# Patient Record
Sex: MALE | Race: BLACK OR AFRICAN AMERICAN | NOT HISPANIC OR LATINO | URBAN - METROPOLITAN AREA
[De-identification: names, ages, dates, MRNs, and addresses within clinical notes are randomized per-mention and may not be internally consistent; named-entity substitution may affect disease eponyms.]

---

## 2022-07-18 ENCOUNTER — EMERGENCY (EMERGENCY)
Facility: HOSPITAL | Age: 47
LOS: 1 days | Discharge: HOME | End: 2022-07-18
Attending: EMERGENCY MEDICINE | Admitting: EMERGENCY MEDICINE

## 2022-07-18 VITALS
TEMPERATURE: 98 F | DIASTOLIC BLOOD PRESSURE: 101 MMHG | SYSTOLIC BLOOD PRESSURE: 141 MMHG | HEART RATE: 89 BPM | RESPIRATION RATE: 18 BRPM | OXYGEN SATURATION: 97 %

## 2022-07-18 PROCEDURE — 99284 EMERGENCY DEPT VISIT MOD MDM: CPT

## 2022-07-18 RX ORDER — IBUPROFEN 200 MG
600 TABLET ORAL ONCE
Refills: 0 | Status: COMPLETED | OUTPATIENT
Start: 2022-07-18 | End: 2022-07-18

## 2022-07-18 RX ADMIN — Medication 600 MILLIGRAM(S): at 11:52

## 2022-07-18 NOTE — ED PROVIDER NOTE - OBJECTIVE STATEMENT
Offered pt professional , pt declined, wife at bedside fluent in english.   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 Pt occasionally takes tylenol for pain.   Paperwork provided states malignant melanoma with metastasis to multiple right lymph node. Pt was started on chemo, does not know what regimen. Pt has no insurance yet. Offered pt professional , pt declined, wife at bedside fluent in english.   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 Pt occasionally takes tylenol for pain.   Paperwork provided states malignant melanoma with metastasis to multiple right lymph node. Pt was started on chemo, does not know what regimen. Pt has no insurance yet.

## 2022-07-18 NOTE — ED PROVIDER NOTE - NSFOLLOWUPINSTRUCTIONS_ED_ALL_ED_FT
** You have metastatic ungual melanoma.   ** Take over the counter Tylenol or Ibuprofen for pain -- follow dosing directions on original bottle.    ** A rapid follow up appointment has been requested. The coordinator will call.   ** Go to the nearest Emergency Department if you experience any new or concerning symptoms, such as:   - worsening pain  - chest pain  - difficulty breathing  - passing out  - unable to eat or drink  - unable to move or feel part of your body  - fever, chills

## 2022-07-18 NOTE — ED PROVIDER NOTE - ATTENDING CONTRIBUTION TO CARE
GEN - NAD; well appearing; A+O x3   HEAD - NC/AT   EYES- PERRL, EOMI  ENT: Airway patent, mmm, Oral cavity and pharynx normal. No inflammation, swelling, exudate, or lesions.  NECK: Neck supple, non-tender without lymphadenopathy, no masses.  PULMONARY - CTA b/l, symmetric breath sounds.   CARDIAC -s1s2, RRR, no M,G,R  ABDOMEN - +BS, ND, NT, soft  BACK - no CVA tenderness, Normal  spine   EXTREMITIES - FROM, no edema, r. thumb with discoloration, hyperpigmentation and distortion of nail, no erythema/drainage, warmth, mild tender, brisk cap refill, full function to affected finger and hand, no swelling, +r. axillary lymph nodes palpable, minimally tender, no overlying axillary skin changes   SKIN - no rash or bruising   NEUROLOGIC - alert, speech clear, no focal deficits  PSYCH -nl mood/affect, nl insight.  a/p-irma winn-wife translating at patients request. Patient and wife report he was diagnosed with metastatic melanoma in [facility]-started chemo treatment while there but was determined they did not have the appropriate treatment regimen in [facility] so has come to us to resume treatment here. He has not yet sought care since he arrived. He describes chronic thumb pain for which he takes tylenol when severe, and does provide relief, has not taken any today. He notes previously diagnosed lymph nodes to his r. axilla. He is able to tolerate po, no fevers, chills, cough, cp, sob, abd pain, vomiting, diarrhea. On exam patient is nontoxic appearing, ao3, unlabored breathing, clear lungs, abd soft/nt, exts warm and well perfused-chronic appearing deformity to r. thumb c/w reported melanoma-axillary lymph node present as reported in patients paperwork indicating metastasis, is nvi. Patient has no acute complaints-is seeking establishment of care. He does not yet have insurance. BELLE consulted to assist with emergency insurance info for patient-[facility] soheila contacted [facility] to establish f/u with them for further management. Return precautions discussed.

## 2022-07-18 NOTE — ED PROVIDER NOTE - NS ED ROS FT
GENERAL: No fever, chills  EYES: no vision changes, no discharge.   ENT: no difficulty swallowing or speaking   CARDIAC: no chest pain/pressure, SOB, lower extremity swelling  PULMONARY: no cough, SOB  GI: no abdominal pain, n/v/d  : no dysuria, no hematuria  SKIN: + right first digit nail pain.   NEURO: no headache, lightheadedness  MSK: No joint pain, myalgia, weakness.

## 2022-07-18 NOTE — ED PROVIDER NOTE - PATIENT PORTAL LINK FT
10/18/18 2000   [facility] Group Therapy   Group Name Community Reintegration   Specific Interventions Other (see comments)  (leisure )   Participation Level Active   Participation Quality Cooperative   Insight/Motivation Good   Affect/Mood Display Appropriate   Cognition Alert      You can access the FollowMyHealth Patient Portal offered by [facility] by registering at the following website: [email or web address]. By joining [employer]’s FollowMyHealth portal, you will also be able to view your health information using other applications (apps) compatible with our system.

## 2022-07-18 NOTE — ED PROVIDER NOTE - PHYSICAL EXAMINATION
Gen: Well appearing in NAD   Head: NC/AT  Neck: trachea midline  Resp:  No distress  Ext: + right thumb nail deformity with ungual melanoma, no surrounding erythema or draining. + ri  Neuro:  A&O appears non focal  Skin:  + right thumb nail deformity with ungual melanoma, no surrounding erythema or draining.   Psych:  Normal affect and mood Gen: Well appearing in NAD   Head: NC/AT  Neck: trachea midline  Resp:  No distress  Ext: + right thumb nail deformity with ungual melanoma, no surrounding erythema or draining. + right axillary lymph node tenderness, no cervical or supraclavicular lymph nodes.   Neuro:  A&O appears non focal  Skin:  + right thumb nail deformity with ungual melanoma, no surrounding erythema or draining.   Psych:  Normal affect and mood

## 2022-07-18 NOTE — ED PROVIDER NOTE - CLINICAL SUMMARY MEDICAL DECISION MAKING FREE TEXT BOX
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 Pt occasionally takes tylenol for pain.   Paperwork provided states malignant melanoma with metastasis to multiple right lymph node. Ext: + right thumb nail deformity with ungual melanoma, no surrounding erythema or draining. + right axillary lymph node tenderness, no cervical or supraclavicular lymph nodes. No acute distress, well appearing. analgesia, consult SW and referral coordinator to facilitate follow up care with oncology.

## 2022-08-01 PROBLEM — Z00.00 ENCOUNTER FOR PREVENTIVE HEALTH EXAMINATION: Status: ACTIVE | Noted: 2022-08-01

## 2022-08-02 ENCOUNTER — OUTPATIENT (OUTPATIENT)
Dept: OUTPATIENT SERVICES | Facility: HOSPITAL | Age: 47
LOS: 1 days | Discharge: HOME | End: 2022-08-02

## 2022-08-02 ENCOUNTER — TRANSCRIPTION ENCOUNTER (OUTPATIENT)
Age: 47
End: 2022-08-02

## 2022-08-02 DIAGNOSIS — C43.9 MALIGNANT MELANOMA OF SKIN, UNSPECIFIED: ICD-10-CM

## 2022-08-03 ENCOUNTER — APPOINTMENT (OUTPATIENT)
Dept: HEMATOLOGY ONCOLOGY | Facility: CLINIC | Age: 47
End: 2022-08-03

## 2022-08-03 ENCOUNTER — RESULT REVIEW (OUTPATIENT)
Age: 47
End: 2022-08-03

## 2022-08-03 ENCOUNTER — NON-APPOINTMENT (OUTPATIENT)
Age: 47
End: 2022-08-03

## 2022-08-03 VITALS
BODY MASS INDEX: 31.28 KG/M2 | RESPIRATION RATE: 16 BRPM | HEART RATE: 78 BPM | OXYGEN SATURATION: 98 % | WEIGHT: 216.05 LBS | SYSTOLIC BLOOD PRESSURE: 134 MMHG | HEIGHT: 69.49 IN | TEMPERATURE: 97.1 F | DIASTOLIC BLOOD PRESSURE: 95 MMHG

## 2022-08-03 LAB
BASOPHILS # BLD AUTO: 0.03 K/UL — SIGNIFICANT CHANGE UP (ref 0–0.2)
BASOPHILS NFR BLD AUTO: 0.8 % — SIGNIFICANT CHANGE UP (ref 0–2)
EOSINOPHIL # BLD AUTO: 0.23 K/UL — SIGNIFICANT CHANGE UP (ref 0–0.5)
EOSINOPHIL NFR BLD AUTO: 6 % — SIGNIFICANT CHANGE UP (ref 0–6)
HCT VFR BLD CALC: 43.6 % — SIGNIFICANT CHANGE UP (ref 39–50)
HGB BLD-MCNC: 14.5 G/DL — SIGNIFICANT CHANGE UP (ref 13–17)
IMM GRANULOCYTES NFR BLD AUTO: 0.3 % — SIGNIFICANT CHANGE UP (ref 0–1.5)
LYMPHOCYTES # BLD AUTO: 1.72 K/UL — SIGNIFICANT CHANGE UP (ref 1–3.3)
LYMPHOCYTES # BLD AUTO: 44.7 % — HIGH (ref 13–44)
MCHC RBC-ENTMCNC: 28.3 PG — SIGNIFICANT CHANGE UP (ref 27–34)
MCHC RBC-ENTMCNC: 33.3 G/DL — SIGNIFICANT CHANGE UP (ref 32–36)
MCV RBC AUTO: 85 FL — SIGNIFICANT CHANGE UP (ref 80–100)
MONOCYTES # BLD AUTO: 0.4 K/UL — SIGNIFICANT CHANGE UP (ref 0–0.9)
MONOCYTES NFR BLD AUTO: 10.4 % — SIGNIFICANT CHANGE UP (ref 2–14)
NEUTROPHILS # BLD AUTO: 1.46 K/UL — LOW (ref 1.8–7.4)
NEUTROPHILS NFR BLD AUTO: 37.8 % — LOW (ref 43–77)
NRBC # BLD: 0 /100 WBCS — SIGNIFICANT CHANGE UP (ref 0–0)
PLATELET # BLD AUTO: 223 K/UL — SIGNIFICANT CHANGE UP (ref 150–400)
RBC # BLD: 5.13 M/UL — SIGNIFICANT CHANGE UP (ref 4.2–5.8)
RBC # FLD: 12.9 % — SIGNIFICANT CHANGE UP (ref 10.3–14.5)
WBC # BLD: 3.85 K/UL — SIGNIFICANT CHANGE UP (ref 3.8–10.5)
WBC # FLD AUTO: 3.85 K/UL — SIGNIFICANT CHANGE UP (ref 3.8–10.5)

## 2022-08-03 PROCEDURE — 99205 OFFICE O/P NEW HI 60 MIN: CPT | Mod: 25

## 2022-08-03 PROCEDURE — 93010 ELECTROCARDIOGRAM REPORT: CPT

## 2022-08-03 PROCEDURE — 99417 PROLNG OP E/M EACH 15 MIN: CPT

## 2022-08-03 NOTE — HISTORY OF PRESENT ILLNESS
[de-identified] : Mr. Burk is a 47 year old Citizen of [place] speaking gentlemen presenting to the office for an initial consultation of melanoma.\par \par Patient has history of ungual melanoma of right thumb with metastases to multiple right axillary lymph nodes.\par He first noticed the change in color of the nail > 1 year.\par He underwent node resection 12/2021 and 3 nodes between 6-9 cm were removed at the time.\par \par He received treatment from January 10 to March 28 2022 at [facility] in [facility].\par Patient received 4 cycles of dacarbazine 250 mg/m2 x 5 days every 28 days.\par He states that the nail tumor improved with chemotherapy.\par The bleeding stopped.\par Since off of chemotherapy the pain is getting worse.\par \par He notes pain in the epigastrum for the past 3 weeks that is worse at night.\par It is ~ 3/10 in severity and is persistent.\par \par Patient referred to US oncology for treatment with immunotherapy.\par \par COVID Vaccine Screen Fix [employer] J&J 12/5/22 (no booster) [de-identified] : Medical Oncology ([facility]): Guy Shetty  [phone]\par \par \par PTs Contact    Inocencia Tovar (spouse) / [phone]\par

## 2022-08-03 NOTE — PHYSICAL EXAM
[Restricted in physically strenuous activity but ambulatory and able to carry out work of a light or sedentary nature] : Status 1- Restricted in physically strenuous activity but ambulatory and able to carry out work of a light or sedentary nature, e.g., light house work, office work [Normal] : affect appropriate [de-identified] : epigastric tenderness [de-identified] : destructive pigmented lesion on the right thumb that completely replaces the nail; right axilla node ~ 2 cm.

## 2022-08-04 ENCOUNTER — OUTPATIENT (OUTPATIENT)
Dept: OUTPATIENT SERVICES | Facility: HOSPITAL | Age: 47
LOS: 1 days | End: 2022-08-04
Payer: MEDICAID

## 2022-08-04 ENCOUNTER — APPOINTMENT (OUTPATIENT)
Dept: MRI IMAGING | Facility: IMAGING CENTER | Age: 47
End: 2022-08-04

## 2022-08-04 DIAGNOSIS — C43.61 MALIGNANT MELANOMA OF RIGHT UPPER LIMB, INCLUDING SHOULDER: ICD-10-CM

## 2022-08-04 PROCEDURE — 70553 MRI BRAIN STEM W/O & W/DYE: CPT

## 2022-08-04 PROCEDURE — A9585: CPT

## 2022-08-04 PROCEDURE — 70553 MRI BRAIN STEM W/O & W/DYE: CPT | Mod: 26

## 2022-08-05 LAB
25(OH)D3 SERPL-MCNC: 27.3 NG/ML
ALBUMIN SERPL ELPH-MCNC: 4.8 G/DL
ALP BLD-CCNC: 57 U/L
ALT SERPL-CCNC: 14 U/L
AMYLASE/CREAT SERPL: 95 U/L
ANION GAP SERPL CALC-SCNC: 10 MMOL/L
APTT BLD: 30.7 SEC
AST SERPL-CCNC: 18 U/L
BILIRUB SERPL-MCNC: 0.4 MG/DL
BUN SERPL-MCNC: 8 MG/DL
CALCIUM SERPL-MCNC: 9.6 MG/DL
CHLORIDE SERPL-SCNC: 103 MMOL/L
CHOLEST SERPL-MCNC: 210 MG/DL
CO2 SERPL-SCNC: 29 MMOL/L
COVID-19 NUCLEOCAPSID  GAM ANTIBODY INTERPRETATION: POSITIVE
COVID-19 SPIKE DOMAIN ANTIBODY INTERPRETATION: POSITIVE
CREAT SERPL-MCNC: 1.25 MG/DL
CRP SERPL-MCNC: 4 MG/L
EGFR: 71 ML/MIN/1.73M2
ESTIMATED AVERAGE GLUCOSE: 123 MG/DL
FERRITIN SERPL-MCNC: 210 NG/ML
GLUCOSE SERPL-MCNC: 101 MG/DL
HBA1C MFR BLD HPLC: 5.9 %
HBV SURFACE AB SER QL: NONREACTIVE
HBV SURFACE AG SER QL: NONREACTIVE
HCV AB SER QL: NONREACTIVE
HCV S/CO RATIO: 0.48 S/CO
HDLC SERPL-MCNC: 41 MG/DL
INR PPP: 1.08 RATIO
LDH SERPL-CCNC: 170 U/L
LDLC SERPL CALC-MCNC: 136 MG/DL
LPL SERPL-CCNC: 24 U/L
NONHDLC SERPL-MCNC: 169 MG/DL
POTASSIUM SERPL-SCNC: 3.7 MMOL/L
PROT SERPL-MCNC: 7.8 G/DL
PSA SERPL-MCNC: 0.74 NG/ML
PT BLD: 12.6 SEC
SARS-COV-2 AB SERPL IA-ACNC: >250 U/ML
SARS-COV-2 AB SERPL QL IA: 117 INDEX
SODIUM SERPL-SCNC: 142 MMOL/L
TRIGL SERPL-MCNC: 168 MG/DL
TSH SERPL-ACNC: 0.46 UIU/ML

## 2022-08-06 ENCOUNTER — APPOINTMENT (OUTPATIENT)
Dept: NUCLEAR MEDICINE | Facility: IMAGING CENTER | Age: 47
End: 2022-08-06

## 2022-08-06 ENCOUNTER — OUTPATIENT (OUTPATIENT)
Dept: OUTPATIENT SERVICES | Facility: HOSPITAL | Age: 47
LOS: 1 days | End: 2022-08-06
Payer: MEDICAID

## 2022-08-06 DIAGNOSIS — C43.61 MALIGNANT MELANOMA OF RIGHT UPPER LIMB, INCLUDING SHOULDER: ICD-10-CM

## 2022-08-06 DIAGNOSIS — Z00.8 ENCOUNTER FOR OTHER GENERAL EXAMINATION: ICD-10-CM

## 2022-08-06 PROCEDURE — 78816 PET IMAGE W/CT FULL BODY: CPT

## 2022-08-06 PROCEDURE — 78816 PET IMAGE W/CT FULL BODY: CPT | Mod: 26,PI

## 2022-08-06 PROCEDURE — A9552: CPT

## 2022-08-08 ENCOUNTER — NON-APPOINTMENT (OUTPATIENT)
Age: 47
End: 2022-08-08

## 2022-08-09 ENCOUNTER — RESULT REVIEW (OUTPATIENT)
Age: 47
End: 2022-08-09

## 2022-08-17 ENCOUNTER — RESULT REVIEW (OUTPATIENT)
Age: 47
End: 2022-08-17

## 2022-08-17 ENCOUNTER — OUTPATIENT (OUTPATIENT)
Dept: OUTPATIENT SERVICES | Facility: HOSPITAL | Age: 47
LOS: 1 days | End: 2022-08-17
Payer: MEDICAID

## 2022-08-17 ENCOUNTER — APPOINTMENT (OUTPATIENT)
Dept: ULTRASOUND IMAGING | Facility: IMAGING CENTER | Age: 47
End: 2022-08-17

## 2022-08-17 DIAGNOSIS — Z00.8 ENCOUNTER FOR OTHER GENERAL EXAMINATION: ICD-10-CM

## 2022-08-17 PROCEDURE — 88305 TISSUE EXAM BY PATHOLOGIST: CPT | Mod: 26

## 2022-08-17 PROCEDURE — 88173 CYTOPATH EVAL FNA REPORT: CPT | Mod: 26

## 2022-08-17 PROCEDURE — 88172 CYTP DX EVAL FNA 1ST EA SITE: CPT

## 2022-08-17 PROCEDURE — 10005 FNA BX W/US GDN 1ST LES: CPT

## 2022-08-17 PROCEDURE — 88342 IMHCHEM/IMCYTCHM 1ST ANTB: CPT

## 2022-08-17 PROCEDURE — 88173 CYTOPATH EVAL FNA REPORT: CPT

## 2022-08-17 PROCEDURE — 88342 IMHCHEM/IMCYTCHM 1ST ANTB: CPT | Mod: 26

## 2022-08-17 PROCEDURE — 88341 IMHCHEM/IMCYTCHM EA ADD ANTB: CPT | Mod: 26

## 2022-08-17 PROCEDURE — 88305 TISSUE EXAM BY PATHOLOGIST: CPT

## 2022-08-17 PROCEDURE — 88341 IMHCHEM/IMCYTCHM EA ADD ANTB: CPT

## 2022-08-23 LAB — NON-GYNECOLOGICAL CYTOLOGY STUDY: SIGNIFICANT CHANGE UP

## 2022-08-30 ENCOUNTER — APPOINTMENT (OUTPATIENT)
Dept: INTERVENTIONAL RADIOLOGY/VASCULAR | Facility: CLINIC | Age: 47
End: 2022-08-30

## 2022-08-30 ENCOUNTER — APPOINTMENT (OUTPATIENT)
Dept: HEMATOLOGY ONCOLOGY | Facility: CLINIC | Age: 47
End: 2022-08-30

## 2022-08-30 ENCOUNTER — APPOINTMENT (OUTPATIENT)
Dept: INFUSION THERAPY | Facility: HOSPITAL | Age: 47
End: 2022-08-30

## 2022-08-30 DIAGNOSIS — Z78.9 OTHER SPECIFIED HEALTH STATUS: ICD-10-CM

## 2022-08-30 PROCEDURE — 99203 OFFICE O/P NEW LOW 30 MIN: CPT | Mod: 95

## 2022-08-30 NOTE — DATA REVIEWED
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 , initial treatment strategy\par \par HISTORY: 47-year-old patient with ungual melanoma right thumb and nodes in right axilla\par ungual melanoma of right thumb with metastases to multiple right axillary lymph nodes.\par He first noticed the change in color of the nail > 1 year.\par He underwent node resection 12/2021 and 3 nodes between 6-9 cm were removed at the time.\par \par He received treatment from January 10 to March 28 2022 at [facility] in [facility].\par Patient received 4 cycles of dacarbazine 250 mg/m2 x 5 days every 28 days.\par He states that the nail tumor improved with chemotherapy.\par The bleeding stopped.\par Since off of chemotherapy the pain is getting worse.\par Blood glucose pre injection 97 mg/dL\par TECHNIQUE: Approximately 45 minutes after the intravenous administration of 11.5 mCi 18-Fluorine FDG, whole body PET images were acquired from top of head to bottom of feet . In addition, non-contrast, low dose, non - diagnostic CT was acquired for attenuation correction and anatomic correlation purposes only.\par These images reveal pathologic FDG uptake coregistering with the distal phalanx of the right finger (SUV 6.2; image 264-268) suspicious for biologic tumor activity in light of the history (note technical misregistration between the CT and emission images of the right hand) and FDG avid 1.4 x 0.9 cm solid right middle lobe pulmonary nodule with SUV 3.6, avid on nonattenuation corrected images as well, (image 134) suspicious for biologic tumor activity\par \par There is focal uptake in the left forearm (image 227 SUV 3.5) which is possibly related to injection in the left hand IV and physical examination of the left forearm suggested\par Subcentimeter right axillary adenopathy SUV 2.6 (example image 100) which is nonspecific despite history of metastases to right axilla.\par \par IMPRESSION:\par Pathologic FDG uptake in the right first finger and 1.4 x 0.9 cm solid right middle lobe pulmonary nodule (SUV 3.6, and avid on NAC images, image 134) suspicious for biologic tumor activity in light of the history\par \par -distal phalanx of the right first finger (SUV 6.2; image 264-268) (technical misregistration between CT and emission images of the right hand)\par - focal uptake in the left forearm (image 227 SUV 3.5) - may be related to injection site in "left hand IV". Physical examination of the left forearm suggested\par -Subcentimeter right axillary adenopathy SUV 2.6 (example image 100) -nonspecific despite history of metastases to right axilla\par \par No other sites of abnormal FDG uptake\par \par --- End of Report ---\par \par \par \par \par \par \par ABDIAS BENAVIDES DO; Attending Nuclear Medicine\par This document has been electronically signed. Aug  6 2022 11:55PM\par

## 2022-08-30 NOTE — HISTORY OF PRESENT ILLNESS
[FreeTextEntry1] : Mr. Burk is a 47 year old [employer] [facility] speaking gentlemen being evaluated for melanoma referred to IR for biopsy. Patient accompanied with spouse Diane Tovar. \par \par Patient has history of ungual melanoma of right thumb with metastases to multiple right axillary lymph nodes.\par He first noticed the change in color of the nail > 1 year. He underwent node resection 12/2021 and 3 nodes between 6-9 cm were removed at the time. He received treatment from January 10 to March 28 2022 at [facility] in [facility]. Patient received 4 cycles of dacarbazine 250 mg/m2 x 5 days every 28 days. He states that the nail tumor improved with chemotherapy & the bleeding stopped. Since off of chemotherapy the pain is getting worse.\par \par Pt s/p now axiliary LN biopsy on 8/17 resulting nonmalignant; now referred to IR for lung biopsy to assess for pathology and molecular genetics. \par \par \par Heme/Onc: Dr. Flores\par \par \par \par Medical Oncology ([facility]): Guy Shetty [phone]\par \par

## 2022-08-30 NOTE — ASSESSMENT
[Other: _____] : [unfilled] [FreeTextEntry1] : Mr. Burk is a 47 year old [place] speaking gentlemen being evaluated for melanoma referred to IR for right lung lesion biopsy. \par \par #1  Right middle lobe pulmonary nodule\par - PET from 8/6/22 demonstrating Pathologic FDG uptake in the right first finger and 1.4 x 0.9 cm solid right middle lobe pulmonary nodule (SUV 3.6, and avid on NAC images, image 134) suspicious for biologic tumor activity in light of the history; distal phalanx of the right first finger (SUV 6.2; image 264-268) (technical misregistration between CT and emission images of the right hand)\par - image findings discussed at length with patient\par - I reviewed image guided lung biopsy procedure, including the risks (pneumothorax, chest tube placement/hospitalization, bleeding, etc), benefits, alternatives, and expected post procedure course.\par \par I confirmed with Dr. Marcos the indication and rationale for performing the TNB of the right lung mass to obtain tissue to guide therapy at this point in time.\par \par \par Mr. Burk and his wife's comprehension was confirmed & all questions were answered to his satisfaction.\par \par IR contact information was provided to the pt & I discussed that our booking office will be in touch to schedule Covid PCR test, PST & procedure once she contacts our office. \par

## 2022-09-12 ENCOUNTER — TRANSCRIPTION ENCOUNTER (OUTPATIENT)
Age: 47
End: 2022-09-12

## 2022-09-12 ENCOUNTER — RESULT REVIEW (OUTPATIENT)
Age: 47
End: 2022-09-12

## 2022-09-12 ENCOUNTER — OUTPATIENT (OUTPATIENT)
Dept: OUTPATIENT SERVICES | Facility: HOSPITAL | Age: 47
LOS: 1 days | End: 2022-09-12
Payer: MEDICAID

## 2022-09-12 VITALS
TEMPERATURE: 98 F | RESPIRATION RATE: 20 BRPM | SYSTOLIC BLOOD PRESSURE: 144 MMHG | DIASTOLIC BLOOD PRESSURE: 110 MMHG | WEIGHT: 216.05 LBS | OXYGEN SATURATION: 97 % | HEIGHT: 69 IN | HEART RATE: 91 BPM

## 2022-09-12 VITALS
SYSTOLIC BLOOD PRESSURE: 128 MMHG | RESPIRATION RATE: 16 BRPM | HEART RATE: 60 BPM | OXYGEN SATURATION: 100 % | DIASTOLIC BLOOD PRESSURE: 72 MMHG

## 2022-09-12 DIAGNOSIS — C43.61 MALIGNANT MELANOMA OF RIGHT UPPER LIMB, INCLUDING SHOULDER: ICD-10-CM

## 2022-09-12 LAB — SARS-COV-2 RNA SPEC QL NAA+PROBE: SIGNIFICANT CHANGE UP

## 2022-09-12 PROCEDURE — 88305 TISSUE EXAM BY PATHOLOGIST: CPT

## 2022-09-12 PROCEDURE — 88173 CYTOPATH EVAL FNA REPORT: CPT

## 2022-09-12 PROCEDURE — C1894: CPT

## 2022-09-12 PROCEDURE — 88173 CYTOPATH EVAL FNA REPORT: CPT | Mod: 26

## 2022-09-12 PROCEDURE — 10009 FNA BX W/CT GDN 1ST LES: CPT

## 2022-09-12 PROCEDURE — 88341 IMHCHEM/IMCYTCHM EA ADD ANTB: CPT

## 2022-09-12 PROCEDURE — 88305 TISSUE EXAM BY PATHOLOGIST: CPT | Mod: 26

## 2022-09-12 PROCEDURE — U0003: CPT

## 2022-09-12 PROCEDURE — 71045 X-RAY EXAM CHEST 1 VIEW: CPT

## 2022-09-12 PROCEDURE — 71045 X-RAY EXAM CHEST 1 VIEW: CPT | Mod: 26

## 2022-09-12 PROCEDURE — 88342 IMHCHEM/IMCYTCHM 1ST ANTB: CPT | Mod: 26

## 2022-09-12 RX ORDER — FENTANYL CITRATE 50 UG/ML
50 INJECTION INTRAVENOUS
Refills: 0 | Status: DISCONTINUED | OUTPATIENT
Start: 2022-09-12 | End: 2022-09-12

## 2022-09-12 NOTE — ASU PATIENT PROFILE, ADULT - FALL HARM RISK - UNIVERSAL INTERVENTIONS
Bed in lowest position, wheels locked, appropriate side rails in place/Call bell, personal items and telephone in reach/Instruct patient to call for assistance before getting out of bed or chair/Non-slip footwear when patient is out of bed/[place] to call system/Physically safe environment - no spills, clutter or unnecessary equipment/Purposeful Proactive Rounding/Room/bathroom lighting operational, light cord in reach

## 2022-09-12 NOTE — PROCEDURE NOTE - PROCEDURE FINDINGS AND DETAILS
Patient placed supine on CT table. Right lung nodule identified on CT imaging. Right lung nodule accessed under CT imaging. FNA of the right lung nodule was performed x1. Repeat imaging demonstrated a right sided pneumothorax. Pleural air was then aspirated with a syringe. Repeat imaging demonstrated resolution of pneumothorax with trace residual pneumothorax. Patient placed supine on CT table. Right lung nodule identified on CT imaging. Right lung nodule accessed under CT imaging. FNA of the right lung nodule was performed x1 using 20 g needle via 19 g guiding needle. Repeat imaging demonstrated a right sided pneumothorax. Pleural air was then aspirated with a syringe via 5 [place] Islander modified sheath placed using Seldinger technique. Repeat imaging demonstrated resolution of pneumothorax with trace residual pneumothorax.  Efforts to continue biopsy for more tissue were unsuccessful due to PTX.  Specimen was reviewed by cytopathologist present.  Full report to follow.

## 2022-09-12 NOTE — PROCEDURE NOTE - PLAN
Recovery for atleast 2 hours.  Chest Xray at 1PM and 3PM. Discharge pending results of 3PM chest Xray  Follow up results of FNA.

## 2022-09-12 NOTE — ASU PREOP CHECKLIST - ALLERGIES REVIEWED
Dr. Alberto Faust regarding antibiotic duration for patient via perfect serve. Awaiting call back. done

## 2022-09-12 NOTE — ASU DISCHARGE PLAN (ADULT/PEDIATRIC) - NS MD DC FALL RISK RISK
For information on Fall & Injury Prevention, visit: [email or web address] OR  [email or web address] OR  https://www.cdc.gov/steadi/patient.html

## 2022-09-12 NOTE — ASU DISCHARGE PLAN (ADULT/PEDIATRIC) - NURSING INSTRUCTIONS
Please feel free to contact us at [phone] if any problems arise. After 6PM, Monday through Friday, on weekends and on holidays, please call [phone] and ask for the radiology resident on call to be paged.

## 2022-09-12 NOTE — PRE PROCEDURE NOTE - PRE PROCEDURE EVALUATION
Interventional Radiology    HPI: 47y Male with melanoma referred to IR for right lung lesion biopsy to obtain tissue to guide therapy. PET from 8/6/22 demonstrating pathologic FDC uptake in the right first finger and 1.4x 0.9 cm solid right middle lobe pulmonary nodule. IR to obtain right lung lesion biopsy.      Allergies:   Medications (Abx/Cardiac/Anticoagulation/Blood Products)      Data:  175.3  98  T(C): 36.8  HR: 91  BP: 144/110  RR: 20  SpO2: 97%    Exam  General: No acute distress  Chest: Non labored breathing  Abdomen: Non-distended  Extremities: No swelling, warm          Imaging:   Imaging reviewed     Plan: Right lung lesion biopsy     -- Relevant imaging and labs were reviewed.   -- No additional antibiotics are indicated for this procedure.   -- Risks, benefits, and alternatives were explained to the patient and informed consent was obtained.

## 2022-09-12 NOTE — ASU DISCHARGE PLAN (ADULT/PEDIATRIC) - ASU DC SPECIAL INSTRUCTIONSFT
Biopsy Discharge    Discharge Instructions  - You have had a biopsy of right lung nodule.   - You may shower in 24 hours. No soaking or swimming until the site is completely healed.  - Keep the area covered and dry for the next 24 hours.  - Do not perform any heavy lifting for the next few days or until the site is healed.  - You may resume your normal diet.  - You may resume your normal medications however you should wait 48 hours before restarting aspirin, plavix, or blood thinners.  - It is normal to experience some pain over the site for the next few days. You may take apply ice to the area (20 minutes on, 20 minutes off) and take Tylenol for that pain. Do not take more frequently than every 6 hours and do not exceed more than 3000mg of Tylenol in a 24 hour period.    - You were given conscious sedation which may make you drowsy, therefore you need someone to stay with you until the morning following the procedure.  - Do not drive, engage in heavy lifting or strenuous activity, or drink any alcoholic beverages for the next 24 hours.   - You may resume normal activity in 24 hours.    Notify your primary physician and/or Interventional Radiology IMMEDIATELY if you experience any of the following       - Fever of 100.4F or 38C       - Chills or Rigors/ Shakes       - Swelling and/or Redness in the area around the biopsy site       - Worsening Pain       - Blood soaked bandages or worsening bleeding       - Lightheadedness and/or dizziness upon standing       - Chest Pain/ Tightness       - Shortness of Breath       - Difficulty walking    If you have a problem that you believe requires IMMEDIATE attention, please go to your NEAREST Emergency Room. If you believe your problem can safely wait until you speak to a physician, please call Interventional Radiology for any concerns.    During Normal Weekday Business Hours- You can contact the Interventional Radiology department during normal business hours via telephone.  During Evenings and Weekends- If you need to contact Interventional Radiology during off hours, do so by calling the hospital and requesting to be connected to the Interventional Radiologist on call.

## 2022-09-15 ENCOUNTER — NON-APPOINTMENT (OUTPATIENT)
Age: 47
End: 2022-09-15

## 2022-09-18 LAB — NON-GYNECOLOGICAL CYTOLOGY STUDY: SIGNIFICANT CHANGE UP

## 2022-09-19 ENCOUNTER — NON-APPOINTMENT (OUTPATIENT)
Age: 47
End: 2022-09-19

## 2022-09-19 DIAGNOSIS — R91.8 OTHER NONSPECIFIC ABNORMAL FINDING OF LUNG FIELD: ICD-10-CM

## 2022-09-20 ENCOUNTER — APPOINTMENT (OUTPATIENT)
Dept: HEMATOLOGY ONCOLOGY | Facility: CLINIC | Age: 47
End: 2022-09-20

## 2022-09-20 ENCOUNTER — APPOINTMENT (OUTPATIENT)
Dept: INFUSION THERAPY | Facility: HOSPITAL | Age: 47
End: 2022-09-20

## 2022-09-20 VITALS
DIASTOLIC BLOOD PRESSURE: 87 MMHG | BODY MASS INDEX: 30.83 KG/M2 | RESPIRATION RATE: 16 BRPM | HEART RATE: 82 BPM | HEIGHT: 69.49 IN | WEIGHT: 212.94 LBS | TEMPERATURE: 97.2 F | SYSTOLIC BLOOD PRESSURE: 128 MMHG | OXYGEN SATURATION: 99 %

## 2022-09-20 PROCEDURE — 99214 OFFICE O/P EST MOD 30 MIN: CPT

## 2022-09-20 NOTE — HISTORY OF PRESENT ILLNESS
[de-identified] : Mr. Burk is a 47 year old [place] speaking gentlemen presenting to the office for an initial consultation of melanoma.\par \par Patient has history of ungual melanoma of right thumb with metastases to multiple right axillary lymph nodes.\par He first noticed the change in color of the nail > 1 year.\par He underwent node resection 12/2021 and 3 nodes between 6-9 cm were removed at the time.\par \par He received treatment from January 10 to March 28 2022 at [facility] in [facility].\par Patient received 4 cycles of dacarbazine 250 mg/m2 x 5 days every 28 days.\par He states that the nail tumor improved with chemotherapy.\par The bleeding stopped.\par Since off of chemotherapy the pain is getting worse.\par \par He notes pain in the epigastrum for the past 3 weeks that is worse at night.\par It is ~ 3/10 in severity and is persistent.\par \par Patient referred to US oncology for treatment with immunotherapy.\par \par COVID Vaccine [employer] J&J 12/5/22 (no booster)\par \par 9/20/2022\par Both the right axilla biopsy and lung biopsy are negative.\par However could be false negative.\par His thumb pain is significant and might need amputation.\par We still need pathologic diagnosis and molecular genetics.\par 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 Herrera on 9/27.\par  [de-identified] : Medical Oncology ([facility]): Guy Shetty  [phone]\par \par \par PTs Contact    Inocencia Tovar (spouse) / [phone]\par

## 2022-09-21 LAB
ANION GAP SERPL CALC-SCNC: 14 MMOL/L
APTT BLD: 29.7 SEC
BASOPHILS # BLD AUTO: 0.05 K/UL
BASOPHILS NFR BLD AUTO: 1 %
BUN SERPL-MCNC: 11 MG/DL
CALCIUM SERPL-MCNC: 9.7 MG/DL
CHLORIDE SERPL-SCNC: 104 MMOL/L
CO2 SERPL-SCNC: 25 MMOL/L
CREAT SERPL-MCNC: 1.24 MG/DL
EGFR: 72 ML/MIN/1.73M2
EOSINOPHIL # BLD AUTO: 0.38 K/UL
EOSINOPHIL NFR BLD AUTO: 7.3 %
GLUCOSE SERPL-MCNC: 127 MG/DL
HCT VFR BLD CALC: 43.2 %
HGB BLD-MCNC: 14.8 G/DL
IMM GRANULOCYTES NFR BLD AUTO: 0 %
INR PPP: 0.99 RATIO
LYMPHOCYTES # BLD AUTO: 2.29 K/UL
LYMPHOCYTES NFR BLD AUTO: 44 %
MAN DIFF?: NORMAL
MCHC RBC-ENTMCNC: 28.6 PG
MCHC RBC-ENTMCNC: 34.3 GM/DL
MCV RBC AUTO: 83.4 FL
MONOCYTES # BLD AUTO: 0.54 K/UL
MONOCYTES NFR BLD AUTO: 10.4 %
NEUTROPHILS # BLD AUTO: 1.95 K/UL
NEUTROPHILS NFR BLD AUTO: 37.3 %
PLATELET # BLD AUTO: 209 K/UL
POTASSIUM SERPL-SCNC: 4.1 MMOL/L
PT BLD: 11.5 SEC
RBC # BLD: 5.18 M/UL
RBC # FLD: 13.1 %
SODIUM SERPL-SCNC: 143 MMOL/L
WBC # FLD AUTO: 5.21 K/UL

## 2022-09-27 ENCOUNTER — APPOINTMENT (OUTPATIENT)
Dept: SURGICAL ONCOLOGY | Facility: CLINIC | Age: 47
End: 2022-09-27

## 2022-09-27 VITALS
WEIGHT: 216 LBS | TEMPERATURE: 98 F | OXYGEN SATURATION: 100 % | RESPIRATION RATE: 17 BRPM | BODY MASS INDEX: 31.99 KG/M2 | DIASTOLIC BLOOD PRESSURE: 87 MMHG | HEIGHT: 69 IN | SYSTOLIC BLOOD PRESSURE: 131 MMHG | HEART RATE: 74 BPM

## 2022-09-27 PROCEDURE — 99205 OFFICE O/P NEW HI 60 MIN: CPT

## 2022-09-28 ENCOUNTER — RESULT REVIEW (OUTPATIENT)
Age: 47
End: 2022-09-28

## 2022-10-04 ENCOUNTER — OUTPATIENT (OUTPATIENT)
Dept: OUTPATIENT SERVICES | Facility: HOSPITAL | Age: 47
LOS: 1 days | End: 2022-10-04
Payer: MEDICAID

## 2022-10-04 ENCOUNTER — APPOINTMENT (OUTPATIENT)
Dept: CT IMAGING | Facility: IMAGING CENTER | Age: 47
End: 2022-10-04

## 2022-10-04 DIAGNOSIS — Z00.8 ENCOUNTER FOR OTHER GENERAL EXAMINATION: ICD-10-CM

## 2022-10-04 DIAGNOSIS — C43.61 MALIGNANT MELANOMA OF RIGHT UPPER LIMB, INCLUDING SHOULDER: ICD-10-CM

## 2022-10-04 PROCEDURE — 71260 CT THORAX DX C+: CPT | Mod: 26

## 2022-10-04 PROCEDURE — 71260 CT THORAX DX C+: CPT

## 2022-10-05 ENCOUNTER — OUTPATIENT (OUTPATIENT)
Dept: OUTPATIENT SERVICES | Facility: HOSPITAL | Age: 47
LOS: 1 days | Discharge: HOME | End: 2022-10-05

## 2022-10-05 DIAGNOSIS — C34.90 MALIGNANT NEOPLASM OF UNSPECIFIED PART OF UNSPECIFIED BRONCHUS OR LUNG: ICD-10-CM

## 2022-10-10 NOTE — ED PROVIDER NOTE - NSCAREINITIATED _GEN_ER
[facility]    Patient currently receiving [facility] of:     • Nursing: was unable to see patient at home due to rehospitalization.     Home Health plan of care is available in [facility].      Home care goals have not been met. Please place orders to resume services upon discharge if appropriate. [facility] ([phone]) will continue to follow until discharge.     Bridget Walters RN, BSN   [facility] Liaison  Phone: [phone]   Marlena Jones(Attending)

## 2022-10-11 ENCOUNTER — APPOINTMENT (OUTPATIENT)
Dept: HEMATOLOGY ONCOLOGY | Facility: CLINIC | Age: 47
End: 2022-10-11

## 2022-10-11 ENCOUNTER — APPOINTMENT (OUTPATIENT)
Dept: INFUSION THERAPY | Facility: HOSPITAL | Age: 47
End: 2022-10-11

## 2022-10-11 VITALS
SYSTOLIC BLOOD PRESSURE: 126 MMHG | OXYGEN SATURATION: 99 % | RESPIRATION RATE: 17 BRPM | DIASTOLIC BLOOD PRESSURE: 87 MMHG | BODY MASS INDEX: 31.16 KG/M2 | WEIGHT: 210.98 LBS | HEART RATE: 79 BPM | TEMPERATURE: 97.8 F

## 2022-10-11 PROCEDURE — 99214 OFFICE O/P EST MOD 30 MIN: CPT

## 2022-10-11 NOTE — HISTORY OF PRESENT ILLNESS
[de-identified] : Mr. Burk is a 47 year old [place] speaking gentlemen presenting to the office for an initial consultation of melanoma.\par \par Patient has history of ungual melanoma of right thumb with metastases to multiple right axillary lymph nodes.\par He first noticed the change in color of the nail > 1 year.\par He underwent node resection 12/2021 and 3 nodes between 6-9 cm were removed at the time.\par \par He received treatment from January 10 to March 28 2022 at [facility] in [facility].\par Patient received 4 cycles of dacarbazine 250 mg/m2 x 5 days every 28 days.\par He states that the nail tumor improved with chemotherapy.\par The bleeding stopped.\par Since off of chemotherapy the pain is getting worse.\par \par He notes pain in the epigastrum for the past 3 weeks that is worse at night.\par It is ~ 3/10 in severity and is persistent.\par \par Patient referred to US oncology for treatment with immunotherapy.\par \par COVID Vaccine [employer] J&[employer]ub[employer] 12/5/22 (no booster)\par \par 9/20/2022\par Both the right axilla biopsy and lung biopsy are negative.\par However could be false negative.\par His thumb pain is significant and might need amputation.\par We still need pathologic diagnosis and molecular genetics.\par 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 Herrera on 9/27.\par \par 10/22/2022\par I discussed case with surgeon.\par Given lack of tissue positive on lung or axilla biopsy, will proceed with distal right thumb amputation.\par Patient has been in pain and also notes intermittent discharge.\par Repeat CT scan of Rr Lung lesion shows mile increase in size of lesion.\par Patient will then also need to have this lesion resected.\par He will return to see me 2-3 weeks post-op and we will begin 1 year of adjuvant immunotherapy.  [de-identified] : Medical Oncology ([facility]): Guy Shetty  [phone]\par \par \par PTs Contact    Inocencia Tovar (spouse) / [phone]\par

## 2022-10-11 NOTE — PHYSICAL EXAM
[Restricted in physically strenuous activity but ambulatory and able to carry out work of a light or sedentary nature] : Status 1- Restricted in physically strenuous activity but ambulatory and able to carry out work of a light or sedentary nature, e.g., light house work, office work [Normal] : normoactive bowel sounds, soft and nontender, no hepatosplenomegaly or masses appreciated [de-identified] : destructive pigmented lesion on the right thumb that completely replaces the nail; right axilla node <2 cm.

## 2022-10-25 ENCOUNTER — APPOINTMENT (OUTPATIENT)
Dept: THORACIC SURGERY | Facility: CLINIC | Age: 47
End: 2022-10-25

## 2022-10-25 ENCOUNTER — NON-APPOINTMENT (OUTPATIENT)
Age: 47
End: 2022-10-25

## 2022-10-25 ENCOUNTER — OUTPATIENT (OUTPATIENT)
Dept: OUTPATIENT SERVICES | Facility: HOSPITAL | Age: 47
LOS: 1 days | End: 2022-10-25

## 2022-10-25 ENCOUNTER — APPOINTMENT (OUTPATIENT)
Dept: PULMONOLOGY | Facility: CLINIC | Age: 47
End: 2022-10-25

## 2022-10-25 VITALS
HEART RATE: 84 BPM | SYSTOLIC BLOOD PRESSURE: 124 MMHG | OXYGEN SATURATION: 100 % | TEMPERATURE: 97 F | HEIGHT: 69 IN | WEIGHT: 207.9 LBS | RESPIRATION RATE: 16 BRPM | DIASTOLIC BLOOD PRESSURE: 87 MMHG

## 2022-10-25 VITALS
HEART RATE: 82 BPM | BODY MASS INDEX: 31.25 KG/M2 | WEIGHT: 211 LBS | RESPIRATION RATE: 18 BRPM | OXYGEN SATURATION: 98 % | SYSTOLIC BLOOD PRESSURE: 141 MMHG | DIASTOLIC BLOOD PRESSURE: 97 MMHG | HEIGHT: 69 IN

## 2022-10-25 DIAGNOSIS — C43.9 MALIGNANT MELANOMA OF SKIN, UNSPECIFIED: ICD-10-CM

## 2022-10-25 DIAGNOSIS — Z98.890 OTHER SPECIFIED POSTPROCEDURAL STATES: Chronic | ICD-10-CM

## 2022-10-25 LAB
ALBUMIN SERPL ELPH-MCNC: 4.8 G/DL — SIGNIFICANT CHANGE UP (ref 3.3–5)
ALP SERPL-CCNC: 55 U/L — SIGNIFICANT CHANGE UP (ref 40–120)
ALT FLD-CCNC: 12 U/L — SIGNIFICANT CHANGE UP (ref 4–41)
ANION GAP SERPL CALC-SCNC: 12 MMOL/L — SIGNIFICANT CHANGE UP (ref 7–14)
AST SERPL-CCNC: 25 U/L — SIGNIFICANT CHANGE UP (ref 4–40)
BILIRUB SERPL-MCNC: 0.6 MG/DL — SIGNIFICANT CHANGE UP (ref 0.2–1.2)
BLD GP AB SCN SERPL QL: NEGATIVE — SIGNIFICANT CHANGE UP
BUN SERPL-MCNC: 18 MG/DL — SIGNIFICANT CHANGE UP (ref 7–23)
CALCIUM SERPL-MCNC: 9.3 MG/DL — SIGNIFICANT CHANGE UP (ref 8.4–10.5)
CHLORIDE SERPL-SCNC: 102 MMOL/L — SIGNIFICANT CHANGE UP (ref 98–107)
CO2 SERPL-SCNC: 27 MMOL/L — SIGNIFICANT CHANGE UP (ref 22–31)
CREAT SERPL-MCNC: 1.36 MG/DL — HIGH (ref 0.5–1.3)
EGFR: 65 ML/MIN/1.73M2 — SIGNIFICANT CHANGE UP
GLUCOSE SERPL-MCNC: 93 MG/DL — SIGNIFICANT CHANGE UP (ref 70–99)
HCT VFR BLD CALC: 42.5 % — SIGNIFICANT CHANGE UP (ref 39–50)
HGB BLD-MCNC: 14.2 G/DL — SIGNIFICANT CHANGE UP (ref 13–17)
MCHC RBC-ENTMCNC: 28 PG — SIGNIFICANT CHANGE UP (ref 27–34)
MCHC RBC-ENTMCNC: 33.4 GM/DL — SIGNIFICANT CHANGE UP (ref 32–36)
MCV RBC AUTO: 83.8 FL — SIGNIFICANT CHANGE UP (ref 80–100)
NRBC # BLD: 0 /100 WBCS — SIGNIFICANT CHANGE UP (ref 0–0)
NRBC # FLD: 0 K/UL — SIGNIFICANT CHANGE UP (ref 0–0)
PLATELET # BLD AUTO: 199 K/UL — SIGNIFICANT CHANGE UP (ref 150–400)
POTASSIUM SERPL-MCNC: 3.5 MMOL/L — SIGNIFICANT CHANGE UP (ref 3.5–5.3)
POTASSIUM SERPL-SCNC: 3.5 MMOL/L — SIGNIFICANT CHANGE UP (ref 3.5–5.3)
PROT SERPL-MCNC: 7.7 G/DL — SIGNIFICANT CHANGE UP (ref 6–8.3)
RBC # BLD: 5.07 M/UL — SIGNIFICANT CHANGE UP (ref 4.2–5.8)
RBC # FLD: 13.1 % — SIGNIFICANT CHANGE UP (ref 10.3–14.5)
RH IG SCN BLD-IMP: POSITIVE — SIGNIFICANT CHANGE UP
SODIUM SERPL-SCNC: 141 MMOL/L — SIGNIFICANT CHANGE UP (ref 135–145)
WBC # BLD: 5.36 K/UL — SIGNIFICANT CHANGE UP (ref 3.8–10.5)
WBC # FLD AUTO: 5.36 K/UL — SIGNIFICANT CHANGE UP (ref 3.8–10.5)

## 2022-10-25 PROCEDURE — 99205 OFFICE O/P NEW HI 60 MIN: CPT

## 2022-10-25 RX ORDER — SODIUM CHLORIDE 9 MG/ML
1000 INJECTION, SOLUTION INTRAVENOUS
Refills: 0 | Status: DISCONTINUED | OUTPATIENT
Start: 2022-11-09 | End: 2022-11-23

## 2022-10-25 RX ORDER — GABAPENTIN 300 MG/1
300 CAPSULE ORAL 3 TIMES DAILY
Qty: 30 | Refills: 0 | Status: COMPLETED | COMMUNITY
Start: 2022-09-27 | End: 2022-10-25

## 2022-10-25 NOTE — H&P PST ADULT - NSICDXPASTMEDICALHX_GEN_ALL_CORE_FT
PAST MEDICAL HISTORY:  GERD (gastroesophageal reflux disease)     Malignant melanoma right thumb    Metastatic malignant melanoma Right axillary lymph nodes

## 2022-10-25 NOTE — HISTORY OF PRESENT ILLNESS
[FreeTextEntry1] : Mr. 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 Path was non-diagnostic.\par \par CT Chest w/ IV contrast on 10/4/22:\par - 1.3 x 1.4cm Rt lung nodule (previously 1.2 x 1.1cm) \par - stable 3mm LLL nodule (3:290)\par - no new nodules, consolidations, edema, effusion or PTX\par \par Patient is here today for CT Sx consultation, referred by Dr. Herrera. Denies SOB, CP, cough.\par

## 2022-10-25 NOTE — H&P PST ADULT - HISTORY OF PRESENT ILLNESS
48 y/o Cayman Islander speaking male presents to presurgical testing with diagnosis of malignant melanoma of skin scheduled for a right thumb melanoma amputation distal, right axillary lymph node dissection. Pt with a right thumb melanoma with mets to right axillary lymph nodes s/p right axillary dissection in 12/2021, and chemotherapy from 1-3, 2022, in [facility].

## 2022-10-25 NOTE — CONSULT LETTER
[Consult Letter:] : I had the pleasure of evaluating your patient, [unfilled]. [( Thank you for referring [unfilled] for consultation for _____ )] : Thank you for referring [unfilled] for consultation for [unfilled] [Please see my note below.] : Please see my note below. [Consult Closing:] : Thank you very much for allowing me to participate in the care of this patient.  If you have any questions, please do not hesitate to contact me. [Sincerely,] : Sincerely, [FreeTextEntry2] : Dr. Sammy Herrera (Surg/Onc/referring)\par Dr. Neo Marcos (Hem/Onc)\par  [FreeTextEntry3] : Zachariah Dunn MD\par Director of Thoracic, [facility]\par Cardiovascular & Thoracic Surgery\par \par [facility]\par [address]\par Oncology Building 3rd Fl\[place] [address]\par Tel: [phone]\par Fax: [phone]\par

## 2022-10-25 NOTE — DATA REVIEWED
[FreeTextEntry1] : I have independently reviewed the following:\par PET/CT on 8/6/22\par CT-guided core biopsy of RML on 9/19/22. Path was non-diagnostic.\par CT Chest w/ IV contrast on 10/4/22

## 2022-10-25 NOTE — H&P PST ADULT - ATTENDING COMMENTS
Risks, benefits, and alternatives discussed with the patient - he expressed understanding and agrees to proceed with right thumb amputation and right axillary lymph node dissection.

## 2022-10-25 NOTE — H&P PST ADULT - NSICDXPASTSURGICALHX_GEN_ALL_CORE_FT
PAST SURGICAL HISTORY:  History of lymph node dissection of right axilla and Right thumb melanoma excision 12/2021

## 2022-10-25 NOTE — ASSESSMENT
[FreeTextEntry1] : Mr. 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 Path was non-diagnostic.\par \par CT Chest w/ IV contrast on 10/4/22:\par - 1.3 x 1.4cm Rt lung nodule (previously 1.2 x 1.1cm) \par - stable 3mm LLL nodule (3:290)\par - no new nodules, consolidations, edema, effusion or PTX\par \par I have reviewed the patient's medical records and diagnostic images at time of this office consultation and have made the following recommendation:\par 1. CT and PET scans reviewed, RML nodule is suspicious for mets melanoma, I recommended a Flex Bronch Rt VATS R.A. lung rxn on 11/15/22. Risks and benefits and alternatives explained to patient, all questions answered, patient agreed to proceed with surgery.\par 2. PFTs with Dr. Love\par 3. PST and COVID testing prior\par \par \par I personally performed the services described in the documentation, reviewed the documentation recorded by the scribe in my presence and it accurately and completely records my words and actions.\par \par I, Nacho Raymond NP, am scribing for and the presence of DESIRAE Lewis, the following sections HISTORY OF PRESENT ILLNESS, PAST MEDICAL/FAMILY/SOCIAL HISTORY; REVIEW OF SYSTEMS; VITAL SIGNS; PHYSICAL EXAM; DISPOSITION.\par \par

## 2022-10-26 PROBLEM — C43.9 MALIGNANT MELANOMA OF SKIN, UNSPECIFIED: Chronic | Status: ACTIVE | Noted: 2022-10-25

## 2022-10-26 PROBLEM — K21.9 GASTRO-ESOPHAGEAL REFLUX DISEASE WITHOUT ESOPHAGITIS: Chronic | Status: ACTIVE | Noted: 2022-10-25

## 2022-10-28 ENCOUNTER — APPOINTMENT (OUTPATIENT)
Dept: PULMONOLOGY | Facility: CLINIC | Age: 47
End: 2022-10-28

## 2022-10-28 VITALS — HEART RATE: 87 BPM | SYSTOLIC BLOOD PRESSURE: 130 MMHG | OXYGEN SATURATION: 96 % | DIASTOLIC BLOOD PRESSURE: 93 MMHG

## 2022-10-28 DIAGNOSIS — R91.1 SOLITARY PULMONARY NODULE: ICD-10-CM

## 2022-10-28 PROCEDURE — 94729 DIFFUSING CAPACITY: CPT

## 2022-10-28 PROCEDURE — 99204 OFFICE O/P NEW MOD 45 MIN: CPT | Mod: 25

## 2022-10-28 PROCEDURE — 94726 PLETHYSMOGRAPHY LUNG VOLUMES: CPT

## 2022-10-28 PROCEDURE — 94010 BREATHING CAPACITY TEST: CPT

## 2022-10-28 PROCEDURE — ZZZZZ: CPT

## 2022-10-31 PROBLEM — R91.1 LUNG NODULE: Status: ACTIVE | Noted: 2022-10-20

## 2022-10-31 NOTE — PROCEDURE
[FreeTextEntry1] : pfts 10/2022- normal spirometry, normal volume & DLCO\par \par PET/CT on 8/6/22:\par - 1.4 x 0.9 cm SUV 3.6 solid RML nodule (image 134) suspicious for biologic tumor activity in light of the history\par \par PET/CT on 8/6/22:\par - 1.4 x 0.9 cm SUV 3.6 solid RML nodule (image 134) suspicious for biologic tumor activity in light of the history\par

## 2022-10-31 NOTE — PHYSICAL EXAM
[No Acute Distress] : no acute distress [Well Nourished] : well nourished [Well Developed] : well developed [No Resp Distress] : no resp distress [No Acc Muscle Use] : no acc muscle use [Clear to Auscultation Bilaterally] : clear to auscultation bilaterally [TextBox_105] : right thumb melanoma- traces of old blood

## 2022-10-31 NOTE — REASON FOR VISIT
[Initial] : an initial visit [Abnormal CXR/ Chest CT] : an abnormal CXR/ chest CT [Pre-op Risk Stratification] : pre-op risk stratification [TextBox_13] : Dr Zachariah Dunn

## 2022-10-31 NOTE — HISTORY OF PRESENT ILLNESS
[Never] : never [TextBox_4] : KELLY NEGRON is a 47 year old male who presents with family for pre-op with Dr. 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 Path was non-diagnostic.\par now pending surgical resectin\par \par per wife/ patient- never smoker. \par no resp complaints. no cough no wheezing no dyspnea\par no issues with anesthesia in the past\par \par surgery planned for 11/15/2022\par

## 2022-11-01 ENCOUNTER — OUTPATIENT (OUTPATIENT)
Dept: OUTPATIENT SERVICES | Facility: HOSPITAL | Age: 47
LOS: 1 days | End: 2022-11-01

## 2022-11-01 ENCOUNTER — APPOINTMENT (OUTPATIENT)
Dept: PLASTIC SURGERY | Facility: CLINIC | Age: 47
End: 2022-11-01

## 2022-11-01 ENCOUNTER — APPOINTMENT (OUTPATIENT)
Dept: INFUSION THERAPY | Facility: HOSPITAL | Age: 47
End: 2022-11-01

## 2022-11-01 ENCOUNTER — APPOINTMENT (OUTPATIENT)
Dept: HEMATOLOGY ONCOLOGY | Facility: CLINIC | Age: 47
End: 2022-11-01

## 2022-11-01 VITALS
BODY MASS INDEX: 31.25 KG/M2 | DIASTOLIC BLOOD PRESSURE: 83 MMHG | TEMPERATURE: 97.8 F | HEART RATE: 92 BPM | OXYGEN SATURATION: 97 % | SYSTOLIC BLOOD PRESSURE: 124 MMHG | HEIGHT: 69 IN | WEIGHT: 211 LBS

## 2022-11-01 DIAGNOSIS — Z01.818 ENCOUNTER FOR OTHER PREPROCEDURAL EXAMINATION: ICD-10-CM

## 2022-11-01 DIAGNOSIS — Z98.890 OTHER SPECIFIED POSTPROCEDURAL STATES: Chronic | ICD-10-CM

## 2022-11-01 PROCEDURE — 99203 OFFICE O/P NEW LOW 30 MIN: CPT

## 2022-11-06 ENCOUNTER — RX RENEWAL (OUTPATIENT)
Age: 47
End: 2022-11-06

## 2022-11-07 DIAGNOSIS — Z01.818 ENCOUNTER FOR OTHER PREPROCEDURAL EXAMINATION: ICD-10-CM

## 2022-11-09 ENCOUNTER — TRANSCRIPTION ENCOUNTER (OUTPATIENT)
Age: 47
End: 2022-11-09

## 2022-11-09 ENCOUNTER — APPOINTMENT (OUTPATIENT)
Dept: PLASTIC SURGERY | Facility: HOSPITAL | Age: 47
End: 2022-11-09

## 2022-11-09 ENCOUNTER — RESULT REVIEW (OUTPATIENT)
Age: 47
End: 2022-11-09

## 2022-11-09 ENCOUNTER — OUTPATIENT (OUTPATIENT)
Dept: OUTPATIENT SERVICES | Facility: HOSPITAL | Age: 47
LOS: 1 days | Discharge: HOME | End: 2022-11-09
Payer: MEDICAID

## 2022-11-09 ENCOUNTER — APPOINTMENT (OUTPATIENT)
Dept: SURGICAL ONCOLOGY | Facility: HOSPITAL | Age: 47
End: 2022-11-09

## 2022-11-09 VITALS
OXYGEN SATURATION: 100 % | RESPIRATION RATE: 18 BRPM | WEIGHT: 207.9 LBS | SYSTOLIC BLOOD PRESSURE: 137 MMHG | TEMPERATURE: 98 F | DIASTOLIC BLOOD PRESSURE: 102 MMHG | HEART RATE: 84 BPM | HEIGHT: 69 IN

## 2022-11-09 VITALS
DIASTOLIC BLOOD PRESSURE: 85 MMHG | SYSTOLIC BLOOD PRESSURE: 130 MMHG | HEART RATE: 79 BPM | OXYGEN SATURATION: 100 % | RESPIRATION RATE: 18 BRPM

## 2022-11-09 DIAGNOSIS — C43.9 MALIGNANT MELANOMA OF SKIN, UNSPECIFIED: ICD-10-CM

## 2022-11-09 DIAGNOSIS — Z98.890 OTHER SPECIFIED POSTPROCEDURAL STATES: Chronic | ICD-10-CM

## 2022-11-09 LAB — SARS-COV-2 RNA SPEC QL NAA+PROBE: SIGNIFICANT CHANGE UP

## 2022-11-09 PROCEDURE — 14040 TIS TRNFR F/C/C/M/N/A/G/H/F: CPT

## 2022-11-09 PROCEDURE — 26951 AMPUTATION OF FINGER/THUMB: CPT | Mod: F5

## 2022-11-09 PROCEDURE — 38745 REMOVE ARMPIT LYMPH NODES: CPT

## 2022-11-09 DEVICE — ARISTA 3GR: Type: IMPLANTABLE DEVICE | Site: RIGHT | Status: FUNCTIONAL

## 2022-11-09 DEVICE — LIGATING CLIPS WECK HORIZON MEDIUM (BLUE) 24: Type: IMPLANTABLE DEVICE | Site: RIGHT | Status: FUNCTIONAL

## 2022-11-09 RX ORDER — MEPERIDINE HYDROCHLORIDE 50 MG/ML
12.5 INJECTION INTRAMUSCULAR; INTRAVENOUS; SUBCUTANEOUS
Refills: 0 | Status: DISCONTINUED | OUTPATIENT
Start: 2022-11-09 | End: 2022-11-09

## 2022-11-09 RX ORDER — OXYCODONE HYDROCHLORIDE 5 MG/1
1 TABLET ORAL
Qty: 0 | Refills: 0 | DISCHARGE
Start: 2022-11-09 | End: 2022-11-11

## 2022-11-09 RX ORDER — OXYCODONE HYDROCHLORIDE 5 MG/1
10 TABLET ORAL ONCE
Refills: 0 | Status: DISCONTINUED | OUTPATIENT
Start: 2022-11-09 | End: 2022-11-09

## 2022-11-09 RX ORDER — OXYCODONE HYDROCHLORIDE 5 MG/1
1 TABLET ORAL
Qty: 0 | Refills: 0 | DISCHARGE

## 2022-11-09 RX ORDER — OXYCODONE HYDROCHLORIDE 5 MG/1
5 TABLET ORAL ONCE
Refills: 0 | Status: DISCONTINUED | OUTPATIENT
Start: 2022-11-09 | End: 2022-11-09

## 2022-11-09 RX ORDER — OXYCODONE HYDROCHLORIDE 5 MG/1
1 TABLET ORAL
Qty: 12 | Refills: 0
Start: 2022-11-09 | End: 2022-11-11

## 2022-11-09 RX ORDER — HYDROMORPHONE HYDROCHLORIDE 2 MG/ML
0.5 INJECTION INTRAMUSCULAR; INTRAVENOUS; SUBCUTANEOUS
Refills: 0 | Status: DISCONTINUED | OUTPATIENT
Start: 2022-11-09 | End: 2022-11-09

## 2022-11-09 RX ORDER — HYDROMORPHONE HYDROCHLORIDE 2 MG/ML
1 INJECTION INTRAMUSCULAR; INTRAVENOUS; SUBCUTANEOUS
Refills: 0 | Status: DISCONTINUED | OUTPATIENT
Start: 2022-11-09 | End: 2022-11-09

## 2022-11-09 RX ORDER — ONDANSETRON 8 MG/1
4 TABLET, FILM COATED ORAL ONCE
Refills: 0 | Status: DISCONTINUED | OUTPATIENT
Start: 2022-11-09 | End: 2022-11-23

## 2022-11-09 RX ADMIN — HYDROMORPHONE HYDROCHLORIDE 1 MILLIGRAM(S): 2 INJECTION INTRAMUSCULAR; INTRAVENOUS; SUBCUTANEOUS at 12:00

## 2022-11-09 RX ADMIN — HYDROMORPHONE HYDROCHLORIDE 0.5 MILLIGRAM(S): 2 INJECTION INTRAMUSCULAR; INTRAVENOUS; SUBCUTANEOUS at 12:44

## 2022-11-09 RX ADMIN — HYDROMORPHONE HYDROCHLORIDE 1 MILLIGRAM(S): 2 INJECTION INTRAMUSCULAR; INTRAVENOUS; SUBCUTANEOUS at 11:36

## 2022-11-09 RX ADMIN — HYDROMORPHONE HYDROCHLORIDE 0.5 MILLIGRAM(S): 2 INJECTION INTRAMUSCULAR; INTRAVENOUS; SUBCUTANEOUS at 12:27

## 2022-11-09 RX ADMIN — OXYCODONE HYDROCHLORIDE 5 MILLIGRAM(S): 5 TABLET ORAL at 12:42

## 2022-11-09 NOTE — ASU DISCHARGE PLAN (ADULT/PEDIATRIC) - FOLLOW UP APPOINTMENTS
may also call Recovery Room (PACU) 24/7 @ [phone]/[facility]

## 2022-11-09 NOTE — ASU DISCHARGE PLAN (ADULT/PEDIATRIC) - ASU DC SPECIAL INSTRUCTIONSFT
elevate right arm  wear compressive wrap or garment as tolerated, rewrap if too tight  leave dressing over hand and thumb in place, keep dry  empty and record drain output  follow up with Dr. Barry in 1 week elevate right arm  wear compressive wrap or garment as tolerated, rewrap if too tight  leave dressing over hand and thumb in place, keep dry  empty and record drain output  follow up with Dr. Barry in 1 week  follow up with Dr. Herrera in 2 weeks

## 2022-11-09 NOTE — ASU DISCHARGE PLAN (ADULT/PEDIATRIC) - NS MD DC FALL RISK RISK
For information on Fall & Injury Prevention, visit: [email or web address] OR  [email or web address] OR  https://www.cdc.gov/steadi/patient.html

## 2022-11-09 NOTE — ASU DISCHARGE PLAN (ADULT/PEDIATRIC) - PROVIDER TOKENS
PROVIDER:[TOKEN:[[id number]],FOLLOWUP:[1 week]] PROVIDER:[TOKEN:[[id number]],FOLLOWUP:[1 week]],PROVIDER:[TOKEN:[[id number]],FOLLOWUP:[2 weeks]]

## 2022-11-09 NOTE — BRIEF OPERATIVE NOTE - OPERATION/FINDINGS
complex closure of thumb wound with proximal phalanx shortening, reconstruction of axillary wound with complex closure
Right thumb amputation at PIP joint with PRS closure and Right axillary dissection of zones 1,2,3 with PRS closure

## 2022-11-09 NOTE — BRIEF OPERATIVE NOTE - VENOUS THROMBOEMBOLISM PROPHYLAXIS THERAPY
TELEMEDICINE VISIT on 10/1/2021 .    Verbal consent was given by the patient to conduct this visit.    History: The patient was scheduled for an appointment.  Due to hospital outpatient closure with the COVID-19 situation, a telemedicine visit was conducted as a follow-up instead.      Review of systems:  Gen: NAD. No fever.  Head: No headaches, vertigo.  Eyes: Normal vision, no diplopia, no pain.  Ears: No change in hearing, tinnitus, bleeding.  Nose: No epistaxis, coryza, obstruction.  Mouth: No dental issues, no gingival bleeding.  Chest: No cough, no heavyness, no hemoptysis.  Heart: No chest pain, palpitations, syncope, or orthopnea.  Abdomen: No dysphagia, or melena.  : No hematuria.  Musculoskeletal: No cyanosis x4 extr.  Neur: No changes in mentation or ataxia.  Psych: No changes in thought content.    Impression:   1.  Bilateral low lumbar facet arthropathy with axial low back pain.  2.  Lumbar degenerative disc disease with L5/S1 extrusion and axial discogenic pain.  3.  Lumbar spinal stenosis at L4/5 with neurogenic claudication.     Plan:   1.  This visit was conducted due to the current COVID-19 situation.  I answered all of the patient's questions and they have our contact information.  2.  Had bilat lumbar MBB#2 with no pain the first day w/o side effects. Rec: bilat lumbar MB-RF, starting with the left. RBSA d/w pt and agreed.  3.  Will treat disks conservatively given his diagnostic response with the facets.  Please see my initial note for those details.    Over half of today's telemedicine visit was spent educating them about their disease.      
Verbal consent was given by patient to conduct this telemed visit with Dr Argueta.  The visit was performed via telephone.    Clinic/Physician Location : [facility]  Patient Location: Home    This phone call occurred on   Date and Time: 10/01/21 10:39 AM        In addition to the telemedicine visit:    [] Patients physician was contacted for coordination of care.  [] Consultation(s) ordered (MD,  etc.)  [] Precertification/referral for approval of treatment plan initiated.  [x] Medication Compliance Audit was completed, and prescription monitoring report reviewed.  [x] Routine discharge instructions given.  [] Complex discharge instructions given.  
SCDs
SCDs

## 2022-11-09 NOTE — ASU DISCHARGE PLAN (ADULT/PEDIATRIC) - CARE PROVIDER_API CALL
Michael Barry)  Plastic Surgery  [address] [address]  Phone: [phone]  Fax: [phone]  Follow Up Time: 1 week   Michael Barry)  Plastic Surgery  [address] [address]  Phone: [phone]  Fax: [phone]  Follow Up Time: 1 week    Sammy Herrera)  Complex General Surgical Oncology; Surgery; Surgical Oncology  [address] [place] [address]  Phone: [phone]  Fax: [phone]  Follow Up Time: 2 weeks

## 2022-11-09 NOTE — ASU DISCHARGE PLAN (ADULT/PEDIATRIC) - NURSING INSTRUCTIONS
DO NOT take any Tylenol (Acetaminophen) or narcotics containing Tylenol until after  4:15pm______ . You received Tylenol during your operation and it can cause damage to your liver if too much is taken within a 24 hour time period.

## 2022-11-09 NOTE — BRIEF OPERATIVE NOTE - NSICDXBRIEFPROCEDURE_GEN_ALL_CORE_FT
PROCEDURES:  Amputation of right thumb 09-Nov-2022 11:18:13  Abbey Romero  Right axillary lymph node dissection 09-Nov-2022 11:18:25  Abbey Romero

## 2022-11-11 ENCOUNTER — OUTPATIENT (OUTPATIENT)
Dept: OUTPATIENT SERVICES | Facility: HOSPITAL | Age: 47
LOS: 1 days | End: 2022-11-11

## 2022-11-11 VITALS
RESPIRATION RATE: 18 BRPM | SYSTOLIC BLOOD PRESSURE: 112 MMHG | TEMPERATURE: 98 F | HEIGHT: 69 IN | OXYGEN SATURATION: 97 % | DIASTOLIC BLOOD PRESSURE: 77 MMHG | HEART RATE: 87 BPM | WEIGHT: 210.1 LBS

## 2022-11-11 DIAGNOSIS — Z98.890 OTHER SPECIFIED POSTPROCEDURAL STATES: Chronic | ICD-10-CM

## 2022-11-11 DIAGNOSIS — R91.1 SOLITARY PULMONARY NODULE: ICD-10-CM

## 2022-11-11 DIAGNOSIS — C43.60 MALIGNANT MELANOMA OF UNSPECIFIED UPPER LIMB, INCLUDING SHOULDER: Chronic | ICD-10-CM

## 2022-11-11 LAB
ALBUMIN SERPL ELPH-MCNC: 4.4 G/DL — SIGNIFICANT CHANGE UP (ref 3.3–5)
ALP SERPL-CCNC: 54 U/L — SIGNIFICANT CHANGE UP (ref 40–120)
ALT FLD-CCNC: 13 U/L — SIGNIFICANT CHANGE UP (ref 4–41)
ANION GAP SERPL CALC-SCNC: 12 MMOL/L — SIGNIFICANT CHANGE UP (ref 7–14)
AST SERPL-CCNC: 15 U/L — SIGNIFICANT CHANGE UP (ref 4–40)
BILIRUB SERPL-MCNC: 0.2 MG/DL — SIGNIFICANT CHANGE UP (ref 0.2–1.2)
BLD GP AB SCN SERPL QL: NEGATIVE — SIGNIFICANT CHANGE UP
BUN SERPL-MCNC: 14 MG/DL — SIGNIFICANT CHANGE UP (ref 7–23)
CALCIUM SERPL-MCNC: 9.2 MG/DL — SIGNIFICANT CHANGE UP (ref 8.4–10.5)
CHLORIDE SERPL-SCNC: 102 MMOL/L — SIGNIFICANT CHANGE UP (ref 98–107)
CO2 SERPL-SCNC: 27 MMOL/L — SIGNIFICANT CHANGE UP (ref 22–31)
CREAT SERPL-MCNC: 1.34 MG/DL — HIGH (ref 0.5–1.3)
EGFR: 66 ML/MIN/1.73M2 — SIGNIFICANT CHANGE UP
GLUCOSE SERPL-MCNC: 115 MG/DL — HIGH (ref 70–99)
HCT VFR BLD CALC: 41 % — SIGNIFICANT CHANGE UP (ref 39–50)
HGB BLD-MCNC: 13.6 G/DL — SIGNIFICANT CHANGE UP (ref 13–17)
MCHC RBC-ENTMCNC: 28 PG — SIGNIFICANT CHANGE UP (ref 27–34)
MCHC RBC-ENTMCNC: 33.2 GM/DL — SIGNIFICANT CHANGE UP (ref 32–36)
MCV RBC AUTO: 84.5 FL — SIGNIFICANT CHANGE UP (ref 80–100)
NRBC # BLD: 0 /100 WBCS — SIGNIFICANT CHANGE UP (ref 0–0)
NRBC # FLD: 0 K/UL — SIGNIFICANT CHANGE UP (ref 0–0)
PLATELET # BLD AUTO: 195 K/UL — SIGNIFICANT CHANGE UP (ref 150–400)
POTASSIUM SERPL-MCNC: 3.8 MMOL/L — SIGNIFICANT CHANGE UP (ref 3.5–5.3)
POTASSIUM SERPL-SCNC: 3.8 MMOL/L — SIGNIFICANT CHANGE UP (ref 3.5–5.3)
PROT SERPL-MCNC: 7.7 G/DL — SIGNIFICANT CHANGE UP (ref 6–8.3)
RBC # BLD: 4.85 M/UL — SIGNIFICANT CHANGE UP (ref 4.2–5.8)
RBC # FLD: 12.8 % — SIGNIFICANT CHANGE UP (ref 10.3–14.5)
RH IG SCN BLD-IMP: POSITIVE — SIGNIFICANT CHANGE UP
SODIUM SERPL-SCNC: 141 MMOL/L — SIGNIFICANT CHANGE UP (ref 135–145)
WBC # BLD: 5.46 K/UL — SIGNIFICANT CHANGE UP (ref 3.8–10.5)
WBC # FLD AUTO: 5.46 K/UL — SIGNIFICANT CHANGE UP (ref 3.8–10.5)

## 2022-11-11 RX ORDER — SODIUM CHLORIDE 9 MG/ML
1000 INJECTION, SOLUTION INTRAVENOUS
Refills: 0 | Status: DISCONTINUED | OUTPATIENT
Start: 2022-11-15 | End: 2022-11-16

## 2022-11-11 NOTE — H&P PST ADULT - REASON FOR ADMISSION
"I'm having a right thumb melanoma amputation and right axillary lymph node dissection" 'I am having a nodule removed from my right lung"

## 2022-11-11 NOTE — H&P PST ADULT - HISTORY OF PRESENT ILLNESS
46 y/o Gibraltarian speaking male presents to presurgical testing with diagnosis of malignant melanoma of skin scheduled for a right thumb melanoma amputation distal, right axillary lymph node dissection. Pt s/p  right thumb melanoma with mets to right axillary lymph nodes s/p right axillary dissection in 12/2021, and chemotherapy from 1-3, 2022, in [facility]. Pt had followup PET scan done & Right Lung nodules seen. Now scheduled for Flexible Bronchoscopy, Right Robotic Assisted Wedge Resection tentatively for 11/15/22.

## 2022-11-11 NOTE — H&P PST ADULT - NSICDXPASTSURGICALHX_GEN_ALL_CORE_FT
PAST SURGICAL HISTORY:  History of lymph node dissection of right axilla and Right thumb melanoma excision 12/2021    Malignant melanoma of thumb Right Thumb Amputation with aillary Lymph Node Dissection -10/2022

## 2022-11-11 NOTE — H&P PST ADULT - PROBLEM SELECTOR PLAN 1
Now scheduled for Flexible Bronchoscopy, Right Robotic Assisted Wedge Resection tentatively on 11/15/22  Pre-op instructions provided. Pt given verbal and written instructions with teach back on chlorhexidine shampoo and Pepcid. Pt verbalized understanding with return demonstration.   Pending labs   Covid testing scheduled prior to surgery as per patient.

## 2022-11-11 NOTE — H&P PST ADULT - SKIN COMMENTS
s/p amputation right thumb - right arm splint with ace maryanne & SONAL Drain Inpalce, other finger mobile s/p amputation right thumb - right arm splint with ace wrap & SONAL Drain Inlace, other finger mobile

## 2022-11-14 NOTE — ASU PATIENT PROFILE, ADULT - NS TRANSFER PATIENT BELONGINGS
Spoke to patient's mother. Informed her a copy of [employer]'s sports form has been printed. Asked mother if school was requesting a school form for [employer] as well. Mother was unsure. Thinks the sports form should be sufficient. Mother states she will call the school to double-check. Informed mother if they are requesting the [place] school form as well to call our office back and we can have it ready for . Mother verbalized understanding. States she does have a copy of [employer]'s sports form on hand. Will call our office tomorrow if school form is needed. Otherwise, has no additional needs at this time.    Clothing

## 2022-11-14 NOTE — ASU PATIENT PROFILE, ADULT - FALL HARM RISK - UNIVERSAL INTERVENTIONS
Bed in lowest position, wheels locked, appropriate side rails in place/Call bell, personal items and telephone in reach/Instruct patient to call for assistance before getting out of bed or chair/Non-slip footwear when patient is out of bed/[place] to call system/Physically safe environment - no spills, clutter or unnecessary equipment/Purposeful Proactive Rounding/Room/bathroom lighting operational, light cord in reach

## 2022-11-14 NOTE — ASU PATIENT PROFILE, ADULT - URINARY CATHETER
<--- Click to Launch ICDx for PreOp, PostOp and Procedure <--- Click to Launch ICDx for PreOp, PostOp and Procedure no

## 2022-11-15 ENCOUNTER — INPATIENT (INPATIENT)
Facility: HOSPITAL | Age: 47
LOS: 1 days | Discharge: HOME | End: 2022-11-17
Attending: THORACIC SURGERY (CARDIOTHORACIC VASCULAR SURGERY) | Admitting: THORACIC SURGERY (CARDIOTHORACIC VASCULAR SURGERY)
Payer: MEDICAID

## 2022-11-15 ENCOUNTER — APPOINTMENT (OUTPATIENT)
Dept: THORACIC SURGERY | Facility: HOSPITAL | Age: 47
End: 2022-11-15

## 2022-11-15 ENCOUNTER — RESULT REVIEW (OUTPATIENT)
Age: 47
End: 2022-11-15

## 2022-11-15 VITALS
SYSTOLIC BLOOD PRESSURE: 126 MMHG | OXYGEN SATURATION: 98 % | WEIGHT: 210.1 LBS | DIASTOLIC BLOOD PRESSURE: 81 MMHG | HEART RATE: 98 BPM | RESPIRATION RATE: 16 BRPM | TEMPERATURE: 98 F | HEIGHT: 69 IN

## 2022-11-15 DIAGNOSIS — Z98.890 OTHER SPECIFIED POSTPROCEDURAL STATES: Chronic | ICD-10-CM

## 2022-11-15 DIAGNOSIS — R91.1 SOLITARY PULMONARY NODULE: ICD-10-CM

## 2022-11-15 DIAGNOSIS — C43.60 MALIGNANT MELANOMA OF UNSPECIFIED UPPER LIMB, INCLUDING SHOULDER: Chronic | ICD-10-CM

## 2022-11-15 PROCEDURE — S2900 ROBOTIC SURGICAL SYSTEM: CPT | Mod: NC

## 2022-11-15 PROCEDURE — 88331 PATH CONSLTJ SURG 1 BLK 1SPC: CPT | Mod: 26

## 2022-11-15 PROCEDURE — 71045 X-RAY EXAM CHEST 1 VIEW: CPT | Mod: 26

## 2022-11-15 PROCEDURE — 88307 TISSUE EXAM BY PATHOLOGIST: CPT | Mod: 26

## 2022-11-15 PROCEDURE — 88305 TISSUE EXAM BY PATHOLOGIST: CPT | Mod: 26

## 2022-11-15 PROCEDURE — 32674 THORACOSCOPY LYMPH NODE EXC: CPT | Mod: AS

## 2022-11-15 PROCEDURE — 32666 THORACOSCOPY W/WEDGE RESECT: CPT | Mod: AS

## 2022-11-15 PROCEDURE — 31622 DX BRONCHOSCOPE/WASH: CPT

## 2022-11-15 PROCEDURE — 32666 THORACOSCOPY W/WEDGE RESECT: CPT

## 2022-11-15 PROCEDURE — 32674 THORACOSCOPY LYMPH NODE EXC: CPT

## 2022-11-15 PROCEDURE — 88360 TUMOR IMMUNOHISTOCHEM/MANUAL: CPT | Mod: 26

## 2022-11-15 PROCEDURE — 99233 SBSQ HOSP IP/OBS HIGH 50: CPT

## 2022-11-15 DEVICE — PROGEL PLEURAL AIR LEAK 4ML: Type: IMPLANTABLE DEVICE | Status: FUNCTIONAL

## 2022-11-15 DEVICE — SURGICEL 2 X 14": Type: IMPLANTABLE DEVICE | Status: FUNCTIONAL

## 2022-11-15 DEVICE — XI STAPLER SUREFORM RELOAD 60 BLACK: Type: IMPLANTABLE DEVICE | Status: FUNCTIONAL

## 2022-11-15 DEVICE — SURGICEL FIBRILLAR 2 X 4": Type: IMPLANTABLE DEVICE | Status: FUNCTIONAL

## 2022-11-15 DEVICE — CHEST DRAIN THORACIC ARGYLE PVC 24FR STRAIGHT: Type: IMPLANTABLE DEVICE | Status: FUNCTIONAL

## 2022-11-15 RX ORDER — SENNA PLUS 8.6 MG/1
2 TABLET ORAL AT BEDTIME
Refills: 0 | Status: DISCONTINUED | OUTPATIENT
Start: 2022-11-15 | End: 2022-11-17

## 2022-11-15 RX ORDER — ACETAMINOPHEN 500 MG
1000 TABLET ORAL ONCE
Refills: 0 | Status: COMPLETED | OUTPATIENT
Start: 2022-11-15 | End: 2022-11-15

## 2022-11-15 RX ORDER — HEPARIN SODIUM 5000 [USP'U]/ML
5000 INJECTION INTRAVENOUS; SUBCUTANEOUS EVERY 8 HOURS
Refills: 0 | Status: DISCONTINUED | OUTPATIENT
Start: 2022-11-15 | End: 2022-11-17

## 2022-11-15 RX ORDER — KETOROLAC TROMETHAMINE 30 MG/ML
15 SYRINGE (ML) INJECTION EVERY 8 HOURS
Refills: 0 | Status: DISCONTINUED | OUTPATIENT
Start: 2022-11-15 | End: 2022-11-16

## 2022-11-15 RX ORDER — INFLUENZA VIRUS VACCINE 15; 15; 15; 15 UG/.5ML; UG/.5ML; UG/.5ML; UG/.5ML
0.5 SUSPENSION INTRAMUSCULAR ONCE
Refills: 0 | Status: DISCONTINUED | OUTPATIENT
Start: 2022-11-15 | End: 2022-11-16

## 2022-11-15 RX ORDER — METOCLOPRAMIDE HCL 10 MG
10 TABLET ORAL ONCE
Refills: 0 | Status: COMPLETED | OUTPATIENT
Start: 2022-11-15 | End: 2022-11-15

## 2022-11-15 RX ORDER — GABAPENTIN 400 MG/1
300 CAPSULE ORAL ONCE
Refills: 0 | Status: COMPLETED | OUTPATIENT
Start: 2022-11-15 | End: 2022-11-15

## 2022-11-15 RX ORDER — ACETAMINOPHEN 500 MG
975 TABLET ORAL ONCE
Refills: 0 | Status: COMPLETED | OUTPATIENT
Start: 2022-11-15 | End: 2022-11-15

## 2022-11-15 RX ORDER — HYDROMORPHONE HYDROCHLORIDE 2 MG/ML
0.5 INJECTION INTRAMUSCULAR; INTRAVENOUS; SUBCUTANEOUS
Refills: 0 | Status: DISCONTINUED | OUTPATIENT
Start: 2022-11-15 | End: 2022-11-16

## 2022-11-15 RX ORDER — HYDROMORPHONE HYDROCHLORIDE 2 MG/ML
30 INJECTION INTRAMUSCULAR; INTRAVENOUS; SUBCUTANEOUS
Refills: 0 | Status: DISCONTINUED | OUTPATIENT
Start: 2022-11-15 | End: 2022-11-16

## 2022-11-15 RX ORDER — HEPARIN SODIUM 5000 [USP'U]/ML
5000 INJECTION INTRAVENOUS; SUBCUTANEOUS ONCE
Refills: 0 | Status: COMPLETED | OUTPATIENT
Start: 2022-11-15 | End: 2022-11-15

## 2022-11-15 RX ORDER — NALOXONE HYDROCHLORIDE 4 MG/.1ML
0.1 SPRAY NASAL
Refills: 0 | Status: DISCONTINUED | OUTPATIENT
Start: 2022-11-15 | End: 2022-11-17

## 2022-11-15 RX ORDER — ONDANSETRON 8 MG/1
4 TABLET, FILM COATED ORAL EVERY 6 HOURS
Refills: 0 | Status: DISCONTINUED | OUTPATIENT
Start: 2022-11-15 | End: 2022-11-17

## 2022-11-15 RX ORDER — PANTOPRAZOLE SODIUM 20 MG/1
40 TABLET, DELAYED RELEASE ORAL
Refills: 0 | Status: DISCONTINUED | OUTPATIENT
Start: 2022-11-15 | End: 2022-11-17

## 2022-11-15 RX ORDER — POLYETHYLENE GLYCOL 3350 17 G/17G
17 POWDER, FOR SOLUTION ORAL DAILY
Refills: 0 | Status: DISCONTINUED | OUTPATIENT
Start: 2022-11-15 | End: 2022-11-17

## 2022-11-15 RX ADMIN — HEPARIN SODIUM 5000 UNIT(S): 5000 INJECTION INTRAVENOUS; SUBCUTANEOUS at 07:28

## 2022-11-15 RX ADMIN — GABAPENTIN 300 MILLIGRAM(S): 400 CAPSULE ORAL at 07:28

## 2022-11-15 RX ADMIN — HEPARIN SODIUM 5000 UNIT(S): 5000 INJECTION INTRAVENOUS; SUBCUTANEOUS at 14:32

## 2022-11-15 RX ADMIN — SENNA PLUS 2 TABLET(S): 8.6 TABLET ORAL at 21:47

## 2022-11-15 RX ADMIN — Medication 15 MILLIGRAM(S): at 18:49

## 2022-11-15 RX ADMIN — HEPARIN SODIUM 5000 UNIT(S): 5000 INJECTION INTRAVENOUS; SUBCUTANEOUS at 21:48

## 2022-11-15 RX ADMIN — HYDROMORPHONE HYDROCHLORIDE 30 MILLILITER(S): 2 INJECTION INTRAMUSCULAR; INTRAVENOUS; SUBCUTANEOUS at 11:26

## 2022-11-15 RX ADMIN — HYDROMORPHONE HYDROCHLORIDE 30 MILLILITER(S): 2 INJECTION INTRAMUSCULAR; INTRAVENOUS; SUBCUTANEOUS at 19:12

## 2022-11-15 RX ADMIN — SODIUM CHLORIDE 30 MILLILITER(S): 9 INJECTION, SOLUTION INTRAVENOUS at 11:25

## 2022-11-15 RX ADMIN — Medication 400 MILLIGRAM(S): at 21:47

## 2022-11-15 RX ADMIN — Medication 975 MILLIGRAM(S): at 07:29

## 2022-11-15 RX ADMIN — Medication 15 MILLIGRAM(S): at 19:05

## 2022-11-15 RX ADMIN — SODIUM CHLORIDE 30 MILLILITER(S): 9 INJECTION, SOLUTION INTRAVENOUS at 19:13

## 2022-11-15 RX ADMIN — SODIUM CHLORIDE 30 MILLILITER(S): 9 INJECTION, SOLUTION INTRAVENOUS at 07:27

## 2022-11-15 RX ADMIN — Medication 10 MILLIGRAM(S): at 16:55

## 2022-11-15 RX ADMIN — Medication 1000 MILLIGRAM(S): at 22:00

## 2022-11-15 NOTE — CONSULT NOTE ADULT - ASSESSMENT
ASSESSMENT/RECOMMENDATIONS: 47y Gambian speaking male PMH melanoma of right thumb with metastases axillary lymph nodes, s/p chemotherapy s/p R thumb amputation at PIP with R ALND.  /p Flexible bronchoscopy, robotic assisted right vats with RML lobe wedge resection, right mediastinal lymph node dissection of LN #9. Surgical Oncology consulted for R axillary drain management.         Patient to be discussed with attending, Dr. Herrera.     Umm Paige MD  PGY3 Consult Resident  D Team Surgery (Surgical Oncology)  [id number]   ASSESSMENT/RECOMMENDATIONS: 47y Burundian speaking male PMH melanoma of right thumb with metastases axillary lymph nodes, s/p chemotherapy s/p R thumb amputation at PIP with R ALND.  /p Flexible bronchoscopy, robotic assisted right vats with RML lobe wedge resection, right mediastinal lymph node dissection of LN #9. Surgical Oncology consulted for R axillary drain management.     - Continue axillary drain, will consider d/c when output < 20-25cc per day  - Management of thumb wound per PRS  - Continue excellent care per CTICU    Patient discussed with attending, Dr. Herrera.     Umm Paige MD  PGY3 Consult Resident  D Team Surgery (Surgical Oncology)  [phone]

## 2022-11-15 NOTE — CONSULT NOTE ADULT - SUBJECTIVE AND OBJECTIVE BOX
C A R D I O L O G Y  *********************    DATE OF SERVICE: 11-15-22    HISTORY OF PRESENT ILLNESS: HPI: Pt is a 47 year old amle with a pmh of GERD who presented for flexible bronchoscopy with robotic assisted right vats. Right middle lobe wedge resection. Lymph node dissection of LN #9.    HE is now post op and denies any chest pain or SOB.      PAST MEDICAL & SURGICAL HISTORY:  Malignant melanoma right thumb  Metastatic malignant melanomaRight axillary lymph nodes  GERD (gastroesophageal reflux disease)  History of lymph node dissection of right axilla and Right thumb melanoma excision 12/2021  Malignant melanoma of thumb Right Thumb Amputation with aillary Lymph Node Dissection -10/2022      MEDICATIONS:  MEDICATIONS  (STANDING):  heparin   Injectable 5000 Unit(s) SubCutaneous every 8 hours  HYDROmorphone PCA (1 mG/mL) 30 milliLiter(s) PCA Continuous PCA Continuous  influenza   Vaccine 0.5 milliLiter(s) IntraMuscular once  ketorolac   Injectable 15 milliGRAM(s) IV Push every 8 hours  lactated ringers. 1000 milliLiter(s) (30 mL/Hr) IV Continuous <Continuous>  pantoprazole    Tablet 40 milliGRAM(s) Oral before breakfast  polyethylene glycol 3350 17 Gram(s) Oral daily  senna 2 Tablet(s) Oral at bedtime    AllergiesNo Known Allergies    FAMILY HISTORY:  No pertinent family history in first degree relatives    Non-contributary for premature coronary disease or sudden cardiac death    SOCIAL HISTORY:    [X ] Non-smoker  [ ] Smoker  [ ] Alcohol    FLU VACCINE THIS YEAR STARTS IN AUGUST:  [ ] Yes    [ ] No    IF OVER 65 HAVE YOU EVER HAD A PNA VACCINE:  [ ] Yes    [ ] No       [ ] N/A      REVIEW OF SYSTEMS:  [ ]chest pain  [  ]shortness of breath  [  ]palpitations  [  ]syncope  [ ]near syncope [ ]upper extremity weakness   [ ] lower extremity weakness  [  ]diplopia  [  ]altered mental status   [  ]fevers  [ ]chills [ ]nausea  [ ]vomiting  [  ]dysphagia    [ ]abdominal pain  [ ]melena  [ ]BRBPR    [  ]epistaxis  [  ]rash    [ ]lower extremity edema    [X] All others negative	  [ ] Unable to obtain      LABS:	 	  CARDIAC MARKERS:  Hb Trend:   Creatinine Trend: 1.34<--, 1.36<--      PHYSICAL EXAM:  T(C): 36.2 (11-15-22 @ 11:15), Max: 36.7 (11-15-22 @ 06:55)  HR: 83 (11-15-22 @ 14:00) (79 - 98)  BP: 115/83 (11-15-22 @ 14:00) (115/83 - 151/76)  RR: 15 (11-15-22 @ 14:00) (13 - 26)  SpO2: 98% (11-15-22 @ 14:00) (92% - 99%)  Wt(kg): --   BMI (kg/m2): 31 (11-15-22 @ 06:55)  I&O's Summary    15 Nov 2022 07:01  -  15 Nov 2022 14:11  --------------------------------------------------------  IN: 90 mL / OUT: 0 mL / NET: 90 mL        Gen: Appears well in NAD  HEENT:  (-)icterus (-)pallor  CV: N S1 S2 1/6 LENO (+)2 Pulses B/l  Resp:  Clear to auscultation B/L, normal effort  GI: (+) BS Soft, NT, ND  Lymph:  (-)Edema, (-)obvious lymphadenopathy  Skin: Warm to touch, Normal turgor  Psych: Appropriate mood and affect      TELEMETRY: 	  NSR    ECG:  	NSR  ASSESSMENT/PLAN: Pt is a 47 year old amle with a pmh of GERD who presented for flexible bronchoscopy with robotic assisted right vats. Right middle lobe wedge resection. Lymph node dissection of LN #9.    1. Post OP  - BP well controlled post op. ON no meds at home  - Sinus without any events on telemetry  - PAin control per ICU team    Lian Grimm MD  Pager: [phone]

## 2022-11-15 NOTE — ASU PREOP CHECKLIST - 2.
patient right arm ace wrap and jody in place, left axillary steri/ patient had thumb surgery last wed

## 2022-11-15 NOTE — BRIEF OPERATIVE NOTE - COMMENTS
I, BRIANNE Orozco served as first assistant on this operation. My involvement was including but not limited to positioning, prepping and draping, robotic port placement, robot docking, robotic instrument insertion and removal, exposure for the surgeon by using instruments, retrieving specimens from the operative field, closing surgical incisions, undocking the robot and dressing wounds. As well as other tasks as directed by the surgeon.

## 2022-11-15 NOTE — BRIEF OPERATIVE NOTE - NSICDXBRIEFPROCEDURE_GEN_ALL_CORE_FT
PROCEDURES:  Bronchoscopy, flexible, adult 15-Nov-2022 10:44:13  Naty Mcclure  Thoracoscopic robotic assisted procedure 15-Nov-2022 10:44:22  Naty Mcclure

## 2022-11-15 NOTE — PATIENT PROFILE ADULT - FALL HARM RISK - RISK INTERVENTIONS
Assistance OOB with selected safe patient handling equipment/Assistance with ambulation/Communicate Fall Risk and Risk Factors to all staff, patient, and family/Monitor gait and stability/Reinforce activity limits and safety measures with patient and family/Sit up slowly, dangle for a short time, stand at bedside before walking/Use of alarms - bed, chair and/or voice tab/Visual Cue: Yellow wristband/Bed in lowest position, wheels locked, appropriate side rails in place/Call bell, personal items and telephone in reach/Instruct patient to call for assistance before getting out of bed or chair/Non-slip footwear when patient is out of bed/[place] to call system/Physically safe environment - no spills, clutter or unnecessary equipment/Purposeful Proactive Rounding/Room/bathroom lighting operational, light cord in reach

## 2022-11-15 NOTE — CONSULT NOTE ADULT - SUBJECTIVE AND OBJECTIVE BOX
SURGICAL ONCOLOGY CONSULT NOTE  HPI: 47y Macanese speaking male PMH melanoma of right thumb with metastases axillary lymph nodes, s/p chemotherapy s/p R thumb amputation at [facility] with R ALND.  Pt had followup PET scan done & Right Lung nodules seen. Now s/p Flexible bronchoscopy, robotic assisted right vats with RML lobe wedge resection, right mediastinal lymph node dissection of LN #9.    Surgical Oncology consulted for R axillary drain management.       PAST MEDICAL & SURGICAL HISTORY:  Malignant melanoma  right thumb      Metastatic malignant melanoma  Right axillary lymph nodes      GERD (gastroesophageal reflux disease)      History of lymph node dissection of right axilla  and Right thumb melanoma excision 12/2021      Malignant melanoma of thumb  Right Thumb Amputation with aillary Lymph Node Dissection -10/2022          MEDICATIONS  (STANDING):  heparin   Injectable 5000 Unit(s) SubCutaneous every 8 hours  HYDROmorphone PCA (1 mG/mL) 30 milliLiter(s) PCA Continuous PCA Continuous  influenza   Vaccine 0.5 milliLiter(s) IntraMuscular once  ketorolac   Injectable 15 milliGRAM(s) IV Push every 8 hours  lactated ringers. 1000 milliLiter(s) (30 mL/Hr) IV Continuous <Continuous>  metoclopramide Injectable 10 milliGRAM(s) IV Push once  pantoprazole    Tablet 40 milliGRAM(s) Oral before breakfast  polyethylene glycol 3350 17 Gram(s) Oral daily  senna 2 Tablet(s) Oral at bedtime    MEDICATIONS  (PRN):  HYDROmorphone PCA (1 mG/mL) Rescue Clinician Bolus 0.5 milliGRAM(s) IV Push every 15 minutes PRN for Pain Scale GREATER THAN 6  naloxone Injectable 0.1 milliGRAM(s) IV Push every 3 minutes PRN For ANY of the following changes in patient status:  A. RR LESS THAN 10 breaths per minute, B. Oxygen saturation LESS THAN 90%, C. Sedation score of 6  ondansetron Injectable 4 milliGRAM(s) IV Push every 6 hours PRN Nausea      Allergies    No Known Allergies    Intolerances      SOCIAL HISTORY: former teacher, nonsmoker      FAMILY HISTORY:  No pertinent family history in first degree relatives      Physical Exam:  General: NAD, resting comfortably  HEENT: NC/AT, EOMI, normal hearing, no oral lesions, no LAD, neck supple  Pulmonary: normal resp effort, CTA-B  Cardiovascular: NSR, no murmurs  Abdominal: soft, ND/NT, no organomegaly  Extremities: WWP, normal strength, no clubbing/cyanosis/edema  Neuro: A/O x 3, CNs II-XII grossly intact, normal sensation, no focal deficits  Pulses: palpable distal pulses      Vital Signs Last 24 Hrs  T(C): 36.2 (15 Nov 2022 11:15), Max: 36.7 (15 Nov 2022 06:55)  T(F): 97.1 (15 Nov 2022 11:15), Max: 98.1 (15 Nov 2022 06:55)  HR: 79 (15 Nov 2022 15:00) (79 - 98)  BP: 117/82 (15 Nov 2022 15:00) (115/83 - 151/76)  BP(mean): 94 (15 Nov 2022 15:00) (92 - 107)  RR: 14 (15 Nov 2022 15:00) (13 - 26)  SpO2: 96% (15 Nov 2022 15:00) (92% - 99%)    Parameters below as of 15 Nov 2022 16:00  Patient On (Oxygen Delivery Method): nasal cannula w/ humidification        I&O's Summary    15 Nov 2022 07:01  -  15 Nov 2022 15:59  --------------------------------------------------------  IN: 150 mL / OUT: 20 mL / NET: 130 mL        LABS:   Comprehensive Metabolic Panel (11.11.22 @ 13:30)   Sodium, Serum: 141 mmol/L   Potassium, Serum: 3.8 mmol/L   Chloride, Serum: 102 mmol/L   Carbon Dioxide, Serum: 27 mmol/L   Anion Gap, Serum: 12 mmol/L   Blood Urea Nitrogen, Serum: 14 mg/dL   Creatinine, Serum: 1.34 mg/dL   Glucose, Serum: 115 mg/dL   Calcium, Total Serum: 9.2 mg/dL   Protein Total, Serum: 7.7 g/dL   Albumin, Serum: 4.4 g/dL   Bilirubin Total, Serum: 0.2 mg/dL   Alkaline Phosphatase, Serum: 54 U/L   Aspartate Aminotransferase (AST/SGOT): 15 U/L   Alanine Aminotransferase (ALT/SGPT): 13 U/L   eGFR: 66: The estimated glomerular filtration rate (eGFR) is calculated using the   2021 CKD-EPI creatinine equation, which does not have a coefficient for   race and is validated in individuals 18 years of age and older (N Engl J   Med 2021; 385:3036-7702). Creatinine-based eGFR may be inaccurate in   various situations including but not limited to extremes of muscle mass,   altered dietary protein intake, or medications that affect renal tubular   creatinine secretion. mL/min/1.73m2 Complete Blood Count (11.11.22 @ 13:30)   Nucleated RBC: 0 /100 WBCs   WBC Count: 5.46 K/uL   RBC Count: 4.85 M/uL   Hemoglobin: 13.6 g/dL   Hematocrit: 41.0 %   Mean Cell Volume: 84.5 fL   Mean Cell Hemoglobin: 28.0 pg   Mean Cell Hemoglobin Conc: 33.2 gm/dL   Red Cell Distrib Width: 12.8 %   Platelet Count - Automated: 195 K/uL   Nucleated RBC #: 0.00 K/uL       RADIOLOGY & ADDITIONAL STUDIES: No recent imaging SURGICAL ONCOLOGY CONSULT NOTE  HPI: 47y Spanish speaking male PMH melanoma of right thumb with metastases axillary lymph nodes, s/p chemotherapy s/p R thumb amputation at [facility] with R ALND.  Pt had followup PET scan done & Right Lung nodules seen. Now s/p Flexible bronchoscopy, robotic assisted right vats with RML lobe wedge resection, right mediastinal lymph node dissection of LN #9.    Surgical Oncology consulted for R axillary drain management. Per wife at bedside, no issues managing drain at home. Drain with approx. 40cc serosanguineous output daily. Pt has not yet seen Dr. Herrera in the office for follow-up.       PAST MEDICAL & SURGICAL HISTORY:  Malignant melanoma  right thumb      Metastatic malignant melanoma  Right axillary lymph nodes      GERD (gastroesophageal reflux disease)      History of lymph node dissection of right axilla  and Right thumb melanoma excision 12/2021      Malignant melanoma of thumb  Right Thumb Amputation with aillary Lymph Node Dissection -10/2022          MEDICATIONS  (STANDING):  heparin   Injectable 5000 Unit(s) SubCutaneous every 8 hours  HYDROmorphone PCA (1 mG/mL) 30 milliLiter(s) PCA Continuous PCA Continuous  influenza   Vaccine 0.5 milliLiter(s) IntraMuscular once  ketorolac   Injectable 15 milliGRAM(s) IV Push every 8 hours  lactated ringers. 1000 milliLiter(s) (30 mL/Hr) IV Continuous <Continuous>  metoclopramide Injectable 10 milliGRAM(s) IV Push once  pantoprazole    Tablet 40 milliGRAM(s) Oral before breakfast  polyethylene glycol 3350 17 Gram(s) Oral daily  senna 2 Tablet(s) Oral at bedtime    MEDICATIONS  (PRN):  HYDROmorphone PCA (1 mG/mL) Rescue Clinician Bolus 0.5 milliGRAM(s) IV Push every 15 minutes PRN for Pain Scale GREATER THAN 6  naloxone Injectable 0.1 milliGRAM(s) IV Push every 3 minutes PRN For ANY of the following changes in patient status:  A. RR LESS THAN 10 breaths per minute, B. Oxygen saturation LESS THAN 90%, C. Sedation score of 6  ondansetron Injectable 4 milliGRAM(s) IV Push every 6 hours PRN Nausea      Allergies    No Known Allergies    Intolerances      SOCIAL HISTORY: former teacher, nonsmoker      FAMILY HISTORY:  No pertinent family history in first degree relatives      Physical Exam:  General: NAD, resting comfortably  HEENT: NC/AT, EOMI, normal hearing, no oral lesions, no LAD, neck supple  Pulmonary: normal resp effort on RA, R chest tube in place to suction with serosanguineous output  Cardiovascular: NSR, no murmurs  Abdominal: soft, ND/NT, no organomegaly  Extremities: R arm in ace from fingers to shoulder, R axillary SONAL in place with serosanguineous output, R axillary dermabond prineo c/d/i  Neuro: A/O x 3  Psych: Affect appropriate      Vital Signs Last 24 Hrs  T(C): 36.2 (15 Nov 2022 11:15), Max: 36.7 (15 Nov 2022 06:55)  T(F): 97.1 (15 Nov 2022 11:15), Max: 98.1 (15 Nov 2022 06:55)  HR: 79 (15 Nov 2022 15:00) (79 - 98)  BP: 117/82 (15 Nov 2022 15:00) (115/83 - 151/76)  BP(mean): 94 (15 Nov 2022 15:00) (92 - 107)  RR: 14 (15 Nov 2022 15:00) (13 - 26)  SpO2: 96% (15 Nov 2022 15:00) (92% - 99%)    Parameters below as of 15 Nov 2022 16:00  Patient On (Oxygen Delivery Method): nasal cannula w/ humidification        I&O's Summary    15 Nov 2022 07:01  -  15 Nov 2022 15:59  --------------------------------------------------------  IN: 150 mL / OUT: 20 mL / NET: 130 mL        LABS:   Comprehensive Metabolic Panel (11.11.22 @ 13:30)   Sodium, Serum: 141 mmol/L   Potassium, Serum: 3.8 mmol/L   Chloride, Serum: 102 mmol/L   Carbon Dioxide, Serum: 27 mmol/L   Anion Gap, Serum: 12 mmol/L   Blood Urea Nitrogen, Serum: 14 mg/dL   Creatinine, Serum: 1.34 mg/dL   Glucose, Serum: 115 mg/dL   Calcium, Total Serum: 9.2 mg/dL   Protein Total, Serum: 7.7 g/dL   Albumin, Serum: 4.4 g/dL   Bilirubin Total, Serum: 0.2 mg/dL   Alkaline Phosphatase, Serum: 54 U/L   Aspartate Aminotransferase (AST/SGOT): 15 U/L   Alanine Aminotransferase (ALT/SGPT): 13 U/L   eGFR: 66: The estimated glomerular filtration rate (eGFR) is calculated using the   2021 CKD-EPI creatinine equation, which does not have a coefficient for   race and is validated in individuals 18 years of age and older (N Engl J   Med 2021; 385:9107-9970). Creatinine-based eGFR may be inaccurate in   various situations including but not limited to extremes of muscle mass,   altered dietary protein intake, or medications that affect renal tubular   creatinine secretion. mL/min/1.73m2 Complete Blood Count (11.11.22 @ 13:30)   Nucleated RBC: 0 /100 WBCs   WBC Count: 5.46 K/uL   RBC Count: 4.85 M/uL   Hemoglobin: 13.6 g/dL   Hematocrit: 41.0 %   Mean Cell Volume: 84.5 fL   Mean Cell Hemoglobin: 28.0 pg   Mean Cell Hemoglobin Conc: 33.2 gm/dL   Red Cell Distrib Width: 12.8 %   Platelet Count - Automated: 195 K/uL   Nucleated RBC #: 0.00 K/uL       RADIOLOGY & ADDITIONAL STUDIES: No recent imaging

## 2022-11-15 NOTE — CONSULT NOTE ADULT - ASSESSMENT
ASSESSMENT/RECOMMENDATIONS: 47y Indian speaking male PMH melanoma of right thumb with metastases axillary lymph nodes, s/p chemotherapy s/p R thumb amputation at [facility] with R ALND now admitted for lung nodules s /p Flexible bronchoscopy, robotic assisted right vats with RML lobe wedge resection, right mediastinal lymph node dissection of LN #9. PRS consulted for R thumb dressing management.    - OK to take down R thumb dressing  - keep open to air  - Discussed with Dr. Barry  - continue excellent care per [facility]

## 2022-11-15 NOTE — CONSULT NOTE ADULT - SUBJECTIVE AND OBJECTIVE BOX
Plastic Surgery Consult Note  (pg [facility]: [id number], [facility]: [phone])    HPI:  47y Anguillan speaking male PMH melanoma of right thumb with metastases axillary lymph nodes, s/p chemotherapy s/p R thumb amputation at [facility] with R ALND.  Pt had followup PET scan done & Right Lung nodules seen. Here for s/p Flexible bronchoscopy, robotic assisted right vats with RML lobe wedge resection, right mediastinal lymph node dissection of LN #9. PRS consulted for management of R thumb dressing      PAST MEDICAL & SURGICAL HISTORY:  Malignant melanoma  right thumb      Metastatic malignant melanoma  Right axillary lymph nodes      GERD (gastroesophageal reflux disease)      History of lymph node dissection of right axilla  and Right thumb melanoma excision 12/2021      Malignant melanoma of thumb  Right Thumb Amputation with aillary Lymph Node Dissection -10/2022        Allergies    No Known Allergies    Intolerances      Home Medications:  oxyCODONE 5 mg oral tablet: 1 tab(s) orally once a day, As Needed -for severe pain MDD:6 tabs  AM (11 Nov 2022 13:25)  pantoprazole 40 mg oral delayed release tablet: 1 tab(s) orally once a day AM (11 Nov 2022 13:25)    MEDICATIONS  (STANDING):  heparin   Injectable 5000 Unit(s) SubCutaneous every 8 hours  HYDROmorphone PCA (1 mG/mL) 30 milliLiter(s) PCA Continuous PCA Continuous  influenza   Vaccine 0.5 milliLiter(s) IntraMuscular once  ketorolac   Injectable 15 milliGRAM(s) IV Push every 8 hours  lactated ringers. 1000 milliLiter(s) (30 mL/Hr) IV Continuous <Continuous>  pantoprazole    Tablet 40 milliGRAM(s) Oral before breakfast  polyethylene glycol 3350 17 Gram(s) Oral daily  senna 2 Tablet(s) Oral at bedtime      SOCIAL HISTORY:  FAMILY HISTORY:  No pertinent family history in first degree relatives        ___________________________________________  OBJECTIVE:  Vital Signs Last 24 Hrs  T(C): 36.9 (15 Nov 2022 16:00), Max: 36.9 (15 Nov 2022 16:00)  T(F): 98.5 (15 Nov 2022 16:00), Max: 98.5 (15 Nov 2022 16:00)  HR: 80 (15 Nov 2022 18:00) (70 - 98)  BP: 128/75 (15 Nov 2022 18:00) (115/83 - 151/76)  BP(mean): 93 (15 Nov 2022 18:00) (92 - 107)  RR: 16 (15 Nov 2022 18:00) (13 - 26)  SpO2: 94% (15 Nov 2022 18:00) (92% - 99%)    Parameters below as of 15 Nov 2022 19:00  Patient On (Oxygen Delivery Method): room air    CAPILLARY BLOOD GLUCOSE        I&O's Detail    15 Nov 2022 07:01  -  15 Nov 2022 18:23  --------------------------------------------------------  IN:    Lactated Ringers: 240 mL    Oral Fluid: 220 mL  Total IN: 460 mL    OUT:    Chest Tube (mL): 30 mL    Drain (mL): 20 mL    Voided (mL): 175 mL  Total OUT: 225 mL    Total NET: 235 mL        General: Well developed, well nourished, NAD  Respiratory: Airway patent, respirations unlabored  CVS: Regular rate and rhythm  Extremities: No edema, sensation and movement grossly intact  RUE: in ACE wrap with thumb dressign c/d/i. FROM of R thumb with sensation intact.   Skin: Warm, dry, appropriate color  ____________________________________________  LABS:

## 2022-11-15 NOTE — BRIEF OPERATIVE NOTE - OPERATION/FINDINGS
Pt had one small amount of bright red blood in her stool.  Pt denies history of hemorrhoids.  MD updated.  No new orders.      Flexible bronchoscopy with robotic assisted right vats. Right middle lobe wedge resection. Lymph node dissection of LN #9.
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
## 2022-11-16 ENCOUNTER — TRANSCRIPTION ENCOUNTER (OUTPATIENT)
Age: 47
End: 2022-11-16

## 2022-11-16 LAB
ANION GAP SERPL CALC-SCNC: 12 MMOL/L — SIGNIFICANT CHANGE UP (ref 7–14)
BASOPHILS # BLD AUTO: 0.01 K/UL — SIGNIFICANT CHANGE UP (ref 0–0.2)
BASOPHILS NFR BLD AUTO: 0.1 % — SIGNIFICANT CHANGE UP (ref 0–2)
BUN SERPL-MCNC: 15 MG/DL — SIGNIFICANT CHANGE UP (ref 7–23)
CALCIUM SERPL-MCNC: 9.6 MG/DL — SIGNIFICANT CHANGE UP (ref 8.4–10.5)
CHLORIDE SERPL-SCNC: 100 MMOL/L — SIGNIFICANT CHANGE UP (ref 98–107)
CO2 SERPL-SCNC: 25 MMOL/L — SIGNIFICANT CHANGE UP (ref 22–31)
CREAT SERPL-MCNC: 1.19 MG/DL — SIGNIFICANT CHANGE UP (ref 0.5–1.3)
EGFR: 76 ML/MIN/1.73M2 — SIGNIFICANT CHANGE UP
EOSINOPHIL # BLD AUTO: 0.03 K/UL — SIGNIFICANT CHANGE UP (ref 0–0.5)
EOSINOPHIL NFR BLD AUTO: 0.4 % — SIGNIFICANT CHANGE UP (ref 0–6)
GLUCOSE SERPL-MCNC: 93 MG/DL — SIGNIFICANT CHANGE UP (ref 70–99)
HCT VFR BLD CALC: 38.4 % — LOW (ref 39–50)
HGB BLD-MCNC: 12.9 G/DL — LOW (ref 13–17)
IANC: 5.66 K/UL — SIGNIFICANT CHANGE UP (ref 1.8–7.4)
IMM GRANULOCYTES NFR BLD AUTO: 0.2 % — SIGNIFICANT CHANGE UP (ref 0–0.9)
LYMPHOCYTES # BLD AUTO: 1.57 K/UL — SIGNIFICANT CHANGE UP (ref 1–3.3)
LYMPHOCYTES # BLD AUTO: 19.1 % — SIGNIFICANT CHANGE UP (ref 13–44)
MAGNESIUM SERPL-MCNC: 2.2 MG/DL — SIGNIFICANT CHANGE UP (ref 1.6–2.6)
MCHC RBC-ENTMCNC: 28.2 PG — SIGNIFICANT CHANGE UP (ref 27–34)
MCHC RBC-ENTMCNC: 33.6 GM/DL — SIGNIFICANT CHANGE UP (ref 32–36)
MCV RBC AUTO: 84 FL — SIGNIFICANT CHANGE UP (ref 80–100)
MONOCYTES # BLD AUTO: 0.91 K/UL — HIGH (ref 0–0.9)
MONOCYTES NFR BLD AUTO: 11.1 % — SIGNIFICANT CHANGE UP (ref 2–14)
NEUTROPHILS # BLD AUTO: 5.66 K/UL — SIGNIFICANT CHANGE UP (ref 1.8–7.4)
NEUTROPHILS NFR BLD AUTO: 69.1 % — SIGNIFICANT CHANGE UP (ref 43–77)
NRBC # BLD: 0 /100 WBCS — SIGNIFICANT CHANGE UP (ref 0–0)
NRBC # FLD: 0 K/UL — SIGNIFICANT CHANGE UP (ref 0–0)
PHOSPHATE SERPL-MCNC: 4.1 MG/DL — SIGNIFICANT CHANGE UP (ref 2.5–4.5)
PLATELET # BLD AUTO: 196 K/UL — SIGNIFICANT CHANGE UP (ref 150–400)
POTASSIUM SERPL-MCNC: 3.8 MMOL/L — SIGNIFICANT CHANGE UP (ref 3.5–5.3)
POTASSIUM SERPL-SCNC: 3.8 MMOL/L — SIGNIFICANT CHANGE UP (ref 3.5–5.3)
RBC # BLD: 4.57 M/UL — SIGNIFICANT CHANGE UP (ref 4.2–5.8)
RBC # FLD: 12.8 % — SIGNIFICANT CHANGE UP (ref 10.3–14.5)
SODIUM SERPL-SCNC: 137 MMOL/L — SIGNIFICANT CHANGE UP (ref 135–145)
WBC # BLD: 8.2 K/UL — SIGNIFICANT CHANGE UP (ref 3.8–10.5)
WBC # FLD AUTO: 8.2 K/UL — SIGNIFICANT CHANGE UP (ref 3.8–10.5)

## 2022-11-16 PROCEDURE — 71045 X-RAY EXAM CHEST 1 VIEW: CPT | Mod: 26,76

## 2022-11-16 RX ORDER — OXYCODONE HYDROCHLORIDE 5 MG/1
5 TABLET ORAL
Refills: 0 | Status: DISCONTINUED | OUTPATIENT
Start: 2022-11-16 | End: 2022-11-16

## 2022-11-16 RX ORDER — SENNA PLUS 8.6 MG/1
2 TABLET ORAL
Qty: 0 | Refills: 0 | DISCHARGE
Start: 2022-11-16

## 2022-11-16 RX ORDER — ACETAMINOPHEN 500 MG
3 TABLET ORAL
Qty: 0 | Refills: 0 | DISCHARGE
Start: 2022-11-16

## 2022-11-16 RX ORDER — POLYETHYLENE GLYCOL 3350 17 G/17G
17 POWDER, FOR SOLUTION ORAL
Qty: 0 | Refills: 0 | DISCHARGE
Start: 2022-11-16

## 2022-11-16 RX ORDER — OXYCODONE HYDROCHLORIDE 5 MG/1
10 TABLET ORAL
Refills: 0 | Status: DISCONTINUED | OUTPATIENT
Start: 2022-11-16 | End: 2022-11-16

## 2022-11-16 RX ORDER — HYDROMORPHONE HYDROCHLORIDE 2 MG/ML
4 INJECTION INTRAMUSCULAR; INTRAVENOUS; SUBCUTANEOUS EVERY 4 HOURS
Refills: 0 | Status: DISCONTINUED | OUTPATIENT
Start: 2022-11-16 | End: 2022-11-17

## 2022-11-16 RX ORDER — ACETAMINOPHEN 500 MG
975 TABLET ORAL EVERY 6 HOURS
Refills: 0 | Status: DISCONTINUED | OUTPATIENT
Start: 2022-11-16 | End: 2022-11-17

## 2022-11-16 RX ORDER — OXYCODONE HYDROCHLORIDE 5 MG/1
2 TABLET ORAL
Qty: 60 | Refills: 0
Start: 2022-11-16 | End: 2022-11-20

## 2022-11-16 RX ORDER — IBUPROFEN 200 MG
400 TABLET ORAL EVERY 6 HOURS
Refills: 0 | Status: DISCONTINUED | OUTPATIENT
Start: 2022-11-16 | End: 2022-11-17

## 2022-11-16 RX ADMIN — Medication 975 MILLIGRAM(S): at 01:11

## 2022-11-16 RX ADMIN — HEPARIN SODIUM 5000 UNIT(S): 5000 INJECTION INTRAVENOUS; SUBCUTANEOUS at 05:48

## 2022-11-16 RX ADMIN — Medication 15 MILLIGRAM(S): at 03:37

## 2022-11-16 RX ADMIN — OXYCODONE HYDROCHLORIDE 10 MILLIGRAM(S): 5 TABLET ORAL at 12:18

## 2022-11-16 RX ADMIN — HYDROMORPHONE HYDROCHLORIDE 4 MILLIGRAM(S): 2 INJECTION INTRAMUSCULAR; INTRAVENOUS; SUBCUTANEOUS at 17:29

## 2022-11-16 RX ADMIN — Medication 400 MILLIGRAM(S): at 15:50

## 2022-11-16 RX ADMIN — HEPARIN SODIUM 5000 UNIT(S): 5000 INJECTION INTRAVENOUS; SUBCUTANEOUS at 14:11

## 2022-11-16 RX ADMIN — HYDROMORPHONE HYDROCHLORIDE 4 MILLIGRAM(S): 2 INJECTION INTRAMUSCULAR; INTRAVENOUS; SUBCUTANEOUS at 16:29

## 2022-11-16 RX ADMIN — PANTOPRAZOLE SODIUM 40 MILLIGRAM(S): 20 TABLET, DELAYED RELEASE ORAL at 05:47

## 2022-11-16 RX ADMIN — POLYETHYLENE GLYCOL 3350 17 GRAM(S): 17 POWDER, FOR SOLUTION ORAL at 12:12

## 2022-11-16 RX ADMIN — Medication 15 MILLIGRAM(S): at 02:37

## 2022-11-16 RX ADMIN — HYDROMORPHONE HYDROCHLORIDE 30 MILLILITER(S): 2 INJECTION INTRAMUSCULAR; INTRAVENOUS; SUBCUTANEOUS at 07:35

## 2022-11-16 RX ADMIN — Medication 975 MILLIGRAM(S): at 12:11

## 2022-11-16 RX ADMIN — OXYCODONE HYDROCHLORIDE 10 MILLIGRAM(S): 5 TABLET ORAL at 01:11

## 2022-11-16 RX ADMIN — SENNA PLUS 2 TABLET(S): 8.6 TABLET ORAL at 22:30

## 2022-11-16 RX ADMIN — Medication 15 MILLIGRAM(S): at 01:11

## 2022-11-16 RX ADMIN — Medication 400 MILLIGRAM(S): at 16:50

## 2022-11-16 RX ADMIN — Medication 400 MILLIGRAM(S): at 22:29

## 2022-11-16 RX ADMIN — HEPARIN SODIUM 5000 UNIT(S): 5000 INJECTION INTRAVENOUS; SUBCUTANEOUS at 22:29

## 2022-11-16 RX ADMIN — HYDROMORPHONE HYDROCHLORIDE 30 MILLILITER(S): 2 INJECTION INTRAMUSCULAR; INTRAVENOUS; SUBCUTANEOUS at 00:57

## 2022-11-16 RX ADMIN — Medication 15 MILLIGRAM(S): at 12:11

## 2022-11-16 NOTE — DISCHARGE NOTE PROVIDER - NSDCFUADDINST_GEN_ALL_CORE_FT
Please keep your chest tube dressing in place for 2 days. After 2 days you may remove the dressing to shower. Wash the area with soap and water, do no scrub area, pat to dry, leave open to air.  Please monitor the area for any redness, swelling, discharge, pus, or pain. If you start to develop these symptoms or new/worsening chest pain, shortness of breath, fevers or chills, please call Dr. Dunn.  Please follow all follow up instructions given to you by the Oncology and Plastic surgery teams. Make appointments with them as you were directed. Please keep your chest tube dressing in place until tomorrow. Starting tomorrow you may remove all dressings to shower. Wash the area with soap and water, do no scrub area, pat to dry, leave open to air and uncovered. Keep ACE wrap to arm in place for compression.   Please monitor the area for any redness, swelling, discharge, pus, or pain. If you start to develop these symptoms or new/worsening chest pain, shortness of breath, fevers or chills, please call Dr. Dunn.  Walk 4-5 x per day, increase as tolerated. You may climb stairs. Continue to use incentive spirometer.   Please follow all follow up instructions given to you by the Oncology and Plastic surgery teams. Make appointments with them as you were directed.

## 2022-11-16 NOTE — DISCHARGE NOTE PROVIDER - CARE PROVIDER_API CALL
Zachariah Dunn)  Thoracic Surgery  [address] [place] [address]  Phone: [phone]  Fax: [phone]  Follow Up Time:

## 2022-11-16 NOTE — PHYSICAL THERAPY INITIAL EVALUATION ADULT - PATIENT PROFILE REVIEW, REHAB EVAL
PT evaluate and treat orders received: Ambulate as tolerated, Out of bed to chair. Consult with RN Rabia MONTELONGO, pt may participate in PT evaluation./yes

## 2022-11-16 NOTE — DISCHARGE NOTE PROVIDER - NSDCMRMEDTOKEN_GEN_ALL_CORE_FT
acetaminophen 325 mg oral tablet: 3 tab(s) orally every 6 hours, As needed, Mild Pain (1 - 3)  pantoprazole 40 mg oral delayed release tablet: 1 tab(s) orally once a day AM  polyethylene glycol 3350 oral powder for reconstitution: 17 gram(s) orally once a day  senna leaf extract oral tablet: 2 tab(s) orally once a day (at bedtime)   acetaminophen 325 mg oral tablet: 3 tab(s) orally every 6 hours, As needed, Mild Pain (1 - 3)  oxyCODONE 5 mg oral tablet: 2 tab(s) orally every 4 hours, As Needed   -1 tab for moderate pain   -2 tabs for severe pain MDD:12   pantoprazole 40 mg oral delayed release tablet: 1 tab(s) orally once a day AM  polyethylene glycol 3350 oral powder for reconstitution: 17 gram(s) orally once a day  senna leaf extract oral tablet: 2 tab(s) orally once a day (at bedtime)   acetaminophen 325 mg oral tablet: 3 tab(s) orally every 6 hours, As needed, Mild Pain (1 - 3)  HYDROmorphone 4 mg oral tablet: 1 tab(s) orally every 4 hours, As needed, moderate to severe pain MDD:6  ibuprofen 200 mg oral tablet: 2 tab(s) orally every 6 hours, As Needed.  Can alternate with Tylenol and Dilaudid  pantoprazole 40 mg oral delayed release tablet: 1 tab(s) orally once a day AM  polyethylene glycol 3350 oral powder for reconstitution: 17 gram(s) orally once a day  senna leaf extract oral tablet: 2 tab(s) orally once a day (at bedtime)

## 2022-11-16 NOTE — PHYSICAL THERAPY INITIAL EVALUATION ADULT - ADDITIONAL COMMENTS
Pt lives in an apartment with 8 exterior steps to enter. Prior to admission, pt ambulated independently.

## 2022-11-16 NOTE — DISCHARGE NOTE PROVIDER - NSDCFUADDAPPT_GEN_ALL_CORE_FT
Please make a follow up appointment with Dr. Dunn in 2 weeks. Call for an appointment [phone]. Please obtain a chest xray 1-2 days before your appointment.    Please follow up with your primary care provider.    Please follow up with plastic surgery as directed previously    Please follow up with your oncologist as directed previously. Please make a follow up appointment with Dr. Dunn in 2 weeks. Call for an appointment [phone]. Please obtain a chest xray 1-2 days before your appointment.    Please follow up with your primary care provider.    Please follow up with plastic surgery as directed previously  Follow up with Dr. Herrera about drain removal. Continue to empty SONAL drain and record output.   Please follow up with your oncologist as directed previously.

## 2022-11-16 NOTE — PHYSICAL THERAPY INITIAL EVALUATION ADULT - ASSISTIVE DEVICE FOR TRANSFER: GAIT, REHAB EVAL
Yulia Rivero  [id number]  [place] [address]      2020      : 1969    Dear Ms. Rivero:    We have been trying to reach you without success.  Please call my office at [phone].    Thank you for your timely response.    Sincerely,         Hang Osullivan DO        
no assistive device

## 2022-11-16 NOTE — DISCHARGE NOTE PROVIDER - NSDCACTIVITY_GEN_ALL_CORE
Return to Work/School allowed/Sex allowed/Do not drive or operate machinery/Do not make important decisions/Stairs allowed/Walking - Indoors allowed/No heavy lifting/straining/Walking - Outdoors allowed/Follow Instructions Provided by your Surgical Team

## 2022-11-16 NOTE — PHYSICAL THERAPY INITIAL EVALUATION ADULT - MD/RN NOTIFIED
Health Maintenance Due   Topic Date Due   • COVID-19 Vaccine (1) Never done   • DTaP/Tdap/Td Vaccine (2 - Tdap) 02/06/2006   • Influenza Vaccine (1) 09/01/2021       Patient is due for topics as listed above but is not proceeding with Immunization(s) COVID-19, Dtap/Tdap/Td and Influenza at this time.      yes

## 2022-11-16 NOTE — DISCHARGE NOTE PROVIDER - NSDCCPTREATMENT_GEN_ALL_CORE_FT
PRINCIPAL PROCEDURE  Procedure: Thoracoscopic robotic assisted procedure  Findings and Treatment:       SECONDARY PROCEDURE  Procedure: Bronchoscopy, flexible, adult  Findings and Treatment:     Procedure: Thoracoscopic robotic assisted procedure  Findings and Treatment:

## 2022-11-16 NOTE — DISCHARGE NOTE PROVIDER - HOSPITAL COURSE
48 y/o Syrian speaking male presents to presurgical testing with diagnosis of malignant melanoma of skin scheduled for a right thumb melanoma amputation distal, right axillary lymph node dissection. Pt s/p  right thumb melanoma with mets to right axillary lymph nodes s/p right axillary dissection in 12/2021, and chemotherapy from 1-3, 2022, in [facility]. Pt had followup PET scan done & Right Lung nodules seen. Patient had his chest tube removed on POD 1, his follow up chest Xray was stable and he was cleared for discharge home per Dr. Dunn.    Vital Signs Last 24 Hrs  T(C): 37 (16 Nov 2022 08:45), Max: 37.1 (16 Nov 2022 01:16)  T(F): 98.6 (16 Nov 2022 08:45), Max: 98.8 (16 Nov 2022 01:16)  HR: 78 (16 Nov 2022 08:45) (70 - 99)  BP: 117/74 (16 Nov 2022 08:45) (115/81 - 151/76)  BP(mean): 93 (16 Nov 2022 00:00) (90 - 107)  RR: 18 (16 Nov 2022 08:45) (13 - 26)  SpO2: 98% (16 Nov 2022 08:45) (92% - 100%)    Parameters below as of 16 Nov 2022 08:45  Patient On (Oxygen Delivery Method): room air      General: A&Ox3, NAD  HEENT: Normocephalic. Vision and hearing grossly intact, EOMI. Airway grossly patent, no stridor.  CV: S1S2, RRR, well perfused  Resp: Breathing comfortably on RA, no resp distress, no accessory muscle use  GI: Soft, NT, ND, +BS  MSK: WALTERS x4  Pysch: Appropriate affect  Incisions: c/d/i  Tubes: R axilla SONAL (Plastics)                          12.9   8.20  )-----------( 196      ( 16 Nov 2022 06:39 )             38.4       11-16    137  |  100  |  15  ----------------------------<  93  3.8   |  25  |  1.19    Ca    9.6      16 Nov 2022 06:39  Phos  4.1     11-16  Mg     2.20     11-16     48 y/o Rwandan speaking male presents to presurgical testing with diagnosis of malignant melanoma of skin scheduled for a right thumb melanoma amputation distal, right axillary lymph node dissection. Pt s/p  right thumb melanoma with mets to right axillary lymph nodes s/p right axillary dissection in 12/2021, and chemotherapy from 1-3, 2022, in [facility]. Pt had followup PET scan done & Right Lung nodules seen. Patient had his chest tube removed on POD 1, his follow up chest Xray was stable and he was cleared for discharge home per Dr. Dunn.    Vital Signs Last 24 Hrs  T(C): 37 (16 Nov 2022 08:45), Max: 37.1 (16 Nov 2022 01:16)  T(F): 98.6 (16 Nov 2022 08:45), Max: 98.8 (16 Nov 2022 01:16)  HR: 78 (16 Nov 2022 08:45) (70 - 99)  BP: 117/74 (16 Nov 2022 08:45) (115/81 - 151/76)  BP(mean): 93 (16 Nov 2022 00:00) (90 - 107)  RR: 18 (16 Nov 2022 08:45) (13 - 26)  SpO2: 98% (16 Nov 2022 08:45) (92% - 100%)    Parameters below as of 16 Nov 2022 08:45  Patient On (Oxygen Delivery Method): room air      General: A&Ox3, NAD  HEENT: Normocephalic. Vision and hearing grossly intact, EOMI. Airway grossly patent, no stridor.  CV: S1S2, RRR, well perfused  Resp: Breathing comfortably on RA, no resp distress, no accessory muscle use  GI: Soft, NT, ND, +BS  MSK: WALTERS x4, s/p thumb amp, wound c/d/i.  Pysch: Appropriate affect  Incisions: c/d/i  Tubes: R axilla SONAL (Plastics)                          12.9   8.20  )-----------( 196      ( 16 Nov 2022 06:39 )             38.4       11-16    137  |  100  |  15  ----------------------------<  93  3.8   |  25  |  1.19    Ca    9.6      16 Nov 2022 06:39  Phos  4.1     11-16  Mg     2.20     11-16     48 y/o Puerto Rican speaking male presents to presurgical testing with diagnosis of malignant melanoma of skin scheduled for a right thumb melanoma amputation distal, right axillary lymph node dissection. Pt s/p  right thumb melanoma with mets to right axillary lymph nodes s/p right axillary dissection in 12/2021, and chemotherapy from 1-3, 2022, in [facility]. Pt had followup PET scan done & Right Lung nodules seen. Patient had his chest tube removed on POD 1, his follow up chest Xray was stable. Discharged was delayed yesterday for uncontrolled pain. Pt was changed to Dilaudid from Oxy and added Motrin with increased  relief. Thumb wound uncovered. Wound c/d/i. Rt axilla SONAL drain and ACE wrap to remain in placed. Pt CXR again this morning. Pain improved. Cleared for d/c to home    Vital Signs Last 24 Hrs  T(C): 36.8 (17 Nov 2022 08:20), Max: 37 (17 Nov 2022 05:11)  T(F): 98.2 (17 Nov 2022 08:20), Max: 98.6 (17 Nov 2022 05:11)  HR: 74 (17 Nov 2022 08:20) (73 - 90)  BP: 142/72 (17 Nov 2022 08:20) (116/75 - 142/72)  BP(mean): --  RR: 18 (17 Nov 2022 08:20) (18 - 19)  SpO2: 98% (17 Nov 2022 08:20) (98% - 100%)    Parameters below as of 17 Nov 2022 08:20  Patient On (Oxygen Delivery Method): room air          General: A&Ox3, NAD  HEENT: Normocephalic. Vision and hearing grossly intact, EOMI. Airway grossly patent, no stridor.  CV: S1S2, RRR, well perfused  Resp: Breathing comfortably on RA, no resp distress, no accessory muscle use  GI: Soft, NT, ND, +BS  MSK: WALTERS x4, s/p thumb amp, wound c/d/i.  Pysch: Appropriate affect  Incisions: c/d/i  Tubes: R axilla SONAL (Plastics)                          12.9   8.20  )-----------( 196      ( 16 Nov 2022 06:39 )             38.4       11-16    137  |  100  |  15  ----------------------------<  93  3.8   |  25  |  1.19    Ca    9.6      16 Nov 2022 06:39  Phos  4.1     11-16  Mg     2.20     11-16

## 2022-11-16 NOTE — PHYSICAL THERAPY INITIAL EVALUATION ADULT - GENERAL OBSERVATIONS, REHAB EVAL
Pt received seated in bedside chair, +IV/PCA pump, +SONAL drain, +right thumb dressing/ace wrap, in NAD. Pt agreeable to participate in PT evaluation. Pt left seated in bedside chair, all lines intact and RN aware.

## 2022-11-16 NOTE — PROGRESS NOTE ADULT - ASSESSMENT
ASSESSMENT/RECOMMENDATIONS: 47y [place]aguayan speaking male PMH melanoma of right thumb with metastases axillary lymph nodes, s/p chemotherapy s/p R thumb amputation at [facility] with R ALND now admitted for lung nodules s /p Flexible bronchoscopy, robotic assisted right vats with RML lobe wedge resection, right mediastinal lymph node dissection of LN #9. PRS consulted for R thumb dressing management.    - OK to take down R thumb dressing  - keep open to air  - Discussed with Dr. Barry  - continue excellent care per [facility]

## 2022-11-16 NOTE — DISCHARGE NOTE PROVIDER - NSDCCPCAREPLAN_GEN_ALL_CORE_FT
PRINCIPAL DISCHARGE DIAGNOSIS  Diagnosis: Status post lung surgery  Assessment and Plan of Treatment:

## 2022-11-16 NOTE — PHYSICAL THERAPY INITIAL EVALUATION ADULT - PERTINENT HX OF CURRENT PROBLEM, REHAB EVAL
Pt is a 47 year old female presenting with a pre-operative diagnosis of lung nodules. Pt POD#1 s/p flexible bronchoscopy, robotic assisted right vats with RML lobe wedge resection, right mediastinal lymph node dissection of LN #9. PMH listed below.

## 2022-11-17 ENCOUNTER — TRANSCRIPTION ENCOUNTER (OUTPATIENT)
Age: 47
End: 2022-11-17

## 2022-11-17 VITALS
OXYGEN SATURATION: 98 % | RESPIRATION RATE: 18 BRPM | SYSTOLIC BLOOD PRESSURE: 118 MMHG | HEART RATE: 70 BPM | DIASTOLIC BLOOD PRESSURE: 77 MMHG | TEMPERATURE: 97 F

## 2022-11-17 PROCEDURE — 71045 X-RAY EXAM CHEST 1 VIEW: CPT | Mod: 26

## 2022-11-17 RX ORDER — HYDROMORPHONE HYDROCHLORIDE 2 MG/ML
1 INJECTION INTRAMUSCULAR; INTRAVENOUS; SUBCUTANEOUS
Qty: 30 | Refills: 0
Start: 2022-11-17 | End: 2022-11-21

## 2022-11-17 RX ORDER — IBUPROFEN 200 MG
2 TABLET ORAL
Qty: 0 | Refills: 0 | DISCHARGE
Start: 2022-11-17

## 2022-11-17 RX ADMIN — HYDROMORPHONE HYDROCHLORIDE 4 MILLIGRAM(S): 2 INJECTION INTRAMUSCULAR; INTRAVENOUS; SUBCUTANEOUS at 05:37

## 2022-11-17 RX ADMIN — HYDROMORPHONE HYDROCHLORIDE 4 MILLIGRAM(S): 2 INJECTION INTRAMUSCULAR; INTRAVENOUS; SUBCUTANEOUS at 06:22

## 2022-11-17 RX ADMIN — HEPARIN SODIUM 5000 UNIT(S): 5000 INJECTION INTRAVENOUS; SUBCUTANEOUS at 05:37

## 2022-11-17 RX ADMIN — PANTOPRAZOLE SODIUM 40 MILLIGRAM(S): 20 TABLET, DELAYED RELEASE ORAL at 05:37

## 2022-11-17 RX ADMIN — Medication 400 MILLIGRAM(S): at 11:30

## 2022-11-17 NOTE — DISCHARGE NOTE NURSING/CASE MANAGEMENT/SOCIAL WORK - NSDCFUADDAPPT_GEN_ALL_CORE_FT
Please make a follow up appointment with Dr. Dunn in 2 weeks. Call for an appointment [phone]. Please obtain a chest xray 1-2 days before your appointment.    Please follow up with your primary care provider.    Please follow up with plastic surgery as directed previously  Follow up with Dr. Herrera about drain removal. Continue to empty SONAL drain and record output.   Please follow up with your oncologist as directed previously.

## 2022-11-17 NOTE — DISCHARGE NOTE NURSING/CASE MANAGEMENT/SOCIAL WORK - PATIENT PORTAL LINK FT
You can access the FollowMyHealth Patient Portal offered by [facility] by registering at the following website: [email or web address]. By joining [facility]’s FollowMyHealth portal, you will also be able to view your health information using other applications (apps) compatible with our system.

## 2022-11-17 NOTE — PROGRESS NOTE ADULT - SUBJECTIVE AND OBJECTIVE BOX
CHIEF COMPLAINT: FOLLOW UP IN ICU FOR POSTOPERATIVE CARE OF PATIENT WHO IS S/P  RML wedge resection            PROCEDURES:       Flexible bronchoscopy with robotic assisted right vats. Right middle lobe wedge [id number]          ISSUES:       Lung nodule   Postop pain   Chest tube in place   Metastatic melanoma   Recent right thumb amputation, right axillary lymph node dissection         INTERVAL EVENTS:      OR today. Extubated in OR. Transferred to CTICU.               HISTORY:      Patient reports moderate pain at chest wall incision sites which is worse with coughing and deep breathing without associated fever or dyspnea. Pain is improved with use of pain meds.           PHYSICAL EXAM:      Gen: Comfortable, No acute distress     Eyes: Sclera white, Conjunctiva normal, Eyelids normal, Pupils symmetrical      ENT: Mucous membranes moist,  ,  ,       Neck: Trachea midline,  ,  ,  ,  ,  ,       CV: Rate regular, Rhythm regular,  ,  ,       Resp: Breath sounds clear, No accessory muscles use, R chest tube in place,  ,   Right chest SONAL drain place    Abd: Soft, Non-distended, Non-tender, Bowel sounds normal,  ,  ,       Skin: Warm, RUE with ace wrap, right thumb with dressing in place after recent surgery     : No paz     Neuro: Moving all 4 extremities,       Psych: A&Ox3               ASSESSMENT AND PLAN:           NEURO:     Post-operative Pain - Stable. Pain control with PCA and Tylenol IV PRN.              RESPIRATORY:   Hypoxia - Wean nasal cannula for goal O2sat above 92. Obtain CXR. Incentive spirometry. Chest PT and frequent suctioning. Continue bronchodilators. OOB to chair & ambulate w/ assistance. Continuous pulse oximetry for support & to prevent decompensation.     Chest tube – Pleurevac regulated suctioning. Monitor chest tube output.                  CARDIOVASCULAR:     Hemodynamically stable - Not on pressors. Continue hemodynamic monitoring.     Telemetry (medical test) - Reviewed by me today independently. Normal sinus rhythm.                RENAL:     Stable - Monitor IOs and electrolytes. Keep K above 4.0 and Mg above 2.0.           GASTROINTESTINAL:     GI prophylaxis not indicated     Zofran and Reglan IV PRN for nausea     Regular consistency diet              HEMATOLOGIC:     No signs of active bleeding. Monitor Hgb in CBC in AM     DVT prophylaxis with heparin subQ and SCDs.              INFECTIOUS DISEASE:     All surgical sites appear clean. No signs of active infection. Will monitor for fever and leukocytosis.                ENDOCRINE:     Stable – Monitor glucose fingersticks for goal 120-180.            ONCOLOGY:     Lung nodule - Improved. S/P resection. Follow up final pathology.        RUE precautions, f/u plastic surgery recs       Pertinent clinical, laboratory, radiographic, hemodynamic, echocardiographic, respiratory data, microbiologic data and chart were reviewed by myself and analyzed frequently throughout the course of the day and night by myself.     Plan discussed at length with the CTICU staff and Attending CT Surgeon -   Dr Zachariah Dunn    Patient's status was discussed with patient at bedside.        ________________________________________________        _________________________  VITAL SIGNS:  Vital Signs Last 24 Hrs  T(C): 36.2 (15 Nov 2022 11:15), Max: 36.7 (15 Nov 2022 06:55)  T(F): 97.1 (15 Nov 2022 11:15), Max: 98.1 (15 Nov 2022 06:55)  HR: 79 (15 Nov 2022 15:00) (79 - 98)  BP: 117/82 (15 Nov 2022 15:00) (115/83 - 151/76)  BP(mean): 94 (15 Nov 2022 15:00) (92 - 107)  RR: 14 (15 Nov 2022 15:00) (13 - 26)  SpO2: 96% (15 Nov 2022 15:00) (92% - 99%)    Parameters below as of 15 Nov 2022 16:00  Patient On (Oxygen Delivery Method): nasal cannula w/ humidification      I/Os:   I&O's Detail    15 Nov 2022 07:01  -  15 Nov 2022 15:51  --------------------------------------------------------  IN:    Lactated Ringers: 150 mL  Total IN: 150 mL    OUT:    Chest Tube (mL): 20 mL  Total OUT: 20 mL    Total NET: 130 mL              MEDICATIONS:  MEDICATIONS  (STANDING):  heparin   Injectable 5000 Unit(s) SubCutaneous every 8 hours  HYDROmorphone PCA (1 mG/mL) 30 milliLiter(s) PCA Continuous PCA Continuous  influenza   Vaccine 0.5 milliLiter(s) IntraMuscular once  ketorolac   Injectable 15 milliGRAM(s) IV Push every 8 hours  lactated ringers. 1000 milliLiter(s) (30 mL/Hr) IV Continuous <Continuous>  pantoprazole    Tablet 40 milliGRAM(s) Oral before breakfast  polyethylene glycol 3350 17 Gram(s) Oral daily  senna 2 Tablet(s) Oral at bedtime    MEDICATIONS  (PRN):  HYDROmorphone PCA (1 mG/mL) Rescue Clinician Bolus 0.5 milliGRAM(s) IV Push every 15 minutes PRN for Pain Scale GREATER THAN 6  naloxone Injectable 0.1 milliGRAM(s) IV Push every 3 minutes PRN For ANY of the following changes in patient status:  A. RR LESS THAN 10 breaths per minute, B. Oxygen saturation LESS THAN 90%, C. Sedation score of 6  ondansetron Injectable 4 milliGRAM(s) IV Push every 6 hours PRN Nausea      LABS:  Laboratory data was independently reviewed by me today.                       RADIOLOGY:   Radiology images were independently reviewed by me today. Reports were reviewed by me today.       
Anesthesia Pain Management Service    SUBJECTIVE: Pt now off IV PCA without problems reported.    Therapy:	  [ X] IV PCA	   [ ] Epidural           [ ] s/p Spinal Opoid              [ ] Postpartum infusion	  [ ] Patient controlled regional anesthesia (PCRA)    [ ] prn Analgesics    Allergies    No Known Allergies    Intolerances      MEDICATIONS  (STANDING):  heparin   Injectable 5000 Unit(s) SubCutaneous every 8 hours  ibuprofen  Tablet. 400 milliGRAM(s) Oral every 6 hours  pantoprazole    Tablet 40 milliGRAM(s) Oral before breakfast  polyethylene glycol 3350 17 Gram(s) Oral daily  senna 2 Tablet(s) Oral at bedtime    MEDICATIONS  (PRN):  acetaminophen     Tablet .. 975 milliGRAM(s) Oral every 6 hours PRN Mild Pain (1 - 3)  HYDROmorphone   Tablet 4 milliGRAM(s) Oral every 4 hours PRN moderate to severe pain  naloxone Injectable 0.1 milliGRAM(s) IV Push every 3 minutes PRN For ANY of the following changes in patient status:  A. RR LESS THAN 10 breaths per minute, B. Oxygen saturation LESS THAN 90%, C. Sedation score of 6  ondansetron Injectable 4 milliGRAM(s) IV Push every 6 hours PRN Nausea      OBJECTIVE:   [X] No new signs     [ ] Other:    Side Effects:  [X ] None			[ ] Other:    Assessment of Catheter Site:		[ ] Intact		[ ] Other:    ASSESSMENT/PLAN  [ ] Continue current therapy    [X ] Therapy changed to:    [ ] IV PCA       [ ] Epidural     [ X] prn Analgesics     Comments: PRN Oral/IV opioids and/or non-opioid adjuvant analgesics to be used at this point.    Progress Note written now but Patient was seen earlier.
Date of [id number]/17/2022    chief complaint: Right middle lobe wedge resection    extended hpi: : Pt is a 47 year old amle with a pmh of GERD who presented for flexible bronchoscopy with robotic assisted right vats. Right middle lobe wedge resection. Lymph node dissection of LN.    Patient denies chest pain or shortness of breath.   Review of symptoms otherwise negative.    Review of Systems:   Constitutional: [ ] fevers, [ ] chills.   Skin: [ ] dry skin. [ ] rashes.  Psychiatric: [ ] depression, [ ] anxiety.   Gastrointestinal: [ ] BRBPR, [ ] melena.   Neurological: [ ] confusion. [ ] seizures. [ ] shuffling gait.   Ears,Nose,Mouth and Throat: [ ] ear pain [ ] sore throat.   Eyes: [ ] diplopia.   Respiratory: [ ] hemoptysis. [ ] shortness of breath  Cardiovascular: See HPI above  Hematologic/Lymphatic: [ ] anemia. [ ] painful nodes. [ ] prolonged bleeding.   Genitourinary: [ ] hematuria. [ ] flank pain.   Endocrine: [ ] significant change in weight. [ ] intolerance to heat and cold.     Review of systems [x ] otherwise negative, [ ] otherwise unable to obtain    FH: no family history of sudden cardiac death in first degree relatives    SH: [ ] tobacco, [ ] alcohol, [ ] drugs    acetaminophen     Tablet .. 975 milliGRAM(s) Oral every 6 hours PRN  heparin   Injectable 5000 Unit(s) SubCutaneous every 8 hours  HYDROmorphone   Tablet 4 milliGRAM(s) Oral every 4 hours PRN  ibuprofen  Tablet. 400 milliGRAM(s) Oral every 6 hours  naloxone Injectable 0.1 milliGRAM(s) IV Push every 3 minutes PRN  ondansetron Injectable 4 milliGRAM(s) IV Push every 6 hours PRN  pantoprazole    Tablet 40 milliGRAM(s) Oral before breakfast  polyethylene glycol 3350 17 Gram(s) Oral daily  senna 2 Tablet(s) Oral at bedtime                            12.9   8.20  )-----------( 196      ( 16 Nov 2022 06:39 )             38.4       11-16    137  |  100  |  15  ----------------------------<  93  3.8   |  25  |  1.19    Ca    9.6      16 Nov 2022 06:39  Phos  4.1     11-16  Mg     2.20     11-16      T(C): 36.3 (11-17-22 @ 12:13), Max: 37 (11-17-22 @ 05:11)  HR: 70 (11-17-22 @ 12:13) (70 - 85)  BP: 118/77 (11-17-22 @ 12:13) (116/75 - 142/72)  RR: 18 (11-17-22 @ 12:13) (18 - 19)  SpO2: 98% (11-17-22 @ 12:13) (98% - 100%)  Wt(kg): --    I&O's Summary    16 Nov 2022 07:01  -  17 Nov 2022 07:00  --------------------------------------------------------  IN: 1760 mL / OUT: 1423 mL / NET: 337 mL    17 Nov 2022 07:01  -  17 Nov 2022 12:35  --------------------------------------------------------  IN: 300 mL / OUT: 303 mL / NET: -3 mL      General: Well nourished in no acute distress. Alert and Oriented * 3.   Head: Normocephalic and atraumatic.   Neck: No JVD. No bruits. Supple. Does not appear to be enlarged.   Cardiovascular: + S1,S2 ; RRR Soft systolic murmur at the left lower sternal border. No rubs noted.    Lungs: CTA b/l. No rhonchi, rales or wheezes.   Abdomen: + BS, soft. Non tender. Non distended. No rebound. No guarding.   Extremities: No clubbing/cyanosis/edema.   Neurologic: Moves all four extremities. Full range of motion.   Skin: Warm and moist. The patient's skin has normal elasticity and good skin turgor.   Psychiatric: Appropriate mood and affect.  Musculoskeletal: Normal range of motion, normal strength      TELEMETRY: 	  NSR    ECG:  	NSR    ASSESSMENT/PLAN: Pt is a 47 year old female with a pmh of GERD who presented for flexible bronchoscopy with robotic assisted right vats. Right middle lobe wedge resection. Lymph node dissection of LN #9.    1. Post OP  - BP well controlled post op. On no BP meds meds at home  - Sinus without any events on telemetry  - Pain control per ICU team    
Anesthesia Pain Management Service    SUBJECTIVE: Pt is now off IV PCA without problems reported. Patient is primarily Creole speaking,  ID# [id number] used.  Patient states that the IV PCA helped with his pain.  Pain Score:  6/10    THERAPY:    [ ] IV PCA Morphine		[ ] 5 mg/mL	[ ] 1 mg/mL  [X ] IV PCA Hydromorphone	[ ] 5 mg/mL	[X ] 1 mg/mL  [ ] IV PCA Fentanyl		[ ] 50 micrograms/mL    Demand dose __0.2_ lockout __6_ (minutes) Continuous Rate _0__ Total: _1.2__  mg used (in past 24 hours)    Therapy:	  [ X] IV PCA	   [ ] Epidural           [ ] s/p Spinal Opoid              [ ] Postpartum infusion	  [ ] Patient controlled regional anesthesia (PCRA)    [ ] prn Analgesics    Allergies  No Known Allergies    MEDICATIONS  (STANDING):  heparin   Injectable 5000 Unit(s) SubCutaneous every 8 hours  influenza   Vaccine 0.5 milliLiter(s) IntraMuscular once  ketorolac   Injectable 15 milliGRAM(s) IV Push every 8 hours  lactated ringers. 1000 milliLiter(s) (30 mL/Hr) IV Continuous <Continuous>  pantoprazole    Tablet 40 milliGRAM(s) Oral before breakfast  polyethylene glycol 3350 17 Gram(s) Oral daily  senna 2 Tablet(s) Oral at bedtime    MEDICATIONS  (PRN):  acetaminophen     Tablet .. 975 milliGRAM(s) Oral every 6 hours PRN Mild Pain (1 - 3)  naloxone Injectable 0.1 milliGRAM(s) IV Push every 3 minutes PRN For ANY of the following changes in patient status:  A. RR LESS THAN 10 breaths per minute, B. Oxygen saturation LESS THAN 90%, C. Sedation score of 6  ondansetron Injectable 4 milliGRAM(s) IV Push every 6 hours PRN Nausea  oxyCODONE    IR 5 milliGRAM(s) Oral every 3 hours PRN Moderate Pain (4 - 6)  oxyCODONE    IR 10 milliGRAM(s) Oral every 3 hours PRN Severe Pain (7 - 10)      OBJECTIVE:   [X] No new signs     [ ] Other:    Side Effects:  [X ] None			[ ] Other:    Assessment of Catheter Site:		[ ] Intact		[ ] Other:    ASSESSMENT/PLAN  [ ] Continue current therapy    [X ] Therapy changed to:    [ ] IV PCA       [ ] Epidural     [ X] prn Analgesics     Comments: IV PCA discontinued by team and they ordered PRN Oral/IV opioids and/or non-opioid adjuvant analgesics to be used at this point.    Progress Note written now but Patient was seen earlier.
Date of [id number]/16/2022    chief complaint: Right middle lobe wedge resection    extended hpi: : Pt is a 47 year old amle with a pmh of GERD who presented for flexible bronchoscopy with robotic assisted right vats. Right middle lobe wedge resection. Lymph node dissection of LN.    Patient denies chest pain or shortness of breath.   Review of symptoms otherwise negative.    MEDICATIONS:  heparin   Injectable 5000 Unit(s) SubCutaneous every 8 hours  HYDROmorphone PCA (1 mG/mL) 30 milliLiter(s) PCA Continuous PCA Continuous  HYDROmorphone PCA (1 mG/mL) Rescue Clinician Bolus 0.5 milliGRAM(s) IV Push every 15 minutes PRN  influenza   Vaccine 0.5 milliLiter(s) IntraMuscular once  ketorolac   Injectable 15 milliGRAM(s) IV Push every 8 hours  lactated ringers. 1000 milliLiter(s) IV Continuous <Continuous>  naloxone Injectable 0.1 milliGRAM(s) IV Push every 3 minutes PRN  ondansetron Injectable 4 milliGRAM(s) IV Push every 6 hours PRN  pantoprazole    Tablet 40 milliGRAM(s) Oral before breakfast  polyethylene glycol 3350 17 Gram(s) Oral daily  senna 2 Tablet(s) Oral at bedtime      LABS:                        12.9   8.20  )-----------( 196      ( 16 Nov 2022 06:39 )             38.4       Hemoglobin: 12.9 g/dL (11-16 @ 06:39)  Hemoglobin: 13.6 g/dL (11-11 @ 13:30)      11-16    137  |  100  |  15  ----------------------------<  93  3.8   |  25  |  1.19    Ca    9.6      16 Nov 2022 06:39  Phos  4.1     11-16  Mg     2.20     11-16    Creatinine Trend: 1.19<--, 1.34<--, 1.36<--    PHYSICAL EXAM:  T(C): 37 (11-16-22 @ 05:40), Max: 37.1 (11-16-22 @ 01:16)  HR: 74 (11-16-22 @ 05:40) (70 - 99)  BP: 118/79 (11-16-22 @ 05:40) (115/81 - 151/76)  RR: 18 (11-16-22 @ 05:40) (13 - 26)  SpO2: 100% (11-16-22 @ 05:40) (92% - 100%)  Wt(kg): --    I&O's Summary    15 Nov 2022 07:01  -  16 Nov 2022 07:00  --------------------------------------------------------  IN: 1060 mL / OUT: 308 mL / NET: 752 mL    General: Well nourished in no acute distress. Alert and Oriented * 3.   Head: Normocephalic and atraumatic.   Neck: No JVD. No bruits. Supple. Does not appear to be enlarged.   Cardiovascular: + S1,S2 ; RRR Soft systolic murmur at the left lower sternal border. No rubs noted.    Lungs: CTA b/l. No rhonchi, rales or wheezes.   Abdomen: + BS, soft. Non tender. Non distended. No rebound. No guarding.   Extremities: No clubbing/cyanosis/edema.   Neurologic: Moves all four extremities. Full range of motion.   Skin: Warm and moist. The patient's skin has normal elasticity and good skin turgor.   Psychiatric: Appropriate mood and affect.  Musculoskeletal: Normal range of motion, normal strength          TELEMETRY: 	  NSR    ECG:  	NSR    ASSESSMENT/PLAN: Pt is a 47 year old amle with a pmh of GERD who presented for flexible bronchoscopy with robotic assisted right vats. Right middle lobe wedge resection. Lymph node dissection of LN #9.    1. Post OP  - BP well controlled post op. On no BP meds meds at home  - Sinus without any events on telemetry  - Pain control per ICU team    Lian Grimm MD  Pager: [phone] 
Plastic Surgery Progress Note (pg [facility]: [id number], [facility]: [phone])    SUBJECTIVE  The patient was seen and examined.     OBJECTIVE  ___________________________________________________  VITAL SIGNS / I&O's   Vital Signs Last 24 Hrs  T(C): 37 (16 Nov 2022 05:40), Max: 37.1 (16 Nov 2022 01:16)  T(F): 98.6 (16 Nov 2022 05:40), Max: 98.8 (16 Nov 2022 01:16)  HR: 74 (16 Nov 2022 05:40) (70 - 99)  BP: 118/79 (16 Nov 2022 05:40) (115/81 - 151/76)  BP(mean): 93 (16 Nov 2022 00:00) (90 - 107)  RR: 18 (16 Nov 2022 05:40) (13 - 26)  SpO2: 100% (16 Nov 2022 05:40) (92% - 100%)    Parameters below as of 16 Nov 2022 05:40  Patient On (Oxygen Delivery Method): room air          15 Nov 2022 07:01  -  16 Nov 2022 07:00  --------------------------------------------------------  IN:    Lactated Ringers: 570 mL    Oral Fluid: 460 mL  Total IN: 1030 mL    OUT:    Chest Tube (mL): 98 mL    Drain (mL): 35 mL    Voided (mL): 175 mL  Total OUT: 308 mL    Total NET: 722 mL        ___________________________________________________  PHYSICAL EXAM    General: Well developed, well nourished, NAD  Respiratory: Airway patent, respirations unlabored  CVS: Regular rate and rhythm  Extremities: No edema, sensation and movement grossly intact  RUE: in ACE wrap with thumb dressing c/d/i. FROM of R thumb with sensation intact. Incision c/d/i no erythema/collections/discharge. Sutures in place  Skin: Warm, dry, appropriate color          ___________________________________________________  MEDICATIONS  (STANDING):  heparin   Injectable 5000 Unit(s) SubCutaneous every 8 hours  HYDROmorphone PCA (1 mG/mL) 30 milliLiter(s) PCA Continuous PCA Continuous  influenza   Vaccine 0.5 milliLiter(s) IntraMuscular once  ketorolac   Injectable 15 milliGRAM(s) IV Push every 8 hours  lactated ringers. 1000 milliLiter(s) (30 mL/Hr) IV Continuous <Continuous>  pantoprazole    Tablet 40 milliGRAM(s) Oral before breakfast  polyethylene glycol 3350 17 Gram(s) Oral daily  senna 2 Tablet(s) Oral at bedtime    MEDICATIONS  (PRN):  HYDROmorphone PCA (1 mG/mL) Rescue Clinician Bolus 0.5 milliGRAM(s) IV Push every 15 minutes PRN for Pain Scale GREATER THAN 6  naloxone Injectable 0.1 milliGRAM(s) IV Push every 3 minutes PRN For ANY of the following changes in patient status:  A. RR LESS THAN 10 breaths per minute, B. Oxygen saturation LESS THAN 90%, C. Sedation score of 6  ondansetron Injectable 4 milliGRAM(s) IV Push every 6 hours PRN Nausea      
   Subjective: Translation done using  phone in Creole. Pt states severe pain at CT site, axilla. Taking pain meds with minimal relief. OOB and amb in room and hallway. Using IS.     Vital Signs:  Vital Signs Last 24 Hrs  T(C): 36.8 (11-16-22 @ 16:33), Max: 37.1 (11-16-22 @ 01:16)  T(F): 98.3 (11-16-22 @ 16:33), Max: 98.8 (11-16-22 @ 01:16)  HR: 85 (11-16-22 @ 16:33) (74 - 99)  BP: 116/75 (11-16-22 @ 16:33) (115/81 - 126/86)  RR: 18 (11-16-22 @ 16:33) (13 - 21)  SpO2: 99% (11-16-22 @ 16:33) (92% - 100%) on (O2)    Telemetry/Alarms: tele SR  General: WN/WD NAD  Neurology: Awake, nonfocal, WALTERS x 4  Eyes: Scleras clear, PERRLA/ EOMI, Gross vision intact  ENT:Gross hearing intact, grossly patent pharynx, no stridor  Neck: Neck supple, trachea midline, No JVD,   Respiratory: Dec BS to Rt LL  CV: RRR, S1S2, no murmurs, rubs or gallops  Abdominal: Soft, NT, ND +BS, no BM, +voiding.   Extremities: No edema, + peripheral pulses  Skin: No Rashes, Hematoma, Ecchymosis  Lymphatic: No Neck, axilla, groin LAD  Psych: Oriented x 3, normal affect  Incisions: rt VATS c//di  Tubes: Rt CT minimal output. Removed at bedside this am. FU CXR stable, no obvious ptx.   Relevant labs, radiology and Medications reviewed                        12.9   8.20  )-----------( 196      ( 16 Nov 2022 06:39 )             38.4     11-16    137  |  100  |  15  ----------------------------<  93  3.8   |  25  |  1.19    Ca    9.6      16 Nov 2022 06:39  Phos  4.1     11-16  Mg     2.20     11-16        MEDICATIONS  (STANDING):  heparin   Injectable 5000 Unit(s) SubCutaneous every 8 hours  ibuprofen  Tablet. 400 milliGRAM(s) Oral every 6 hours  pantoprazole    Tablet 40 milliGRAM(s) Oral before breakfast  polyethylene glycol 3350 17 Gram(s) Oral daily  senna 2 Tablet(s) Oral at bedtime    MEDICATIONS  (PRN):  acetaminophen     Tablet .. 975 milliGRAM(s) Oral every 6 hours PRN Mild Pain (1 - 3)  HYDROmorphone   Tablet 4 milliGRAM(s) Oral every 4 hours PRN moderate to severe pain  naloxone Injectable 0.1 milliGRAM(s) IV Push every 3 minutes PRN For ANY of the following changes in patient status:  A. RR LESS THAN 10 breaths per minute, B. Oxygen saturation LESS THAN 90%, C. Sedation score of 6  ondansetron Injectable 4 milliGRAM(s) IV Push every 6 hours PRN Nausea    Pertinent Physical Exam  I&O's Summary    15 Nov 2022 07:01  -  16 Nov 2022 07:00  --------------------------------------------------------  IN: 1060 mL / OUT: 308 mL / NET: 752 mL    16 Nov 2022 07:01  -  16 Nov 2022 18:05  --------------------------------------------------------  IN: 810 mL / OUT: 993 mL / NET: -183 mL        Assessment  47y Male  w/ PAST MEDICAL & SURGICAL HISTORY:  Malignant melanoma  right thumb      Metastatic malignant melanoma  Right axillary lymph nodes      GERD (gastroesophageal reflux disease)      History of lymph node dissection of right axilla  and Right thumb melanoma excision 12/2021      Malignant melanoma of thumb  Right Thumb Amputation with aillary Lymph Node Dissection -10/2022      admitted with complaints of Patient is a 47y old  Male who presents with a chief complaint of RVATS, RML Wedge (16 Nov 2022 01:42)  46yo m s/p Rt VATS RML wedge on 11/15/22. Post op non complicated. Thumb dressing removed per plastics. Per Surg Onc, will need to keep ACE wrap in place. Pt kept for improved pain control.     PLAN  Neuro: Pain management. Minimal relief on Oxy. Will switch to dilaudid and add Motrin ATC.   Pulm: Encourage coughing, deep breathing and use of incentive spirometry. . Daily CXR.   Cardio: Monitor telemetry/alarms  GI: Tolerating diet. Continue stool softeners.  Renal: monitor urine output, supplement electrolytes as needed  Vasc: Heparin SC/SCDs for DVT prophylaxis  Heme: Stable H/H. .   ID: Off antibiotics. Stable.  Therapy: OOB/ambulate  Disposition: Aim to D/C to home tomorrow if pain improved.   Discussed with Cardiothoracic Team at AM rounds.

## 2022-11-17 NOTE — PROGRESS NOTE ADULT - NS ATTEND AMEND GEN_ALL_CORE FT
Patient seen and examined. Agree with plan as detailed in PA/NP Note.     BP acceptable, no events on tele.    Lian Grimm MD  Pager: [phone]

## 2022-11-18 LAB — SURGICAL PATHOLOGY STUDY: SIGNIFICANT CHANGE UP

## 2022-11-21 LAB — SURGICAL PATHOLOGY STUDY: SIGNIFICANT CHANGE UP

## 2022-11-22 DIAGNOSIS — R91.1 SOLITARY PULMONARY NODULE: ICD-10-CM

## 2022-11-29 ENCOUNTER — APPOINTMENT (OUTPATIENT)
Dept: PLASTIC SURGERY | Facility: CLINIC | Age: 47
End: 2022-11-29

## 2022-11-29 PROCEDURE — 99024 POSTOP FOLLOW-UP VISIT: CPT

## 2022-11-29 NOTE — REASON FOR VISIT
[Post Op: _________] : a [unfilled] post op visit [Family Member] : family member [FreeTextEntry1] : s/p right thumb distal amputation and right axillary lymph node dissection. Patient has 1 drain remaining , he complaints  of thumb pain.

## 2022-12-06 ENCOUNTER — APPOINTMENT (OUTPATIENT)
Dept: PLASTIC SURGERY | Facility: CLINIC | Age: 47
End: 2022-12-06

## 2022-12-06 VITALS
DIASTOLIC BLOOD PRESSURE: 81 MMHG | WEIGHT: 211 LBS | TEMPERATURE: 97.6 F | SYSTOLIC BLOOD PRESSURE: 121 MMHG | BODY MASS INDEX: 31.25 KG/M2 | HEART RATE: 91 BPM | OXYGEN SATURATION: 97 % | HEIGHT: 69 IN | RESPIRATION RATE: 17 BRPM

## 2022-12-06 PROCEDURE — 99024 POSTOP FOLLOW-UP VISIT: CPT

## 2022-12-07 NOTE — HISTORY OF PRESENT ILLNESS
[de-identified] : A 47 year old [place] citizen speaking gentlemen without significant medical hx  presenting to the office for an initial consultation of melanoma.\par \par Patient has history of ungual melanoma of right thumb with metastases to multiple right axillary lymph nodes. He first noticed the change in color of the nail > 1 year. He also noticed multiple masses in R axilla. He underwent node resection 12/2021 and 3 nodes between 6-9 cm were removed at the time and result came back as melanoma. \par \par He came [place] for continue treatment of melanoma and possible immunotherapy.He received treatment from January 10 to March 28 2022 at [facility] in [facility]. Patient received 4 cycles of dacarbazine 250 mg/m2 x 5 days every 28 days. He states that the nail tumor improved with chemotherapy. The bleeding stopped. Since off of chemotherapy the pain is getting worse. pain prevents him from sleep. He take oxycodone for pain but no significant improvement. Pain radiates to his hand . He underwent PET scan which had a lesion in lung and R axillary.  Both the right axilla biopsy and lung biopsy are negative.\par

## 2022-12-07 NOTE — PHYSICAL EXAM
[FreeTextEntry1] : \par ECOG Performance Status: Status 1- Restricted in physically strenuous activity but ambulatory and able to carry out work of a light or sedentary nature, e.g., light house work, office work. \par \par \par Constitutional: well developed, well nourished, in no acute distress. \par Eyes: PERRL, EOMI, no conjunctival infection, anicteric. \par ENT: pharynx is unremarkable, moist mucus membrane, no oral lesions. \par Neck: supple without JVD, no thyromegaly or masses appreciated. \par Pulmonary: clear to auscultation bilaterally, no dullness, no wheezing. \par Cardiac: RRR, normal S1S2, no murmurs, rubs, gallops. \par Vascular: no JVD, no calf tenderness, venous stasis changes, varices. \par Abdomen:. epigastric tenderness. \par Lymphatic:there was questionable lymphnode enlargement in R axilla ( large LN versus scar tissue from surgery)\par Musculoskeletal: full range of motion and no deformities appreciated. \par Skin:. destructive pigmented lesion on the right thumb that completely replaces the nail; right axilla node ~ 2 cm. \par Neurology: grossly intact. \par Psychiatric: affect appropriate.

## 2022-12-07 NOTE — ASSESSMENT
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 He first noticed the change in color of the nail > 1 year. He also noticed multiple masses in R axilla. He underwent node resection 12/2021 and 3 nodes between 6-9 cm were removed at the time and result came back as melanoma. \par \par He came [place] for continue treatment of melanoma and possible immunotherapy.He received treatment from January 10 to March 28 2022 at [facility] in [facility]. Patient received 4 cycles of dacarbazine 250 mg/m2 x 5 days every 28 days. He states that the nail tumor improved with chemotherapy. The bleeding stopped. Since off of chemotherapy the pain is getting worse. pain prevents him from sleep. He take oxycodone for pain but no significant improvement. Pain radiates to his hand . He underwent PET scan which had a lesion in lung and R axillary.  Both the right axilla biopsy and lung biopsy are negative.\par \par -ungual melanoma: pt requires amputation of tip of R thumb in order to get a negative margin. We will schedule the surgery with combination with plastic surgeon for reconstruction. we also prescribed gabapentin before surgery for pain management. the possibility of phantom pain after surgery was discussed with patient and he understood he may have some residual pain after surgery. \par \par -With consideration of not complete axillary dissection in initial operation ( only sample 3 lymph nodes at the time of operation) , will consider axillary dissection with consideration of 3 positive LNs in this area\par \par -Lung nodule: previous biopsy was negative, will obtain dedicated CT of chest for better evaluation of mass. \par \par

## 2022-12-07 NOTE — CONSULT LETTER
[Dear  ___] : Dear  [unfilled], [Consult Letter:] : I had the pleasure of evaluating your patient, [unfilled]. [Please see my note below.] : Please see my note below. [Consult Closing:] : Thank you very much for allowing me to participate in the care of this patient.  If you have any questions, please do not hesitate to contact me. [Sincerely,] : Sincerely, [FreeTextEntry3] : Sammy Herrera MD, MPH, FACS, FSSO\par , [facility] General Surgical Oncology [facility]\par [facility]\par Associate Professor of Surgery\par [employer] at [facility]

## 2022-12-08 NOTE — REASON FOR VISIT
[Post Op: _________] : a [unfilled] post op visit [Family Member] : family member [FreeTextEntry1] : s/p right thumb distal amputation and right axillary lymph node dissection DOS: 11/09/22. Patient complaints of discomfort in the left back, otherwise denies any other complaints.

## 2022-12-12 NOTE — REASON FOR VISIT
[Consultation] : a consultation visit [FreeTextEntry1] : Patient present regarding diagnosed melanoma of right hand(thumb), at the request of Dr Herrera.Patient first noticed lesion a year ago, he has underwent chemo therapy. Last chemo treatment 03/28/22 in [facility]. Patient complaints of growth, pain and bleeding. He denies Hx and Fhx of skin cancer.

## 2022-12-13 ENCOUNTER — APPOINTMENT (OUTPATIENT)
Dept: RADIOLOGY | Facility: HOSPITAL | Age: 47
End: 2022-12-13

## 2022-12-13 ENCOUNTER — OUTPATIENT (OUTPATIENT)
Dept: OUTPATIENT SERVICES | Facility: HOSPITAL | Age: 47
LOS: 1 days | End: 2022-12-13

## 2022-12-13 ENCOUNTER — APPOINTMENT (OUTPATIENT)
Dept: THORACIC SURGERY | Facility: CLINIC | Age: 47
End: 2022-12-13

## 2022-12-13 ENCOUNTER — RESULT REVIEW (OUTPATIENT)
Age: 47
End: 2022-12-13

## 2022-12-13 VITALS
DIASTOLIC BLOOD PRESSURE: 79 MMHG | RESPIRATION RATE: 18 BRPM | BODY MASS INDEX: 29.62 KG/M2 | SYSTOLIC BLOOD PRESSURE: 118 MMHG | WEIGHT: 200 LBS | OXYGEN SATURATION: 97 % | HEART RATE: 83 BPM | HEIGHT: 69 IN

## 2022-12-13 VITALS
WEIGHT: 200 LBS | HEART RATE: 83 BPM | DIASTOLIC BLOOD PRESSURE: 79 MMHG | RESPIRATION RATE: 18 BRPM | OXYGEN SATURATION: 97 % | HEIGHT: 69 IN | SYSTOLIC BLOOD PRESSURE: 118 MMHG | BODY MASS INDEX: 29.62 KG/M2

## 2022-12-13 DIAGNOSIS — R91.1 SOLITARY PULMONARY NODULE: ICD-10-CM

## 2022-12-13 PROCEDURE — 99024 POSTOP FOLLOW-UP VISIT: CPT

## 2022-12-13 PROCEDURE — 71046 X-RAY EXAM CHEST 2 VIEWS: CPT | Mod: 26

## 2022-12-13 NOTE — CONSULT LETTER
[Consult Letter:] : I had the pleasure of evaluating your patient, [unfilled]. [( Thank you for referring [unfilled] for consultation for _____ )] : Thank you for referring [unfilled] for consultation for [unfilled] [Please see my note below.] : Please see my note below. [Consult Closing:] : Thank you very much for allowing me to participate in the care of this patient.  If you have any questions, please do not hesitate to contact me. [Sincerely,] : Sincerely, [FreeTextEntry2] : Dr. Sammy Herrera (Surg/Onc/referring)\par Dr. Neo Marcos (Hem/Onc)\par  [FreeTextEntry3] : Zachariah Dunn MD\par Director of Thoracic, [facility]\par Cardiovascular & Thoracic Surgery\par \par [facility]\par [address]\par Oncology Building 3rd Fl\[place] [address]\par Tel: [phone]\par Fax: [phone]\par

## 2022-12-13 NOTE — PHYSICAL EXAM
[] : no respiratory distress [Auscultation Breath Sounds / Voice Sounds] : lungs were clear to auscultation bilaterally [Heart Rate And Rhythm] : heart rate was normal and rhythm regular [Heart Sounds] : normal S1 and S2 [Heart Sounds Gallop] : no gallops [Murmurs] : no murmurs [Heart Sounds Pericardial Friction Rub] : no pericardial rub [Clean] : clean [Dry] : dry [Healing Well] : healing well [No Edema] : no edema

## 2022-12-13 NOTE — REASON FOR VISIT
[Spouse] : spouse [de-identified] : Flex Bronch Rt VATS BRANDIE RML rxn, MLND [de-identified] : 11/15/22

## 2022-12-13 NOTE — ASSESSMENT
[FreeTextEntry1] : Mr. 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 Path was non-diagnostic.\par \par CT Chest w/ IV contrast on 10/4/22:\par - 1.3 x 1.4cm Rt lung nodule (previously 1.2 x 1.1cm) \par - stable 3mm LLL nodule (3:290)\par - no new nodules, consolidations, edema, effusion or PTX\par \par PFTs with Dr. Love on 10/28/22: %, FEV1 114%, DLCO 106%.\par \par Now s/p Flex Bronch Rt VATS R.A. RML rxn, MLND on 11/15/22. Path of RML wedge revealed +Mets melanoma in lung, margin negative. +PDL1 30%. LN level 9 showed fibrous tissue, negative for melanoma.\par \par CXR today -- reviewed.\par \par I have reviewed the patient's medical records and diagnostic images at time of this office consultation and have made the following recommendation:\par 1. Path reviewed and explained to patient, mets melanoma to lung, recommended patient to f/u with Hem/Onc for chemo.\par 2. RTC as needed.\par 3. Trial of Gabapentin 300mg BID\par \par \par I, DESIRAE Lewis, personally performed the evaluation and management (E/M) services for this established patient who presents today with (a) new problem(s)/exacerbation of (an) existing condition(s). That E/M includes conducting the examination, assessing all new/exacerbated conditions, and establishing a new plan of care. Today, my ACP, Nacho Raymond NP was here to observe my evaluation and management services for this new problem/exacerbated condition to be followed going forward.\par \par

## 2022-12-13 NOTE — DISCUSSION/SUMMARY
[Doing Well] : is doing well [Fair Pain Control] : has fair pain control [No Sign of Infection] : is showing no signs of infection [3] : 3 [de-identified] : c/o increased sensation to Rt sided chest wall

## 2023-01-10 ENCOUNTER — OUTPATIENT (OUTPATIENT)
Dept: OUTPATIENT SERVICES | Facility: HOSPITAL | Age: 48
LOS: 1 days | Discharge: HOME | End: 2023-01-10

## 2023-01-10 DIAGNOSIS — C43.60 MALIGNANT MELANOMA OF UNSPECIFIED UPPER LIMB, INCLUDING SHOULDER: Chronic | ICD-10-CM

## 2023-01-10 DIAGNOSIS — Z98.890 OTHER SPECIFIED POSTPROCEDURAL STATES: Chronic | ICD-10-CM

## 2023-01-10 DIAGNOSIS — C43.9 MALIGNANT MELANOMA OF SKIN, UNSPECIFIED: ICD-10-CM

## 2023-01-17 ENCOUNTER — RESULT REVIEW (OUTPATIENT)
Age: 48
End: 2023-01-17

## 2023-01-17 ENCOUNTER — APPOINTMENT (OUTPATIENT)
Dept: HEMATOLOGY ONCOLOGY | Facility: CLINIC | Age: 48
End: 2023-01-17
Payer: MEDICAID

## 2023-01-17 VITALS
HEART RATE: 70 BPM | SYSTOLIC BLOOD PRESSURE: 112 MMHG | DIASTOLIC BLOOD PRESSURE: 77 MMHG | RESPIRATION RATE: 18 BRPM | TEMPERATURE: 97.3 F | WEIGHT: 198.41 LBS | BODY MASS INDEX: 29.3 KG/M2 | OXYGEN SATURATION: 93 %

## 2023-01-17 LAB
BASOPHILS # BLD AUTO: 0.03 K/UL — SIGNIFICANT CHANGE UP (ref 0–0.2)
BASOPHILS NFR BLD AUTO: 0.8 % — SIGNIFICANT CHANGE UP (ref 0–2)
EOSINOPHIL # BLD AUTO: 0.45 K/UL — SIGNIFICANT CHANGE UP (ref 0–0.5)
EOSINOPHIL NFR BLD AUTO: 12 % — HIGH (ref 0–6)
HCT VFR BLD CALC: 41.7 % — SIGNIFICANT CHANGE UP (ref 39–50)
HGB BLD-MCNC: 14 G/DL — SIGNIFICANT CHANGE UP (ref 13–17)
IMM GRANULOCYTES NFR BLD AUTO: 0 % — SIGNIFICANT CHANGE UP (ref 0–0.9)
LYMPHOCYTES # BLD AUTO: 1.71 K/UL — SIGNIFICANT CHANGE UP (ref 1–3.3)
LYMPHOCYTES # BLD AUTO: 45.7 % — HIGH (ref 13–44)
MCHC RBC-ENTMCNC: 28.3 PG — SIGNIFICANT CHANGE UP (ref 27–34)
MCHC RBC-ENTMCNC: 33.6 G/DL — SIGNIFICANT CHANGE UP (ref 32–36)
MCV RBC AUTO: 84.2 FL — SIGNIFICANT CHANGE UP (ref 80–100)
MONOCYTES # BLD AUTO: 0.26 K/UL — SIGNIFICANT CHANGE UP (ref 0–0.9)
MONOCYTES NFR BLD AUTO: 7 % — SIGNIFICANT CHANGE UP (ref 2–14)
NEUTROPHILS # BLD AUTO: 1.29 K/UL — LOW (ref 1.8–7.4)
NEUTROPHILS NFR BLD AUTO: 34.5 % — LOW (ref 43–77)
NRBC # BLD: 0 /100 WBCS — SIGNIFICANT CHANGE UP (ref 0–0)
PLATELET # BLD AUTO: 190 K/UL — SIGNIFICANT CHANGE UP (ref 150–400)
RBC # BLD: 4.95 M/UL — SIGNIFICANT CHANGE UP (ref 4.2–5.8)
RBC # FLD: 13.2 % — SIGNIFICANT CHANGE UP (ref 10.3–14.5)
WBC # BLD: 3.74 K/UL — LOW (ref 3.8–10.5)
WBC # FLD AUTO: 3.74 K/UL — LOW (ref 3.8–10.5)

## 2023-01-17 PROCEDURE — 99214 OFFICE O/P EST MOD 30 MIN: CPT

## 2023-01-17 NOTE — PHYSICAL EXAM
[Normal] : affect appropriate [Ambulatory and capable of all self care but unable to carry out any work activities] : Status 2- Ambulatory and capable of all self care but unable to carry out any work activities. Up and about more than 50% of waking hours [de-identified] : tender RUQ; no masses [de-identified] : right thumb amputation is well healed

## 2023-01-17 NOTE — HISTORY OF PRESENT ILLNESS
[de-identified] : Mr. 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.\par Patient received 4 cycles of dacarbazine 250 mg/m2 x 5 days every 28 days.\par He states that the nail tumor improved with chemotherapy.\par The bleeding stopped.\par Since off of chemotherapy the pain is getting worse.\par \par He notes pain in the epigastrum for the past 3 weeks that is worse at night.\par It is ~ 3/10 in severity and is persistent.\par \par Patient referred to [facility] for treatment with immunotherapy.\par \par COVID Vaccine Mozenda J&J 12/5/22 (no booster)\par \par 9/20/2022\par Both the right axilla biopsy and lung biopsy are negative.\par However could be false negative.\par His thumb pain is significant and might need amputation.\par We still need pathologic diagnosis and molecular genetics.\par 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 Herrera on 9/27.\par \par 10/22/2022\par I discussed case with surgeon.\par Given lack of tissue positive on lung or axilla biopsy, will proceed with distal right thumb amputation.\par Patient has been in pain and also notes intermittent discharge.\par Repeat CT scan of Rr Lung lesion shows mile increase in size of lesion.\par Patient will then also need to have this lesion resected.\par He will return to see me 2-3 weeks post-op and we will begin 1 year of adjuvant immunotherapy. \par \par 1/17/2023\par Patient had both surgeries\par 11/9/22 - Right thumb resection:\par 1. Right thumb, disarticulated between proximal and distal phalanges\par -  Melanoma, ulcerated, acral type, approximately 8.5 mm in thickness, mitotic rate 5/mm2, margins negative\par - See synoptic summary\par 2. Right thumb distal phalanx proximal bone margin, excision\par - Negative for tumor\par 3. Right axillary node dissection levels 1, 2, and 3\par - Two of eleven lymph nodes positive for metastatic melanoma (2/11)\par - Present as aggregates of tumor cells in subcapsular spaces (0.6 mmin greatest dimension)\par - No extracapsular extension seen\par Note: Melan-A and SOX10 immunostains on representative sections support the interpretation.\par Mitotic Rate:   5 mitoses per mm2\par pT Category:  pT4b\par pN Category:   pN2\par pM Category:   pM1b\par \par 11/15/2022: Right lung resection\par 1. Lung, right middle lobe, wedge resection\par - Metastatic melanoma in lung. Margin is free of melanoma. Moleculartesting and PDL1 testing is pending.\par 2. Lymph node, level 9:\par - Fibrous tissue, negative for melanoma.\par PDL1 IHC = 30% (TPS)\par Nodule:   1.5 x 1.3 x 0.8 cm\par \par Will need repeat labs and imaging\par Pacheco start Nivolumab q 4 weeks x 1 year.\par \par \par  [de-identified] : Medical Oncology ([facility]): Guy Shetty  [phone]\par \par PTs Contact: Inocencia Tovar (spouse) / [phone]\par Friend: Cipriano

## 2023-01-18 ENCOUNTER — OUTPATIENT (OUTPATIENT)
Dept: OUTPATIENT SERVICES | Facility: HOSPITAL | Age: 48
LOS: 1 days | End: 2023-01-18
Payer: MEDICAID

## 2023-01-18 ENCOUNTER — APPOINTMENT (OUTPATIENT)
Dept: CT IMAGING | Facility: IMAGING CENTER | Age: 48
End: 2023-01-18
Payer: MEDICAID

## 2023-01-18 DIAGNOSIS — Z98.890 OTHER SPECIFIED POSTPROCEDURAL STATES: Chronic | ICD-10-CM

## 2023-01-18 DIAGNOSIS — C43.61 MALIGNANT MELANOMA OF RIGHT UPPER LIMB, INCLUDING SHOULDER: ICD-10-CM

## 2023-01-18 DIAGNOSIS — C43.60 MALIGNANT MELANOMA OF UNSPECIFIED UPPER LIMB, INCLUDING SHOULDER: Chronic | ICD-10-CM

## 2023-01-18 LAB
ALBUMIN SERPL ELPH-MCNC: 4.5 G/DL
ALP BLD-CCNC: 65 U/L
ALT SERPL-CCNC: 8 U/L
AMYLASE/CREAT SERPL: 96 U/L
ANION GAP SERPL CALC-SCNC: 10 MMOL/L
AST SERPL-CCNC: 15 U/L
BILIRUB SERPL-MCNC: 0.5 MG/DL
BUN SERPL-MCNC: 11 MG/DL
CALCIUM SERPL-MCNC: 9.5 MG/DL
CHLORIDE SERPL-SCNC: 104 MMOL/L
CO2 SERPL-SCNC: 28 MMOL/L
CREAT SERPL-MCNC: 1.36 MG/DL
EGFR: 65 ML/MIN/1.73M2
GLUCOSE SERPL-MCNC: 91 MG/DL
LDH SERPL-CCNC: 139 U/L
LPL SERPL-CCNC: 21 U/L
POTASSIUM SERPL-SCNC: 3.6 MMOL/L
PROT SERPL-MCNC: 7.4 G/DL
SODIUM SERPL-SCNC: 142 MMOL/L

## 2023-01-18 PROCEDURE — 71260 CT THORAX DX C+: CPT | Mod: 26

## 2023-01-18 PROCEDURE — 71260 CT THORAX DX C+: CPT

## 2023-01-18 PROCEDURE — 74177 CT ABD & PELVIS W/CONTRAST: CPT

## 2023-01-18 PROCEDURE — 74177 CT ABD & PELVIS W/CONTRAST: CPT | Mod: 26

## 2023-01-23 ENCOUNTER — RESULT REVIEW (OUTPATIENT)
Age: 48
End: 2023-01-23

## 2023-01-23 ENCOUNTER — NON-APPOINTMENT (OUTPATIENT)
Age: 48
End: 2023-01-23

## 2023-01-23 ENCOUNTER — APPOINTMENT (OUTPATIENT)
Dept: INFUSION THERAPY | Facility: HOSPITAL | Age: 48
End: 2023-01-23

## 2023-01-23 LAB
BASOPHILS # BLD AUTO: 0.05 K/UL — SIGNIFICANT CHANGE UP (ref 0–0.2)
BASOPHILS NFR BLD AUTO: 1.1 % — SIGNIFICANT CHANGE UP (ref 0–2)
EOSINOPHIL # BLD AUTO: 0.36 K/UL — SIGNIFICANT CHANGE UP (ref 0–0.5)
EOSINOPHIL NFR BLD AUTO: 7.9 % — HIGH (ref 0–6)
HCT VFR BLD CALC: 42.9 % — SIGNIFICANT CHANGE UP (ref 39–50)
HGB BLD-MCNC: 14.7 G/DL — SIGNIFICANT CHANGE UP (ref 13–17)
IMM GRANULOCYTES NFR BLD AUTO: 0.2 % — SIGNIFICANT CHANGE UP (ref 0–0.9)
LYMPHOCYTES # BLD AUTO: 2.13 K/UL — SIGNIFICANT CHANGE UP (ref 1–3.3)
LYMPHOCYTES # BLD AUTO: 46.7 % — HIGH (ref 13–44)
MCHC RBC-ENTMCNC: 28 PG — SIGNIFICANT CHANGE UP (ref 27–34)
MCHC RBC-ENTMCNC: 34.3 G/DL — SIGNIFICANT CHANGE UP (ref 32–36)
MCV RBC AUTO: 81.7 FL — SIGNIFICANT CHANGE UP (ref 80–100)
MONOCYTES # BLD AUTO: 0.5 K/UL — SIGNIFICANT CHANGE UP (ref 0–0.9)
MONOCYTES NFR BLD AUTO: 11 % — SIGNIFICANT CHANGE UP (ref 2–14)
NEUTROPHILS # BLD AUTO: 1.51 K/UL — LOW (ref 1.8–7.4)
NEUTROPHILS NFR BLD AUTO: 33.1 % — LOW (ref 43–77)
NRBC # BLD: 0 /100 WBCS — SIGNIFICANT CHANGE UP (ref 0–0)
PLATELET # BLD AUTO: 180 K/UL — SIGNIFICANT CHANGE UP (ref 150–400)
RBC # BLD: 5.25 M/UL — SIGNIFICANT CHANGE UP (ref 4.2–5.8)
RBC # FLD: 13.1 % — SIGNIFICANT CHANGE UP (ref 10.3–14.5)
WBC # BLD: 4.56 K/UL — SIGNIFICANT CHANGE UP (ref 3.8–10.5)
WBC # FLD AUTO: 4.56 K/UL — SIGNIFICANT CHANGE UP (ref 3.8–10.5)

## 2023-01-23 RX ORDER — PANTOPRAZOLE SODIUM 20 MG/1
1 TABLET, DELAYED RELEASE ORAL
Qty: 0 | Refills: 0 | DISCHARGE

## 2023-01-24 ENCOUNTER — NON-APPOINTMENT (OUTPATIENT)
Age: 48
End: 2023-01-24

## 2023-01-24 DIAGNOSIS — Z51.11 ENCOUNTER FOR ANTINEOPLASTIC CHEMOTHERAPY: ICD-10-CM

## 2023-01-24 DIAGNOSIS — C49.9 MALIGNANT NEOPLASM OF CONNECTIVE AND SOFT TISSUE, UNSPECIFIED: ICD-10-CM

## 2023-01-24 LAB
ALBUMIN SERPL ELPH-MCNC: 4.2 G/DL — SIGNIFICANT CHANGE UP (ref 3.3–5)
ALP SERPL-CCNC: 50 U/L — SIGNIFICANT CHANGE UP (ref 40–120)
ALT FLD-CCNC: 11 U/L — SIGNIFICANT CHANGE UP (ref 10–45)
ANION GAP SERPL CALC-SCNC: 16 MMOL/L — SIGNIFICANT CHANGE UP (ref 5–17)
AST SERPL-CCNC: 34 U/L — SIGNIFICANT CHANGE UP (ref 10–40)
BILIRUB SERPL-MCNC: 0.3 MG/DL — SIGNIFICANT CHANGE UP (ref 0.2–1.2)
BUN SERPL-MCNC: 16 MG/DL — SIGNIFICANT CHANGE UP (ref 7–23)
CALCIUM SERPL-MCNC: 8.8 MG/DL — SIGNIFICANT CHANGE UP (ref 8.4–10.5)
CHLORIDE SERPL-SCNC: 103 MMOL/L — SIGNIFICANT CHANGE UP (ref 96–108)
CO2 SERPL-SCNC: 20 MMOL/L — LOW (ref 22–31)
CREAT SERPL-MCNC: 1.21 MG/DL — SIGNIFICANT CHANGE UP (ref 0.5–1.3)
CRP SERPL-MCNC: <3 MG/L — SIGNIFICANT CHANGE UP
EGFR: 74 ML/MIN/1.73M2 — SIGNIFICANT CHANGE UP
GLUCOSE SERPL-MCNC: 67 MG/DL — LOW (ref 70–99)
HBV CORE AB SER-ACNC: SIGNIFICANT CHANGE UP
LDH SERPL L TO P-CCNC: 535 U/L — HIGH (ref 50–242)
POTASSIUM SERPL-MCNC: 5.5 MMOL/L — HIGH (ref 3.5–5.3)
POTASSIUM SERPL-SCNC: 5.5 MMOL/L — HIGH (ref 3.5–5.3)
PROT SERPL-MCNC: 7 G/DL — SIGNIFICANT CHANGE UP (ref 6–8.3)
SODIUM SERPL-SCNC: 139 MMOL/L — SIGNIFICANT CHANGE UP (ref 135–145)
T3 SERPL-MCNC: 106 NG/DL — SIGNIFICANT CHANGE UP (ref 80–200)
T4 FREE SERPL-MCNC: 1 NG/DL — SIGNIFICANT CHANGE UP (ref 0.9–1.8)
TSH SERPL-MCNC: 0.29 UIU/ML — SIGNIFICANT CHANGE UP (ref 0.27–4.2)

## 2023-02-20 ENCOUNTER — RESULT REVIEW (OUTPATIENT)
Age: 48
End: 2023-02-20

## 2023-02-20 ENCOUNTER — APPOINTMENT (OUTPATIENT)
Dept: INFUSION THERAPY | Facility: HOSPITAL | Age: 48
End: 2023-02-20

## 2023-02-20 LAB
ALBUMIN SERPL ELPH-MCNC: 4.7 G/DL — SIGNIFICANT CHANGE UP (ref 3.3–5)
ALP SERPL-CCNC: 55 U/L — SIGNIFICANT CHANGE UP (ref 40–120)
ALT FLD-CCNC: 7 U/L — LOW (ref 10–45)
ANION GAP SERPL CALC-SCNC: 14 MMOL/L — SIGNIFICANT CHANGE UP (ref 5–17)
AST SERPL-CCNC: 13 U/L — SIGNIFICANT CHANGE UP (ref 10–40)
BASOPHILS # BLD AUTO: 0.04 K/UL — SIGNIFICANT CHANGE UP (ref 0–0.2)
BASOPHILS NFR BLD AUTO: 1 % — SIGNIFICANT CHANGE UP (ref 0–2)
BILIRUB SERPL-MCNC: 0.7 MG/DL — SIGNIFICANT CHANGE UP (ref 0.2–1.2)
BUN SERPL-MCNC: 18 MG/DL — SIGNIFICANT CHANGE UP (ref 7–23)
CALCIUM SERPL-MCNC: 9.7 MG/DL — SIGNIFICANT CHANGE UP (ref 8.4–10.5)
CHLORIDE SERPL-SCNC: 103 MMOL/L — SIGNIFICANT CHANGE UP (ref 96–108)
CO2 SERPL-SCNC: 25 MMOL/L — SIGNIFICANT CHANGE UP (ref 22–31)
CREAT SERPL-MCNC: 1.34 MG/DL — HIGH (ref 0.5–1.3)
CRP SERPL-MCNC: <3 MG/L — SIGNIFICANT CHANGE UP
EGFR: 66 ML/MIN/1.73M2 — SIGNIFICANT CHANGE UP
EOSINOPHIL # BLD AUTO: 0.33 K/UL — SIGNIFICANT CHANGE UP (ref 0–0.5)
EOSINOPHIL NFR BLD AUTO: 8 % — HIGH (ref 0–6)
ERYTHROCYTE [SEDIMENTATION RATE] IN BLOOD: 7 MM/HR — SIGNIFICANT CHANGE UP (ref 0–15)
GLUCOSE SERPL-MCNC: 119 MG/DL — HIGH (ref 70–99)
HCT VFR BLD CALC: 42.7 % — SIGNIFICANT CHANGE UP (ref 39–50)
HGB BLD-MCNC: 14.3 G/DL — SIGNIFICANT CHANGE UP (ref 13–17)
IMM GRANULOCYTES NFR BLD AUTO: 0.2 % — SIGNIFICANT CHANGE UP (ref 0–0.9)
LDH SERPL L TO P-CCNC: 156 U/L — SIGNIFICANT CHANGE UP (ref 50–242)
LYMPHOCYTES # BLD AUTO: 1.31 K/UL — SIGNIFICANT CHANGE UP (ref 1–3.3)
LYMPHOCYTES # BLD AUTO: 31.9 % — SIGNIFICANT CHANGE UP (ref 13–44)
MCHC RBC-ENTMCNC: 28 PG — SIGNIFICANT CHANGE UP (ref 27–34)
MCHC RBC-ENTMCNC: 33.5 G/DL — SIGNIFICANT CHANGE UP (ref 32–36)
MCV RBC AUTO: 83.7 FL — SIGNIFICANT CHANGE UP (ref 80–100)
MONOCYTES # BLD AUTO: 0.36 K/UL — SIGNIFICANT CHANGE UP (ref 0–0.9)
MONOCYTES NFR BLD AUTO: 8.8 % — SIGNIFICANT CHANGE UP (ref 2–14)
NEUTROPHILS # BLD AUTO: 2.06 K/UL — SIGNIFICANT CHANGE UP (ref 1.8–7.4)
NEUTROPHILS NFR BLD AUTO: 50.1 % — SIGNIFICANT CHANGE UP (ref 43–77)
NRBC # BLD: 0 /100 WBCS — SIGNIFICANT CHANGE UP (ref 0–0)
PLATELET # BLD AUTO: 181 K/UL — SIGNIFICANT CHANGE UP (ref 150–400)
POTASSIUM SERPL-MCNC: 3.7 MMOL/L — SIGNIFICANT CHANGE UP (ref 3.5–5.3)
POTASSIUM SERPL-SCNC: 3.7 MMOL/L — SIGNIFICANT CHANGE UP (ref 3.5–5.3)
PROT SERPL-MCNC: 7.1 G/DL — SIGNIFICANT CHANGE UP (ref 6–8.3)
RBC # BLD: 5.1 M/UL — SIGNIFICANT CHANGE UP (ref 4.2–5.8)
RBC # FLD: 13.2 % — SIGNIFICANT CHANGE UP (ref 10.3–14.5)
SODIUM SERPL-SCNC: 142 MMOL/L — SIGNIFICANT CHANGE UP (ref 135–145)
T3 SERPL-MCNC: 102 NG/DL — SIGNIFICANT CHANGE UP (ref 80–200)
T4 FREE SERPL-MCNC: 1.2 NG/DL — SIGNIFICANT CHANGE UP (ref 0.9–1.8)
TSH SERPL-MCNC: 0.11 UIU/ML — LOW (ref 0.27–4.2)
WBC # BLD: 4.11 K/UL — SIGNIFICANT CHANGE UP (ref 3.8–10.5)
WBC # FLD AUTO: 4.11 K/UL — SIGNIFICANT CHANGE UP (ref 3.8–10.5)

## 2023-02-21 DIAGNOSIS — C79.9 SECONDARY MALIGNANT NEOPLASM OF UNSPECIFIED SITE: ICD-10-CM

## 2023-03-04 ENCOUNTER — EMERGENCY (EMERGENCY)
Facility: HOSPITAL | Age: 48
LOS: 1 days | Discharge: ANOTHER HOSPITAL | End: 2023-03-04
Attending: EMERGENCY MEDICINE | Admitting: EMERGENCY MEDICINE
Payer: MEDICAID

## 2023-03-04 VITALS
OXYGEN SATURATION: 100 % | TEMPERATURE: 99 F | HEART RATE: 101 BPM | RESPIRATION RATE: 16 BRPM | HEIGHT: 68.9 IN | SYSTOLIC BLOOD PRESSURE: 124 MMHG | DIASTOLIC BLOOD PRESSURE: 83 MMHG

## 2023-03-04 DIAGNOSIS — C43.60 MALIGNANT MELANOMA OF UNSPECIFIED UPPER LIMB, INCLUDING SHOULDER: Chronic | ICD-10-CM

## 2023-03-04 DIAGNOSIS — C79.31 SECONDARY MALIGNANT NEOPLASM OF BRAIN: ICD-10-CM

## 2023-03-04 DIAGNOSIS — Z98.890 OTHER SPECIFIED POSTPROCEDURAL STATES: Chronic | ICD-10-CM

## 2023-03-04 LAB
ALBUMIN SERPL ELPH-MCNC: 4.5 G/DL — SIGNIFICANT CHANGE UP (ref 3.3–5)
ALP SERPL-CCNC: 56 U/L — SIGNIFICANT CHANGE UP (ref 40–120)
ALT FLD-CCNC: 13 U/L — SIGNIFICANT CHANGE UP (ref 4–41)
ANION GAP SERPL CALC-SCNC: 10 MMOL/L — SIGNIFICANT CHANGE UP (ref 7–14)
APPEARANCE UR: CLEAR — SIGNIFICANT CHANGE UP
APTT BLD: 30.8 SEC — SIGNIFICANT CHANGE UP (ref 27–36.3)
AST SERPL-CCNC: 23 U/L — SIGNIFICANT CHANGE UP (ref 4–40)
BACTERIA # UR AUTO: NEGATIVE — SIGNIFICANT CHANGE UP
BASOPHILS # BLD AUTO: 0.04 K/UL — SIGNIFICANT CHANGE UP (ref 0–0.2)
BASOPHILS NFR BLD AUTO: 0.8 % — SIGNIFICANT CHANGE UP (ref 0–2)
BILIRUB SERPL-MCNC: 0.3 MG/DL — SIGNIFICANT CHANGE UP (ref 0.2–1.2)
BILIRUB UR-MCNC: NEGATIVE — SIGNIFICANT CHANGE UP
BUN SERPL-MCNC: 12 MG/DL — SIGNIFICANT CHANGE UP (ref 7–23)
CALCIUM SERPL-MCNC: 9.4 MG/DL — SIGNIFICANT CHANGE UP (ref 8.4–10.5)
CHLORIDE SERPL-SCNC: 103 MMOL/L — SIGNIFICANT CHANGE UP (ref 98–107)
CO2 SERPL-SCNC: 28 MMOL/L — SIGNIFICANT CHANGE UP (ref 22–31)
COLOR SPEC: YELLOW — SIGNIFICANT CHANGE UP
CREAT SERPL-MCNC: 1.23 MG/DL — SIGNIFICANT CHANGE UP (ref 0.5–1.3)
DIFF PNL FLD: NEGATIVE — SIGNIFICANT CHANGE UP
EGFR: 73 ML/MIN/1.73M2 — SIGNIFICANT CHANGE UP
EOSINOPHIL # BLD AUTO: 0.44 K/UL — SIGNIFICANT CHANGE UP (ref 0–0.5)
EOSINOPHIL NFR BLD AUTO: 8.8 % — HIGH (ref 0–6)
EPI CELLS # UR: 1 /HPF — SIGNIFICANT CHANGE UP (ref 0–5)
GLUCOSE SERPL-MCNC: 116 MG/DL — HIGH (ref 70–99)
GLUCOSE UR QL: NEGATIVE — SIGNIFICANT CHANGE UP
HCT VFR BLD CALC: 45.5 % — SIGNIFICANT CHANGE UP (ref 39–50)
HGB BLD-MCNC: 14.7 G/DL — SIGNIFICANT CHANGE UP (ref 13–17)
IANC: 2.27 K/UL — SIGNIFICANT CHANGE UP (ref 1.8–7.4)
IMM GRANULOCYTES NFR BLD AUTO: 0.2 % — SIGNIFICANT CHANGE UP (ref 0–0.9)
INR BLD: 1.13 RATIO — SIGNIFICANT CHANGE UP (ref 0.88–1.16)
KETONES UR-MCNC: NEGATIVE — SIGNIFICANT CHANGE UP
LEUKOCYTE ESTERASE UR-ACNC: NEGATIVE — SIGNIFICANT CHANGE UP
LYMPHOCYTES # BLD AUTO: 1.77 K/UL — SIGNIFICANT CHANGE UP (ref 1–3.3)
LYMPHOCYTES # BLD AUTO: 35.5 % — SIGNIFICANT CHANGE UP (ref 13–44)
MCHC RBC-ENTMCNC: 27.7 PG — SIGNIFICANT CHANGE UP (ref 27–34)
MCHC RBC-ENTMCNC: 32.3 GM/DL — SIGNIFICANT CHANGE UP (ref 32–36)
MCV RBC AUTO: 85.7 FL — SIGNIFICANT CHANGE UP (ref 80–100)
MONOCYTES # BLD AUTO: 0.45 K/UL — SIGNIFICANT CHANGE UP (ref 0–0.9)
MONOCYTES NFR BLD AUTO: 9 % — SIGNIFICANT CHANGE UP (ref 2–14)
NEUTROPHILS # BLD AUTO: 2.27 K/UL — SIGNIFICANT CHANGE UP (ref 1.8–7.4)
NEUTROPHILS NFR BLD AUTO: 45.7 % — SIGNIFICANT CHANGE UP (ref 43–77)
NITRITE UR-MCNC: NEGATIVE — SIGNIFICANT CHANGE UP
NRBC # BLD: 0 /100 WBCS — SIGNIFICANT CHANGE UP (ref 0–0)
NRBC # FLD: 0 K/UL — SIGNIFICANT CHANGE UP (ref 0–0)
PH UR: 7.5 — SIGNIFICANT CHANGE UP (ref 5–8)
PLATELET # BLD AUTO: 193 K/UL — SIGNIFICANT CHANGE UP (ref 150–400)
POTASSIUM SERPL-MCNC: 4.4 MMOL/L — SIGNIFICANT CHANGE UP (ref 3.5–5.3)
POTASSIUM SERPL-SCNC: 4.4 MMOL/L — SIGNIFICANT CHANGE UP (ref 3.5–5.3)
PROT SERPL-MCNC: 7.7 G/DL — SIGNIFICANT CHANGE UP (ref 6–8.3)
PROT UR-MCNC: ABNORMAL
PROTHROM AB SERPL-ACNC: 13.1 SEC — SIGNIFICANT CHANGE UP (ref 10.5–13.4)
RBC # BLD: 5.31 M/UL — SIGNIFICANT CHANGE UP (ref 4.2–5.8)
RBC # FLD: 13.2 % — SIGNIFICANT CHANGE UP (ref 10.3–14.5)
RBC CASTS # UR COMP ASSIST: 4 /HPF — SIGNIFICANT CHANGE UP (ref 0–4)
SARS-COV-2 RNA SPEC QL NAA+PROBE: DETECTED
SODIUM SERPL-SCNC: 141 MMOL/L — SIGNIFICANT CHANGE UP (ref 135–145)
SP GR SPEC: 1.02 — SIGNIFICANT CHANGE UP (ref 1.01–1.05)
UROBILINOGEN FLD QL: SIGNIFICANT CHANGE UP
WBC # BLD: 4.98 K/UL — SIGNIFICANT CHANGE UP (ref 3.8–10.5)
WBC # FLD AUTO: 4.98 K/UL — SIGNIFICANT CHANGE UP (ref 3.8–10.5)
WBC UR QL: 0 /HPF — SIGNIFICANT CHANGE UP (ref 0–5)

## 2023-03-04 PROCEDURE — 70498 CT ANGIOGRAPHY NECK: CPT | Mod: 26,MA

## 2023-03-04 PROCEDURE — 99285 EMERGENCY DEPT VISIT HI MDM: CPT

## 2023-03-04 PROCEDURE — 99284 EMERGENCY DEPT VISIT MOD MDM: CPT

## 2023-03-04 PROCEDURE — 71045 X-RAY EXAM CHEST 1 VIEW: CPT | Mod: 26

## 2023-03-04 PROCEDURE — 70496 CT ANGIOGRAPHY HEAD: CPT | Mod: 26,MA

## 2023-03-04 RX ORDER — DEXAMETHASONE 0.5 MG/5ML
4 ELIXIR ORAL EVERY 6 HOURS
Refills: 0 | Status: DISCONTINUED | OUTPATIENT
Start: 2023-03-04 | End: 2023-03-05

## 2023-03-04 RX ORDER — DEXAMETHASONE 0.5 MG/5ML
6 ELIXIR ORAL ONCE
Refills: 0 | Status: COMPLETED | OUTPATIENT
Start: 2023-03-04 | End: 2023-03-04

## 2023-03-04 RX ORDER — LEVETIRACETAM 250 MG/1
1000 TABLET, FILM COATED ORAL ONCE
Refills: 0 | Status: COMPLETED | OUTPATIENT
Start: 2023-03-04 | End: 2023-03-04

## 2023-03-04 RX ORDER — SODIUM CHLORIDE 9 MG/ML
1000 INJECTION INTRAMUSCULAR; INTRAVENOUS; SUBCUTANEOUS ONCE
Refills: 0 | Status: COMPLETED | OUTPATIENT
Start: 2023-03-04 | End: 2023-03-04

## 2023-03-04 RX ORDER — METOCLOPRAMIDE HCL 10 MG
10 TABLET ORAL ONCE
Refills: 0 | Status: COMPLETED | OUTPATIENT
Start: 2023-03-04 | End: 2023-03-04

## 2023-03-04 RX ADMIN — LEVETIRACETAM 400 MILLIGRAM(S): 250 TABLET, FILM COATED ORAL at 22:03

## 2023-03-04 RX ADMIN — Medication 6 MILLIGRAM(S): at 22:03

## 2023-03-04 RX ADMIN — Medication 4 MILLIGRAM(S): at 21:40

## 2023-03-04 RX ADMIN — SODIUM CHLORIDE 1000 MILLILITER(S): 9 INJECTION INTRAMUSCULAR; INTRAVENOUS; SUBCUTANEOUS at 18:15

## 2023-03-04 RX ADMIN — Medication 10 MILLIGRAM(S): at 18:16

## 2023-03-04 NOTE — ED ADULT NURSE NOTE - NS ED NOTE ABUSE RESPONSE YN
Stable   Likely due to myelosuppression from chronic alcohol use and cirrhosis   · Avoid pharmocological DVT prophylaxis   · Monitor for excessive bruising or bleeding   · SCDs  · Monitor fever curve, CBC/D Yes

## 2023-03-04 NOTE — ED PROVIDER NOTE - PROGRESS NOTE DETAILS
iPad  #[id number]  Patient states he is feeling better.  Headache improved not dizzy at this time.  Patient with friend Ari at bedside would like him to stay for our discussion.  Updated patient on CT read unfortunately concerning for metastatic disease there is vasogenic edema and shift on the CAT scan.  Aware neurosurgery will come evaluate patient.  Steroids ordered.  Patient neurologically intact at this time pt evaluated by neurosurgery recommend transfer to [facility]> pt updated. also given Keppra. pt AO3 no focal deficits. pt tested +covid, Transfer center states that now will need approval from the medical director and a private room. pt not tachycardic or requiring oxgyen. Informed he is COVID-positive MD CHO:  Pt pending xfer.  [facility] preparing appropriate bed.  Nurse manager Freddy facilitating transfer.  Will continue neurosurg orders as per consult note.

## 2023-03-04 NOTE — ED PROVIDER NOTE - OBJECTIVE STATEMENT
Dr Soto  iPad  #[id number]  47-year-old male patient speaking with a history of melanoma of the right thumb status post and marked left with history of metastatic disease status post flexible bronchoscopy, robotic assisted right VATS with right middle lobe resection in November 2022 on chemo with last treatment on February 20 from home chief complaint of headache x1 week and dizziness for 2 weeks.  Describes it as dull headache, no prior history of headaches.  But denies any associated nausea, vomiting no fever no chills.  Intermittently has been felt dizzy, lightheaded.  But has not passed out no chest pain no shortness of breath.  Does endorses right upper quadrant pain since surgery back in November 22 2 no change in quality of pain no associated change in bowel no urinary complaints.  No numbness no weakness.  Has not taken anything for pain at home.  No falls.  Not on anticoagulation.

## 2023-03-04 NOTE — ED ADULT NURSE NOTE - OBJECTIVE STATEMENT
Pt received to rm  1 , awake and alert, A&OX4, ambulatory. C/o headache and dizziness x1 week. Reports having intermittent blurry vision with the headaches. Hx of melanoma of the R thumb, metastasis with R middle lobe resection in November. Receiving  IV chemo at this time q 2weeks, last treatment 2/20/23. Respirations even and unlabored.  Denies CP, SOB, N/V, palpitations, blurry vision at this time. 20G IV placed to  L AC. NSR on CM. Bed in lowest position, call bell within reach. NAD. iPad  #[id number] used with MD Soto at bedside.

## 2023-03-04 NOTE — CONSULT NOTE ADULT - SUBJECTIVE AND OBJECTIVE BOX
48 YO male with right thumb melanoma, S/P amputation, metastases to the axillary lymph nodes amd right lung S/P right VATS procedure. Patient C/O 2 weeks of dizziness, 1 week of headaches. He denies nausea, vomiting, lethargy, focal motor or sensory loss.    WDWN male in NAD  Vital Signs Last 24 Hrs  T(C): 36.9 (04 Mar 2023 22:08), Max: 37.2 (04 Mar 2023 15:58)  T(F): 98.5 (04 Mar 2023 22:08), Max: 98.9 (04 Mar 2023 15:58)  HR: 88 (04 Mar 2023 22:08) (82 - 101)  BP: 108/86 (04 Mar 2023 22:08) (108/86 - 124/90)  BP(mean): --  RR: 17 (04 Mar 2023 22:08) (16 - 17)  SpO2: 100% (04 Mar 2023 22:08) (100% - 100%)    Parameters below as of 04 Mar 2023 22:08  Patient On (Oxygen Delivery Method): room air    AAO X 3  PERRLA, EOMI  CN 2-12 grossly intact  WALTERS strength 5/5  No dysmetria or drift  SILT  Gait normal, no ataxia    < from: CT Angio Head w/ IV Cont (03.04.23 @ 19:55) >  COMPARISON: MRI brain from 8/4/2022.    FINDINGS:    CT BRAIN:    Left anterior inferior frontal 6.4 x 4.3 x 4.2 cm hyperattenuating mass   (2:19 and 607:46), with surrounding vasogenic edema. Mass effect on the   adjacent left lateral ventricle with associated rightward midline shift   of approximately 1.2 cm at the level of the septum pellucidum. No pawel   hydrocephalus. Additional hyperattenuating 1.6 x 1.3 x 0.9 cm (2:16 and   602:23) right lateral temporal lobe mass with mild surrounding edema,   most prominently inferior to the mass. Few scattered parenchymal   calcifications. No evidence of pawel parenchymal hemorrhage.    Mild polypoid maxillary sinus mucosal thickening and scattered ethmoid   sinus mucosal thickening. Mastoid air cells are clear. Intraorbital   contents are unremarkable. Calvarium is intact.    CT ANGIOGRAPHY NECK:    Thoracic aorta and branch vessels: Patent.  No atherosclerosis.  No   flow-limiting stenosis.  No evidence of dissection.    Right carotid system: Patent.  No atherosclerosis.  No hemodynamically   significant stenosis using NASCET criteria.  No evidence of dissection.    Left carotid system: Patent.  No atherosclerosis.  No hemodynamically   significant stenosis using NASCET criteria.  No evidence of dissection.    Vertebral arteries: Patent.  No atherosclerosis.  No flow-limiting   stenosis.  No evidence of dissection.    Soft tissues of the neck: 1.0 cm right thyroid lobe hypodense nodule.   Intraglandular cystic prominence within the submandibular glands without   discrete radiopaque calculus. No main submandibular ductal dilatation.    Visualized spine: Unremarkable.    Visualized upper chest: Unremarkable.    CT ANGIOGRAPHY BRAIN:    Internal carotid arteries: Patent bilaterally.  No flow limiting stenosis.    Anterior cerebral arteries: Patent bilaterally without flow limiting   stenosis.    Middle cerebral arteries: Patent bilaterally without flow limiting    stenosis.    Anterior communicating artery: Visualized.    Posterior communicating arteries: Visualized bilaterally.    Posterior cerebral arteries: Patent bilaterally without stenosis.    Vertebrobasilar: Patent without stenosis. The distal vertebral arteries   are similar in caliber.  Bilateral posterior inferior cerebellar   arteries, bilateral anterior inferior cerebellar arteries and bilateral   superior cerebellar arteries are visualized.    Vascular lesions: No evidence of intracranial aneurysm within limits of   CTA technique.  Tiny aneurysms may be beyond the resolution of this   examination.    Dural venous sinuses: Suboptimally evaluated secondary to phase of   contrast.    IMPRESSION:    Noncontrast CT Head: Dominant left anterior inferior frontal and   additional right temporal lobe hyperattenuating masses, with associated   vasogenic edema and mass effect contributing to rightward midline shift.   No pawel hydrocephalus at this time. In the setting of metastatic   melanoma, findings likely represent melanoma metastases. More sensitive   evaluation with contrast-enhanced brain MRI may be obtained, as   clinically warranted, as no contraindications exist.    CTA Neck: No significant flow-limiting stenosis or evidence of acute   dissection within the cervical carotid or vertebral arteries.    CTA Head: No proximal large vessel occlusion, significant stenosis, or   evidence of intracranial aneurysm.    < end of copied text >

## 2023-03-04 NOTE — ED ADULT NURSE NOTE - SUICIDE SCREENING QUESTION 2
Veena Buck is a 26 y.o. female referred for outpatient diabetes education by Dr. Prieto. Patient completed individual session 07/07/2017. Topics covered included:    1. Carbohydrate Counting   i. Provided patient with detailed carbohydrate counting guide   ii. Instructed patient to eat 45-60 grams of carbohydrate with each meal and 15-30 grams of carbohydrate for snacks   iii. Provided patient with list of non-starchy vegetables and foods that are low in carbohydrate for snacks and to incorporate with meals    iv. Reviewed carbohydrate-containing foods, standard serving sizes, measuring foods, and nutrition label reading.    v. Reviewed the difference between simple and complex carbohydrate. Encouraged patient to choose complex carbohydrates more often. Patient is drinking Dr. Pepper and was adamant about not quitting. Patient agreed to try Dr. Pepper Ten with with less calories and carbs.     2. Pathophysiology of Diabetes   i. Reviewed disease process of type 2 diabetes   ii. Reviewed common conditions associated with uncontrolled diabetes (diabetic neuropathy, retinopathy, nephropathy, heart disease, skin infections, and kidney disease)    3. Hypoglycemia   i. Reviewed the signs and symptoms of low blood sugar   ii. Explained a low blood sugar is a blood glucose reading below 70 mg/dl   iii. Provided patient with handout detailing proper treatment guidelines      Thank you Dr. Prieto  for this referral.     Micaela Nicolas MS, RD, LD, CDE    
No

## 2023-03-04 NOTE — CONSULT NOTE ADULT - ASSESSMENT
46 YO male with metastatic melanoma with new brain metastases. Discussed with Dr Rodriguez, will transfer to [place] for higher level of care

## 2023-03-04 NOTE — ED ADULT TRIAGE NOTE - CHIEF COMPLAINT QUOTE
c/o headaches x 1 week. C/o dizziness x 2 weeks. Denies any N/V , fever or chills. PMHX skin cancer , started on chemo treatment Jan 2023. Last chemo treatment 2/20/23. FS= 127 c/o headaches x 1 week. C/o dizziness x 2 weeks. Denies any N/V , fever or chills. PMHX metastatic malignant melanoma , started on chemo treatment Jan 2023. Last chemo treatment 2/20/23. FS= 127

## 2023-03-04 NOTE — ED PROVIDER NOTE - PHYSICAL EXAMINATION
Dr Soto  Vital signs noted heart rate 101, blood pressure 124/83, respiratory 16 pulse ox 90% room air  Patient observed walking from triage to room 1 in the red zone normal gait without any difficulty or assistance.  ANO x3.  No photophobia and supple neck no meningeal signs.  No sign of facial asymmetry.  No slurred speech.  Extraocular motor intact.  No work of breathing.  Talking in full clear sentences.  Soft nondistended nontender abdomen.  AOx3, no focal deficits. CN 2-12 grossly intact. nl gait. no meningeal signs.

## 2023-03-04 NOTE — ED PROVIDER NOTE - CLINICAL SUMMARY MEDICAL DECISION MAKING FREE TEXT BOX
Plan EKG, labs, CT angio head and neck to evaluate for metastatic disease, chest x-ray, UA and urine culture, pain meds and reassess.  EKG was sinus with no acute ST elevations

## 2023-03-04 NOTE — ED ADULT NURSE NOTE - CHIEF COMPLAINT QUOTE
c/o headaches x 1 week. C/o dizziness x 2 weeks. Denies any N/V , fever or chills. PMHX metastatic malignant melanoma , started on chemo treatment Jan 2023. Last chemo treatment 2/20/23. FS= 127

## 2023-03-04 NOTE — CONSULT NOTE ADULT - PROBLEM SELECTOR RECOMMENDATION 9
Decadron 10mg IV X 1 then 4mg Q6H  Keppra 1000mg Q12H  Transfer to [place] for higher level of care

## 2023-03-05 ENCOUNTER — INPATIENT (INPATIENT)
Facility: HOSPITAL | Age: 48
LOS: 11 days | Discharge: HOME | DRG: 25 | End: 2023-03-17
Attending: NEUROLOGICAL SURGERY | Admitting: NEUROLOGICAL SURGERY
Payer: MEDICAID

## 2023-03-05 VITALS
RESPIRATION RATE: 18 BRPM | DIASTOLIC BLOOD PRESSURE: 80 MMHG | TEMPERATURE: 98 F | OXYGEN SATURATION: 98 % | HEART RATE: 63 BPM | SYSTOLIC BLOOD PRESSURE: 119 MMHG

## 2023-03-05 VITALS
HEART RATE: 69 BPM | RESPIRATION RATE: 17 BRPM | DIASTOLIC BLOOD PRESSURE: 76 MMHG | SYSTOLIC BLOOD PRESSURE: 99 MMHG | OXYGEN SATURATION: 100 %

## 2023-03-05 DIAGNOSIS — C71.9 MALIGNANT NEOPLASM OF BRAIN, UNSPECIFIED: ICD-10-CM

## 2023-03-05 DIAGNOSIS — Z98.890 OTHER SPECIFIED POSTPROCEDURAL STATES: Chronic | ICD-10-CM

## 2023-03-05 DIAGNOSIS — C43.60 MALIGNANT MELANOMA OF UNSPECIFIED UPPER LIMB, INCLUDING SHOULDER: Chronic | ICD-10-CM

## 2023-03-05 LAB
ANION GAP SERPL CALC-SCNC: 13 MMOL/L — SIGNIFICANT CHANGE UP (ref 5–17)
BUN SERPL-MCNC: 14 MG/DL — SIGNIFICANT CHANGE UP (ref 7–23)
CALCIUM SERPL-MCNC: 10.3 MG/DL — SIGNIFICANT CHANGE UP (ref 8.4–10.5)
CHLORIDE SERPL-SCNC: 100 MMOL/L — SIGNIFICANT CHANGE UP (ref 96–108)
CO2 SERPL-SCNC: 27 MMOL/L — SIGNIFICANT CHANGE UP (ref 22–31)
CREAT SERPL-MCNC: 1.08 MG/DL — SIGNIFICANT CHANGE UP (ref 0.5–1.3)
CULTURE RESULTS: SIGNIFICANT CHANGE UP
EGFR: 85 ML/MIN/1.73M2 — SIGNIFICANT CHANGE UP
GLUCOSE SERPL-MCNC: 120 MG/DL — HIGH (ref 70–99)
HCT VFR BLD CALC: 47.5 % — SIGNIFICANT CHANGE UP (ref 39–50)
HGB BLD-MCNC: 15.5 G/DL — SIGNIFICANT CHANGE UP (ref 13–17)
MCHC RBC-ENTMCNC: 28 PG — SIGNIFICANT CHANGE UP (ref 27–34)
MCHC RBC-ENTMCNC: 32.6 GM/DL — SIGNIFICANT CHANGE UP (ref 32–36)
MCV RBC AUTO: 85.7 FL — SIGNIFICANT CHANGE UP (ref 80–100)
NRBC # BLD: 0 /100 WBCS — SIGNIFICANT CHANGE UP (ref 0–0)
PLATELET # BLD AUTO: 206 K/UL — SIGNIFICANT CHANGE UP (ref 150–400)
POTASSIUM SERPL-MCNC: 3.6 MMOL/L — SIGNIFICANT CHANGE UP (ref 3.5–5.3)
POTASSIUM SERPL-SCNC: 3.6 MMOL/L — SIGNIFICANT CHANGE UP (ref 3.5–5.3)
RBC # BLD: 5.54 M/UL — SIGNIFICANT CHANGE UP (ref 4.2–5.8)
RBC # FLD: 13.3 % — SIGNIFICANT CHANGE UP (ref 10.3–14.5)
SODIUM SERPL-SCNC: 140 MMOL/L — SIGNIFICANT CHANGE UP (ref 135–145)
SPECIMEN SOURCE: SIGNIFICANT CHANGE UP
WBC # BLD: 8.55 K/UL — SIGNIFICANT CHANGE UP (ref 3.8–10.5)
WBC # FLD AUTO: 8.55 K/UL — SIGNIFICANT CHANGE UP (ref 3.8–10.5)

## 2023-03-05 RX ORDER — DEXAMETHASONE 0.5 MG/5ML
4 ELIXIR ORAL ONCE
Refills: 0 | Status: DISCONTINUED | OUTPATIENT
Start: 2023-03-05 | End: 2023-03-05

## 2023-03-05 RX ORDER — ACETAMINOPHEN 500 MG
650 TABLET ORAL EVERY 6 HOURS
Refills: 0 | Status: DISCONTINUED | OUTPATIENT
Start: 2023-03-05 | End: 2023-03-14

## 2023-03-05 RX ORDER — PANTOPRAZOLE SODIUM 20 MG/1
40 TABLET, DELAYED RELEASE ORAL
Refills: 0 | Status: DISCONTINUED | OUTPATIENT
Start: 2023-03-05 | End: 2023-03-13

## 2023-03-05 RX ORDER — SENNA PLUS 8.6 MG/1
2 TABLET ORAL AT BEDTIME
Refills: 0 | Status: DISCONTINUED | OUTPATIENT
Start: 2023-03-05 | End: 2023-03-14

## 2023-03-05 RX ORDER — LEVETIRACETAM 250 MG/1
1000 TABLET, FILM COATED ORAL
Refills: 0 | Status: DISCONTINUED | OUTPATIENT
Start: 2023-03-05 | End: 2023-03-08

## 2023-03-05 RX ORDER — LEVETIRACETAM 250 MG/1
500 TABLET, FILM COATED ORAL
Refills: 0 | Status: DISCONTINUED | OUTPATIENT
Start: 2023-03-05 | End: 2023-03-13

## 2023-03-05 RX ORDER — LEVETIRACETAM 250 MG/1
1000 TABLET, FILM COATED ORAL EVERY 12 HOURS
Refills: 0 | Status: DISCONTINUED | OUTPATIENT
Start: 2023-03-05 | End: 2023-03-05

## 2023-03-05 RX ORDER — ONDANSETRON 8 MG/1
4 TABLET, FILM COATED ORAL EVERY 6 HOURS
Refills: 0 | Status: DISCONTINUED | OUTPATIENT
Start: 2023-03-05 | End: 2023-03-14

## 2023-03-05 RX ORDER — DEXAMETHASONE 0.5 MG/5ML
4 ELIXIR ORAL EVERY 6 HOURS
Refills: 0 | Status: DISCONTINUED | OUTPATIENT
Start: 2023-03-05 | End: 2023-03-08

## 2023-03-05 RX ORDER — DEXAMETHASONE 0.5 MG/5ML
4 ELIXIR ORAL EVERY 6 HOURS
Refills: 0 | Status: DISCONTINUED | OUTPATIENT
Start: 2023-03-05 | End: 2023-03-13

## 2023-03-05 RX ORDER — INFLUENZA VIRUS VACCINE 15; 15; 15; 15 UG/.5ML; UG/.5ML; UG/.5ML; UG/.5ML
0.5 SUSPENSION INTRAMUSCULAR ONCE
Refills: 0 | Status: COMPLETED | OUTPATIENT
Start: 2023-03-05 | End: 2023-03-05

## 2023-03-05 RX ADMIN — SENNA PLUS 2 TABLET(S): 8.6 TABLET ORAL at 23:05

## 2023-03-05 RX ADMIN — Medication 4 MILLIGRAM(S): at 23:06

## 2023-03-05 RX ADMIN — LEVETIRACETAM 500 MILLIGRAM(S): 250 TABLET, FILM COATED ORAL at 18:53

## 2023-03-05 RX ADMIN — Medication 4 MILLIGRAM(S): at 08:32

## 2023-03-05 RX ADMIN — Medication 4 MILLIGRAM(S): at 18:52

## 2023-03-05 RX ADMIN — LEVETIRACETAM 1000 MILLIGRAM(S): 250 TABLET, FILM COATED ORAL at 09:36

## 2023-03-05 NOTE — H&P ADULT - HISTORY OF PRESENT ILLNESS
47M Right-handed, PMH stage IV melanoma in Right thumb s/p amputation in 2021. With mets to axillary lymph nodes and Right lung s/p Right VAT in 11/2022. P/w dizziness x2wk and HA x1wk. CT shows Left frontal hyperdense mass w/ ~1cm MLS and small Right temporal hyperdense mass. 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 CT CAP shows no pulmonary masses, but new Right axillary soft tissue density. Exam: Creole-speaking, AOx3, EOMI, no facial/drift, WALTERS 5/5, SILT

## 2023-03-05 NOTE — H&P ADULT - ASSESSMENT
47M Right-handed, PMH stage IV melanoma in Right thumb s/p amputation in 2021. With mets to axillary lymph nodes and Right lung s/p Right VAT in 11/2022. P/w dizziness x2wk and HA x1wk. CT shows Left frontal hyperdense mass w/ ~1cm MLS and small Right temporal hyperdense mass. 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 CT CAP shows no pulmonary masses, but new Right axillary soft tissue density. Exam: Creole-speaking, AOx3, EOMI, no facial/drift, WALTERS 5/5, SILT  -Adm 9M, q4h neurochecks  -MRI stereo w/wo  -Keppra 500BID, Dex 4q6  -Preop for Left crani this week  -Heme/Onc consult (follows w/ Dr. Neo Marcos), on Opdivo in 1/2023 last dose was 2/20/23

## 2023-03-05 NOTE — H&P ADULT - NSVTERISKASSESS_GEN_ALL_CORE FT
ED Screening Note


Date of service: 03/03/21


Time: 17:06


ED Screening Note: 





Patient presents with complaints of left-sided weakness x4 days


History of CVA and chronic left-sided weakness, however patient states weakness 

is worse than normal


Patient did follow-up with her PCP who initially thought the weakness was due to

a lupus flare, however has now referred her to the ED for worsening symptoms


+ Left-sided headache





This initial assessment/diagnostic orders/clinical plan/treatment(s) is/are 

subject to change based on patients health status, clinical progression and re-

assessment by fellow clinical providers in the ED. Further treatment and workup 

at subsequent clinical providers discretion. Patient/guardian urged not to elope

from the ED as their condition may be serious if not clinically assessed and 

managed. 





Initial orders include: 


stroke protocol
Surgical Assessment Completed on: 05-Mar-2023 17:15

## 2023-03-05 NOTE — PATIENT PROFILE ADULT - FALL HARM RISK - HARM RISK INTERVENTIONS
Assistance with ambulation/Assistance OOB with selected safe patient handling equipment/Communicate Risk of Fall with Harm to all staff/Monitor gait and stability/Reinforce activity limits and safety measures with patient and family/Sit up slowly, dangle for a short time, stand at bedside before walking/Tailored Fall Risk Interventions/Visual Cue: Yellow wristband and red socks/Bed in lowest position, wheels locked, appropriate side rails in place/Call bell, personal items and telephone in reach/Instruct patient to call for assistance before getting out of bed or chair/Non-slip footwear when patient is out of bed/[place] to call system/Physically safe environment - no spills, clutter or unnecessary equipment/Purposeful Proactive Rounding/Room/bathroom lighting operational, light cord in reach

## 2023-03-05 NOTE — ED ADULT NURSE REASSESSMENT NOTE - NS ED NURSE REASSESS COMMENT FT1
ANM note:  Spoke with Martita in [facility] bed management regarding transfer update.  Still awaiting acceptance from medical director, and no bed available on receiving unit at this time. MD updated.
Pt baseline mental status, NAD, resp equal and unlabored. Denies any complaints at this time.
Break Coverage RN: Pt A&Ox4, ambulatory. Respirations equal and unlabored. Pt resting in stretcher at this time with no complaints. IV patent. VSS. Safety maintained. Awaiting further plan.

## 2023-03-05 NOTE — H&P ADULT - NSHPLABSRESULTS_GEN_ALL_CORE
CT shows Left frontal hyperdense mass w/ ~1cm MLS and small Right temporal hyperdense mass. 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 CT CAP shows no pulmonary masses, but new Right axillary soft tissue density.

## 2023-03-06 DIAGNOSIS — C43.9 MALIGNANT MELANOMA OF SKIN, UNSPECIFIED: ICD-10-CM

## 2023-03-06 DIAGNOSIS — U07.1 COVID-19: ICD-10-CM

## 2023-03-06 DIAGNOSIS — D49.6 NEOPLASM OF UNSPECIFIED BEHAVIOR OF BRAIN: ICD-10-CM

## 2023-03-06 PROCEDURE — 99232 SBSQ HOSP IP/OBS MODERATE 35: CPT | Mod: 57

## 2023-03-06 PROCEDURE — 99223 1ST HOSP IP/OBS HIGH 75: CPT | Mod: GC

## 2023-03-06 PROCEDURE — 99223 1ST HOSP IP/OBS HIGH 75: CPT

## 2023-03-06 PROCEDURE — 70553 MRI BRAIN STEM W/O & W/DYE: CPT | Mod: 26

## 2023-03-06 RX ORDER — ENOXAPARIN SODIUM 100 MG/ML
40 INJECTION SUBCUTANEOUS
Refills: 0 | Status: DISCONTINUED | OUTPATIENT
Start: 2023-03-06 | End: 2023-03-13

## 2023-03-06 RX ADMIN — LEVETIRACETAM 500 MILLIGRAM(S): 250 TABLET, FILM COATED ORAL at 18:11

## 2023-03-06 RX ADMIN — LEVETIRACETAM 500 MILLIGRAM(S): 250 TABLET, FILM COATED ORAL at 06:11

## 2023-03-06 RX ADMIN — Medication 4 MILLIGRAM(S): at 23:52

## 2023-03-06 RX ADMIN — Medication 4 MILLIGRAM(S): at 11:11

## 2023-03-06 RX ADMIN — SENNA PLUS 2 TABLET(S): 8.6 TABLET ORAL at 22:03

## 2023-03-06 RX ADMIN — Medication 650 MILLIGRAM(S): at 17:28

## 2023-03-06 RX ADMIN — Medication 4 MILLIGRAM(S): at 18:11

## 2023-03-06 RX ADMIN — Medication 4 MILLIGRAM(S): at 06:12

## 2023-03-06 RX ADMIN — PANTOPRAZOLE SODIUM 40 MILLIGRAM(S): 20 TABLET, DELAYED RELEASE ORAL at 06:12

## 2023-03-06 RX ADMIN — ENOXAPARIN SODIUM 40 MILLIGRAM(S): 100 INJECTION SUBCUTANEOUS at 22:03

## 2023-03-06 RX ADMIN — Medication 650 MILLIGRAM(S): at 16:14

## 2023-03-06 NOTE — CONSULT NOTE ADULT - ASSESSMENT
(Note incomplete) 47 year old man with history of melanoma with metastases to lung now with headache and imaging findings concerning for metastases

## 2023-03-06 NOTE — PROGRESS NOTE ADULT - ASSESSMENT
47M Right-handed, PMH stage IV melanoma in Right thumb s/p amputation in 2021. With mets to axillary lymph nodes and Right lung s/p Right VAT in 11/2022. P/w dizziness x2wk and HA x1wk. CT shows Left frontal hyperdense mass w/ ~1cm MLS and small Right temporal hyperdense mass. 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 CT CAP shows no pulmonary masses, but new Right axillary soft tissue density. Exam: Creole-speaking, AOx3, EOMI, no facial/drift, WALTERS 5/5, SILT    ASSESSMENT:   pantoprazole    Tablet 40 milliGRAM(s) Oral before breakfast  senna 2 Tablet(s) Oral at bedtime   s/p    PLAN:  NEURO:  CARDIOVASCULAR:  PULMONARY:  RENAL:  GI:  HEME:  ID:  ENDO:    DVT PROPHYLAXIS:  [] Venodynes                                [] Heparin/Lovenox    FALL RISK:  [] Low Risk                                    [] Impulsive   47M Right-handed, PMH stage IV melanoma in Right thumb s/p amputation in 2021. With mets to axillary lymph nodes and Right lung s/p Right VAT in 11/2022. P/w dizziness x2wk and HA x1wk. CT shows Left frontal hyperdense mass w/ ~1cm MLS and small Right temporal hyperdense mass. 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 CT CAP shows no pulmonary masses, but new Right axillary soft tissue density. Exam: Creole-speaking, AOx3, EOMI, no facial/drift, WALTERS 5/5, SILT       PLAN:  NEURO:  c/w neuro checks q 4 hrs  analgesic as needed  Keppra for seizure prophylaxis  Decadron 4 q 6 for vasogenic edema  Brain MRI tonight    CARDIOVASCULAR:  hemodynamically stable    PULMONARY:  h/o lung resection  new Rt axillary soft tissue  Satting % on R/A  IS    GI:  Regular diet  Bowel regimen: colace, senna  PPx: Protonix    RENAL:  IVL  Voiding    HEME/Onc :  Metastatic malignant melanoma  Right axillary lymph nodes  DVT prophylaxis SQL  LE dopp: pend    ID: Afebrile    ENDO:    DVT PROPHYLAXIS:  [x] Venodynes                                [x] Heparin/Lovenox  LE dop: pend    FALL RISK:  [x] Low Risk                                    [] Impulsive    Will discuss with Dr Rodriguez

## 2023-03-06 NOTE — PROGRESS NOTE ADULT - SUBJECTIVE AND OBJECTIVE BOX
47M Right-handed, PMH stage IV melanoma in Right thumb s/p amputation in 2021. With mets to axillary lymph nodes and Right lung s/p Right VAT in 11/2022. P/w dizziness x2wk and HA x1wk. CT shows Left frontal hyperdense mass w/ ~1cm MLS and small Right temporal hyperdense mass. 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 CT CAP shows no pulmonary masses, but new Right axillary soft tissue density. Exam: Creole-speaking, AOx3, EOMI, no facial/drift, WALTERS 5/5, SILT     (05 Mar 2023 17:12)      PAST MEDICAL & SURGICAL HISTORY:  Malignant melanoma  right thumb      Metastatic malignant melanoma  Right axillary lymph nodes      GERD (gastroesophageal reflux disease)      History of lymph node dissection of right axilla  and Right thumb melanoma excision 12/2021      Malignant melanoma of thumb  Right Thumb Amputation with aillary Lymph Node Dissection -10/2022        Vital Signs Last 24 Hrs  T(C): 36.9 (06 Mar 2023 07:04), Max: 37.1 (05 Mar 2023 22:57)  T(F): 98.4 (06 Mar 2023 07:04), Max: 98.8 (05 Mar 2023 22:57)  HR: 77 (06 Mar 2023 07:04) (63 - 95)  BP: 111/75 (06 Mar 2023 07:04) (99/76 - 122/78)  BP(mean): --  RR: 18 (06 Mar 2023 07:04) (17 - 18)  SpO2: 98% (06 Mar 2023 07:04) (96% - 100%)    Parameters below as of 06 Mar 2023 07:04  Patient On (Oxygen Delivery Method): room air                              15.5   8.55  )-----------( 206      ( 05 Mar 2023 19:33 )             47.5    03-05    140  |  100  |  14  ----------------------------<  120<H>  3.6   |  27  |  1.08    Ca    10.3      05 Mar 2023 19:33    TPro  7.7  /  Alb  4.5  /  TBili  0.3  /  DBili  x   /  AST  23  /  ALT  13  /  AlkPhos  56  03-04  PT/INR - ( 04 Mar 2023 18:20 )   PT: 13.1 sec;   INR: 1.13 ratio         PTT - ( 04 Mar 2023 18:20 )  PTT:30.8 sec   Stroke Core Measures      DRAIN OUTPUT:     NEUROIMAGING:     PHYSICAL EXAM:    General: No Acute Distress     Neurological: Awake, alert oriented to person, place and time, Following Commands, PERRL, EOMI, Face Symmetrical, Speech Fluent, Moving all extremities, Muscle Strength normal in all four extremities, No Drift, Sensation to Light Touch Intact    Pulmonary: Clear to Auscultation, No Rales, No Rhonchi, No Wheezes     Cardiovascular: S1, S2, Regular Rate and Rhythm     Gastrointestinal: Soft, Nontender, Nondistended     Incision:     MEDICATIONS:   Antibiotics:    Neuro:  acetaminophen     Tablet .. 650 milliGRAM(s) Oral every 6 hours PRN Temp greater or equal to 38C (100.4F), Mild Pain (1 - 3)  levETIRAcetam 500 milliGRAM(s) Oral two times a day  ondansetron Injectable 4 milliGRAM(s) IV Push every 6 hours PRN Nausea and/or Vomiting    Anticoagulation:  enoxaparin Injectable 40 milliGRAM(s) SubCutaneous <User Schedule>    Cardiology:    Endo:   dexAMETHasone     Tablet 4 milliGRAM(s) Oral every 6 hours    Pulm:    GI/:  pantoprazole    Tablet 40 milliGRAM(s) Oral before breakfast  senna 2 Tablet(s) Oral at bedtime    Other:          47M Right-handed, PMH stage IV melanoma in Right thumb s/p amputation in 2021. With mets to axillary lymph nodes and Right lung s/p Right VAT in 11/2022. P/w dizziness x2wk and HA x1wk. CT shows Left frontal hyperdense mass w/ ~1cm MLS and small Right temporal hyperdense mass. 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 CT CAP shows no pulmonary masses, but new Right axillary soft tissue density. 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 -10/2022        Vital Signs Last 24 Hrs  T(C): 36.9 (06 Mar 2023 07:04), Max: 37.1 (05 Mar 2023 22:57)  T(F): 98.4 (06 Mar 2023 07:04), Max: 98.8 (05 Mar 2023 22:57)  HR: 77 (06 Mar 2023 07:04) (63 - 95)  BP: 111/75 (06 Mar 2023 07:04) (99/76 - 122/78)  BP(mean): --  RR: 18 (06 Mar 2023 07:04) (17 - 18)  SpO2: 98% (06 Mar 2023 07:04) (96% - 100%)    Parameters below as of 06 Mar 2023 07:04  Patient On (Oxygen Delivery Method): room air                              15.5   8.55  )-----------( 206      ( 05 Mar 2023 19:33 )             47.5    03-05    140  |  100  |  14  ----------------------------<  120<H>  3.6   |  27  |  1.08    Ca    10.3      05 Mar 2023 19:33    TPro  7.7  /  Alb  4.5  /  TBili  0.3  /  DBili  x   /  AST  23  /  ALT  13  /  AlkPhos  56  03-04  PT/INR - ( 04 Mar 2023 18:20 )   PT: 13.1 sec;   INR: 1.13 ratio         PTT - ( 04 Mar 2023 18:20 )  PTT:30.8 sec   Stroke Core Measures      DRAIN OUTPUT:     NEUROIMAGING:     PHYSICAL EXAM:    General: No Acute Distress     Neurological: Awake, alert oriented to person, place and time, Following Commands, PERRL, EOMI, Face Symmetrical, Speech Fluent, Moving all extremities, Muscle Strength normal in all four extremities, No Drift, Sensation to Light Touch Intact    Pulmonary: Clear to Auscultation, No Rales, No Rhonchi, No Wheezes     Cardiovascular: S1, S2, Regular Rate and Rhythm     Gastrointestinal: Soft, Nontender, Nondistended     Incision:     MEDICATIONS:   Antibiotics:    Neuro:  acetaminophen     Tablet .. 650 milliGRAM(s) Oral every 6 hours PRN Temp greater or equal to 38C (100.4F), Mild Pain (1 - 3)  levETIRAcetam 500 milliGRAM(s) Oral two times a day  ondansetron Injectable 4 milliGRAM(s) IV Push every 6 hours PRN Nausea and/or Vomiting    Anticoagulation:  enoxaparin Injectable 40 milliGRAM(s) SubCutaneous <User Schedule>    Cardiology:    Endo:   dexAMETHasone     Tablet 4 milliGRAM(s) Oral every 6 hours    Pulm:    GI/:  pantoprazole    Tablet 40 milliGRAM(s) Oral before breakfast  senna 2 Tablet(s) Oral at bedtime    Other:          47M Right-handed, PMH stage IV melanoma in Right thumb s/p amputation in 2021. With mets to axillary lymph nodes and Right lung s/p Right VAT in 11/2022. P/w dizziness x2wk and HA x1wk. CT shows Left frontal hyperdense mass w/ ~1cm MLS and small Right temporal hyperdense mass. 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 CT CAP shows no pulmonary masses, but new Right axillary soft tissue density. Exam: Creole-speaking, AOx3, EOMI, no facial/drift, WALTERS 5/5, SILT    Overnight event: no acute event                   GERD (gastroesophageal reflux disease)      History of lymph node dissection of right axilla  and Right thumb melanoma excision 12/2021      Malignant melanoma of thumb  Right Thumb Amputation with aillary Lymph Node Dissection -10/2022        Vital Signs Last 24 Hrs  T(C): 36.9 (06 Mar 2023 07:04), Max: 37.1 (05 Mar 2023 22:57)  T(F): 98.4 (06 Mar 2023 07:04), Max: 98.8 (05 Mar 2023 22:57)  HR: 77 (06 Mar 2023 07:04) (63 - 95)  BP: 111/75 (06 Mar 2023 07:04) (99/76 - 122/78)  BP(mean): --  RR: 18 (06 Mar 2023 07:04) (17 - 18)  SpO2: 98% (06 Mar 2023 07:04) (96% - 100%)    Parameters below as of 06 Mar 2023 07:04  Patient On (Oxygen Delivery Method): room air                              15.5   8.55  )-----------( 206      ( 05 Mar 2023 19:33 )             47.5    03-05    140  |  100  |  14  ----------------------------<  120<H>  3.6   |  27  |  1.08    Ca    10.3      05 Mar 2023 19:33    TPro  7.7  /  Alb  4.5  /  TBili  0.3  /  DBili  x   /  AST  23  /  ALT  13  /  AlkPhos  56  03-04  PT/INR - ( 04 Mar 2023 18:20 )   PT: 13.1 sec;   INR: 1.13 ratio         PTT - ( 04 Mar 2023 18:20 )  PTT:30.8 sec   Stroke Core Measures      DRAIN OUTPUT:     NEUROIMAGING:     PHYSICAL EXAM:    General: No Acute Distress     Neurological: Awake, alert oriented to person, place and time, Following Commands, PERRL, EOMI, Face Symmetrical, Speech Fluent, Moving all extremities, Muscle Strength normal in all four extremities, No Drift, Sensation to Light Touch Intact    Pulmonary: Clear to Auscultation, No Rales, No Rhonchi, No Wheezes     Cardiovascular: S1, S2, Regular Rate and Rhythm     Gastrointestinal: Soft, Nontender, Nondistended     Incision:     MEDICATIONS:   Antibiotics:    Neuro:  acetaminophen     Tablet .. 650 milliGRAM(s) Oral every 6 hours PRN Temp greater or equal to 38C (100.4F), Mild Pain (1 - 3)  levETIRAcetam 500 milliGRAM(s) Oral two times a day  ondansetron Injectable 4 milliGRAM(s) IV Push every 6 hours PRN Nausea and/or Vomiting    Anticoagulation:  enoxaparin Injectable 40 milliGRAM(s) SubCutaneous <User Schedule>    Cardiology:    Endo:   dexAMETHasone     Tablet 4 milliGRAM(s) Oral every 6 hours    Pulm:    GI/:  pantoprazole    Tablet 40 milliGRAM(s) Oral before breakfast  senna 2 Tablet(s) Oral at bedtime    Other:   pantoprazole    Tablet 40 milliGRAM(s) Oral before breakfast  senna 2 Tablet(s) Oral at bedtime

## 2023-03-06 NOTE — CONSULT NOTE ADULT - PROBLEM SELECTOR RECOMMENDATION 9
Pre-operative assessment:  Revised cardiac risk index - 0  Patient optimized for left craniotomy    (Note incomplete) Concerning for metastatic disease. Likely etiology of headache. On steroids (dexamethasone) and anti-epileptics (Keppra).  Pending possible craniotomy with Neurosurgery    Pre-operative assessment:  Revised cardiac risk index - 0  Greater than 4 METS  March 4, 2023 - ECG NSR at 93 bpm, no acute ischemic changes  No history of cardiac disease and no issues with anesthesia with prior surgeries    Patient optimized for left craniotomy

## 2023-03-06 NOTE — PHYSICAL THERAPY INITIAL EVALUATION ADULT - ADDITIONAL COMMENTS
Pt lives in an apartment with 8 exterior steps to enter. PTA patient notes that he ambulated independently. Pt lives in an apartment with 12 steps to enter. PTA patient notes that he ambulated independently.

## 2023-03-06 NOTE — CONSULT NOTE ADULT - SUBJECTIVE AND OBJECTIVE BOX
Patient is a 47y old  Male who presents with a chief complaint of Headaches, intrancranial mass (05 Mar 2023 17:12)    HPI:  47M Right-handed, PMH stage IV melanoma in Right thumb s/p amputation in . With mets to axillary lymph nodes and Right lung s/p Right VAT in 2022. P/w dizziness x2wk and HA x1wk. CT shows Left frontal hyperdense mass w/ ~1cm MLS and small Right temporal hyperdense mass. MRI from 2022 showed only contrast-enhancing Left inferior frontal mass c/f planum sphenoid meningioma. CT CAP shows no pulmonary masses, but new Right axillary soft tissue density. Exam: Creole-speaking, AOx3, EOMI, no facial/drift, WALTERS 5/5, SILT     (05 Mar 2023 17:12)      PAST MEDICAL & SURGICAL HISTORY:  Malignant melanoma  right thumb  Metastatic malignant melanoma  Right axillary lymph nodes  GERD (gastroesophageal reflux disease)  History of lymph node dissection of right axilla  and Right thumb melanoma excision 2021  Malignant melanoma of thumb  Right Thumb Amputation with aillary Lymph Node Dissection -10/2022    MEDICATIONS  (STANDING):  dexAMETHasone     Tablet 4 milliGRAM(s) Oral every 6 hours  levETIRAcetam 500 milliGRAM(s) Oral two times a day  pantoprazole    Tablet 40 milliGRAM(s) Oral before breakfast  senna 2 Tablet(s) Oral at bedtime    MEDICATIONS  (PRN):  acetaminophen     Tablet .. 650 milliGRAM(s) Oral every 6 hours PRN Temp greater or equal to 38C (100.4F), Mild Pain (1 - 3)  ondansetron Injectable 4 milliGRAM(s) IV Push every 6 hours PRN Nausea and/or Vomiting    FAMILY HISTORY:  No pertinent family history in first degree relatives    REVIEW OF SYSTEMS  CONSTITUTIONAL: Negative for fever, chills, fatigue, recent weight changes  ENT/MOUTH: Negative for hearing difficulties, ear pain, sore throat, rhinorrhea  EYES: Negative for eye pain, vision changes  CARDIOVASCULAR: Negative for chest pain, palpitations, claudication, edema  RESPIRATORY: Negative for respiratory distress, cough, dyspnea  GASTROINTESTINAL: Negative for nausea, vomiting, diarrhea, constipation  GENITOURINARY: Negative for dysuria, decreased urinary frequency, hematuria  MUSCULOSKELETAL: Negative for joint pain, back pain, arthralgias, myalgias  SKIN: Negative for skin lesions, rashes, hair changes  NEUROLOGICAL: As per HPI  PSYCHIATRIC: Negative for depression, anxiety  HEME/LYMPH: Negative for bruising, easy bleeding, lymphadenopathy  ENDOCRINE: Negative for polyuria, polydipsia, temperature intolerance    SOCIAL HISTORY:      Vital Signs Last 24 Hrs  T(C): 37.1 (05 Mar 2023 22:57), Max: 37.1 (05 Mar 2023 22:57)  T(F): 98.8 (05 Mar 2023 22:57), Max: 98.8 (05 Mar 2023 22:57)  HR: 72 (05 Mar 2023 22:57) (63 - 78)  BP: 104/60 (05 Mar 2023 22:57) (95/68 - 122/78)  BP(mean): --  RR: 18 (05 Mar 2023 22:57) (15 - 18)  SpO2: 96% (05 Mar 2023 22:57) (96% - 100%)    Parameters below as of 05 Mar 2023 22:57  Patient On (Oxygen Delivery Method): room air        PHYSICAL EXAM:      ECG:    LABS:                        15.5   8.55  )-----------( 206      ( 05 Mar 2023 19:33 )             47.5     03-05    140  |  100  |  14  ----------------------------<  120<H>  3.6   |  27  |  1.08    Ca    10.3      05 Mar 2023 19:33    TPro  7.7  /  Alb  4.5  /  TBili  0.3  /  DBili  x   /  AST  23  /  ALT  13  /  AlkPhos  56  03-04    PT/INR - ( 04 Mar 2023 18:20 )   PT: 13.1 sec;   INR: 1.13 ratio      PTT - ( 04 Mar 2023 18:20 )  PTT:30.8 sec  Urinalysis Basic - ( 04 Mar 2023 18:20 )    Color: Yellow / Appearance: Clear / S.022 / pH: x  Gluc: x / Ketone: Negative  / Bili: Negative / Urobili: <2 mg/dL   Blood: x / Protein: Trace / Nitrite: Negative   Leuk Esterase: Negative / RBC: 4 /HPF / WBC 0 /HPF   Sq Epi: x / Non Sq Epi: 1 /HPF / Bacteria: Negative      I&O's Summary    05 Mar 2023 07:01  -  06 Mar 2023 01:48  --------------------------------------------------------  IN: 180 mL / OUT: 0 mL / NET: 180 mL      BNP  RADIOLOGY & ADDITIONAL STUDIES: Patient is a 47y old  Male who presents with a chief complaint of Headaches, intrancranial mass (05 Mar 2023 17:12)    HPI:  47M Right-handed, PMH stage IV melanoma in Right thumb s/p amputation in . With mets to axillary lymph nodes and Right lung s/p Right VAT in 2022. P/w dizziness x2wk and HA x1wk. CT shows Left frontal hyperdense mass w/ ~1cm MLS and small Right temporal hyperdense mass. MRI from 2022 showed only contrast-enhancing Left inferior frontal mass c/f planum sphenoid meningioma. CT CAP shows no pulmonary masses, but new Right axillary soft tissue density. Exam: Creole-speaking, AOx3, EOMI, no facial/drift, WALTERS 5/5, SILT   (05 Mar 2023 17:12)  Medicine consulted for pre-operative evaluation. Patient with wife at bedside. Reports only complaint of headache, which states is better. Denies chest pain, dyspnea. No difficulty ambulating or climbing stairs. Denies any other medical history or complaints. No bowel or bladder incontinence.      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 650 milliGRAM(s) Oral every 6 hours PRN Temp greater or equal to 38C (100.4F), Mild Pain (1 - 3)  ondansetron Injectable 4 milliGRAM(s) IV Push every 6 hours PRN Nausea and/or Vomiting    FAMILY HISTORY:  No pertinent family history in first degree relatives    REVIEW OF SYSTEMS  CONSTITUTIONAL: Negative for fever, chills, fatigue, recent weight changes  ENT/MOUTH: Negative for hearing difficulties, ear pain, sore throat, rhinorrhea  EYES: Negative for eye pain, vision changes  CARDIOVASCULAR: Negative for chest pain, palpitations, claudication, edema  RESPIRATORY: Negative for respiratory distress, cough, dyspnea  GASTROINTESTINAL: Negative for nausea, vomiting, diarrhea, constipation  GENITOURINARY: Negative for dysuria, decreased urinary frequency, hematuria  MUSCULOSKELETAL: Negative for joint pain, back pain, arthralgias, myalgias  SKIN: Negative for skin lesions, rashes, hair changes  NEUROLOGICAL: As per HPI  PSYCHIATRIC: Negative for depression, anxiety  HEME/LYMPH: Negative for bruising, easy bleeding, lymphadenopathy  ENDOCRINE: Negative for polyuria, polydipsia, temperature intolerance    SOCIAL HISTORY:      Vital Signs Last 24 Hrs  T(C): 37.1 (05 Mar 2023 22:57), Max: 37.1 (05 Mar 2023 22:57)  T(F): 98.8 (05 Mar 2023 22:57), Max: 98.8 (05 Mar 2023 22:57)  HR: 72 (05 Mar 2023 22:57) (63 - 78)  BP: 104/60 (05 Mar 2023 22:57) (95/68 - 122/78)  BP(mean): --  RR: 18 (05 Mar 2023 22:57) (15 - 18)  SpO2: 96% (05 Mar 2023 22:57) (96% - 100%)    Parameters below as of 05 Mar 2023 22:57  Patient On (Oxygen Delivery Method): room air        PHYSICAL EXAM:  General: Awake, alert, in no acute distress  HEENT: Normocephalic, atraumatic. EOMI. Moist mucus membranes.  Pulmonary: Symmetric chest rise. No extra work of breathing. Lungs clear to auscultation bilaterally.  Cardiovascular: Normal rate and regular rhythm. No murmurs/rubs/gallops  Abdominal: Soft, non-distended, non-tender  MSK/Neurologic: No focal neurological defects, moving all extremities  Psychiatric: Mood appropriate    ECG:    LABS:                        15.5   8.55  )-----------( 206      ( 05 Mar 2023 19:33 )             47.5     03-05    140  |  100  |  14  ----------------------------<  120<H>  3.6   |  27  |  1.08    Ca    10.3      05 Mar 2023 19:33    TPro  7.7  /  Alb  4.5  /  TBili  0.3  /  DBili  x   /  AST  23  /  ALT  13  /  AlkPhos  56  03-04    PT/INR - ( 04 Mar 2023 18:20 )   PT: 13.1 sec;   INR: 1.13 ratio      PTT - ( 04 Mar 2023 18:20 )  PTT:30.8 sec  Urinalysis Basic - ( 04 Mar 2023 18:20 )    Color: Yellow / Appearance: Clear / S.022 / pH: x  Gluc: x / Ketone: Negative  / Bili: Negative / Urobili: <2 mg/dL   Blood: x / Protein: Trace / Nitrite: Negative   Leuk Esterase: Negative / RBC: 4 /HPF / WBC 0 /HPF   Sq Epi: x / Non Sq Epi: 1 /HPF / Bacteria: Negative      I&O's Summary    05 Mar 2023 07:01  -  06 Mar 2023 01:48  --------------------------------------------------------  IN: 180 mL / OUT: 0 mL / NET: 180 mL      BNP  RADIOLOGY & ADDITIONAL STUDIES:

## 2023-03-07 DIAGNOSIS — R06.6 HICCOUGH: ICD-10-CM

## 2023-03-07 DIAGNOSIS — Z29.9 ENCOUNTER FOR PROPHYLACTIC MEASURES, UNSPECIFIED: ICD-10-CM

## 2023-03-07 LAB
MRSA PCR RESULT.: SIGNIFICANT CHANGE UP
S AUREUS DNA NOSE QL NAA+PROBE: SIGNIFICANT CHANGE UP

## 2023-03-07 PROCEDURE — 93970 EXTREMITY STUDY: CPT | Mod: 26

## 2023-03-07 PROCEDURE — 99223 1ST HOSP IP/OBS HIGH 75: CPT | Mod: GC

## 2023-03-07 PROCEDURE — 99232 SBSQ HOSP IP/OBS MODERATE 35: CPT

## 2023-03-07 PROCEDURE — 99232 SBSQ HOSP IP/OBS MODERATE 35: CPT | Mod: 57

## 2023-03-07 RX ORDER — GABAPENTIN 400 MG/1
300 CAPSULE ORAL
Refills: 0 | Status: DISCONTINUED | OUTPATIENT
Start: 2023-03-07 | End: 2023-03-14

## 2023-03-07 RX ORDER — CHLORHEXIDINE GLUCONATE 213 G/1000ML
1 SOLUTION TOPICAL
Refills: 0 | Status: DISCONTINUED | OUTPATIENT
Start: 2023-03-07 | End: 2023-03-14

## 2023-03-07 RX ORDER — BACLOFEN 100 %
5 POWDER (GRAM) MISCELLANEOUS ONCE
Refills: 0 | Status: COMPLETED | OUTPATIENT
Start: 2023-03-07 | End: 2023-03-07

## 2023-03-07 RX ADMIN — CHLORHEXIDINE GLUCONATE 1 APPLICATION(S): 213 SOLUTION TOPICAL at 12:32

## 2023-03-07 RX ADMIN — Medication 5 MILLIGRAM(S): at 12:59

## 2023-03-07 RX ADMIN — LEVETIRACETAM 500 MILLIGRAM(S): 250 TABLET, FILM COATED ORAL at 05:09

## 2023-03-07 RX ADMIN — ENOXAPARIN SODIUM 40 MILLIGRAM(S): 100 INJECTION SUBCUTANEOUS at 23:00

## 2023-03-07 RX ADMIN — SENNA PLUS 2 TABLET(S): 8.6 TABLET ORAL at 23:00

## 2023-03-07 RX ADMIN — PANTOPRAZOLE SODIUM 40 MILLIGRAM(S): 20 TABLET, DELAYED RELEASE ORAL at 05:11

## 2023-03-07 RX ADMIN — Medication 4 MILLIGRAM(S): at 17:34

## 2023-03-07 RX ADMIN — Medication 4 MILLIGRAM(S): at 12:33

## 2023-03-07 RX ADMIN — LEVETIRACETAM 500 MILLIGRAM(S): 250 TABLET, FILM COATED ORAL at 17:34

## 2023-03-07 RX ADMIN — Medication 4 MILLIGRAM(S): at 05:11

## 2023-03-07 NOTE — PROGRESS NOTE ADULT - SUBJECTIVE AND OBJECTIVE BOX
Anna Ponce MD  Division of Hospital Medicine  [facility]  Spectra: [phone]      Patient is a 47y old  Male who presents with a chief complaint of Headaches, intracranial mass (07 Mar 2023 12:42)      SUBJECTIVE / OVERNIGHT EVENTS: Spanish Creole  #[id number] used for translation. No acute events overnight. No fever, chills, headache, dizziness, chest pain, dyspnea nor cough. unsure where he got COVID from as only goes to [employer] and wears mask. this is new COVID but he is asymptomatic and doesn't meet criteria for any treatment at this time. has had ongoing hiccups for a while now for which he states his doctor prescribed him a medication to use which helps. he could not recall name of medicine at time of my exam but per his pharmacy, is rx'ed gabapentin.  ADDITIONAL REVIEW OF SYSTEMS:    MEDICATIONS  (STANDING):  chlorhexidine 2% Cloths 1 Application(s) Topical <User Schedule>  dexAMETHasone     Tablet 4 milliGRAM(s) Oral every 6 hours  enoxaparin Injectable 40 milliGRAM(s) SubCutaneous <User Schedule>  levETIRAcetam 500 milliGRAM(s) Oral two times a day  pantoprazole    Tablet 40 milliGRAM(s) Oral before breakfast  senna 2 Tablet(s) Oral at bedtime    MEDICATIONS  (PRN):  acetaminophen     Tablet .. 650 milliGRAM(s) Oral every 6 hours PRN Temp greater or equal to 38C (100.4F), Mild Pain (1 - 3)  gabapentin 300 milliGRAM(s) Oral two times a day PRN hiccups  ondansetron Injectable 4 milliGRAM(s) IV Push every 6 hours PRN Nausea and/or Vomiting      CAPILLARY BLOOD GLUCOSE        I&O's Summary    06 Mar 2023 07:01  -  07 Mar 2023 07:00  --------------------------------------------------------  IN: 1430 mL / OUT: 0 mL / NET: 1430 mL    07 Mar 2023 07:01  -  07 Mar 2023 13:05  --------------------------------------------------------  IN: 360 mL / OUT: 0 mL / NET: 360 mL        PHYSICAL EXAM:  Vital Signs Last 24 Hrs  T(C): 36.7 (07 Mar 2023 10:00), Max: 36.9 (06 Mar 2023 18:00)  T(F): 98.1 (07 Mar 2023 10:00), Max: 98.4 (06 Mar 2023 18:00)  HR: 89 (07 Mar 2023 10:00) (68 - 89)  BP: 111/72 (07 Mar 2023 10:00) (102/69 - 116/72)  BP(mean): --  RR: 18 (07 Mar 2023 10:00) (18 - 18)  SpO2: 98% (07 Mar 2023 10:00) (98% - 100%)    Parameters below as of 07 Mar 2023 10:00  Patient On (Oxygen Delivery Method): room air        CONSTITUTIONAL: NAD, well-developed, well-groomed  EYES: PERRLA; conjunctiva and sclera clear  ENMT: Moist oral mucosa, no pharyngeal injection or exudates; normal dentition  NECK: Supple, no palpable masses; no thyromegaly  RESPIRATORY: Normal respiratory effort; lungs are clear to auscultation bilaterally, no wheeze nor crackles  CARDIOVASCULAR: Regular rate and rhythm, normal S1 and S2, no murmur/rub/gallop; No lower extremity edema; Peripheral pulses are 2+ bilaterally  ABDOMEN: Soft, Nondistended, Nontender to palpation, normoactive bowel sounds  MUSCULOSKELETAL: No clubbing or cyanosis of digits; no joint swelling or tenderness to palpation  PSYCH: A+O to person, place, and time; affect appropriate  NEUROLOGY: CN 2-12 are intact and symmetric; no gross sensory deficits, 5/5 strength throughout  SKIN: No rashes; no palpable lesions    LABS:                        15.5   8.55  )-----------( 206      ( 05 Mar 2023 19:33 )             47.5     03-05    140  |  100  |  14  ----------------------------<  120<H>  3.6   |  27  |  1.08    Ca    10.3      05 Mar 2023 19:33      Culture - Urine (collected 04 Mar 2023 18:20)  Source: Clean Catch Clean Catch (Midstream)  Final Report (05 Mar 2023 19:55):    <10,000 CFU/mL Normal Urogenital Snow        RADIOLOGY & ADDITIONAL TESTS:  Results Reviewed:   Imaging Personally Reviewed:  Electrocardiogram Personally Reviewed:    COORDINATION OF CARE:  Care Discussed with Consultants/Other Providers [Y]: Neurosurgery BRIANNE Zee  Prior or Outpatient Records Reviewed [Y/N]:

## 2023-03-07 NOTE — PROGRESS NOTE ADULT - NSPROGADDITIONALINFOA_GEN_ALL_CORE
.  Anna Ponce MD  Division of Hospital Medicine  [facility]   Spectra: [phone]    Will follow periodically. Please call [phone] with new questions/concerns.    Plan discussed with patient via [place] Creole  and neurosurgery BRIANNE Zee.
33 minutes pent. more than 50 percent of time was spent on examining patient, reviewed labs images, spoke plan with hospitalist and oncology team regarding surgical plan.

## 2023-03-07 NOTE — PROGRESS NOTE ADULT - SUBJECTIVE AND OBJECTIVE BOX
SUBJECTIVE:   Patient was seen and evaluated at bedside. Patient is resting in bed and is in no new acute distress.   OVERNIGHT EVENTS:   none   Vital Signs Last 24 Hrs  T(C): 36.7 (07 Mar 2023 10:00), Max: 36.9 (06 Mar 2023 18:00)  T(F): 98.1 (07 Mar 2023 10:00), Max: 98.4 (06 Mar 2023 18:00)  HR: 89 (07 Mar 2023 10:00) (68 - 89)  BP: 111/72 (07 Mar 2023 10:00) (102/69 - 116/72)  BP(mean): --  RR: 18 (07 Mar 2023 10:00) (18 - 18)  SpO2: 98% (07 Mar 2023 10:00) (98% - 100%)    Parameters below as of 07 Mar 2023 10:00  Patient On (Oxygen Delivery Method): room air        PHYSICAL EXAM:    General: No Acute Distress     Neurological: Awake, alert oriented to person, place and time, Following Commands, PERRL, EOMI, Face Symmetrical, Speech Fluent, Moving all extremities, Muscle Strength normal in all four extremities, No Drift, Sensation to Light Touch Intact    Pulmonary: Clear to Auscultation, No Rales, No Rhonchi, No Wheezes     Cardiovascular: S1, S2, Regular Rate and Rhythm     Gastrointestinal: Soft, Nontender, Nondistended     Incision:     LABS:                        15.5   8.55  )-----------( 206      ( 05 Mar 2023 19:33 )             47.5    03-05    140  |  100  |  14  ----------------------------<  120<H>  3.6   |  27  |  1.08    Ca    10.3      05 Mar 2023 19:33          03-06 @ 07:01  -  03-07 @ 07:00  --------------------------------------------------------  IN: 1430 mL / OUT: 0 mL / NET: 1430 mL    03-07 @ 07:01  -  03-07 @ 12:45  --------------------------------------------------------  IN: 360 mL / OUT: 0 mL / NET: 360 mL      DRAINS:     MEDICATIONS:  Antibiotics:    Neuro:  acetaminophen     Tablet .. 650 milliGRAM(s) Oral every 6 hours PRN Temp greater or equal to 38C (100.4F), Mild Pain (1 - 3)  baclofen 5 milliGRAM(s) Oral once  levETIRAcetam 500 milliGRAM(s) Oral two times a day  ondansetron Injectable 4 milliGRAM(s) IV Push every 6 hours PRN Nausea and/or Vomiting    Cardiac:    Pulm:    GI/:  pantoprazole    Tablet 40 milliGRAM(s) Oral before breakfast  senna 2 Tablet(s) Oral at bedtime    Other:   chlorhexidine 2% Cloths 1 Application(s) Topical <User Schedule>  dexAMETHasone     Tablet 4 milliGRAM(s) Oral every 6 hours  enoxaparin Injectable 40 milliGRAM(s) SubCutaneous <User Schedule>    DIET: [] Regular [] CCD [] Renal [] Puree [] Dysphagia [] Tube Feeds:     IMAGING:   ACC: [id number] EXAM:  MR BRAIN [facility] FOR [facility]   ORDERED BY:  ARUN PERERA     PROCEDURE DATE:  03/06/2023          INTERPRETATION:  Contrast-enhanced MRI of the brain.    CLINICAL INDICATION: Melanoma, intracranial metastases    TECHNIQUE:  Multiplanar, multisequence MR images of the brain were   obtained before and after the intravenous administration of 10 cc of   Gadavist. 0 cc were discarded.    COMPARISON: CT and CTA brain 3/4/2023. MRI brain 8/4/2022    FINDINGS:    Markedly enlarged left anteromedial frontal mass, currently measuring 2.4   x 4.1 x 4.4 cm, previously measuring 1.7 x 1.2 x 1.0 cm (maximal AP x TV   x CC dimensions). The lesion demonstrates new T1 shortening and   susceptibility artifact which limits evaluation for underlying   enhancement. Vasogenic edema surrounds the lesion.    New 1.6 x 1.3 x 0.9 cm right anterior temporal T1 hyperintense lesion   with susceptibility artifact mild surrounding vasogenic edema.    No abnormal leptomeningeal enhancement.    Rightwardsubfalcine herniation of 1.4 cm. Rightward midline shift of 1.2   cm.    Partial mild effacement of the left anterolateral suprasellar cistern. No   hydrocephalus.    No acute intracranial hemorrhage or acute infarction.    Signal voids are seen within the major intracranial vessels consistent   with their patency.    Scattered paranasal sinus mucosal thickening. Mastoid air cells clear.    IMPRESSION:    Markedly enlarged left anteromedial frontal mass, consistent with   progression of metastatic melanoma. New T1 shortening and susceptibility   artifact may represent the presence of melanin and intralesional   hemorrhage.    New 1.6 x 1.3 x 0.9 cm right anterior temporal T1 hyperintense lesion   with susceptibility artifact mild surrounding vasogenic edema, consistent   with metastatic melanoma.    No abnormal leptomeningeal enhancement.    Rightward subfalcine herniation of 1.4 cm. Rightward midline shift of 1.2   cm.    Partial mild effacement of the left anterolateral suprasellar cistern. No   hydrocephalus.    --- End of Report ---            NOÉ STEWART MD; Attending Radiologist  This document has been electronically signed. Mar  7 2023  9:42AM

## 2023-03-07 NOTE — CHART NOTE - NSCHARTNOTEFT_GEN_A_CORE
CAPRINI SCORE [CLOT] Score on Admission for     AGE RELATED RISK FACTORS                                                       MOBILITY RELATED FACTORS  [ x] Age 41-60 years                                            (1 Point)                  [ ] Bed rest                                                        (1 Point)  [ ] Age: 61-74 years                                           (2 Points)                 [ ] Plaster cast                                                   (2 Points)  [ ] Age= 75 years                                              (3 Points)                 [ ] Bed bound for more than 72 hours                 (2 Points)    DISEASE RELATED RISK FACTORS                                               GENDER SPECIFIC FACTORS  [ ] Edema in the lower extremities                       (1 Point)                  [ ] Pregnancy                                                     (1 Point)  [ ] Varicose veins                                               (1 Point)                  [ ] Post-partum < 6 weeks                                   (1 Point)             [ ] BMI > 25 Kg/m2                                            (1 Point)                  [ ] Hormonal therapy  or oral contraception          (1 Point)                 [ ] Sepsis (in the previous month)                        (1 Point)                  [ ] History of pregnancy complications                 (1 point)  [ ] Pneumonia or serious lung disease                                               [ ] Unexplained or recurrent                     (1 Point)           (in the previous month)                               (1 Point)  [ ] Abnormal pulmonary function test                     (1 Point)                 SURGERY RELATED RISK FACTORS (include planned surgeries)  [ ] Acute myocardial infarction                              (1 Point)                 [ ]  Section                                             (1 Point)  [ ] Congestive heart failure (in the previous month)  (1 Point)         [ ] Minor surgery                                                  (1 Point)   [ ] Inflammatory bowel disease                             (1 Point)                 [ ] Arthroscopic surgery                                        (2 Points)  [ ] Central venous access                                      (2 Points)                [ ] General surgery lasting more than 45 minutes   (2 Points)       [ ] Stroke (in the previous month)                          (5 Points)               [ ] Elective arthroplasty                                         (5 Points)            [ x] current or past malignancy                              (2 Points)                                                                                                       HEMATOLOGY RELATED FACTORS                                                 TRAUMA RELATED RISK FACTORS  [ ] Prior episodes of VTE                                     (3 Points)                [ ] Fracture of the hip, pelvis, or leg                       (5 Points)  [ ] Positive family history for VTE                         (3 Points)                 [ ] Acute spinal cord injury (in the previous month)  (5 Points)  [ ] Prothrombin 36987 A                                     (3 Points)                 [ ] Paralysis  (less than 1 month)                             (5 Points)  [ ] Factor V Leiden                                             (3 Points)                  [ ] Multiple Trauma within 1 month                        (5 Points)  [ ] Lupus anticoagulants                                     (3 Points)                                                           [ ] Anticardiolipin antibodies                               (3 Points)                                                       [ ] High homocysteine in the blood                      (3 Points)                                             [ ] Other congenital or acquired thrombophilia      (3 Points)                                                [ ] Heparin induced thrombocytopenia                  (3 Points)                                          Total Score [      3    ]    Risk:  Very low 0   Low 1 to 2   Moderate 3 to 4   High =5       VTE Prophylasix Recommednations:  [x ] mechanical pneumatic compression devices                                      [ ] contraindicated: _____________________  [ x] chemo prophylasix                                                                                   [ ] contraindicated _____________________    **** HIGH LIKELIHOOD DVT PRESENT ON ADMISSION  [x ] (please order LE dopplers within 24 hours of admission)

## 2023-03-07 NOTE — PROGRESS NOTE ADULT - PROBLEM SELECTOR PLAN 4
POST-OP DIAGNOSIS:  Bunion, left foot 08-Nov-2019 09:34:53  Lucinda Prescott DVT ppx: lovenox has had ongoing intermittent intractable hiccups  - he states he was started on a medication outpatient for his hiccups which helps. he could not recall the name of the medication but when I called the pharmacy, they state it is gabapentin  - c/w home gabapentin 300mg BID PRN hiccups  - can also trial baclofen if hiccups refractory to above gabapentin

## 2023-03-07 NOTE — PROGRESS NOTE ADULT - PROBLEM SELECTOR PLAN 3
COVID-19 PCR positive on 3/4/23  - asymptomatic with no hypoxia  - supportive care  - does not meet criteria nor does he require any treatment for COVID-19 infection  - isolation as per infection control protocols  - lovenox for DVT ppx - COVID-19 puts him at higher risk for VTE  - COVID-19 infection does not preclude him for surgery. he is not symptomatic nor hypoxic.

## 2023-03-07 NOTE — PROGRESS NOTE ADULT - ASSESSMENT
HPI:  47M Right-handed, PMH stage IV melanoma in Right thumb s/p amputation in 2021. With mets to axillary lymph nodes and Right lung s/p Right VAT in 11/2022. P/w dizziness x2wk and HA x1wk. CT shows Left frontal hyperdense mass w/ ~1cm MLS and small Right temporal hyperdense mass. 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 CT CAP shows no pulmonary masses, but new Right axillary soft tissue density. Exam: Creole-speaking, AOx3, EOMI, no facial/drift, WALTERS 5/5, SILT      PROCEDURE:  pre op for OR on 3/14   POD#    PLAN:    1 Out of bed   2 continue on decadron 4q6   3 keppra 500 bid to prevent seizure   4 room air   5 blood pressure stable   6 regular diet   7 medical clearance done   8 covid + -asymtomatic   9 dvt ppx sql and scds     -will discuss with Dr. Rodriguez   spectra [phone]

## 2023-03-07 NOTE — CONSULT NOTE ADULT - ATTENDING COMMENTS
47M  PMH stage IV melanoma in right thumb ,s/p amputation in 2021, with mets to axillary lymph nodes and right lung s/p right VAT in 11/2022. He presented with dizziness x2wk and HA x1wk. CTH and MRI brain findings suspicious for metastatic melanoma. Agree with decadron to reduce vasogenic edema. NeuroSx planning for craniotomy next week with possible biopsy. If surgical resection is not possible, would consider rad/onc evaluation. Hold immunotherapy for now
47M Right-handed, PMH stage IV melanoma in right thumb s/p amputation in 2021 with mets to axillary lymph nodes and right lung s/p right VAT in 11/2022, presenting with dizziness x2wk and HA x1wk, admitted for new intracranial masses likely 2/2 metastatic melanoma. Internal medicine consulted for preoperative evaluation for left craniotomy this week.    #Preoperative evaluation  - Currently no active cardiac conditions. No signs of ischemia, ADHF, clinical exam not consistent with stenotic valvular disease, no stable arrhythmias noted.   - Able to walk >4 METS without any anginal symptoms.   - HR and BP acceptable  - EKG from 3/4/23 reviewed: NSR, no ischemic changes, stable from prior  - Able to proceed with intermediate risk surgery without any further cardiac workup.   - Routine hemodynamic monitoring  - Agree with Heme/Onc consult as patient is on Immunotherapy    Agree with rest of plan as per resident note.  Discussed w/ resident Dr. Margie Junior MD, Hospitalist  Department of Internal Medicine  Pager: [phone]  Email: [email or web address]

## 2023-03-07 NOTE — CONSULT NOTE ADULT - ASSESSMENT
47M  PMH stage IV melanoma in right thumb ,s/p amputation in 2021, with mets to axillary lymph nodes and right lung s/p right VAT in 11/2022. He presented with dizziness x2wk and HA x1wk. CTH and MRI brain findings suspicious for metastatic melanoma.    # Metastatic melanoma  Patient has history of ungual melanoma of right thumb with metastases to multiple right axillary lymph nodes.  He first noticed the change in color of the nail > 1 year.  He underwent node resection 12/2021 and 3 nodes between 6-9 cm were removed at the time.  He received treatment from January 10 to March 28 2022 at [facility] in [facility].   Patient received 4 cycles of dacarbazine 250 mg/m2 x 5 days, every 28 days. He states that the nail tumor improved with chemotherapy. The bleeding stopped. Since off of chemotherapy the pain is getting worse.  In 11/202 he completed right tumb amputation and right lung resection. This is [id number] stage.   In 1/2023, he was started on Opdivo for 1 year adjuvant.  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   - CT CAP (1/18/23) shows no pulmonary masses, but new Right axillary soft tissue density.   - MRI brain (3/6/23) shows markedly enlarged left anteromedial frontal mass, consistent with progression of metastatic melanoma. New T1 shortening and susceptibility artifact may represent the presence of melanin and intralesional hemorrhage. New 1.6 x 1.3 x 0.9 cm right anterior temporal T1 hyperintense lesion with susceptibility artifact mild surrounding vasogenic edema, consistent with metastatic melanoma.   - agree with decadron to reduce vasogenic edema  - NeuroSx planning for craniotomy next week with possible biopsy  - If surgical resection is not possible, would consider rad/onc evaluation  - Oncology will continue to follow

## 2023-03-07 NOTE — CONSULT NOTE ADULT - SUBJECTIVE AND OBJECTIVE BOX
HPI:  47M PMH stage IV melanoma (on Opdivo in 1/2023 last dose was 2/20/23) in Right thumb s/p amputation in 2021. With mets to axillary lymph nodes and Right lung s/p Right VAT in 11/2022. He presented with dizziness x2wk and HA x1wk. CT shows left frontal hyperdense mass w/ ~1cm MLS and small right temporal hyperdense mass. MRI from 8/2022 showed only contrast-enhancing left inferior frontal mass c/f planum sphenoid meningioma. CT CAP shows no pulmonary masses, but new right axillary soft tissue density.    Repeat MRI brain 3/6 shows markedly enlarged left anteromedial frontal mass, consistent with progression of metastatic melanoma. New T1 shortening and susceptibility artifact may represent the presence of melanin and intralesional hemorrhage. New 1.6 x 1.3 x 0.9 cm right anterior temporal T1 hyperintense lesion with susceptibility artifact mild surrounding vasogenic edema, consistent with metastatic melanoma. No abnormal leptomeningeal enhancement. Rightward subfalcine herniation of 1.4 cm. Rightward midline shift of 1.2 cm.  Patient was started on decadron and his headache has improved. NeuroSx team is tentatively planning on craniectomy next week but final plan to be updated later.      Oncology history    Patient has history of ungual melanoma of right thumb with metastases to multiple right axillary lymph nodes.  He first noticed the change in color of the nail > 1 year.  He underwent node resection 12/2021 and 3 nodes between 6-9 cm were removed at the time.        He received treatment from January 10 to March 28 2022 at [facility] in [facility].   Patient received 4 cycles of dacarbazine 250 mg/m2 x 5 days, every 28 days. He states that the nail tumor improved with chemotherapy. The bleeding stopped. Since off of chemotherapy the pain is getting worse.  In 11/202 he completed right tumb amputation and right lung resection. This is [id number] stage.   In 1/2023, he was started on Opdivo for 1 year adjuvant.    ---      PAST MEDICAL & SURGICAL HISTORY:  Malignant melanoma  right thumb      Metastatic malignant melanoma  Right axillary lymph nodes      GERD (gastroesophageal reflux disease)      History of lymph node dissection of right axilla  and Right thumb melanoma excision 12/2021      Malignant melanoma of thumb  Right Thumb Amputation with aillary Lymph Node Dissection -10/2022          Allergies    No Known Allergies    Intolerances        MEDICATIONS  (STANDING):  chlorhexidine 2% Cloths 1 Application(s) Topical <User Schedule>  dexAMETHasone     Tablet 4 milliGRAM(s) Oral every 6 hours  enoxaparin Injectable 40 milliGRAM(s) SubCutaneous <User Schedule>  levETIRAcetam 500 milliGRAM(s) Oral two times a day  pantoprazole    Tablet 40 milliGRAM(s) Oral before breakfast  senna 2 Tablet(s) Oral at bedtime    MEDICATIONS  (PRN):  acetaminophen     Tablet .. 650 milliGRAM(s) Oral every 6 hours PRN Temp greater or equal to 38C (100.4F), Mild Pain (1 - 3)  gabapentin 300 milliGRAM(s) Oral two times a day PRN hiccups  ondansetron Injectable 4 milliGRAM(s) IV Push every 6 hours PRN Nausea and/or Vomiting      FAMILY HISTORY:  No pertinent family history in first degree relatives        SOCIAL HISTORY: No EtOH, no tobacco    REVIEW OF SYSTEMS:    CONSTITUTIONAL: No weakness, fevers or chills  EYES/ENT: No visual changes;  No vertigo or throat pain   NECK: No pain or stiffness  RESPIRATORY: No cough, wheezing, hemoptysis; No shortness of breath  CARDIOVASCULAR: No chest pain or palpitations  GASTROINTESTINAL: No abdominal or epigastric pain. No nausea, vomiting, or hematemesis; No diarrhea or constipation. No melena or hematochezia.  GENITOURINARY: No dysuria, frequency or hematuria  NEUROLOGICAL: No numbness or weakness  SKIN: No itching, burning, rashes, or lesions   All other review of systems is negative unless indicated above.        T(F): 98 (03-07-23 @ 14:19), Max: 98.4 (03-06-23 @ 18:00)  HR: 75 (03-07-23 @ 14:19)  BP: 109/70 (03-07-23 @ 14:19)  RR: 18 (03-07-23 @ 14:19)  SpO2: 98% (03-07-23 @ 14:19)  Wt(kg): --    GENERAL: NAD, well-developed  HEAD:  Atraumatic, Normocephalic  EYES: EOMI, PERRLA, conjunctiva and sclera clear  NECK: Supple, No JVD  CHEST/LUNG: Clear to auscultation bilaterally; No wheeze  HEART: Regular rate and rhythm; No murmurs, rubs, or gallops  ABDOMEN: Soft, Nontender, Nondistended; Bowel sounds present  EXTREMITIES:  2+ Peripheral Pulses, No clubbing, cyanosis, or edema  NEUROLOGY: non-focal  SKIN: No rashes or lesions                          15.5   8.55  )-----------( 206      ( 05 Mar 2023 19:33 )             47.5       03-05    140  |  100  |  14  ----------------------------<  120<H>  3.6   |  27  |  1.08    Ca    10.3      05 Mar 2023 19:33                Clean Catch Clean Catch (Midstream)  03-04 @ 18:20   <10,000 CFU/mL Normal Urogenital Snow  --  --

## 2023-03-07 NOTE — PROGRESS NOTE ADULT - ASSESSMENT
47 year old Iraqi-Creole speaking man with history of stage IV melanoma in R thumb s/p amputation in 2021 with mets to axillary LNs and R lung now s/p R VATS and RML resection in 11/2022 who p/w dizziness x 2 weeks and headache x 1 week with CT showing L frontal hyperdense mass with ~1cm MLS and small R temporal hyperdense mass who was transferred to [facility] for neurosurgery for L crani for mass resection. Incidental COVID-19 positive PCR on admission, asymptomatic.

## 2023-03-08 LAB
ALBUMIN SERPL ELPH-MCNC: 4.2 G/DL — SIGNIFICANT CHANGE UP (ref 3.3–5)
ALP SERPL-CCNC: 52 U/L — SIGNIFICANT CHANGE UP (ref 40–120)
ALT FLD-CCNC: 12 U/L — SIGNIFICANT CHANGE UP (ref 10–45)
ANION GAP SERPL CALC-SCNC: 12 MMOL/L — SIGNIFICANT CHANGE UP (ref 5–17)
AST SERPL-CCNC: 11 U/L — SIGNIFICANT CHANGE UP (ref 10–40)
BASOPHILS # BLD AUTO: 0.01 K/UL — SIGNIFICANT CHANGE UP (ref 0–0.2)
BASOPHILS NFR BLD AUTO: 0.1 % — SIGNIFICANT CHANGE UP (ref 0–2)
BILIRUB SERPL-MCNC: 0.2 MG/DL — SIGNIFICANT CHANGE UP (ref 0.2–1.2)
BUN SERPL-MCNC: 20 MG/DL — SIGNIFICANT CHANGE UP (ref 7–23)
CALCIUM SERPL-MCNC: 9.1 MG/DL — SIGNIFICANT CHANGE UP (ref 8.4–10.5)
CHLORIDE SERPL-SCNC: 102 MMOL/L — SIGNIFICANT CHANGE UP (ref 96–108)
CO2 SERPL-SCNC: 24 MMOL/L — SIGNIFICANT CHANGE UP (ref 22–31)
CREAT SERPL-MCNC: 1.16 MG/DL — SIGNIFICANT CHANGE UP (ref 0.5–1.3)
EGFR: 78 ML/MIN/1.73M2 — SIGNIFICANT CHANGE UP
EOSINOPHIL # BLD AUTO: 0 K/UL — SIGNIFICANT CHANGE UP (ref 0–0.5)
EOSINOPHIL NFR BLD AUTO: 0 % — SIGNIFICANT CHANGE UP (ref 0–6)
GLUCOSE SERPL-MCNC: 137 MG/DL — HIGH (ref 70–99)
HCT VFR BLD CALC: 42.8 % — SIGNIFICANT CHANGE UP (ref 39–50)
HGB BLD-MCNC: 14.1 G/DL — SIGNIFICANT CHANGE UP (ref 13–17)
IMM GRANULOCYTES NFR BLD AUTO: 0.6 % — SIGNIFICANT CHANGE UP (ref 0–0.9)
LYMPHOCYTES # BLD AUTO: 1.12 K/UL — SIGNIFICANT CHANGE UP (ref 1–3.3)
LYMPHOCYTES # BLD AUTO: 10.4 % — LOW (ref 13–44)
MCHC RBC-ENTMCNC: 28.1 PG — SIGNIFICANT CHANGE UP (ref 27–34)
MCHC RBC-ENTMCNC: 32.9 GM/DL — SIGNIFICANT CHANGE UP (ref 32–36)
MCV RBC AUTO: 85.4 FL — SIGNIFICANT CHANGE UP (ref 80–100)
MONOCYTES # BLD AUTO: 0.67 K/UL — SIGNIFICANT CHANGE UP (ref 0–0.9)
MONOCYTES NFR BLD AUTO: 6.2 % — SIGNIFICANT CHANGE UP (ref 2–14)
NEUTROPHILS # BLD AUTO: 8.9 K/UL — HIGH (ref 1.8–7.4)
NEUTROPHILS NFR BLD AUTO: 82.7 % — HIGH (ref 43–77)
NRBC # BLD: 0 /100 WBCS — SIGNIFICANT CHANGE UP (ref 0–0)
PLATELET # BLD AUTO: 175 K/UL — SIGNIFICANT CHANGE UP (ref 150–400)
POTASSIUM SERPL-MCNC: 3.9 MMOL/L — SIGNIFICANT CHANGE UP (ref 3.5–5.3)
POTASSIUM SERPL-SCNC: 3.9 MMOL/L — SIGNIFICANT CHANGE UP (ref 3.5–5.3)
PROT SERPL-MCNC: 6.9 G/DL — SIGNIFICANT CHANGE UP (ref 6–8.3)
RBC # BLD: 5.01 M/UL — SIGNIFICANT CHANGE UP (ref 4.2–5.8)
RBC # FLD: 13.7 % — SIGNIFICANT CHANGE UP (ref 10.3–14.5)
SODIUM SERPL-SCNC: 138 MMOL/L — SIGNIFICANT CHANGE UP (ref 135–145)
WBC # BLD: 10.76 K/UL — HIGH (ref 3.8–10.5)
WBC # FLD AUTO: 10.76 K/UL — HIGH (ref 3.8–10.5)

## 2023-03-08 PROCEDURE — 99232 SBSQ HOSP IP/OBS MODERATE 35: CPT | Mod: 57

## 2023-03-08 RX ADMIN — Medication 4 MILLIGRAM(S): at 01:26

## 2023-03-08 RX ADMIN — LEVETIRACETAM 500 MILLIGRAM(S): 250 TABLET, FILM COATED ORAL at 09:59

## 2023-03-08 RX ADMIN — SENNA PLUS 2 TABLET(S): 8.6 TABLET ORAL at 21:20

## 2023-03-08 RX ADMIN — Medication 650 MILLIGRAM(S): at 21:30

## 2023-03-08 RX ADMIN — Medication 650 MILLIGRAM(S): at 21:22

## 2023-03-08 RX ADMIN — CHLORHEXIDINE GLUCONATE 1 APPLICATION(S): 213 SOLUTION TOPICAL at 06:52

## 2023-03-08 RX ADMIN — Medication 4 MILLIGRAM(S): at 13:12

## 2023-03-08 RX ADMIN — Medication 4 MILLIGRAM(S): at 06:31

## 2023-03-08 RX ADMIN — PANTOPRAZOLE SODIUM 40 MILLIGRAM(S): 20 TABLET, DELAYED RELEASE ORAL at 06:31

## 2023-03-08 RX ADMIN — ENOXAPARIN SODIUM 40 MILLIGRAM(S): 100 INJECTION SUBCUTANEOUS at 21:22

## 2023-03-08 RX ADMIN — LEVETIRACETAM 500 MILLIGRAM(S): 250 TABLET, FILM COATED ORAL at 17:56

## 2023-03-08 RX ADMIN — Medication 4 MILLIGRAM(S): at 17:56

## 2023-03-08 NOTE — PROGRESS NOTE ADULT - ASSESSMENT
47M Right-handed, PMH stage IV melanoma in Right thumb s/p amputation in 2021. With mets to axillary lymph nodes and Right lung s/p Right VAT in 11/2022. P/w dizziness x2wk and HA x1wk. CT shows Left frontal hyperdense mass w/ ~1cm MLS and small Right temporal hyperdense mass. 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 CT CAP shows no pulmonary masses, but new Right axillary soft tissue density. Exam: Creole-speaking, AOx3, EOMI, no facial/drift, WALTERS 5/5, SILT       PLAN:  NEURO:  c/w neuro checks q 4 hrs  analgesic as needed  Keppra for seizure prophylaxis  Decadron 4 q 6 for vasogenic edema  pre-op for 3/14    CARDIOVASCULAR:  hemodynamically stable    PULMONARY:  h/o lung resection  new Rt axillary soft tissue  Satting % on R/A  IS    GI:  Regular diet  Bowel regimen: colace, senna  PPx: Protonix    RENAL:  IVL  Voiding    HEME/Onc :  Metastatic malignant melanoma  Right axillary lymph nodes  DVT prophylaxis SQL  LE dopp: pend    ID: Afebrile  covid+ asymptomatic    ENDO:    DVT PROPHYLAXIS:  [x] Venodynes                                [x] Heparin/Lovenox  LE dop: pend    FALL RISK:  [x] Low Risk                                    [] Impulsive    Will discuss with Dr Rodriguez  Spectra link [phone]   47M Right-handed, PMH stage IV melanoma in Right thumb s/p amputation in 2021. With mets to axillary lymph nodes and Right lung s/p Right VAT in 11/2022. P/w dizziness x2wk and HA x1wk. CT shows Left frontal hyperdense mass w/ ~1cm MLS and small Right temporal hyperdense mass. 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 CT CAP shows no pulmonary masses, but new Right axillary soft tissue density. Exam: Creole-speaking, AOx3, EOMI, no facial/drift, WALTERS 5/5, SILT       PLAN:  NEURO:  c/w neuro checks q 4 hrs  analgesic as needed  Keppra for seizure prophylaxis  Decadron 4 q 6 for vasogenic edema  pre-op for 3/14    CARDIOVASCULAR:  hemodynamically stable    PULMONARY:  h/o lung resection  new Rt axillary soft tissue  Satting % on R/A  IS    GI:  Regular diet  Bowel regimen: colace, senna  PPx: Protonix for h/o GERD    RENAL:  IVL  Voiding    HEME/Onc :  Metastatic malignant melanoma  Right axillary lymph nodes  DVT prophylaxis SQL  3/7 LE dopp: negative    ID: Afebrile  covid+ asymptomatic    ENDO: no issue    FALL RISK:  [x] Low Risk                                    [] Impulsive    Will discuss with Dr Rodriguez  Spectra link [phone]

## 2023-03-08 NOTE — PROGRESS NOTE ADULT - SUBJECTIVE AND OBJECTIVE BOX
HPI:  47M Right-handed, PMH stage IV melanoma in Right thumb s/p amputation in 2021. With mets to axillary lymph nodes and Right lung s/p Right VAT in 11/2022. P/w dizziness x2wk and HA x1wk. CT shows Left frontal hyperdense mass w/ ~1cm MLS and small Right temporal hyperdense mass. 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 CT CAP shows no pulmonary masses, but new Right axillary soft tissue density. Exam: Creole-speaking, AOx3, EOMI, no facial/drift, WALTERS 5/5, SILT     (05 Mar 2023 17:12)      PAST MEDICAL & SURGICAL HISTORY:  Malignant melanoma  right thumb      Metastatic malignant melanoma  Right axillary lymph nodes      GERD (gastroesophageal reflux disease)      History of lymph node dissection of right axilla  and Right thumb melanoma excision 12/2021      Malignant melanoma of thumb  Right Thumb Amputation with aillary Lymph Node Dissection -10/2022        Vital Signs Last 24 Hrs  T(C): 36.4 (08 Mar 2023 01:37), Max: 36.7 (07 Mar 2023 10:00)  T(F): 97.5 (08 Mar 2023 01:37), Max: 98.1 (07 Mar 2023 10:00)  HR: 69 (08 Mar 2023 01:37) (69 - 89)  BP: 104/64 (08 Mar 2023 01:37) (104/64 - 127/77)  BP(mean): --  RR: 18 (08 Mar 2023 01:37) (18 - 18)  SpO2: 99% (08 Mar 2023 01:37) (98% - 100%)    Parameters below as of 08 Mar 2023 01:37  Patient On (Oxygen Delivery Method): room air                              14.1   10.76 )-----------( 175      ( 08 Mar 2023 07:24 )             42.8    03-08    138  |  102  |  20  ----------------------------<  137<H>  3.9   |  24  |  1.16    Ca    9.1      08 Mar 2023 07:23    TPro  6.9  /  Alb  4.2  /  TBili  0.2  /  DBili  x   /  AST  11  /  ALT  12  /  AlkPhos  52  03-08     Stroke Core Measures      DRAIN OUTPUT:     NEUROIMAGING:     PHYSICAL EXAM:    General: No Acute Distress     Neurological: Awake, alert oriented to person, place and time, Following Commands, PERRL, EOMI, Face Symmetrical, Speech Fluent, Moving all extremities, Muscle Strength normal in all four extremities, No Drift, Sensation to Light Touch Intact    Pulmonary: Clear to Auscultation, No Rales, No Rhonchi, No Wheezes     Cardiovascular: S1, S2, Regular Rate and Rhythm     Gastrointestinal: Soft, Nontender, Nondistended     Incision:     MEDICATIONS:   Antibiotics:    Neuro:  acetaminophen     Tablet .. 650 milliGRAM(s) Oral every 6 hours PRN Temp greater or equal to 38C (100.4F), Mild Pain (1 - 3)  gabapentin 300 milliGRAM(s) Oral two times a day PRN hiccups  levETIRAcetam 500 milliGRAM(s) Oral two times a day  ondansetron Injectable 4 milliGRAM(s) IV Push every 6 hours PRN Nausea and/or Vomiting    Anticoagulation:  enoxaparin Injectable 40 milliGRAM(s) SubCutaneous <User Schedule>    Cardiology:    Endo:   dexAMETHasone     Tablet 4 milliGRAM(s) Oral every 6 hours    Pulm:    GI/:  pantoprazole    Tablet 40 milliGRAM(s) Oral before breakfast  senna 2 Tablet(s) Oral at bedtime    Other:  chlorhexidine 2% Cloths 1 Application(s) Topical <User Schedule>  pantoprazole    Tablet 40 milliGRAM(s) Oral before breakfast  senna 2 Tablet(s) Oral at bedtime      47M Right-handed, PMH stage IV melanoma in Right thumb s/p amputation in 2021. With mets to axillary lymph nodes and Right lung s/p Right VAT in 11/2022. P/w dizziness x2wk and HA x1wk. CT shows Left frontal hyperdense mass w/ ~1cm MLS and small Right temporal hyperdense mass. 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 CT CAP shows no pulmonary masses, but new Right axillary soft tissue density. Exam: Creole-speaking, AOx3, EOMI, no facial/drift, WALTERS 5/5, SILT    Overnight event: no acute event    Vital Signs Last 24 Hrs  T(C): 36.4 (08 Mar 2023 01:37), Max: 36.7 (07 Mar 2023 10:00)  T(F): 97.5 (08 Mar 2023 01:37), Max: 98.1 (07 Mar 2023 10:00)  HR: 69 (08 Mar 2023 01:37) (69 - 89)  BP: 104/64 (08 Mar 2023 01:37) (104/64 - 127/77)  BP(mean): --  RR: 18 (08 Mar 2023 01:37) (18 - 18)  SpO2: 99% (08 Mar 2023 01:37) (98% - 100%)    Parameters below as of 08 Mar 2023 01:37  Patient On (Oxygen Delivery Method): room air                              14.1   10.76 )-----------( 175      ( 08 Mar 2023 07:24 )             42.8    03-08    138  |  102  |  20  ----------------------------<  137<H>  3.9   |  24  |  1.16    Ca    9.1      08 Mar 2023 07:23    TPro  6.9  /  Alb  4.2  /  TBili  0.2  /  DBili  x   /  AST  11  /  ALT  12  /  AlkPhos  52  03-08     Stroke Core Measures      DRAIN OUTPUT:     NEUROIMAGING:     PHYSICAL EXAM:    General: No Acute Distress     Neurological: Awake, alert oriented to person, place and time, Following Commands, PERRL, EOMI, Face Symmetrical, Speech Fluent, Moving all extremities, Muscle Strength normal in all four extremities, No Drift, Sensation to Light Touch Intact    Pulmonary: Clear to Auscultation, No Rales, No Rhonchi, No Wheezes     Cardiovascular: S1, S2, Regular Rate and Rhythm     Gastrointestinal: Soft, Nontender, Nondistended     Incision:     MEDICATIONS:   Antibiotics:    Neuro:  acetaminophen     Tablet .. 650 milliGRAM(s) Oral every 6 hours PRN Temp greater or equal to 38C (100.4F), Mild Pain (1 - 3)  gabapentin 300 milliGRAM(s) Oral two times a day PRN hiccups  levETIRAcetam 500 milliGRAM(s) Oral two times a day  ondansetron Injectable 4 milliGRAM(s) IV Push every 6 hours PRN Nausea and/or Vomiting    Anticoagulation:  enoxaparin Injectable 40 milliGRAM(s) SubCutaneous <User Schedule>    Cardiology:    Endo:   dexAMETHasone     Tablet 4 milliGRAM(s) Oral every 6 hours    Pulm:    GI/:  pantoprazole    Tablet 40 milliGRAM(s) Oral before breakfast  senna 2 Tablet(s) Oral at bedtime    Other:  chlorhexidine 2% Cloths 1 Application(s) Topical <User Schedule>  pantoprazole    Tablet 40 milliGRAM(s) Oral before breakfast  senna 2 Tablet(s) Oral at bedtime

## 2023-03-09 PROCEDURE — 99232 SBSQ HOSP IP/OBS MODERATE 35: CPT | Mod: 57

## 2023-03-09 RX ADMIN — GABAPENTIN 300 MILLIGRAM(S): 400 CAPSULE ORAL at 12:49

## 2023-03-09 RX ADMIN — Medication 4 MILLIGRAM(S): at 05:22

## 2023-03-09 RX ADMIN — Medication 4 MILLIGRAM(S): at 00:40

## 2023-03-09 RX ADMIN — Medication 4 MILLIGRAM(S): at 12:40

## 2023-03-09 RX ADMIN — PANTOPRAZOLE SODIUM 40 MILLIGRAM(S): 20 TABLET, DELAYED RELEASE ORAL at 05:22

## 2023-03-09 RX ADMIN — SENNA PLUS 2 TABLET(S): 8.6 TABLET ORAL at 21:51

## 2023-03-09 RX ADMIN — GABAPENTIN 300 MILLIGRAM(S): 400 CAPSULE ORAL at 05:22

## 2023-03-09 RX ADMIN — LEVETIRACETAM 500 MILLIGRAM(S): 250 TABLET, FILM COATED ORAL at 17:57

## 2023-03-09 RX ADMIN — LEVETIRACETAM 500 MILLIGRAM(S): 250 TABLET, FILM COATED ORAL at 05:22

## 2023-03-09 RX ADMIN — ENOXAPARIN SODIUM 40 MILLIGRAM(S): 100 INJECTION SUBCUTANEOUS at 21:52

## 2023-03-09 RX ADMIN — CHLORHEXIDINE GLUCONATE 1 APPLICATION(S): 213 SOLUTION TOPICAL at 05:25

## 2023-03-09 RX ADMIN — Medication 4 MILLIGRAM(S): at 17:56

## 2023-03-09 NOTE — PROGRESS NOTE ADULT - ASSESSMENT
47M Right-handed, PMH stage IV melanoma in Right thumb s/p amputation in 2021. With mets to axillary lymph nodes and Right lung s/p Right VAT in 11/2022. P/w dizziness x2wk and HA x1wk. CT shows Left frontal hyperdense mass w/ ~1cm MLS and small Right temporal hyperdense mass. 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 CT CAP shows no pulmonary masses, but new Right axillary soft tissue density. Exam: Creole-speaking, AOx3, EOMI, no facial/drift, WALTERS 5/5, SILT       PLAN:  NEURO:  c/w neuro checks q 4 hrs  analgesic as needed  Keppra for seizure prophylaxis  Decadron 4 q 6 for vasogenic edema  pre-op for 3/14    CARDIOVASCULAR:  hemodynamically stable    PULMONARY:  h/o lung resection, History of lymph node dissection of right axilla  and Right thumb melanoma excision 12/2021  new Rt axillary soft tissue  Satting % on R/A  IS    GI:  Regular diet  Bowel regimen: colace, senna  PPx: Protonix for h/o GERD    RENAL:  IVL  Voiding    HEME/Onc :  Metastatic malignant melanoma  Right axillary lymph nodes  DVT prophylaxis SQL  3/7 LE dopp: negative    ID: Afebrile  covid+ asymptomatic    ENDO: no issue    FALL RISK:  [x] Low Risk                                    [] Impulsive    Will discuss with Dr Rodriguez  Spectra link [id number]

## 2023-03-09 NOTE — PROGRESS NOTE ADULT - SUBJECTIVE AND OBJECTIVE BOX
47M Right-handed, PMH stage IV melanoma in Right thumb s/p amputation in 2021. With mets to axillary lymph nodes and Right lung s/p Right VAT in 11/2022. P/w dizziness x2wk and HA x1wk. CT shows Left frontal hyperdense mass w/ ~1cm MLS and small Right temporal hyperdense mass. 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 CT CAP shows no pulmonary masses, but new Right axillary soft tissue density. Exam: Creole-speaking, AOx3, EOMI, no facial/drift, WALTERS 5/5, SILT     Overnight event: c/o chest pain at old surgical site, no SOB, has been satting %    Vital Signs Last 24 Hrs  T(C): 36.7 (09 Mar 2023 05:19), Max: 36.9 (08 Mar 2023 20:59)  T(F): 98.1 (09 Mar 2023 05:19), Max: 98.4 (08 Mar 2023 20:59)  HR: 71 (09 Mar 2023 05:19) (67 - 102)  BP: 97/63 (09 Mar 2023 05:19) (97/63 - 133/81)  BP(mean): 74 (09 Mar 2023 05:19) (74 - 74)  RR: 18 (09 Mar 2023 05:19) (18 - 18)  SpO2: 98% (09 Mar 2023 05:19) (98% - 100%)    Parameters below as of 09 Mar 2023 05:19  Patient On (Oxygen Delivery Method): room air                              14.1   10.76 )-----------( 175      ( 08 Mar 2023 07:24 )             42.8    03-08    138  |  102  |  20  ----------------------------<  137<H>  3.9   |  24  |  1.16    Ca    9.1      08 Mar 2023 07:23    TPro  6.9  /  Alb  4.2  /  TBili  0.2  /  DBili  x   /  AST  11  /  ALT  12  /  AlkPhos  52  03-08     Stroke Core Measures      DRAIN OUTPUT:     NEUROIMAGING:     PHYSICAL EXAM:    General: No Acute Distress     Neurological: Awake, alert oriented to person, place and time, Following Commands, PERRL, EOMI, Face Symmetrical, Speech Fluent, Moving all extremities, Muscle Strength normal in all four extremities, No Drift, Sensation to Light Touch Intact    Pulmonary: Clear to Auscultation, No Rales, No Rhonchi, No Wheezes     Cardiovascular: S1, S2, Regular Rate and Rhythm     Gastrointestinal: Soft, Nontender, Nondistended     Incision:     MEDICATIONS:   Antibiotics:    Neuro:  acetaminophen     Tablet .. 650 milliGRAM(s) Oral every 6 hours PRN Temp greater or equal to 38C (100.4F), Mild Pain (1 - 3)  gabapentin 300 milliGRAM(s) Oral two times a day PRN hiccups  levETIRAcetam 500 milliGRAM(s) Oral two times a day  ondansetron Injectable 4 milliGRAM(s) IV Push every 6 hours PRN Nausea and/or Vomiting    Anticoagulation:  enoxaparin Injectable 40 milliGRAM(s) SubCutaneous <User Schedule>    Cardiology:    Endo:   dexAMETHasone     Tablet 4 milliGRAM(s) Oral every 6 hours    Pulm:    GI/:  pantoprazole    Tablet 40 milliGRAM(s) Oral before breakfast  senna 2 Tablet(s) Oral at bedtime    Other:  chlorhexidine 2% Cloths 1 Application(s) Topical <User Schedule>  pantoprazole    Tablet 40 milliGRAM(s) Oral before breakfast  senna 2 Tablet(s) Oral at bedtime

## 2023-03-10 PROCEDURE — 99232 SBSQ HOSP IP/OBS MODERATE 35: CPT | Mod: 57

## 2023-03-10 RX ORDER — PROCHLORPERAZINE MALEATE 5 MG
10 TABLET ORAL EVERY 6 HOURS
Refills: 0 | Status: DISCONTINUED | OUTPATIENT
Start: 2023-03-10 | End: 2023-03-10

## 2023-03-10 RX ORDER — CHLORPROMAZINE HCL 10 MG
10 TABLET ORAL
Refills: 0 | Status: DISCONTINUED | OUTPATIENT
Start: 2023-03-10 | End: 2023-03-14

## 2023-03-10 RX ADMIN — Medication 10 MILLIGRAM(S): at 11:30

## 2023-03-10 RX ADMIN — Medication 4 MILLIGRAM(S): at 06:41

## 2023-03-10 RX ADMIN — Medication 4 MILLIGRAM(S): at 23:52

## 2023-03-10 RX ADMIN — ENOXAPARIN SODIUM 40 MILLIGRAM(S): 100 INJECTION SUBCUTANEOUS at 22:06

## 2023-03-10 RX ADMIN — Medication 4 MILLIGRAM(S): at 11:29

## 2023-03-10 RX ADMIN — LEVETIRACETAM 500 MILLIGRAM(S): 250 TABLET, FILM COATED ORAL at 17:44

## 2023-03-10 RX ADMIN — SENNA PLUS 2 TABLET(S): 8.6 TABLET ORAL at 22:05

## 2023-03-10 RX ADMIN — LEVETIRACETAM 500 MILLIGRAM(S): 250 TABLET, FILM COATED ORAL at 06:42

## 2023-03-10 RX ADMIN — Medication 4 MILLIGRAM(S): at 00:53

## 2023-03-10 RX ADMIN — PANTOPRAZOLE SODIUM 40 MILLIGRAM(S): 20 TABLET, DELAYED RELEASE ORAL at 06:41

## 2023-03-10 RX ADMIN — Medication 4 MILLIGRAM(S): at 17:44

## 2023-03-10 RX ADMIN — CHLORHEXIDINE GLUCONATE 1 APPLICATION(S): 213 SOLUTION TOPICAL at 06:44

## 2023-03-10 NOTE — PROGRESS NOTE ADULT - ASSESSMENT
47M Right-handed, PMH stage IV melanoma in Right thumb s/p amputation in 2021. With mets to axillary lymph nodes and Right lung s/p Right VAT in 11/2022. P/w dizziness x2wk and HA x1wk. CT shows Left frontal hyperdense mass w/ ~1cm MLS and small Right temporal hyperdense mass. 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 CT CAP shows no pulmonary masses, but new Right axillary soft tissue density. Exam: Creole-speaking, AOx3, EOMI, no facial/drift, WALTERS 5/5, SILT       PLAN:  NEURO:  c/w neuro checks q 4 hrs  analgesic as needed  Keppra for seizure prophylaxis  Decadron 4 q 6 for vasogenic edema  pre-op for 3/14    CARDIOVASCULAR:  hemodynamically stable    PULMONARY:  h/o lung resection, History of lymph node dissection of right axilla  and Right thumb melanoma excision 12/2021  new Rt axillary soft tissue  Satting % on R/A  IS    GI:  Regular diet  Bowel regimen: colace, senna  PPx: Protonix for h/o GERD    RENAL:  IVL  Voiding    HEME/Onc :  Metastatic malignant melanoma  Right axillary lymph nodes  DVT prophylaxis SQL  3/7 LE dopp: negative    ID: Afebrile  covid+ asymptomatic    ENDO: no issue    FALL RISK:  [x] Low Risk                                    [] Impulsive    Will discuss with Dr Rodriguez  Spectra link [id number]   47M Right-handed, PMH stage IV melanoma in Right thumb s/p amputation in 2021. With mets to axillary lymph nodes and Right lung s/p Right VAT in 11/2022. P/w dizziness x2wk and HA x1wk. CT shows Left frontal hyperdense mass w/ ~1cm MLS and small Right temporal hyperdense mass. 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 CT CAP shows no pulmonary masses, but new Right axillary soft tissue density. Exam: Creole-speaking, AOx3, EOMI, no facial/drift, WALTERS 5/5, SILT       PLAN:  NEURO:  c/w neuro checks q 4 hrs  analgesic as needed  Keppra for seizure prophylaxis  Decadron 4 q 6 for vasogenic edema  Thorazine for Hiccups  pre-op for 3/14    CARDIOVASCULAR:  hemodynamically stable    PULMONARY:  h/o lung resection, History of lymph node dissection of right axilla  and Right thumb melanoma excision 12/2021  new Rt axillary soft tissue  Satting % on R/A  IS    GI:  Regular diet  Bowel regimen: colace, senna  PPx: Protonix for h/o GERD    RENAL:  IVL  Voiding    HEME/Onc :  Metastatic malignant melanoma  Right axillary lymph nodes  DVT prophylaxis SQL  3/7 LE dopp: negative    ID: Afebrile  covid+ asymptomatic    ENDO: no issue    FALL RISK:  [x] Low Risk                                    [] Impulsive    Will discuss with Dr Rodriguez  Spectra link [id number]   47M Right-handed, PMH stage IV melanoma in Right thumb s/p amputation in 2021. With mets to axillary lymph nodes and Right lung s/p Right VAT in 11/2022. P/w dizziness x2wk and HA x1wk. CT shows Left frontal hyperdense mass w/ ~1cm MLS and small Right temporal hyperdense mass. 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 CT CAP shows no pulmonary masses, but new Right axillary soft tissue density. Exam: Creole-speaking, AOx3, EOMI, no facial/drift, WALTERS 5/5, SILT    Post-op day # 2 Lt frontal craniotomy for brain mets     PLAN:  NEURO:  c/w neuro checks q 4 hrs  analgesic as needed  Keppra for seizure prophylaxis  Decadron 4 q 6 for vasogenic edema  Thorazine for Hiccups  Increase activity as tolerated  PT/OT: out patient    CARDIOVASCULAR:  hemodynamically stable    PULMONARY:  h/o lung resection, History of lymph node dissection of right axilla  and Right thumb melanoma excision 12/2021  new Rt axillary soft tissue  Satting % on R/A  IS    GI:  Regular diet  Bowel regimen: miralax, senna, dulcolax added  PPx: Protonix for h/o GERD    RENAL:  IVL  Voiding    HEME/Onc :  Metastatic malignant melanoma  Right axillary lymph nodes  DVT prophylaxis SQL  3/7 LE dopp: negative    ID: Afebrile  covid+ asymptomatic    ENDO: c/w sliding scale    FALL RISK:  [x] Low Risk                                    [] Impulsive    Will discuss with Dr Rodriguez  Spectra link [phone]

## 2023-03-10 NOTE — PROGRESS NOTE ADULT - SUBJECTIVE AND OBJECTIVE BOX
HPI:  47M Right-handed, PMH stage IV melanoma in Right thumb s/p amputation in 2021. With mets to axillary lymph nodes and Right lung s/p Right VAT in 11/2022. P/w dizziness x2wk and HA x1wk. CT shows Left frontal hyperdense mass w/ ~1cm MLS and small Right temporal hyperdense mass. 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 CT CAP shows no pulmonary masses, but new Right axillary soft tissue density. Exam: Creole-speaking, AOx3, EOMI, no facial/drift, WALTERS 5/5, SILT     (05 Mar 2023 17:12)      PAST MEDICAL & SURGICAL HISTORY:  Malignant melanoma  right thumb      Metastatic malignant melanoma  Right axillary lymph nodes      GERD (gastroesophageal reflux disease)      History of lymph node dissection of right axilla  and Right thumb melanoma excision 12/2021      Malignant melanoma of thumb  Right Thumb Amputation with aillary Lymph Node Dissection -10/2022        Vital Signs Last 24 Hrs  T(C): 36.8 (10 Mar 2023 00:41), Max: 36.8 (09 Mar 2023 10:22)  T(F): 98.2 (10 Mar 2023 00:41), Max: 98.2 (09 Mar 2023 10:22)  HR: 77 (10 Mar 2023 00:41) (62 - 77)  BP: 113/65 (10 Mar 2023 00:41) (108/71 - 120/74)  BP(mean): 81 (10 Mar 2023 00:41) (81 - 89)  RR: 18 (10 Mar 2023 00:41) (18 - 18)  SpO2: 99% (10 Mar 2023 00:41) (98% - 100%)    Parameters below as of 10 Mar 2023 00:41  Patient On (Oxygen Delivery Method): room air                Stroke Core Measures      DRAIN OUTPUT:     NEUROIMAGING:     PHYSICAL EXAM:    General: No Acute Distress     Neurological: Awake, alert oriented to person, place and time, Following Commands, PERRL, EOMI, Face Symmetrical, Speech Fluent, Moving all extremities, Muscle Strength normal in all four extremities, No Drift, Sensation to Light Touch Intact    Pulmonary: Clear to Auscultation, No Rales, No Rhonchi, No Wheezes     Cardiovascular: S1, S2, Regular Rate and Rhythm     Gastrointestinal: Soft, Nontender, Nondistended     Incision:     MEDICATIONS:   Antibiotics:    Neuro:  acetaminophen     Tablet .. 650 milliGRAM(s) Oral every 6 hours PRN Temp greater or equal to 38C (100.4F), Mild Pain (1 - 3)  chlorproMAZINE    Tablet 10 milliGRAM(s) Oral four times a day PRN hiccups  gabapentin 300 milliGRAM(s) Oral two times a day PRN hiccups  levETIRAcetam 500 milliGRAM(s) Oral two times a day  ondansetron Injectable 4 milliGRAM(s) IV Push every 6 hours PRN Nausea and/or Vomiting    Anticoagulation:  enoxaparin Injectable 40 milliGRAM(s) SubCutaneous <User Schedule>    Cardiology:    Endo:   dexAMETHasone     Tablet 4 milliGRAM(s) Oral every 6 hours    Pulm:    GI/:  pantoprazole    Tablet 40 milliGRAM(s) Oral before breakfast  senna 2 Tablet(s) Oral at bedtime    Other:  chlorhexidine 2% Cloths 1 Application(s) Topical <User Schedule>      ASSESSMENT:   pantoprazole    Tablet 40 milliGRAM(s) Oral before breakfast  senna 2 Tablet(s) Oral at bedtime   s/p    PLAN:  NEURO:  CARDIOVASCULAR:  PULMONARY:  RENAL:  GI:  HEME:  ID:  ENDO:    DVT PROPHYLAXIS:  [] Venodynes                                [] Heparin/Lovenox    FALL RISK:  [] Low Risk                                    [] Impulsive    47M Right-handed, PMH stage IV melanoma in Right thumb s/p amputation in 2021. With mets to axillary lymph nodes and Right lung s/p Right VAT in 11/2022. P/w dizziness x2wk and HA x1wk. CT shows Left frontal hyperdense mass w/ ~1cm MLS and small Right temporal hyperdense mass. 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 CT CAP shows no pulmonary masses, but new Right axillary soft tissue density. Exam: Creole-speaking, AOx3, EOMI, no facial/drift, WALTERS 5/5, SILT    Overnight event: patient was noted to have frequent Hiccups    Vital Signs Last 24 Hrs  T(C): 36.8 (10 Mar 2023 00:41), Max: 36.8 (09 Mar 2023 10:22)  T(F): 98.2 (10 Mar 2023 00:41), Max: 98.2 (09 Mar 2023 10:22)  HR: 77 (10 Mar 2023 00:41) (62 - 77)  BP: 113/65 (10 Mar 2023 00:41) (108/71 - 120/74)  BP(mean): 81 (10 Mar 2023 00:41) (81 - 89)  RR: 18 (10 Mar 2023 00:41) (18 - 18)  SpO2: 99% (10 Mar 2023 00:41) (98% - 100%)    Parameters below as of 10 Mar 2023 00:41  Patient On (Oxygen Delivery Method): room air                Stroke Core Measures      DRAIN OUTPUT:     NEUROIMAGING:     PHYSICAL EXAM:    General: No Acute Distress     Neurological: Awake, alert oriented to person, place and time, Following Commands, PERRL, EOMI, Face Symmetrical, Speech Fluent, Moving all extremities, Muscle Strength normal in all four extremities, No Drift, Sensation to Light Touch Intact    Pulmonary: Clear to Auscultation, No Rales, No Rhonchi, No Wheezes     Cardiovascular: S1, S2, Regular Rate and Rhythm     Gastrointestinal: Soft, Nontender, Nondistended     Incision:     MEDICATIONS:   Antibiotics:    Neuro:  acetaminophen     Tablet .. 650 milliGRAM(s) Oral every 6 hours PRN Temp greater or equal to 38C (100.4F), Mild Pain (1 - 3)  chlorproMAZINE    Tablet 10 milliGRAM(s) Oral four times a day PRN hiccups  gabapentin 300 milliGRAM(s) Oral two times a day PRN hiccups  levETIRAcetam 500 milliGRAM(s) Oral two times a day  ondansetron Injectable 4 milliGRAM(s) IV Push every 6 hours PRN Nausea and/or Vomiting    Anticoagulation:  enoxaparin Injectable 40 milliGRAM(s) SubCutaneous <User Schedule>    Cardiology:    Endo:   dexAMETHasone     Tablet 4 milliGRAM(s) Oral every 6 hours    Pulm:    GI/:  pantoprazole    Tablet 40 milliGRAM(s) Oral before breakfast  senna 2 Tablet(s) Oral at bedtime    Other:  chlorhexidine 2% Cloths 1 Application(s) Topical <User Schedule>           47M Right-handed, PMH stage IV melanoma in Right thumb s/p amputation in 2021. With mets to axillary lymph nodes and Right lung s/p Right VAT in 11/2022. P/w dizziness x2wk and HA x1wk. CT shows Left frontal hyperdense mass w/ ~1cm MLS and small Right temporal hyperdense mass. 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 CT CAP shows no pulmonary masses, but new Right axillary soft tissue density.   Post-op day # 2 Lt frontal craniotomy for brain mets    Overnight event: patient stated his Lt eye swelling has improved    Vital Signs Last 24 Hrs  T(C): 36.8 (10 Mar 2023 00:41), Max: 36.8 (09 Mar 2023 10:22)  T(F): 98.2 (10 Mar 2023 00:41), Max: 98.2 (09 Mar 2023 10:22)  HR: 77 (10 Mar 2023 00:41) (62 - 77)  BP: 113/65 (10 Mar 2023 00:41) (108/71 - 120/74)  BP(mean): 81 (10 Mar 2023 00:41) (81 - 89)  RR: 18 (10 Mar 2023 00:41) (18 - 18)  SpO2: 99% (10 Mar 2023 00:41) (98% - 100%)    Parameters below as of 10 Mar 2023 00:41  Patient On (Oxygen Delivery Method): room air                Stroke Core Measures      DRAIN OUTPUT:     NEUROIMAGING:     PHYSICAL EXAM:    General: No Acute Distress     Neurological: Awake, alert oriented to person, place and time, Following Commands, PERRL, EOMI, + preconjuntival heme/edema, Face Symmetrical, Lt periorbital swelling, Speech Fluent, Moving all extremities, Muscle Strength normal in all four extremities, No Drift, Sensation to Light Touch Intact    Pulmonary: Clear to Auscultation, No Rales, No Rhonchi, No Wheezes     Cardiovascular: S1, S2, Regular Rate and Rhythm     Gastrointestinal: Soft, Nontender, Nondistended     Incision: intact    MEDICATIONS:   Antibiotics:    Neuro:  acetaminophen     Tablet .. 650 milliGRAM(s) Oral every 6 hours PRN Temp greater or equal to 38C (100.4F), Mild Pain (1 - 3)  chlorproMAZINE    Tablet 10 milliGRAM(s) Oral four times a day PRN hiccups  gabapentin 300 milliGRAM(s) Oral two times a day PRN hiccups  levETIRAcetam 500 milliGRAM(s) Oral two times a day  ondansetron Injectable 4 milliGRAM(s) IV Push every 6 hours PRN Nausea and/or Vomiting    Anticoagulation:  enoxaparin Injectable 40 milliGRAM(s) SubCutaneous <User Schedule>    Cardiology:    Endo:   dexAMETHasone     Tablet 4 milliGRAM(s) Oral every 6 hours    Pulm:    GI/:  pantoprazole    Tablet 40 milliGRAM(s) Oral before breakfast  senna 2 Tablet(s) Oral at bedtime    Other:  chlorhexidine 2% Cloths 1 Application(s) Topical <User Schedule>

## 2023-03-11 PROCEDURE — 99232 SBSQ HOSP IP/OBS MODERATE 35: CPT

## 2023-03-11 RX ORDER — POLYETHYLENE GLYCOL 3350 17 G/17G
17 POWDER, FOR SOLUTION ORAL EVERY 12 HOURS
Refills: 0 | Status: DISCONTINUED | OUTPATIENT
Start: 2023-03-11 | End: 2023-03-14

## 2023-03-11 RX ADMIN — Medication 4 MILLIGRAM(S): at 06:15

## 2023-03-11 RX ADMIN — POLYETHYLENE GLYCOL 3350 17 GRAM(S): 17 POWDER, FOR SOLUTION ORAL at 11:49

## 2023-03-11 RX ADMIN — Medication 4 MILLIGRAM(S): at 11:49

## 2023-03-11 RX ADMIN — GABAPENTIN 300 MILLIGRAM(S): 400 CAPSULE ORAL at 20:39

## 2023-03-11 RX ADMIN — LEVETIRACETAM 500 MILLIGRAM(S): 250 TABLET, FILM COATED ORAL at 06:16

## 2023-03-11 RX ADMIN — ENOXAPARIN SODIUM 40 MILLIGRAM(S): 100 INJECTION SUBCUTANEOUS at 21:23

## 2023-03-11 RX ADMIN — LEVETIRACETAM 500 MILLIGRAM(S): 250 TABLET, FILM COATED ORAL at 18:14

## 2023-03-11 RX ADMIN — PANTOPRAZOLE SODIUM 40 MILLIGRAM(S): 20 TABLET, DELAYED RELEASE ORAL at 06:15

## 2023-03-11 RX ADMIN — Medication 4 MILLIGRAM(S): at 18:14

## 2023-03-11 RX ADMIN — SENNA PLUS 2 TABLET(S): 8.6 TABLET ORAL at 21:20

## 2023-03-11 NOTE — PROGRESS NOTE ADULT - ASSESSMENT
ASSESSMENT AND PLAN: 47M Right-handed, PMH stage IV melanoma in Right thumb s/p amputation in 2021. With mets to axillary lymph nodes and Right lung s/p Right VAT in 11/2022. P/w dizziness x2wk and HA x1wk. CT shows Left frontal hyperdense mass w/ ~1cm MLS and small Right temporal hyperdense mass. 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 CT CAP shows no pulmonary masses, but new Right axillary soft tissue density.    Awaiting OR on 3/14 - left craniotomy for resection of brain met    NEURO:   - Continue neuro checks q 4  - Pre-op for OR on 3/14 with Dr. Rodriguez (cleared by Medicine on 3/6/23)  - Continue Decadron for cerebral edema  - Continue Keppra for seizure ppx  - Continue Tylenol prn for pain control  - Continue Gabapentin for neuropathic pain  - Thorazine for hiccups prn  - PT - no skilled PT needs, but will need re-eval once post-op    PULM:   - On room air, O2Sat>96%  - Incentive spirometry  - COVID+ 3/4/23 - on isolation per Infection Control protocols, currently asymptomatic - no cough / afebrile  - History of right lung resection 11/2022    CV:  - -160, no issues    ENDO:   - Goal euglycemia while on steroids    HEME/ONC:             3/8 CBC mild leukocytosis; Appreciate Heme/Onc following for Stage IV melanoma right thumb s/p amputation 12/2021, with mets to lymph nodes and right lung resection 11/2022; s/p 4 cycles of chemo         DVT ppx: SQL, SCDs, 3/7 Le Dopp - negative    RENAL:   - IVL  - 3/8 BMP     ID:   - Afebrile  - COVID+ 3/4/23, asymptomatic, on isolation  - 3/7 MRSA/MSSA neg    GI:    - Continue oral diet  - Continue protonix while on steroids  - Continue senna and miralax, last BM per patient was yesterday    DISCHARGE PLANNING:   PT - no skilled needs however will need re-eval once post-op    Plan to be discussed w/ Dr. Rodriguez  [id number]

## 2023-03-11 NOTE — PROGRESS NOTE ADULT - SUBJECTIVE AND OBJECTIVE BOX
SUBJECTIVE: HPI:  47M Right-handed, PMH stage IV melanoma in Right thumb s/p amputation in 2021. With mets to axillary lymph nodes and Right lung s/p Right VAT in 11/2022. P/w dizziness x2wk and HA x1wk. CT shows Left frontal hyperdense mass w/ ~1cm MLS and small Right temporal hyperdense mass. 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 CT CAP shows no pulmonary masses, but new Right axillary soft tissue density. Exam: Creole-speaking, AOx3, EOMI, no facial/drift, WALTERS 5/5, SILT     (05 Mar 2023 17:12)      OVERNIGHT EVENTS: No acute events overnight patient remains to be in isolation for COVID+, c/o hiccups for which he is on Thorazine. Awaiting OR on 3/14. Used  [id number] Creole.     Vital Signs Last 24 Hrs  T(C): 36.7 (11 Mar 2023 13:58), Max: 37 (11 Mar 2023 06:19)  T(F): 98.1 (11 Mar 2023 13:58), Max: 98.6 (11 Mar 2023 06:19)  HR: 100 (11 Mar 2023 13:58) (78 - 100)  BP: 119/87 (11 Mar 2023 13:58) (108/73 - 119/87)  BP(mean): --  RR: 18 (11 Mar 2023 13:58) (18 - 18)  SpO2: 96% (11 Mar 2023 13:58) (96% - 99%)    Parameters below as of 11 Mar 2023 13:58  Patient On (Oxygen Delivery Method): room air        PHYSICAL EXAM:    General: No Acute Distress     Neurological: Awake, OX3, EOMI, PERRL, no drift, following commands, uppers 5/5, lowers 5/5, SILT    Pulmonary: Clear to Auscultation, No Rales, No Rhonchi, No Wheezes     Cardiovascular: S1, S2, Regular Rate and Rhythm     Gastrointestinal: Soft, Nontender, Nondistended     Incision: None    LABS:             03-10 @ 07:01  -  03-11 @ 07:00  --------------------------------------------------------  IN: 360 mL / OUT: 0 mL / NET: 360 mL    03-11 @ 06:01  -  03-11 @ 16:44  --------------------------------------------------------  IN: 320 mL / OUT: 0 mL / NET: 320 mL      DRAINS: None    MEDICATIONS:  Antibiotics:    Neuro:  acetaminophen     Tablet .. 650 milliGRAM(s) Oral every 6 hours PRN Temp greater or equal to 38C (100.4F), Mild Pain (1 - 3)  chlorproMAZINE    Tablet 10 milliGRAM(s) Oral four times a day PRN hiccups  gabapentin 300 milliGRAM(s) Oral two times a day PRN hiccups  levETIRAcetam 500 milliGRAM(s) Oral two times a day  ondansetron Injectable 4 milliGRAM(s) IV Push every 6 hours PRN Nausea and/or Vomiting    Cardiac:    Pulm:    GI/:  pantoprazole    Tablet 40 milliGRAM(s) Oral before breakfast  polyethylene glycol 3350 17 Gram(s) Oral every 12 hours  senna 2 Tablet(s) Oral at bedtime    Other:   chlorhexidine 2% Cloths 1 Application(s) Topical <User Schedule>  dexAMETHasone     Tablet 4 milliGRAM(s) Oral every 6 hours  enoxaparin Injectable 40 milliGRAM(s) SubCutaneous <User Schedule>    DIET: [x] Regular [] CCD [] Renal [] Puree [] Dysphagia [] Tube Feeds:     IMAGING:   < from: MR Brain Stereotactic w/wo IV Cont (03.06.23 @ 23:30) >  IMPRESSION:    Markedly enlarged left anteromedial frontal mass, consistent with   progression of metastatic melanoma. New T1 shortening and susceptibility   artifact may represent the presence of melanin and intralesional   hemorrhage.    New 1.6 x 1.3 x 0.9 cm right anterior temporal T1 hyperintense lesion   with susceptibility artifact mild surrounding vasogenic edema, consistent   with metastatic melanoma.    No abnormal leptomeningeal enhancement.    Rightward subfalcine herniation of 1.4 cm. Rightward midline shift of 1.2   cm.    Partial mild effacement of the left anterolateral suprasellar cistern. No   hydrocephalus.    --- End of Report ---    NOÉ STEWART MD; Attending Radiologist  This document has been electronically signed. Mar  7 2023  9:42AM    < end of copied text >

## 2023-03-12 LAB
ANION GAP SERPL CALC-SCNC: 16 MMOL/L — SIGNIFICANT CHANGE UP (ref 5–17)
BUN SERPL-MCNC: 25 MG/DL — HIGH (ref 7–23)
CALCIUM SERPL-MCNC: 8.9 MG/DL — SIGNIFICANT CHANGE UP (ref 8.4–10.5)
CHLORIDE SERPL-SCNC: 101 MMOL/L — SIGNIFICANT CHANGE UP (ref 96–108)
CO2 SERPL-SCNC: 23 MMOL/L — SIGNIFICANT CHANGE UP (ref 22–31)
CREAT SERPL-MCNC: 1.13 MG/DL — SIGNIFICANT CHANGE UP (ref 0.5–1.3)
EGFR: 81 ML/MIN/1.73M2 — SIGNIFICANT CHANGE UP
GLUCOSE SERPL-MCNC: 144 MG/DL — HIGH (ref 70–99)
HCT VFR BLD CALC: 44.3 % — SIGNIFICANT CHANGE UP (ref 39–50)
HGB BLD-MCNC: 14.6 G/DL — SIGNIFICANT CHANGE UP (ref 13–17)
MCHC RBC-ENTMCNC: 27.9 PG — SIGNIFICANT CHANGE UP (ref 27–34)
MCHC RBC-ENTMCNC: 33 GM/DL — SIGNIFICANT CHANGE UP (ref 32–36)
MCV RBC AUTO: 84.7 FL — SIGNIFICANT CHANGE UP (ref 80–100)
NRBC # BLD: 0 /100 WBCS — SIGNIFICANT CHANGE UP (ref 0–0)
PLATELET # BLD AUTO: 153 K/UL — SIGNIFICANT CHANGE UP (ref 150–400)
POTASSIUM SERPL-MCNC: 4.2 MMOL/L — SIGNIFICANT CHANGE UP (ref 3.5–5.3)
POTASSIUM SERPL-SCNC: 4.2 MMOL/L — SIGNIFICANT CHANGE UP (ref 3.5–5.3)
RBC # BLD: 5.23 M/UL — SIGNIFICANT CHANGE UP (ref 4.2–5.8)
RBC # FLD: 13.5 % — SIGNIFICANT CHANGE UP (ref 10.3–14.5)
SODIUM SERPL-SCNC: 140 MMOL/L — SIGNIFICANT CHANGE UP (ref 135–145)
WBC # BLD: 12.51 K/UL — HIGH (ref 3.8–10.5)
WBC # FLD AUTO: 12.51 K/UL — HIGH (ref 3.8–10.5)

## 2023-03-12 PROCEDURE — 99233 SBSQ HOSP IP/OBS HIGH 50: CPT

## 2023-03-12 PROCEDURE — 99232 SBSQ HOSP IP/OBS MODERATE 35: CPT

## 2023-03-12 RX ADMIN — Medication 4 MILLIGRAM(S): at 00:41

## 2023-03-12 RX ADMIN — PANTOPRAZOLE SODIUM 40 MILLIGRAM(S): 20 TABLET, DELAYED RELEASE ORAL at 06:32

## 2023-03-12 RX ADMIN — ENOXAPARIN SODIUM 40 MILLIGRAM(S): 100 INJECTION SUBCUTANEOUS at 21:03

## 2023-03-12 RX ADMIN — Medication 4 MILLIGRAM(S): at 18:12

## 2023-03-12 RX ADMIN — Medication 4 MILLIGRAM(S): at 11:48

## 2023-03-12 RX ADMIN — Medication 4 MILLIGRAM(S): at 06:32

## 2023-03-12 RX ADMIN — CHLORHEXIDINE GLUCONATE 1 APPLICATION(S): 213 SOLUTION TOPICAL at 06:35

## 2023-03-12 RX ADMIN — POLYETHYLENE GLYCOL 3350 17 GRAM(S): 17 POWDER, FOR SOLUTION ORAL at 06:33

## 2023-03-12 RX ADMIN — LEVETIRACETAM 500 MILLIGRAM(S): 250 TABLET, FILM COATED ORAL at 06:32

## 2023-03-12 RX ADMIN — POLYETHYLENE GLYCOL 3350 17 GRAM(S): 17 POWDER, FOR SOLUTION ORAL at 18:13

## 2023-03-12 RX ADMIN — LEVETIRACETAM 500 MILLIGRAM(S): 250 TABLET, FILM COATED ORAL at 18:13

## 2023-03-12 RX ADMIN — Medication 5 MILLIGRAM(S): at 18:15

## 2023-03-12 RX ADMIN — SENNA PLUS 2 TABLET(S): 8.6 TABLET ORAL at 21:02

## 2023-03-12 NOTE — PROGRESS NOTE ADULT - PROBLEM SELECTOR PLAN 2
Concern for brain metastases as above  - follows with Dr. Marcos as outpatient  - was being treated with Opdivo most recently  - f/u heme/onc recs
Concern for brain metastases as above  - follows with Dr. Marcos as outpatient  - was being treated with Opdivo most recently  - f/u heme/onc recs

## 2023-03-12 NOTE — PROGRESS NOTE ADULT - ASSESSMENT
ASSESSMENT AND PLAN: 47M Right-handed, PMH stage IV melanoma in Right thumb s/p amputation in 2021. With mets to axillary lymph nodes and Right lung s/p Right VAT in 11/2022. P/w dizziness x2wk and HA x1wk. CT shows Left frontal hyperdense mass w/ ~1cm MLS and small Right temporal hyperdense mass. 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 CT CAP shows no pulmonary masses, but new Right axillary soft tissue density.    Awaiting OR on 3/14 - left craniotomy for resection of brain met    NEURO:   - Continue neuro checks q 4  - Pre-op for OR on 3/14 with Dr. Rodriguez (cleared by Medicine on 3/6/23)  - Continue Decadron for cerebral edema  - Continue Keppra for seizure ppx  - Continue Tylenol prn for pain control  - Continue Gabapentin for neuropathic pain  - Thorazine for hiccups prn  - PT - no skilled PT needs, but will need re-eval once post-op    PULM:   - On room air, O2Sat>96%  - Incentive spirometry  - COVID+ 3/4/23 - on isolation per Infection Control protocols, currently asymptomatic - no cough / afebrile  - History of right lung resection 11/2022    CV:  - -160, no issues    ENDO:   - Goal euglycemia while on steroids    HEME/ONC:             3/12 CBC mild leukocytosis / aferbrile; Appreciate Heme/Onc following for Stage IV melanoma right thumb s/p amputation 12/2021, with mets to lymph nodes and right lung resection 11/2022; s/p 4 cycles of chemo         DVT ppx: SQL, SCDs, 3/7 Le Dopp - negative    RENAL:   - IVL  - 3/12 BMP stable     ID:   - Afebrile  - COVID+ 3/4/23, asymptomatic, on isolation  - 3/7 MRSA/MSSA neg    GI:    - Continue oral diet  - Continue protonix while on steroids  - Continue senna and miralax, last BM per patient was 3/10, added dulcolax BID    DISCHARGE PLANNING:   PT - no skilled needs however will need re-eval once post-op    Plan to be discussed w/ Dr. Rodriguez  [id number]

## 2023-03-12 NOTE — PROGRESS NOTE ADULT - PROBLEM SELECTOR PLAN 4
has had ongoing intermittent intractable hiccups  - c/w home gabapentin 300mg BID PRN hiccups  - can also trial baclofen if hiccups refractory to above gabapentin

## 2023-03-12 NOTE — PROGRESS NOTE ADULT - PROBLEM SELECTOR PLAN 3
COVID-19 PCR positive on 3/4/23  - asymptomatic, no hypoxia, does not meet criteria for treatment at this time   - supportive care  - isolation as per infection control protocols  - lovenox for DVT ppx - COVID-19 puts him at higher risk for VTE  - asymptomatic COVID-19 infection does not preclude him for surgery at this time

## 2023-03-12 NOTE — PROGRESS NOTE ADULT - PROBLEM SELECTOR PROBLEM 1
Intracranial tumor

## 2023-03-12 NOTE — PROGRESS NOTE ADULT - SUBJECTIVE AND OBJECTIVE BOX
[facility] Division of Hospital Medicine  Mejia Dial MD  Available via MS Teams  Pager: [phone]    SUBJECTIVE / OVERNIGHT EVENTS:  - [place] Creole pacific language line  [place] ID [id number]  - no events overnight, denies any nausea, abdominal pain, headaches, cp, shortness of breath, chest pain.     ADDITIONAL REVIEW OF SYSTEMS:    MEDICATIONS  (STANDING):  chlorhexidine 2% Cloths 1 Application(s) Topical <User Schedule>  dexAMETHasone     Tablet 4 milliGRAM(s) Oral every 6 hours  enoxaparin Injectable 40 milliGRAM(s) SubCutaneous <User Schedule>  levETIRAcetam 500 milliGRAM(s) Oral two times a day  pantoprazole    Tablet 40 milliGRAM(s) Oral before breakfast  polyethylene glycol 3350 17 Gram(s) Oral every 12 hours  senna 2 Tablet(s) Oral at bedtime    MEDICATIONS  (PRN):  acetaminophen     Tablet .. 650 milliGRAM(s) Oral every 6 hours PRN Temp greater or equal to 38C (100.4F), Mild Pain (1 - 3)  chlorproMAZINE    Tablet 10 milliGRAM(s) Oral four times a day PRN hiccups  gabapentin 300 milliGRAM(s) Oral two times a day PRN hiccups  ondansetron Injectable 4 milliGRAM(s) IV Push every 6 hours PRN Nausea and/or Vomiting      I&O's Summary    11 Mar 2023 06:01  -  12 Mar 2023 07:00  --------------------------------------------------------  IN: 1740 mL / OUT: 0 mL / NET: 1740 mL    12 Mar 2023 07:01  -  12 Mar 2023 13:35  --------------------------------------------------------  IN: 240 mL / OUT: 0 mL / NET: 240 mL        PHYSICAL EXAM:  Vital Signs Last 24 Hrs  T(C): 36.8 (12 Mar 2023 11:15), Max: 36.8 (12 Mar 2023 11:15)  T(F): 98.2 (12 Mar 2023 11:15), Max: 98.2 (12 Mar 2023 11:15)  HR: 85 (12 Mar 2023 11:15) (62 - 100)  BP: 106/66 (12 Mar 2023 11:15) (106/66 - 119/87)  BP(mean): --  RR: 18 (12 Mar 2023 11:15) (18 - 18)  SpO2: 96% (12 Mar 2023 11:15) (96% - 100%)    Parameters below as of 12 Mar 2023 11:15  Patient On (Oxygen Delivery Method): room air      CONSTITUTIONAL: NAD, well-developed, well-groomed  EYES: PERRLA; conjunctiva and sclera clear  ENMT: Moist oral mucosa, no pharyngeal injection or exudates;   NECK: Supple, no palpable masses;   RESPIRATORY: Normal respiratory effort; lungs are clear to auscultation bilaterally without any wheezing or crackles   CARDIOVASCULAR: Regular rate and rhythm, normal S1 and S2, no murmur/rub/gallop; No lower extremity edema; Peripheral pulses are 2+ bilaterally  ABDOMEN: Soft, Nondistended, Nontender to palpation, normoactive bowel sounds  MUSCULOSKELETAL: No clubbing or cyanosis of digits; no joint swelling or tenderness to palpation  PSYCH: A+O to person, place, and time; affect appropriate  NEUROLOGY: CN 2-12 are intact and symmetric; no gross sensory deficits, 5/5 strength throughout  SKIN: No rashes; no palpable lesions    LABS:                        14.6   12.51 )-----------( 153      ( 12 Mar 2023 07:02 )             44.3     03-12    140  |  101  |  25<H>  ----------------------------<  144<H>  4.2   |  23  |  1.13    Ca    8.9      12 Mar 2023 07:03                COVID-19 PCR: Detected (04 Mar 2023 19:00)  COVID-19 PCR: NotDetec (09 Nov 2022 06:39)      RADIOLOGY & ADDITIONAL TESTS:  New Results Reviewed Today: cbc cmp  New Imaging Personally Reviewed Today: n/a  New Electrocardiogram Personally Reviewed Today: n/a  Prior or Outpatient Records Reviewed Today: n/a     COMMUNICATION:  Care Discussed with Consultants/Other Providers and Details of Discussion: n/a  Discussions with Patient/Family: n/a  PCP Communication: n/a

## 2023-03-12 NOTE — PROGRESS NOTE ADULT - SUBJECTIVE AND OBJECTIVE BOX
SUBJECTIVE: HPI:  47M Right-handed, PMH stage IV melanoma in Right thumb s/p amputation in 2021. With mets to axillary lymph nodes and Right lung s/p Right VAT in 11/2022. P/w dizziness x2wk and HA x1wk. CT shows Left frontal hyperdense mass w/ ~1cm MLS and small Right temporal hyperdense mass. 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 CT CAP shows no pulmonary masses, but new Right axillary soft tissue density. Exam: Creole-speaking, AOx3, EOMI, no facial/drift, WALTERS 5/5, SILT     (05 Mar 2023 17:12)      OVERNIGHT EVENTS: No acute events overnight, patient remains in isolation for COVID+ and his immunocompromised history. Awaiting surgery on Tuesday. Spoke with his wife Livier Tovar and updated her regarding current status of patient. Answered her questions.     Vital Signs Last 24 Hrs  T(C): 36.4 (12 Mar 2023 13:56), Max: 36.8 (12 Mar 2023 11:15)  T(F): 97.6 (12 Mar 2023 13:56), Max: 98.2 (12 Mar 2023 11:15)  HR: 85 (12 Mar 2023 13:56) (62 - 86)  BP: 127/82 (12 Mar 2023 13:56) (106/66 - 127/82)  BP(mean): --  RR: 18 (12 Mar 2023 13:56) (18 - 18)  SpO2: 96% (12 Mar 2023 13:56) (96% - 100%)    Parameters below as of 12 Mar 2023 13:56  Patient On (Oxygen Delivery Method): room air        PHYSICAL EXAM:    General: No Acute Distress     Neurological: Awake, OX3, EOMI, PERRL, no drift, following commands, uppers 5/5, lowers 5/5, SILT    Pulmonary: Clear to Auscultation, No Rales, No Rhonchi, No Wheezes     Cardiovascular: S1, S2, Regular Rate and Rhythm     Gastrointestinal: Soft, Nontender, Nondistended     Incision: None    LABS:                        14.6   12.51 )-----------( 153      ( 12 Mar 2023 07:02 )             44.3    03-12    140  |  101  |  25<H>  ----------------------------<  144<H>  4.2   |  23  |  1.13    Ca    8.9      12 Mar 2023 07:03          03-11 @ 06:01  - 03-12 @ 07:00  --------------------------------------------------------  IN: 1740 mL / OUT: 0 mL / NET: 1740 mL    03-12 @ 07:01  - 03-12 @ 15:36  --------------------------------------------------------  IN: 240 mL / OUT: 0 mL / NET: 240 mL      DRAINS: None    MEDICATIONS:  Antibiotics:    Neuro:  acetaminophen     Tablet .. 650 milliGRAM(s) Oral every 6 hours PRN Temp greater or equal to 38C (100.4F), Mild Pain (1 - 3)  chlorproMAZINE    Tablet 10 milliGRAM(s) Oral four times a day PRN hiccups  gabapentin 300 milliGRAM(s) Oral two times a day PRN hiccups  levETIRAcetam 500 milliGRAM(s) Oral two times a day  ondansetron Injectable 4 milliGRAM(s) IV Push every 6 hours PRN Nausea and/or Vomiting    Cardiac:    Pulm:    GI/:  pantoprazole    Tablet 40 milliGRAM(s) Oral before breakfast  polyethylene glycol 3350 17 Gram(s) Oral every 12 hours  senna 2 Tablet(s) Oral at bedtime    Other:   chlorhexidine 2% Cloths 1 Application(s) Topical <User Schedule>  dexAMETHasone     Tablet 4 milliGRAM(s) Oral every 6 hours  enoxaparin Injectable 40 milliGRAM(s) SubCutaneous <User Schedule>    DIET: [x] Regular [] CCD [] Renal [] Puree [] Dysphagia [] Tube Feeds:     IMAGING:   < from: MR Brain Stereotactic w/wo IV Cont (03.06.23 @ 23:30) >  IMPRESSION:    Markedly enlarged left anteromedial frontal mass, consistent with   progression of metastatic melanoma. New T1 shortening and susceptibility   artifact may represent the presence of melanin and intralesional   hemorrhage.    New 1.6 x 1.3 x 0.9 cm right anterior temporal T1 hyperintense lesion   with susceptibility artifact mild surrounding vasogenic edema, consistent   with metastatic melanoma.    No abnormal leptomeningeal enhancement.    Rightward subfalcine herniation of 1.4 cm. Rightward midline shift of 1.2   cm.    Partial mild effacement of the left anterolateral suprasellar cistern. No   hydrocephalus.    --- End of Report ---    NOÉ STEWART MD; Attending Radiologist  This document has been electronically signed. Mar  7 2023  9:42AM    < end of copied text >   SUBJECTIVE: HPI:  47M Right-handed, PMH stage IV melanoma in Right thumb s/p amputation in 2021. With mets to axillary lymph nodes and Right lung s/p Right VAT in 11/2022. P/w dizziness x2wk and HA x1wk. CT shows Left frontal hyperdense mass w/ ~1cm MLS and small Right temporal hyperdense mass. 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 CT CAP shows no pulmonary masses, but new Right axillary soft tissue density. Exam: Creole-speaking, AOx3, EOMI, no facial/drift, WALTERS 5/5, SILT     (05 Mar 2023 17:12)      OVERNIGHT EVENTS: No acute events overnight, patient remains in isolation for COVID+ and his immunocompromised history. Awaiting surgery on Tuesday. Spoke with patient via  [phone]. Spoke with his wife Livier Tovar and updated her regarding current status of patient. Answered her questions.     Vital Signs Last 24 Hrs  T(C): 36.4 (12 Mar 2023 13:56), Max: 36.8 (12 Mar 2023 11:15)  T(F): 97.6 (12 Mar 2023 13:56), Max: 98.2 (12 Mar 2023 11:15)  HR: 85 (12 Mar 2023 13:56) (62 - 86)  BP: 127/82 (12 Mar 2023 13:56) (106/66 - 127/82)  BP(mean): --  RR: 18 (12 Mar 2023 13:56) (18 - 18)  SpO2: 96% (12 Mar 2023 13:56) (96% - 100%)    Parameters below as of 12 Mar 2023 13:56  Patient On (Oxygen Delivery Method): room air        PHYSICAL EXAM:    General: No Acute Distress     Neurological: Awake, OX3, EOMI, PERRL, no drift, following commands, uppers 5/5, lowers 5/5, SILT    Pulmonary: Clear to Auscultation, No Rales, No Rhonchi, No Wheezes     Cardiovascular: S1, S2, Regular Rate and Rhythm     Gastrointestinal: Soft, Nontender, Nondistended     Incision: None    LABS:                        14.6   12.51 )-----------( 153      ( 12 Mar 2023 07:02 )             44.3    03-12    140  |  101  |  25<H>  ----------------------------<  144<H>  4.2   |  23  |  1.13    Ca    8.9      12 Mar 2023 07:03          03-11 @ 06:01  - 03-12 @ 07:00  --------------------------------------------------------  IN: 1740 mL / OUT: 0 mL / NET: 1740 mL    03-12 @ 07:01 - 03-12 @ 15:36  --------------------------------------------------------  IN: 240 mL / OUT: 0 mL / NET: 240 mL      DRAINS: None    MEDICATIONS:  Antibiotics:    Neuro:  acetaminophen     Tablet .. 650 milliGRAM(s) Oral every 6 hours PRN Temp greater or equal to 38C (100.4F), Mild Pain (1 - 3)  chlorproMAZINE    Tablet 10 milliGRAM(s) Oral four times a day PRN hiccups  gabapentin 300 milliGRAM(s) Oral two times a day PRN hiccups  levETIRAcetam 500 milliGRAM(s) Oral two times a day  ondansetron Injectable 4 milliGRAM(s) IV Push every 6 hours PRN Nausea and/or Vomiting    Cardiac:    Pulm:    GI/:  pantoprazole    Tablet 40 milliGRAM(s) Oral before breakfast  polyethylene glycol 3350 17 Gram(s) Oral every 12 hours  senna 2 Tablet(s) Oral at bedtime    Other:   chlorhexidine 2% Cloths 1 Application(s) Topical <User Schedule>  dexAMETHasone     Tablet 4 milliGRAM(s) Oral every 6 hours  enoxaparin Injectable 40 milliGRAM(s) SubCutaneous <User Schedule>    DIET: [x] Regular [] CCD [] Renal [] Puree [] Dysphagia [] Tube Feeds:     IMAGING:   < from: MR Brain Stereotactic w/wo IV Cont (03.06.23 @ 23:30) >  IMPRESSION:    Markedly enlarged left anteromedial frontal mass, consistent with   progression of metastatic melanoma. New T1 shortening and susceptibility   artifact may represent the presence of melanin and intralesional   hemorrhage.    New 1.6 x 1.3 x 0.9 cm right anterior temporal T1 hyperintense lesion   with susceptibility artifact mild surrounding vasogenic edema, consistent   with metastatic melanoma.    No abnormal leptomeningeal enhancement.    Rightward subfalcine herniation of 1.4 cm. Rightward midline shift of 1.2   cm.    Partial mild effacement of the left anterolateral suprasellar cistern. No   hydrocephalus.    --- End of Report ---    NOÉ STEWART MD; Attending Radiologist  This document has been electronically signed. Mar  7 2023  9:42AM    < end of copied text >

## 2023-03-12 NOTE — PROGRESS NOTE ADULT - PROBLEM SELECTOR PROBLEM 2
Malignant melanoma

## 2023-03-12 NOTE — PROGRESS NOTE ADULT - PROBLEM SELECTOR PLAN 1
- CT showing L frontal hyperdense mass with ~1cm MLS and small R temporal hyperdense mass  - there is concern for metastatics disease  - c/w keppra for seizure ppx  - dexamethasone as per neurosurgery team  - pantoprazole for GI ppx while on high dose steroids  - please see pre-op clearance documented on 3/6/23 medicine note  - but overall, patient is medically optimized for OR
- CT showing L frontal hyperdense mass with ~1cm MLS and small R temporal hyperdense mass  - there is concern for metastatics disease  - c/w keppra for seizure ppx  - dexamethasone as per neurosurgery team  - pantoprazole for GI ppx while on high dose steroids  - please see pre-op clearance documented on 3/6/23 medicine note  - but overall, patient is medically optimized for OR

## 2023-03-12 NOTE — PROGRESS NOTE ADULT - ASSESSMENT
47 year old Lao-Creole speaking man with history of stage IV melanoma in R thumb s/p amputation in 2021 with mets to axillary LNs and R lung now s/p R VATS and RML resection in 11/2022 who p/w dizziness x 2 weeks and headache x 1 week with CT showing L frontal hyperdense mass with ~1cm MLS and small R temporal hyperdense mass who was transferred to [facility] for neurosurgery for L crani for mass resection. Incidental COVID-19 positive PCR on admission, asymptomatic.

## 2023-03-13 ENCOUNTER — TRANSCRIPTION ENCOUNTER (OUTPATIENT)
Age: 48
End: 2023-03-13

## 2023-03-13 LAB
ANION GAP SERPL CALC-SCNC: 11 MMOL/L — SIGNIFICANT CHANGE UP (ref 5–17)
APTT BLD: 22.5 SEC — LOW (ref 27.5–35.5)
BLD GP AB SCN SERPL QL: NEGATIVE — SIGNIFICANT CHANGE UP
BUN SERPL-MCNC: 31 MG/DL — HIGH (ref 7–23)
CALCIUM SERPL-MCNC: 8.8 MG/DL — SIGNIFICANT CHANGE UP (ref 8.4–10.5)
CHLORIDE SERPL-SCNC: 102 MMOL/L — SIGNIFICANT CHANGE UP (ref 96–108)
CO2 SERPL-SCNC: 25 MMOL/L — SIGNIFICANT CHANGE UP (ref 22–31)
CREAT SERPL-MCNC: 1.27 MG/DL — SIGNIFICANT CHANGE UP (ref 0.5–1.3)
EGFR: 70 ML/MIN/1.73M2 — SIGNIFICANT CHANGE UP
GLUCOSE SERPL-MCNC: 165 MG/DL — HIGH (ref 70–99)
HCT VFR BLD CALC: 43.5 % — SIGNIFICANT CHANGE UP (ref 39–50)
HGB BLD-MCNC: 14.6 G/DL — SIGNIFICANT CHANGE UP (ref 13–17)
INR BLD: 1.03 RATIO — SIGNIFICANT CHANGE UP (ref 0.88–1.16)
MCHC RBC-ENTMCNC: 28.4 PG — SIGNIFICANT CHANGE UP (ref 27–34)
MCHC RBC-ENTMCNC: 33.6 GM/DL — SIGNIFICANT CHANGE UP (ref 32–36)
MCV RBC AUTO: 84.6 FL — SIGNIFICANT CHANGE UP (ref 80–100)
NRBC # BLD: 0 /100 WBCS — SIGNIFICANT CHANGE UP (ref 0–0)
PLATELET # BLD AUTO: 139 K/UL — LOW (ref 150–400)
POTASSIUM SERPL-MCNC: 4.3 MMOL/L — SIGNIFICANT CHANGE UP (ref 3.5–5.3)
POTASSIUM SERPL-SCNC: 4.3 MMOL/L — SIGNIFICANT CHANGE UP (ref 3.5–5.3)
PROTHROM AB SERPL-ACNC: 12 SEC — SIGNIFICANT CHANGE UP (ref 10.5–13.4)
RBC # BLD: 5.14 M/UL — SIGNIFICANT CHANGE UP (ref 4.2–5.8)
RBC # FLD: 13.7 % — SIGNIFICANT CHANGE UP (ref 10.3–14.5)
RH IG SCN BLD-IMP: POSITIVE — SIGNIFICANT CHANGE UP
SARS-COV-2 RNA SPEC QL NAA+PROBE: SIGNIFICANT CHANGE UP
SODIUM SERPL-SCNC: 138 MMOL/L — SIGNIFICANT CHANGE UP (ref 135–145)
WBC # BLD: 11.78 K/UL — HIGH (ref 3.8–10.5)
WBC # FLD AUTO: 11.78 K/UL — HIGH (ref 3.8–10.5)

## 2023-03-13 PROCEDURE — 99232 SBSQ HOSP IP/OBS MODERATE 35: CPT

## 2023-03-13 RX ORDER — SODIUM CHLORIDE 9 MG/ML
1000 INJECTION INTRAMUSCULAR; INTRAVENOUS; SUBCUTANEOUS
Refills: 0 | Status: DISCONTINUED | OUTPATIENT
Start: 2023-03-13 | End: 2023-03-14

## 2023-03-13 RX ORDER — PANTOPRAZOLE SODIUM 20 MG/1
40 TABLET, DELAYED RELEASE ORAL DAILY
Refills: 0 | Status: DISCONTINUED | OUTPATIENT
Start: 2023-03-13 | End: 2023-03-14

## 2023-03-13 RX ORDER — DEXAMETHASONE 0.5 MG/5ML
4 ELIXIR ORAL EVERY 6 HOURS
Refills: 0 | Status: DISCONTINUED | OUTPATIENT
Start: 2023-03-13 | End: 2023-03-14

## 2023-03-13 RX ORDER — LEVETIRACETAM 250 MG/1
500 TABLET, FILM COATED ORAL EVERY 12 HOURS
Refills: 0 | Status: DISCONTINUED | OUTPATIENT
Start: 2023-03-13 | End: 2023-03-14

## 2023-03-13 RX ORDER — CHLORHEXIDINE GLUCONATE 213 G/1000ML
1 SOLUTION TOPICAL ONCE
Refills: 0 | Status: COMPLETED | OUTPATIENT
Start: 2023-03-13 | End: 2023-03-13

## 2023-03-13 RX ADMIN — Medication 10 MILLIGRAM(S): at 20:34

## 2023-03-13 RX ADMIN — Medication 10 MILLIGRAM(S): at 13:07

## 2023-03-13 RX ADMIN — Medication 650 MILLIGRAM(S): at 08:39

## 2023-03-13 RX ADMIN — Medication 5 MILLIGRAM(S): at 06:43

## 2023-03-13 RX ADMIN — Medication 4 MILLIGRAM(S): at 17:43

## 2023-03-13 RX ADMIN — LEVETIRACETAM 500 MILLIGRAM(S): 250 TABLET, FILM COATED ORAL at 06:44

## 2023-03-13 RX ADMIN — POLYETHYLENE GLYCOL 3350 17 GRAM(S): 17 POWDER, FOR SOLUTION ORAL at 17:43

## 2023-03-13 RX ADMIN — PANTOPRAZOLE SODIUM 40 MILLIGRAM(S): 20 TABLET, DELAYED RELEASE ORAL at 13:07

## 2023-03-13 RX ADMIN — GABAPENTIN 300 MILLIGRAM(S): 400 CAPSULE ORAL at 06:43

## 2023-03-13 RX ADMIN — Medication 4 MILLIGRAM(S): at 00:00

## 2023-03-13 RX ADMIN — Medication 4 MILLIGRAM(S): at 06:45

## 2023-03-13 RX ADMIN — CHLORHEXIDINE GLUCONATE 1 APPLICATION(S): 213 SOLUTION TOPICAL at 08:40

## 2023-03-13 RX ADMIN — LEVETIRACETAM 400 MILLIGRAM(S): 250 TABLET, FILM COATED ORAL at 17:43

## 2023-03-13 RX ADMIN — POLYETHYLENE GLYCOL 3350 17 GRAM(S): 17 POWDER, FOR SOLUTION ORAL at 06:45

## 2023-03-13 RX ADMIN — Medication 650 MILLIGRAM(S): at 09:09

## 2023-03-13 RX ADMIN — Medication 4 MILLIGRAM(S): at 13:07

## 2023-03-13 RX ADMIN — PANTOPRAZOLE SODIUM 40 MILLIGRAM(S): 20 TABLET, DELAYED RELEASE ORAL at 06:45

## 2023-03-13 RX ADMIN — SENNA PLUS 2 TABLET(S): 8.6 TABLET ORAL at 22:29

## 2023-03-13 RX ADMIN — CHLORHEXIDINE GLUCONATE 1 APPLICATION(S): 213 SOLUTION TOPICAL at 17:56

## 2023-03-13 RX ADMIN — Medication 5 MILLIGRAM(S): at 17:43

## 2023-03-13 NOTE — PROGRESS NOTE ADULT - SUBJECTIVE AND OBJECTIVE BOX
SUBJECTIVE: HPI:  47M Right-handed, PMH stage IV melanoma in Right thumb s/p amputation in 2021. With mets to axillary lymph nodes and Right lung s/p Right VAT in 11/2022. P/w dizziness x2wk and HA x1wk. CT shows Left frontal hyperdense mass w/ ~1cm MLS and small Right temporal hyperdense mass. 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 CT CAP shows no pulmonary masses, but new Right axillary soft tissue density. Exam: Creole-speaking, AOx3, EOMI, no facial/drift, WALTERS 5/5, SILT     (05 Mar 2023 17:12)      OVERNIGHT EVENTS: No acute events overnight.     Vital Signs Last 24 Hrs  T(C): 36.8 (13 Mar 2023 10:52), Max: 36.8 (12 Mar 2023 21:50)  T(F): 98.3 (13 Mar 2023 10:52), Max: 98.3 (12 Mar 2023 21:50)  HR: 65 (13 Mar 2023 10:52) (59 - 85)  BP: 105/71 (13 Mar 2023 10:52) (105/71 - 127/82)  BP(mean): --  RR: 18 (13 Mar 2023 10:52) (18 - 18)  SpO2: 99% (13 Mar 2023 10:52) (96% - 100%)    Parameters below as of 13 Mar 2023 10:52  Patient On (Oxygen Delivery Method): room air        PHYSICAL EXAM:    General: No Acute Distress     Neurological: Awake, OX3, EOMI, PERRL, no drift, following commands, uppers 5/5, lowers 5/5, SILT    Pulmonary: Clear to Auscultation, No Rales, No Rhonchi, No Wheezes     Cardiovascular: S1, S2, Regular Rate and Rhythm     Gastrointestinal: Soft, Nontender, Nondistended     Incision:     LABS:                        14.6   11.78 )-----------( 139      ( 13 Mar 2023 07:18 )             43.5    03-13    138  |  102  |  31<H>  ----------------------------<  165<H>  4.3   |  25  |  1.27    Ca    8.8      13 Mar 2023 07:18    PT/INR - ( 13 Mar 2023 07:18 )   PT: 12.0 sec;   INR: 1.03 ratio         PTT - ( 13 Mar 2023 07:18 )  PTT:22.5 sec      03-12 @ 07:01 - 03-13 @ 07:00  --------------------------------------------------------  IN: 1320 mL / OUT: 0 mL / NET: 1320 mL    03-13 @ 07:01 - 03-13 @ 11:48  --------------------------------------------------------  IN: 240 mL / OUT: 0 mL / NET: 240 mL      DRAINS: None    MEDICATIONS:  Antibiotics:    Neuro:  acetaminophen     Tablet .. 650 milliGRAM(s) Oral every 6 hours PRN Temp greater or equal to 38C (100.4F), Mild Pain (1 - 3)  chlorproMAZINE    Tablet 10 milliGRAM(s) Oral four times a day PRN hiccups  gabapentin 300 milliGRAM(s) Oral two times a day PRN hiccups  levETIRAcetam  IVPB 500 milliGRAM(s) IV Intermittent every 12 hours  ondansetron Injectable 4 milliGRAM(s) IV Push every 6 hours PRN Nausea and/or Vomiting    Cardiac:    Pulm:    GI/:  bisacodyl 5 milliGRAM(s) Oral every 12 hours  pantoprazole  Injectable 40 milliGRAM(s) IV Push daily  polyethylene glycol 3350 17 Gram(s) Oral every 12 hours  senna 2 Tablet(s) Oral at bedtime    Other:   chlorhexidine 2% Cloths 1 Application(s) Topical <User Schedule>  chlorhexidine 4% Liquid 1 Application(s) Topical once  dexAMETHasone  Injectable 4 milliGRAM(s) IV Push every 6 hours  sodium chloride 0.9%. 1000 milliLiter(s) IV Continuous <Continuous>    DIET: [x - NPO after midnight] Regular [] CCD [] Renal [] Puree [] Dysphagia [] Tube Feeds:     IMAGING:   < from: MR Brain Stereotactic w/wo IV Cont (03.06.23 @ 23:30) >  IMPRESSION:    Markedly enlarged left anteromedial frontal mass, consistent with   progression of metastatic melanoma. New T1 shortening and susceptibility   artifact may represent the presence of melanin and intralesional   hemorrhage.    New 1.6 x 1.3 x 0.9 cm right anterior temporal T1 hyperintense lesion   with susceptibility artifact mild surrounding vasogenic edema, consistent   with metastatic melanoma.    No abnormal leptomeningeal enhancement.    Rightward subfalcine herniation of 1.4 cm. Rightward midline shift of 1.2   cm.    Partial mild effacement of the left anterolateral suprasellar cistern. No   hydrocephalus.    --- End of Report ---    NOÉ STEWART MD; Attending Radiologist  This document has been electronically signed. Mar  7 2023  9:42AM    < end of copied text > SUBJECTIVE: HPI:  47M Right-handed, PMH stage IV melanoma in Right thumb s/p amputation in 2021. With mets to axillary lymph nodes and Right lung s/p Right VAT in 11/2022. P/w dizziness x2wk and HA x1wk. CT shows Left frontal hyperdense mass w/ ~1cm MLS and small Right temporal hyperdense mass. 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 CT CAP shows no pulmonary masses, but new Right axillary soft tissue density. Exam: Creole-speaking, AOx3, EOMI, no facial/drift, WALTERS 5/5, SILT     (05 Mar 2023 17:12)      OVERNIGHT EVENTS: No acute events overnight. Used  [id number]. Spoke with patient regarding his procedure tomorrow, that he will be NPO after midnight but with IVF. Reports feeling mild discomfort in his abdomen, he reports having a bowel movement 2 days prior, I offered a suppository for him which he agreed to.     Vital Signs Last 24 Hrs  T(C): 36.8 (13 Mar 2023 10:52), Max: 36.8 (12 Mar 2023 21:50)  T(F): 98.3 (13 Mar 2023 10:52), Max: 98.3 (12 Mar 2023 21:50)  HR: 65 (13 Mar 2023 10:52) (59 - 85)  BP: 105/71 (13 Mar 2023 10:52) (105/71 - 127/82)  BP(mean): --  RR: 18 (13 Mar 2023 10:52) (18 - 18)  SpO2: 99% (13 Mar 2023 10:52) (96% - 100%)    Parameters below as of 13 Mar 2023 10:52  Patient On (Oxygen Delivery Method): room air        PHYSICAL EXAM:    General: No Acute Distress     Neurological: Awake, OX3, EOMI, PERRL, no drift, following commands, uppers 5/5, lowers 5/5, SILT    Pulmonary: Clear to Auscultation, No Rales, No Rhonchi, No Wheezes     Cardiovascular: S1, S2, Regular Rate and Rhythm     Gastrointestinal: Soft, mild discomfort on palpation in the abdomen, Nondistended     Incision: None    LABS:                        14.6   11.78 )-----------( 139      ( 13 Mar 2023 07:18 )             43.5    03-13    138  |  102  |  31<H>  ----------------------------<  165<H>  4.3   |  25  |  1.27    Ca    8.8      13 Mar 2023 07:18    PT/INR - ( 13 Mar 2023 07:18 )   PT: 12.0 sec;   INR: 1.03 ratio         PTT - ( 13 Mar 2023 07:18 )  PTT:22.5 sec      03-12 @ 07:01 - 03-13 @ 07:00  --------------------------------------------------------  IN: 1320 mL / OUT: 0 mL / NET: 1320 mL    03-13 @ 07:01 - 03-13 @ 11:48  --------------------------------------------------------  IN: 240 mL / OUT: 0 mL / NET: 240 mL      DRAINS: None    MEDICATIONS:  Antibiotics:    Neuro:  acetaminophen     Tablet .. 650 milliGRAM(s) Oral every 6 hours PRN Temp greater or equal to 38C (100.4F), Mild Pain (1 - 3)  chlorproMAZINE    Tablet 10 milliGRAM(s) Oral four times a day PRN hiccups  gabapentin 300 milliGRAM(s) Oral two times a day PRN hiccups  levETIRAcetam  IVPB 500 milliGRAM(s) IV Intermittent every 12 hours  ondansetron Injectable 4 milliGRAM(s) IV Push every 6 hours PRN Nausea and/or Vomiting    Cardiac:    Pulm:    GI/:  bisacodyl 5 milliGRAM(s) Oral every 12 hours  pantoprazole  Injectable 40 milliGRAM(s) IV Push daily  polyethylene glycol 3350 17 Gram(s) Oral every 12 hours  senna 2 Tablet(s) Oral at bedtime    Other:   chlorhexidine 2% Cloths 1 Application(s) Topical <User Schedule>  chlorhexidine 4% Liquid 1 Application(s) Topical once  dexAMETHasone  Injectable 4 milliGRAM(s) IV Push every 6 hours  sodium chloride 0.9%. 1000 milliLiter(s) IV Continuous <Continuous>    DIET: [x - NPO after midnight] Regular [] CCD [] Renal [] Puree [] Dysphagia [] Tube Feeds:     IMAGING:   < from: MR Brain Stereotactic w/wo IV Cont (03.06.23 @ 23:30) >  IMPRESSION:    Markedly enlarged left anteromedial frontal mass, consistent with   progression of metastatic melanoma. New T1 shortening and susceptibility   artifact may represent the presence of melanin and intralesional   hemorrhage.    New 1.6 x 1.3 x 0.9 cm right anterior temporal T1 hyperintense lesion   with susceptibility artifact mild surrounding vasogenic edema, consistent   with metastatic melanoma.    No abnormal leptomeningeal enhancement.    Rightward subfalcine herniation of 1.4 cm. Rightward midline shift of 1.2   cm.    Partial mild effacement of the left anterolateral suprasellar cistern. No   hydrocephalus.    --- End of Report ---    NOÉ STEWART MD; Attending Radiologist  This document has been electronically signed. Mar  7 2023  9:42AM    < end of copied text >

## 2023-03-13 NOTE — PROGRESS NOTE ADULT - ASSESSMENT
ASSESSMENT AND PLAN: 47M Right-handed, PMH stage IV melanoma in Right thumb s/p amputation in 2021. With mets to axillary lymph nodes and Right lung s/p Right VAT in 11/2022. P/w dizziness x2wk and HA x1wk. CT shows Left frontal hyperdense mass w/ ~1cm MLS and small Right temporal hyperdense mass. 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 CT CAP shows no pulmonary masses, but new Right axillary soft tissue density.    Awaiting OR on 3/14 - left craniotomy for resection of brain met    NEURO:   - Continue neuro checks q 4  - Pre-op for OR in am 3/14 with Dr. Rodriguez (cleared by Medicine on 3/6/23)  - Continue Decadron for cerebral edema  - Continue Keppra for seizure ppx  - Continue Tylenol prn for pain control  - Continue Gabapentin for neuropathic pain  - Thorazine for hiccups prn  - PT - no skilled PT needs, but will need re-eval once post-op    PULM:   - On room air, O2Sat>96%  - Incentive spirometry  - COVID+ 3/4/23 - on isolation per Infection Control protocols, currently asymptomatic - no cough / afebrile  - History of right lung resection 11/2022    CV:  - -160, no issues    ENDO:   - Goal euglycemia while on steroids    HEME/ONC:             3/13 CBC mild leukocytosis but improved from prior / afebrile; Appreciate Heme/Onc following for Stage IV melanoma right thumb s/p amputation 12/2021, with mets to lymph nodes and right lung resection 11/2022; s/p 4 cycles of chemo         DVT ppx: SQL discontinued in lieu of OR in am, SCDs, 3/7 Le Dopp - negative    RENAL:   - IVL currently but NS@75 once NPO  - 3/13 BMP stable     ID:   - Afebrile  - COVID+ 3/4/23, asymptomatic, on isolation  - 3/7 MRSA/MSSA neg  - Resent COVID swab this morning for pre-op, will follow    GI:    - Continue oral diet, NPO after midnight  - Continue protonix while on steroids  - Continue senna, miralax, and dulcolax BID, last BM per patient was 3/10, added suppository    DISCHARGE PLANNING:   PT - no skilled needs however will need re-eval once post-op    Plan to be discussed w/ Dr. Rodriguez  [id number]    ASSESSMENT AND PLAN: 47M Right-handed, PMH stage IV melanoma in Right thumb s/p amputation in 2021. With mets to axillary lymph nodes and Right lung s/p Right VAT in 11/2022. P/w dizziness x2wk and HA x1wk. CT shows Left frontal hyperdense mass w/ ~1cm MLS and small Right temporal hyperdense mass. 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 CT CAP shows no pulmonary masses, but new Right axillary soft tissue density.    Awaiting OR on 3/14 - left craniotomy for resection of brain met    NEURO:   - Continue neuro checks q 4  - Pre-op for OR in am 3/14 with Dr. Rodriguez (cleared by Medicine on 3/6/23)  - Continue Decadron for cerebral edema  - Continue Keppra for seizure ppx  - Continue Tylenol prn for pain control  - Continue Gabapentin for neuropathic pain  - Thorazine for hiccups prn  - PT - no skilled PT needs, but will need re-eval once post-op    PULM:   - On room air, O2Sat>96%  - Incentive spirometry  - COVID+ 3/4/23 - on isolation per Infection Control protocols, currently asymptomatic - no cough / afebrile  - History of right lung resection 11/2022    CV:  - -160, no issues    ENDO:   - Goal euglycemia while on steroids    HEME/ONC:             3/13 CBC mild leukocytosis but improved from prior / afebrile; Appreciate Heme/Onc following for Stage IV melanoma right thumb s/p amputation 12/2021, with mets to lymph nodes and right lung resection 11/2022; s/p 4 cycles of chemo         DVT ppx: SQL discontinued in lieu of OR in am, coags done for pre-op - stable, SCDs, 3/7 Le Dopp - negative    RENAL:   - IVL currently but NS@75 once NPO  - 3/13 BMP stable     ID:   - Afebrile  - COVID+ 3/4/23, asymptomatic, on isolation  - 3/7 MRSA/MSSA neg  - 3/13 COVID neg    GI:    - Continue oral diet, NPO after midnight  - Continue protonix while on steroids  - Continue senna, miralax, and dulcolax BID, last BM per patient was 3/10, added suppository    DISCHARGE PLANNING:   PT - no skilled needs however will need re-eval once post-op    Plan to be discussed w/ Dr. Rodriguez  [id number]    ASSESSMENT AND PLAN: 47M Right-handed, PMH stage IV melanoma in Right thumb s/p amputation in 2021. With mets to axillary lymph nodes and Right lung s/p Right VAT in 11/2022. P/w dizziness x2wk and HA x1wk. CT shows Left frontal hyperdense mass w/ ~1cm MLS and small Right temporal hyperdense mass. 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 CT CAP shows no pulmonary masses, but new Right axillary soft tissue density.    Awaiting OR on 3/14 - left craniotomy for resection of brain met    NEURO:   - Continue neuro checks q 4  - Pre-op for OR in am 3/14 with Dr. Rodriguez (cleared by Medicine on 3/6/23)  - Continue Decadron for cerebral edema  - Continue Keppra for seizure ppx  - Continue Tylenol prn for pain control  - Continue Gabapentin for neuropathic pain  - Thorazine for hiccups prn  - PT - no skilled PT needs, but will need re-eval once post-op    PULM:   - On room air, O2Sat>96%  - Incentive spirometry  - COVID+ 3/4/23 - on isolation per Infection Control protocols, currently asymptomatic - no cough / afebrile  - History of right lung resection 11/2022    CV:  - -160, no issues    ENDO:   - Goal euglycemia while on steroids    HEME/ONC:             3/13 CBC mild leukocytosis but improved from prior / afebrile; Appreciate Heme/Onc following for Stage IV melanoma right thumb s/p amputation 12/2021, with mets to lymph nodes and right lung resection 11/2022; s/p 4 cycles of chemo         DVT ppx: SQL discontinued in lieu of OR in am, coags done for pre-op - stable, SCDs, 3/7 Le Dopp - negative    RENAL:   - IVL currently but NS@75 once NPO  - 3/13 BMP stable     ID:   - Afebrile  - COVID+ 3/4/23, asymptomatic, on isolation  - 3/7 MRSA/MSSA neg  - 3/13 COVID neg, per Infection Control protocol - for immunocompromised patient - he needs 10 day isolation, on the 10th day (which for him will be tomorrow 3/14) - send 2 COVID tests 4 hours apart. If they are negative, can come off isolation    GI:    - Continue oral diet, NPO after midnight  - Continue protonix while on steroids  - Continue senna, miralax, and dulcolax BID, last BM per patient was 3/10, added suppository    DISCHARGE PLANNING:   PT - no skilled needs however will need re-eval once post-op    Plan to be discussed w/ Dr. Rodriguez  [id number]

## 2023-03-14 ENCOUNTER — TRANSCRIPTION ENCOUNTER (OUTPATIENT)
Age: 48
End: 2023-03-14

## 2023-03-14 ENCOUNTER — RESULT REVIEW (OUTPATIENT)
Age: 48
End: 2023-03-14

## 2023-03-14 ENCOUNTER — APPOINTMENT (OUTPATIENT)
Dept: NEUROSURGERY | Facility: HOSPITAL | Age: 48
End: 2023-03-14

## 2023-03-14 LAB
ANION GAP SERPL CALC-SCNC: 17 MMOL/L — SIGNIFICANT CHANGE UP (ref 5–17)
BUN SERPL-MCNC: 24 MG/DL — HIGH (ref 7–23)
CALCIUM SERPL-MCNC: 8.8 MG/DL — SIGNIFICANT CHANGE UP (ref 8.4–10.5)
CHLORIDE SERPL-SCNC: 104 MMOL/L — SIGNIFICANT CHANGE UP (ref 96–108)
CO2 SERPL-SCNC: 19 MMOL/L — LOW (ref 22–31)
CREAT SERPL-MCNC: 1.04 MG/DL — SIGNIFICANT CHANGE UP (ref 0.5–1.3)
EGFR: 89 ML/MIN/1.73M2 — SIGNIFICANT CHANGE UP
GAS PNL BLDA: SIGNIFICANT CHANGE UP
GLUCOSE BLDC GLUCOMTR-MCNC: 171 MG/DL — HIGH (ref 70–99)
GLUCOSE SERPL-MCNC: 155 MG/DL — HIGH (ref 70–99)
HCT VFR BLD CALC: 45.9 % — SIGNIFICANT CHANGE UP (ref 39–50)
HGB BLD-MCNC: 15.6 G/DL — SIGNIFICANT CHANGE UP (ref 13–17)
MAGNESIUM SERPL-MCNC: 2.7 MG/DL — HIGH (ref 1.6–2.6)
MCHC RBC-ENTMCNC: 28.9 PG — SIGNIFICANT CHANGE UP (ref 27–34)
MCHC RBC-ENTMCNC: 34 GM/DL — SIGNIFICANT CHANGE UP (ref 32–36)
MCV RBC AUTO: 85 FL — SIGNIFICANT CHANGE UP (ref 80–100)
NRBC # BLD: 0 /100 WBCS — SIGNIFICANT CHANGE UP (ref 0–0)
PHOSPHATE SERPL-MCNC: 4.5 MG/DL — SIGNIFICANT CHANGE UP (ref 2.5–4.5)
PLATELET # BLD AUTO: 152 K/UL — SIGNIFICANT CHANGE UP (ref 150–400)
POTASSIUM SERPL-MCNC: 4.7 MMOL/L — SIGNIFICANT CHANGE UP (ref 3.5–5.3)
POTASSIUM SERPL-SCNC: 4.7 MMOL/L — SIGNIFICANT CHANGE UP (ref 3.5–5.3)
RBC # BLD: 5.4 M/UL — SIGNIFICANT CHANGE UP (ref 4.2–5.8)
RBC # FLD: 13.9 % — SIGNIFICANT CHANGE UP (ref 10.3–14.5)
SODIUM SERPL-SCNC: 140 MMOL/L — SIGNIFICANT CHANGE UP (ref 135–145)
WBC # BLD: 18.12 K/UL — HIGH (ref 3.8–10.5)
WBC # FLD AUTO: 18.12 K/UL — HIGH (ref 3.8–10.5)

## 2023-03-14 PROCEDURE — 99291 CRITICAL CARE FIRST HOUR: CPT

## 2023-03-14 PROCEDURE — 88307 TISSUE EXAM BY PATHOLOGIST: CPT | Mod: 26

## 2023-03-14 PROCEDURE — 61583 CRANIOFACIAL APPROACH SKULL: CPT | Mod: 22

## 2023-03-14 PROCEDURE — 61781 SCAN PROC CRANIAL INTRA: CPT

## 2023-03-14 PROCEDURE — 31085 REMOVAL OF FRONTAL SINUS: CPT | Mod: 59

## 2023-03-14 PROCEDURE — 71045 X-RAY EXAM CHEST 1 VIEW: CPT | Mod: 26

## 2023-03-14 PROCEDURE — 88334 PATH CONSLTJ SURG CYTO XM EA: CPT | Mod: 26

## 2023-03-14 PROCEDURE — 88333 PATH CONSLTJ SURG CYTO XM 1: CPT | Mod: 26

## 2023-03-14 PROCEDURE — 93010 ELECTROCARDIOGRAM REPORT: CPT

## 2023-03-14 PROCEDURE — 70450 CT HEAD/BRAIN W/O DYE: CPT | Mod: 26

## 2023-03-14 PROCEDURE — 61601 RESECT/EXCISE CRANIAL LESION: CPT

## 2023-03-14 DEVICE — SURGIFOAM PAD 8CM X 12.5CM X 10MM (100): Type: IMPLANTABLE DEVICE | Status: FUNCTIONAL

## 2023-03-14 DEVICE — PLATE UN3 RECTANGLE: Type: IMPLANTABLE DEVICE | Status: FUNCTIONAL

## 2023-03-14 DEVICE — KIT A-LINE 1LUM 20G X 12CM SAFE KIT: Type: IMPLANTABLE DEVICE | Status: FUNCTIONAL

## 2023-03-14 DEVICE — PLATE UN3 BOX LRG: Type: IMPLANTABLE DEVICE | Status: FUNCTIONAL

## 2023-03-14 DEVICE — PLATE BONE MICRO 10MM 2H: Type: IMPLANTABLE DEVICE | Status: FUNCTIONAL

## 2023-03-14 DEVICE — SURGICEL FIBRILLAR 2 X 4": Type: IMPLANTABLE DEVICE | Status: FUNCTIONAL

## 2023-03-14 DEVICE — SURGICEL 2 X 14": Type: IMPLANTABLE DEVICE | Status: FUNCTIONAL

## 2023-03-14 DEVICE — COLLAGEN DURAMATRIX ONLAY PLUS 4X5IN: Type: IMPLANTABLE DEVICE | Status: FUNCTIONAL

## 2023-03-14 DEVICE — SCREW UN3 AXS SELF DRILL 1.5X4MM: Type: IMPLANTABLE DEVICE | Status: FUNCTIONAL

## 2023-03-14 DEVICE — PLATE COVER BURRHOLE UN3 W/TAB 10MM: Type: IMPLANTABLE DEVICE | Status: FUNCTIONAL

## 2023-03-14 DEVICE — PLATE UN3 STRT 4 HOLE W/BAR: Type: IMPLANTABLE DEVICE | Status: FUNCTIONAL

## 2023-03-14 DEVICE — DVC ADHERUS AUTOSPRAY W/ DURAL SEALANT EXT TIP: Type: IMPLANTABLE DEVICE | Status: FUNCTIONAL

## 2023-03-14 DEVICE — TACHOSIL 4.8 X 4.8CM: Type: IMPLANTABLE DEVICE | Status: FUNCTIONAL

## 2023-03-14 DEVICE — SURGIFLO MATRIX WITH THROMBIN KIT: Type: IMPLANTABLE DEVICE | Status: FUNCTIONAL

## 2023-03-14 DEVICE — IMP TEMPORAL MALLEABLE: Type: IMPLANTABLE DEVICE | Status: FUNCTIONAL

## 2023-03-14 RX ORDER — LEVETIRACETAM 250 MG/1
500 TABLET, FILM COATED ORAL EVERY 12 HOURS
Refills: 0 | Status: DISCONTINUED | OUTPATIENT
Start: 2023-03-14 | End: 2023-03-14

## 2023-03-14 RX ORDER — OXYCODONE HYDROCHLORIDE 5 MG/1
5 TABLET ORAL EVERY 4 HOURS
Refills: 0 | Status: DISCONTINUED | OUTPATIENT
Start: 2023-03-14 | End: 2023-03-17

## 2023-03-14 RX ORDER — DEXAMETHASONE 0.5 MG/5ML
4 ELIXIR ORAL EVERY 6 HOURS
Refills: 0 | Status: DISCONTINUED | OUTPATIENT
Start: 2023-03-14 | End: 2023-03-17

## 2023-03-14 RX ORDER — PANTOPRAZOLE SODIUM 20 MG/1
40 TABLET, DELAYED RELEASE ORAL DAILY
Refills: 0 | Status: DISCONTINUED | OUTPATIENT
Start: 2023-03-14 | End: 2023-03-15

## 2023-03-14 RX ORDER — GABAPENTIN 400 MG/1
300 CAPSULE ORAL
Refills: 0 | Status: DISCONTINUED | OUTPATIENT
Start: 2023-03-14 | End: 2023-03-15

## 2023-03-14 RX ORDER — SODIUM CHLORIDE 9 MG/ML
1000 INJECTION, SOLUTION INTRAVENOUS
Refills: 0 | Status: DISCONTINUED | OUTPATIENT
Start: 2023-03-14 | End: 2023-03-14

## 2023-03-14 RX ORDER — DEXMEDETOMIDINE HYDROCHLORIDE IN 0.9% SODIUM CHLORIDE 4 UG/ML
0.05 INJECTION INTRAVENOUS
Qty: 200 | Refills: 0 | Status: DISCONTINUED | OUTPATIENT
Start: 2023-03-14 | End: 2023-03-15

## 2023-03-14 RX ORDER — INSULIN LISPRO 100/ML
VIAL (ML) SUBCUTANEOUS
Refills: 0 | Status: DISCONTINUED | OUTPATIENT
Start: 2023-03-14 | End: 2023-03-17

## 2023-03-14 RX ORDER — DEXTROSE 50 % IN WATER 50 %
15 SYRINGE (ML) INTRAVENOUS ONCE
Refills: 0 | Status: DISCONTINUED | OUTPATIENT
Start: 2023-03-14 | End: 2023-03-14

## 2023-03-14 RX ORDER — SODIUM CHLORIDE 9 MG/ML
1000 INJECTION INTRAMUSCULAR; INTRAVENOUS; SUBCUTANEOUS
Refills: 0 | Status: DISCONTINUED | OUTPATIENT
Start: 2023-03-14 | End: 2023-03-14

## 2023-03-14 RX ORDER — SENNA PLUS 8.6 MG/1
2 TABLET ORAL AT BEDTIME
Refills: 0 | Status: DISCONTINUED | OUTPATIENT
Start: 2023-03-14 | End: 2023-03-17

## 2023-03-14 RX ORDER — POLYETHYLENE GLYCOL 3350 17 G/17G
17 POWDER, FOR SOLUTION ORAL
Refills: 0 | Status: DISCONTINUED | OUTPATIENT
Start: 2023-03-14 | End: 2023-03-17

## 2023-03-14 RX ORDER — LEVETIRACETAM 250 MG/1
500 TABLET, FILM COATED ORAL EVERY 12 HOURS
Refills: 0 | Status: DISCONTINUED | OUTPATIENT
Start: 2023-03-14 | End: 2023-03-15

## 2023-03-14 RX ORDER — CHLORHEXIDINE GLUCONATE 213 G/1000ML
15 SOLUTION TOPICAL EVERY 12 HOURS
Refills: 0 | Status: DISCONTINUED | OUTPATIENT
Start: 2023-03-14 | End: 2023-03-15

## 2023-03-14 RX ORDER — DEXTROSE 50 % IN WATER 50 %
12.5 SYRINGE (ML) INTRAVENOUS ONCE
Refills: 0 | Status: DISCONTINUED | OUTPATIENT
Start: 2023-03-14 | End: 2023-03-17

## 2023-03-14 RX ORDER — INSULIN LISPRO 100/ML
VIAL (ML) SUBCUTANEOUS AT BEDTIME
Refills: 0 | Status: DISCONTINUED | OUTPATIENT
Start: 2023-03-14 | End: 2023-03-17

## 2023-03-14 RX ORDER — ACETAMINOPHEN 500 MG
650 TABLET ORAL EVERY 6 HOURS
Refills: 0 | Status: DISCONTINUED | OUTPATIENT
Start: 2023-03-14 | End: 2023-03-17

## 2023-03-14 RX ORDER — ONDANSETRON 8 MG/1
4 TABLET, FILM COATED ORAL EVERY 6 HOURS
Refills: 0 | Status: DISCONTINUED | OUTPATIENT
Start: 2023-03-14 | End: 2023-03-17

## 2023-03-14 RX ORDER — ACETAMINOPHEN 500 MG
1000 TABLET ORAL ONCE
Refills: 0 | Status: COMPLETED | OUTPATIENT
Start: 2023-03-14 | End: 2023-03-14

## 2023-03-14 RX ORDER — DEXTROSE 50 % IN WATER 50 %
25 SYRINGE (ML) INTRAVENOUS ONCE
Refills: 0 | Status: DISCONTINUED | OUTPATIENT
Start: 2023-03-14 | End: 2023-03-17

## 2023-03-14 RX ORDER — CEFAZOLIN SODIUM 1 G
2000 VIAL (EA) INJECTION EVERY 8 HOURS
Refills: 0 | Status: COMPLETED | OUTPATIENT
Start: 2023-03-14 | End: 2023-03-15

## 2023-03-14 RX ORDER — CHLORPROMAZINE HCL 10 MG
10 TABLET ORAL
Refills: 0 | Status: DISCONTINUED | OUTPATIENT
Start: 2023-03-14 | End: 2023-03-17

## 2023-03-14 RX ORDER — GLUCAGON INJECTION, SOLUTION 0.5 MG/.1ML
1 INJECTION, SOLUTION SUBCUTANEOUS ONCE
Refills: 0 | Status: DISCONTINUED | OUTPATIENT
Start: 2023-03-14 | End: 2023-03-17

## 2023-03-14 RX ORDER — DEXTROSE 50 % IN WATER 50 %
25 SYRINGE (ML) INTRAVENOUS ONCE
Refills: 0 | Status: DISCONTINUED | OUTPATIENT
Start: 2023-03-14 | End: 2023-03-14

## 2023-03-14 RX ADMIN — Medication 100 MILLIGRAM(S): at 22:36

## 2023-03-14 RX ADMIN — DEXMEDETOMIDINE HYDROCHLORIDE IN 0.9% SODIUM CHLORIDE 1.19 MICROGRAM(S)/KG/HR: 4 INJECTION INTRAVENOUS at 22:41

## 2023-03-14 RX ADMIN — Medication 400 MILLIGRAM(S): at 22:36

## 2023-03-14 RX ADMIN — LEVETIRACETAM 400 MILLIGRAM(S): 250 TABLET, FILM COATED ORAL at 06:07

## 2023-03-14 RX ADMIN — Medication 1000 MILLIGRAM(S): at 22:51

## 2023-03-14 RX ADMIN — Medication 4 MILLIGRAM(S): at 06:09

## 2023-03-14 RX ADMIN — POLYETHYLENE GLYCOL 3350 17 GRAM(S): 17 POWDER, FOR SOLUTION ORAL at 06:10

## 2023-03-14 RX ADMIN — SODIUM CHLORIDE 75 MILLILITER(S): 9 INJECTION INTRAMUSCULAR; INTRAVENOUS; SUBCUTANEOUS at 00:40

## 2023-03-14 RX ADMIN — Medication 4 MILLIGRAM(S): at 00:40

## 2023-03-14 NOTE — PROGRESS NOTE ADULT - ASSESSMENT
ASSESSMENT/PLAN:    NEURO:  Activity: [] mobilize as tolerated [] Bedrest [] PT [] OT [] PMNR    PULM:    CV:  SBP goal    RENAL:  Fluids:    GI:  Diet:  GI prophylaxis [] not indicated [] PPI [] other:  Bowel regimen [] colace [] senna [] other:    ENDO:   Goal euglycemia (-180)    HEME/ONC:  VTE prophylaxis: [] SCDs [] chemoprophylaxis [] hold chemoprophylaxis due to: [] high risk of DVT/PE on admission due to:    ID:    MISC:    SOCIAL/FAMILY:  [] awaiting [] updated at bedside [] family meeting    CODE STATUS:  [] Full Code [] DNR [] DNI [] Palliative/Comfort Care    DISPOSITION:  [] ICU [] Stroke Unit [] Floor [] EMU [] RCU [] PCU    [] Patient is at high risk of neurologic deterioration/death due to:     Time seen:  Time spent: ___ [] critical care minutes    Contact: [phone] ASSESSMENT/PLAN: POD 0 L craniotomy for tumor resection    NEURO:  Q1 hr neurochecks  analgesia--IV tylenol for now  sedation--wean off precedex  decadron for cerebral edema  keppra for seizure ppx  HOB 30  Activity: [x] mobilize as tolerated [] Bedrest [x] PT [x] OT [] PMNR    PULM: intubated  CPAP 10/5 FiO2 30%  14/500/5/50%--PRVC   CPAP as tolerated, wean to extubate    CV:  SBP goal 100-140  L radial monica    RENAL: IVL for now, monitor urine output  d/c paz     GI:  Diet: NPO overnight for possible extubation   GI prophylaxis [] not indicated [x] PPI [] other:  Bowel regimen [] colace [x] senna [x] other: Miralax     ENDO:   Goal euglycemia (-180)  FIOR    HEME/ONC:  VTE prophylaxis: [x] SCDs [] chemoprophylaxis [x] hold chemoprophylaxis due to: fresh post op [x] high risk of DVT/PE on admission due to: metastatic disease  BLE 3/7 negative     ID: adelfo op antibiotics     MISC:    SOCIAL/FAMILY:  [x] awaiting [] updated at bedside [] family meeting    CODE STATUS:  [x] Full Code [] DNR [] DNI [] Palliative/Comfort Care    DISPOSITION:  [x] ICU [] Stroke Unit [] Floor [] EMU [] RCU [] PCU    [x] Patient is at high risk of neurologic deterioration/death due to: post op hemorrhage, respiratory failure requiring intubation     Contact: [phone]

## 2023-03-14 NOTE — BRIEF OPERATIVE NOTE - NSICDXBRIEFPROCEDURE_GEN_ALL_CORE_FT
PROCEDURES:  Craniotomy for resection of tumor of left side of brain 14-Mar-2023 19:15:14  Sasha Dobson

## 2023-03-14 NOTE — CHART NOTE - NSCHARTNOTEFT_GEN_A_CORE
Preoperative Note. Plan for OR for L craniotomy for resection of tumor today, 3/14, with Dr Rodriguez:     KELLY NEGRON  OR Date 3-14-23  MRN [id number]    HPI: 47 Creole-speaking R-handed M with metastatic melanoma on immunotherapy (currently staged [id number], dx 2021, R lymph node rxn 12/2021, /p R thumb amputation and R VATS 11/2022, sp chemo) who presented with dizziness x 2 weeks, HA x 1 week. MRI brain revealed enlarged 2.4x4.4cm L anteromedial frontal enhancing extra-axial mass concerning for enlarging melanoma metastasis (previously 1cm lesion seen on MRI from 8/2022 on screening MRI). The lesion extends to, but not across, the sylvian fissure or the anterior falx, and posteriorly to compress the corpus callosum and frontal horns of the lateral ventricles. The ACAs are displaced to the right. Additionally, the tumor extends inferiorly to the olfactory groove, anterior to the optic apparatus without overt involvement of the anterior clinoid or surrounding neurovascular structures. There is a large vein running posteromedially through the tumor that seems distinct from the BROOK complex and does not appear on CTA. There is also a smaller R anterior temporal 1.6cm lesion concerning for additional metastasis. No leptomeningeal enhancement is seen.     His oncologist is Dr. Neo Marcos. He had previously received 4 cycles dacarbazine and has been maintained on nivolumab immunotherapy since 1/2023, his last dose prior to admission was 2/20/2023. On this admission, he was started on decadron 4q6 and keppra 500 BID for ppx.     Exam: AOx3, PERRL, EOMI, no facial, WALTERS 5/5, no drift, SILT.     Meds: Oncological hx metastatic melanoma as above, GERD.      Labs: Preoperative labs wnl, plts 139, INR 1.03, Na 138. T&S is active from 3/13. COVID neg 3/13 (previously positive 3/4). MRSA/MSSA neg 3/7. LED neg 3/7.     Preop/Operative Plan: The patient is planned for a left lateral supraorbital craniotomy for resection of the left frontal mass today 3/14 with Dr. Rodriguez using BrainLab neuronavigation and PAS neuromonitoring with intraoperative frozen section. Postoperatively the patient was be transferred to the neuro ICU for further management. He was cleared by the hospitalist for surgery on 3/6 with an RCRI score of 0, >4 METS. Preoperative Note. Plan for OR for L craniotomy for resection of tumor today, 3/14, with Dr Rodriguez:     KELLY NEGRON  OR Date 3-14-23  MRN [id number]    HPI: 47 Creole-speaking R-handed M with metastatic melanoma on immunotherapy (currently staged [id number], dx 2021, R lymph node rxn 12/2021, /p R thumb amputation and R VATS 11/2022, sp chemo) who presented with dizziness x 2 weeks, HA x 1 week. MRI brain revealed enlarged 2.4x4.4cm L anteromedial frontal enhancing extra-axial mass concerning for enlarging melanoma metastasis (previously 1cm lesion seen on MRI from 8/2022 on screening MRI). The lesion extends to, but not across, the sylvian fissure or the anterior falx, and posteriorly to compress the corpus callosum and frontal horns of the lateral ventricles. The ACAs are displaced to the right. Additionally, the tumor extends inferiorly to the olfactory groove, anterior to the optic apparatus without overt involvement of the anterior clinoid or surrounding neurovascular structures. There is a large vein running posteromedially through the tumor that seems distinct from the BROOK complex and does not appear on CTA. There is also a smaller R anterior temporal 1.6cm lesion concerning for additional metastasis. No leptomeningeal enhancement is seen.     His oncologist is Dr. Neo Marcos. He had previously received 4 cycles dacarbazine and has been maintained on nivolumab immunotherapy since 1/2023, his last dose prior to admission was 2/20/2023. On this admission, he was started on decadron 4q6 and keppra 500 BID for ppx.     Exam: AOx3, PERRL, EOMI, no facial, WALTERS 5/5, no drift, SILT.     Meds: Dex 4q6, keppra 500 bid    Labs: Preoperative labs wnl, plts 139, INR 1.03, Na 138. T&S is active from 3/13. COVID neg 3/13 (previously positive 3/4). MRSA/MSSA neg 3/7. LED neg 3/7.     Preop/Operative Plan: The patient is planned for a left lateral supraorbital craniotomy for resection of the left frontal mass today 3/14 with Dr. Rodriguez using BrainLab neuronavigation and PAS neuromonitoring with intraoperative frozen section. Postoperatively the patient was be transferred to the neuro ICU for further management. He was cleared by the hospitalist for surgery on 3/6 with an RCRI score of 0, >4 METS. Preoperative Note. Plan for OR for L craniotomy for resection of tumor today, 3/14, with Dr Rodriguez:     KELLY NEGRON  OR Date 3-14-23  MRN [id number]    HPI: 47 Creole-speaking R-handed M with metastatic melanoma on immunotherapy (currently staged [id number], dx 2021, R lymph node rxn 12/2021, /p R thumb amputation and R VATS 11/2022, sp chemo) who presented with dizziness x 2 weeks, HA x 1 week. MRI brain revealed enlarged 2.4x4.4cm L anteromedial frontal enhancing extra-axial mass concerning for enlarging melanoma metastasis (previously 1cm lesion seen on MRI from 8/2022 on screening MRI). The lesion extends to, but not across, the sylvian fissure or the anterior falx, and posteriorly to compress the corpus callosum and frontal horns of the lateral ventricles. The ACAs are displaced to the right. Additionally, the tumor extends inferiorly to the olfactory groove, anterior to the optic apparatus without overt involvement of the anterior clinoid or surrounding neurovascular structures. There is a large vein running posteromedially through the tumor that seems distinct from the BROOK complex and does not appear on CTA. There is also a smaller R anterior temporal 1.6cm lesion concerning for additional metastasis. No leptomeningeal enhancement is seen.     His oncologist is Dr. Neo Marcos. He had previously received 4 cycles dacarbazine and has been maintained on nivolumab immunotherapy since 1/2023, his last dose prior to admission was 2/20/2023. On this admission, he was started on decadron 4q6 and keppra 500 BID for ppx.     Exam: AOx3, PERRL, EOMI, no facial, WALTERS 5/5, no drift, SILT.     Meds: Dex 4q6, keppra 500 bid    Labs: Preoperative labs wnl, plts 139, INR 1.03, Na 138. T&S is active from 3/13. COVID neg 3/13 (previously positive 3/4). MRSA/MSSA neg 3/7. LED neg 3/7.     Preop/Operative Plan: The patient is planned for a left lateral supraorbital craniotomy for resection of the left frontal mass today 3/14 with Dr. Rodriguez using BrainLab neuronavigation and PAS neuromonitoring with intraoperative frozen section. Postoperatively the patient will be transferred to the neuro ICU for further management. He was cleared by the hospitalist for surgery on 3/6 with an RCRI score of 0, >4 METS.

## 2023-03-14 NOTE — PROGRESS NOTE ADULT - SUBJECTIVE AND OBJECTIVE BOX
SUMMARY:    OVERNIGHT EVENTS:     ADMISSION SCORES:   GCS: HH: MF: NIHSS: ICH Score:    SEDATION SCORES:  RASS: CAM-ICU:     REVIEW OF SYSTEMS:    VITALS: [] Reviewed    IMAGING/DATA: [] Reviewed    IVF FLUIDS/MEDICATIONS: [] Reviewed    ALLERGIES: Allergies    No Known Allergies    Intolerances        DEVICES:   [] Restraints [] PIVs [] ET tube [] central line [] PICC [] arterial line [] paz [] NGT/OGT [] EVD [] LD [] SONAL/HMV [] LiCOX [] ICP monitor [] Trach [] PEG [] Chest Tube [] other:    EXAMINATION:  General: No acute distress  HEENT: Anicteric sclerae  Cardiac: [id number]  Lungs: Clear  Abdomen: Soft, non-tender, +BS  Extremities: No c/c/e  Skin/Incision Site: Clean, dry and intact  Neurologic: Awake, alert, fully oriented, follows commands, PERRL, VFFtc, EOMI, face symmetric, tongue midline, no drift, full strength SUMMARY: 46yo man Right-handed, PMH stage IV melanoma in Right thumb s/p amputation in 2021. With mets to axillary lymph nodes and Right lung s/p Right VAT in 11/2022. P/w dizziness x2wk and HA x1wk. CT shows Left frontal hyperdense mass w/ ~1cm MLS and small Right temporal hyperdense mass. 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 CT CAP shows no pulmonary masses, but new Right axillary soft tissue density. Exam: Creole-speaking, AOx3, EOMI, no facial/drift, WALTERS 5/5, SILT  Adm on 3/4. Covid +.    3/14 LEFT LSO craniotomy for resection of tumor, frozen melanoma, 750mL blood loss, postop CTH pending; 3U pRBC, 1U FFP, 1U plt, 3L IVF intra op  Adm NSCU post op remains intubated    REVIEW OF SYSTEMS: [x] Unable to Assess due to neurologic exam   [ ] All ROS addressed below are non-contributory, except:  Neuro: [ ] Headache [ ] Back pain [ ] Numbness [ ] Weakness [ ] Ataxia [ ] Dizziness [ ] Aphasia [ ] Dysarthria [ ] Visual disturbance  Resp: [ ] Shortness of breath/dyspnea [ ] Orthopnea [ ] Cough  CV: [ ] Chest pain [ ] Palpitation [ ] Lightheadedness [ ] Syncope  Renal: [ ] Thirst [ ] Edema  GI: [ ] Nausea [ ] Emesis [ ] Abdominal pain [ ] Constipation [ ] Diarrhea  Hem: [ ] Hematemesis [ ] bBright red blood per rectum  ID: [ ] Fever [ ] Chills [ ] Dysuria  ENT: [ ] Rhinorrhea    VITALS: [x] Reviewed    IMAGING/DATA: [x] Reviewed    IVF FLUIDS/MEDICATIONS: [x] Reviewed    ALLERGIES: Allergies    No Known Allergies    Intolerances    DEVICES:   [] Restraints [x] PIVs [x] ET tube [] central line [] PICC [x] arterial line L radial [x] paz [] NGT/OGT [] EVD [] LD [] SONAL/HMV [] LiCOX [] ICP monitor [] Trach [] PEG [] Chest Tube [] other:    EXAMINATION: Yolanda #[id number] Creole    General: No acute distress  HEENT: Anicteric sclerae  Cardiac: [id number]  Lungs: Clear  Abdomen: Soft, non-tender, +BS  Extremities: No c/c/e  Skin/Incision Site: Clean, dry and intact  Neurologic: EO to voice, PERRL, tracks, does not nod but follows some simple commands--BUE AG, BLE wiggles toes, at least 2/5

## 2023-03-14 NOTE — AIRWAY REMOVAL NOTE  ADULT & PEDS - ARTIFICAL AIRWAY REMOVAL COMMENTS
Written order for extubation verified. The patient was identified by full name and birth date compared to the identification band.

## 2023-03-15 LAB
GLUCOSE BLDC GLUCOMTR-MCNC: 125 MG/DL — HIGH (ref 70–99)
GLUCOSE BLDC GLUCOMTR-MCNC: 136 MG/DL — HIGH (ref 70–99)
GLUCOSE BLDC GLUCOMTR-MCNC: 165 MG/DL — HIGH (ref 70–99)
GLUCOSE BLDC GLUCOMTR-MCNC: 220 MG/DL — HIGH (ref 70–99)
LACTATE SERPL-SCNC: 2.6 MMOL/L — HIGH (ref 0.5–2)
SARS-COV-2 RNA SPEC QL NAA+PROBE: SIGNIFICANT CHANGE UP

## 2023-03-15 PROCEDURE — 99291 CRITICAL CARE FIRST HOUR: CPT

## 2023-03-15 PROCEDURE — 70450 CT HEAD/BRAIN W/O DYE: CPT | Mod: 26

## 2023-03-15 PROCEDURE — 70553 MRI BRAIN STEM W/O & W/DYE: CPT | Mod: 26

## 2023-03-15 RX ORDER — HYDRALAZINE HCL 50 MG
10 TABLET ORAL ONCE
Refills: 0 | Status: DISCONTINUED | OUTPATIENT
Start: 2023-03-15 | End: 2023-03-15

## 2023-03-15 RX ORDER — OXYCODONE HYDROCHLORIDE 5 MG/1
10 TABLET ORAL EVERY 4 HOURS
Refills: 0 | Status: DISCONTINUED | OUTPATIENT
Start: 2023-03-15 | End: 2023-03-17

## 2023-03-15 RX ORDER — PANTOPRAZOLE SODIUM 20 MG/1
40 TABLET, DELAYED RELEASE ORAL DAILY
Refills: 0 | Status: DISCONTINUED | OUTPATIENT
Start: 2023-03-15 | End: 2023-03-17

## 2023-03-15 RX ORDER — LEVETIRACETAM 250 MG/1
500 TABLET, FILM COATED ORAL
Refills: 0 | Status: DISCONTINUED | OUTPATIENT
Start: 2023-03-15 | End: 2023-03-17

## 2023-03-15 RX ORDER — ACETAMINOPHEN 500 MG
1000 TABLET ORAL ONCE
Refills: 0 | Status: COMPLETED | OUTPATIENT
Start: 2023-03-15 | End: 2023-03-15

## 2023-03-15 RX ORDER — HYDROMORPHONE HYDROCHLORIDE 2 MG/ML
0.5 INJECTION INTRAMUSCULAR; INTRAVENOUS; SUBCUTANEOUS ONCE
Refills: 0 | Status: DISCONTINUED | OUTPATIENT
Start: 2023-03-15 | End: 2023-03-15

## 2023-03-15 RX ORDER — CHLORHEXIDINE GLUCONATE 213 G/1000ML
1 SOLUTION TOPICAL DAILY
Refills: 0 | Status: DISCONTINUED | OUTPATIENT
Start: 2023-03-15 | End: 2023-03-15

## 2023-03-15 RX ADMIN — Medication 4 MILLIGRAM(S): at 00:34

## 2023-03-15 RX ADMIN — POLYETHYLENE GLYCOL 3350 17 GRAM(S): 17 POWDER, FOR SOLUTION ORAL at 17:04

## 2023-03-15 RX ADMIN — Medication 4: at 11:29

## 2023-03-15 RX ADMIN — CHLORHEXIDINE GLUCONATE 1 APPLICATION(S): 213 SOLUTION TOPICAL at 12:00

## 2023-03-15 RX ADMIN — OXYCODONE HYDROCHLORIDE 5 MILLIGRAM(S): 5 TABLET ORAL at 03:22

## 2023-03-15 RX ADMIN — Medication 100 MILLIGRAM(S): at 05:34

## 2023-03-15 RX ADMIN — PANTOPRAZOLE SODIUM 40 MILLIGRAM(S): 20 TABLET, DELAYED RELEASE ORAL at 11:29

## 2023-03-15 RX ADMIN — Medication 100 MILLIGRAM(S): at 14:28

## 2023-03-15 RX ADMIN — CHLORHEXIDINE GLUCONATE 15 MILLILITER(S): 213 SOLUTION TOPICAL at 05:35

## 2023-03-15 RX ADMIN — Medication 4 MILLIGRAM(S): at 17:04

## 2023-03-15 RX ADMIN — OXYCODONE HYDROCHLORIDE 5 MILLIGRAM(S): 5 TABLET ORAL at 02:22

## 2023-03-15 RX ADMIN — HYDROMORPHONE HYDROCHLORIDE 0.5 MILLIGRAM(S): 2 INJECTION INTRAMUSCULAR; INTRAVENOUS; SUBCUTANEOUS at 00:52

## 2023-03-15 RX ADMIN — LEVETIRACETAM 500 MILLIGRAM(S): 250 TABLET, FILM COATED ORAL at 17:04

## 2023-03-15 RX ADMIN — HYDROMORPHONE HYDROCHLORIDE 0.5 MILLIGRAM(S): 2 INJECTION INTRAMUSCULAR; INTRAVENOUS; SUBCUTANEOUS at 01:07

## 2023-03-15 RX ADMIN — Medication 1000 MILLIGRAM(S): at 05:06

## 2023-03-15 RX ADMIN — Medication 400 MILLIGRAM(S): at 04:51

## 2023-03-15 RX ADMIN — Medication 4 MILLIGRAM(S): at 11:29

## 2023-03-15 RX ADMIN — Medication 4 MILLIGRAM(S): at 04:53

## 2023-03-15 RX ADMIN — LEVETIRACETAM 400 MILLIGRAM(S): 250 TABLET, FILM COATED ORAL at 05:32

## 2023-03-15 NOTE — DIETITIAN INITIAL EVALUATION ADULT - REASON INDICATOR FOR ASSESSMENT
RD assessment warranted for: length of stay   Source: electronic medical record, Pt interview via  (Shade guerrero #[id number])

## 2023-03-15 NOTE — PHYSICAL THERAPY INITIAL EVALUATION ADULT - NSPTDISCHREC_GEN_A_CORE
Anticipate home with assist from wife and outpatient PT for strength/balance training
No skilled PT needs

## 2023-03-15 NOTE — PROVIDER CONTACT NOTE (OTHER) - ACTION/TREATMENT ORDERED:
pt COVID + on 3/4, 2 negative swabs, no covid symptoms.  Not on any chemotherapy.  Isolation discontinued.

## 2023-03-15 NOTE — DIETITIAN INITIAL EVALUATION ADULT - REASON FOR ADMISSION
Per chart "SUMMARY: 48yo man Right-handed, PMH stage IV melanoma in Right thumb s/p amputation in 2021. With mets to axillary lymph nodes and Right lung s/p Right VAT in 11/2022. P/w dizziness x2wk and HA x1wk. CT shows Left frontal hyperdense mass w/ ~1cm MLS and small Right temporal hyperdense mass. 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 CT CAP shows no pulmonary masses, but new Right axillary soft tissue density. Exam: Creole-speaking, AOx3, EOMI, no facial/drift, WALTERS 5/5, SILT  Adm on 3/4. Covid +."

## 2023-03-15 NOTE — PHYSICAL THERAPY INITIAL EVALUATION ADULT - PERTINENT HX OF CURRENT PROBLEM, REHAB EVAL
46yo man Right-handed, PMH stage IV melanoma in Right thumb s/p amputation in 2021. With mets to axillary lymph nodes and Right lung s/p Right VAT in 11/2022. P/w dizziness x2wk and HA x1wk. CT shows Left frontal hyperdense mass w/ ~1cm MLS and small Right temporal hyperdense mass. 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 CT CAP shows no pulmonary masses, but new Right axillary soft tissue density. now s/p LEFT LSO craniotomy for resection of tumor, frozen melanoma on 3/14.
47M Right-handed, PMH stage IV melanoma in Right thumb s/p amputation in 2021. With mets to axillary lymph nodes and Right lung s/p Right VAT in 11/2022. P/w dizziness x2wk and HA x1wk. CT shows Left frontal hyperdense mass w/ ~1cm MLS and small Right temporal hyperdense mass. 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 CT CAP shows no pulmonary masses, but new Right axillary soft tissue density. Exam: Creole-speaking, AOx3, EOMI, no facial/drift, WALTERS 5/5, SILT. Hospital course 3/4 CXR: Clear lungs. 3/4 CT Angio Neck: Noncontrast CT Head: Dominant left anterior inferior frontal and additional right temporal lobe hyperattenuating masses, with associated vasogenic edema and mass effect contributing to rightward midline shift. No pawel hydrocephalus at this time. In the setting of metastatic melanoma, findings likely represent melanoma metastases. More sensitive evaluation with contrast-enhanced brain MRI may be obtained, as clinically warranted, as no contraindications exist. CTA Neck: No significant flow-limiting stenosis or evidence of acute dissection within the cervical carotid or vertebral arteries. CTA Head: No proximal large vessel occlusion, significant stenosis, or evidence of intracranial aneurysm. 3/4 ECG: Normal sinus rhythm. Possible Left atrial enlargement. Nonspecific ST abnormality

## 2023-03-15 NOTE — OCCUPATIONAL THERAPY INITIAL EVALUATION ADULT - PERTINENT HX OF CURRENT PROBLEM, REHAB EVAL
Order routine labs 48yo man Right-handed, PMH stage IV melanoma in Right thumb s/p amputation in 2021. With mets to axillary lymph nodes and Right lung s/p Right VAT in 11/2022. P/w dizziness x2wk and HA x1wk. CT shows Left frontal hyperdense mass w/ ~1cm MLS and small Right temporal hyperdense mass. 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 CT CAP shows no pulmonary masses, but new Right axillary soft tissue density. now s/p LEFT LSO craniotomy for resection of tumor, frozen melanoma on 3/14.

## 2023-03-15 NOTE — DIETITIAN INITIAL EVALUATION ADULT - REASON
no
no overt signs of muscle/fat depletion upon visual observation; nutrition focused physical examination not indicated at this time

## 2023-03-15 NOTE — PHYSICAL THERAPY INITIAL EVALUATION ADULT - GENERAL OBSERVATIONS, REHAB EVAL
Patient received in bed AOx4 +IVL with wife at bedside to translate, pt speaks Cambodian/creole. Pt deferred .
Pt rec'd semisupine in bed, in NAD, +IV, +monica, +NSCU monitoring, +bandage on head, +L eye swollen shut. Agreeable to PT. RN cleared pt to be seen

## 2023-03-15 NOTE — DIETITIAN INITIAL EVALUATION ADULT - OTHER INFO
- GI: No GI distress reported at time of RD visit. Last BM:  03/13. Bowel regimen: Miralax, Senna.   - Endo: No Hx of elevated BG per Pt or chart. Elevated BG noted today 03/15 in setting of regular diet and steroid regimen, ordered for insulin for BG management (SSI Lispro).   - Neuro: POD1 craniotomy with tumor resection 03/14   - Renal: hypermagnesemia  noted today 03/15

## 2023-03-15 NOTE — DIETITIAN INITIAL EVALUATION ADULT - PERTINENT LABORATORY DATA
03-14    140  |  104  |  24<H>  ----------------------------<  155<H>  4.7   |  19<L>  |  1.04    Ca    8.8      14 Mar 2023 20:38  Phos  4.5     03-14  Mg     2.7     03-14    POCT Blood Glucose.: 220 mg/dL (03-15-23 @ 10:35)

## 2023-03-15 NOTE — PROGRESS NOTE ADULT - ASSESSMENT
ASSESSMENT/PLAN: POD 0 L craniotomy for tumor resection- melanoma     NEURO:  Q1 hr neurochecks  analgesia--IV tylenol for now  sedation--wean off precedex  decadron for cerebral edema  keppra for seizure ppx  HOB 30  CTH this AM followed by MRI  Activity: [x] mobilize as tolerated [] Bedrest [x] PT [x] OT [] PMNR    PULM: intubated  CPAP 10/5 FiO2 30%  14/500/5/50%--PRVC   CPAP as tolerated, wean to extubate    CV:  SBP goal 100-140  L radial monica    RENAL: IVL for now, monitor urine output  d/c paz     GI:  Diet: NPO overnight for possible extubation   GI prophylaxis [] not indicated [x] PPI [] other:  Bowel regimen [] colace [x] senna [x] other: Miralax     ENDO:   Goal euglycemia (-180)  FIOR    HEME/ONC:  VTE prophylaxis: [x] SCDs [] chemoprophylaxis [x] hold chemoprophylaxis due to: fresh post op [x] high risk of DVT/PE on admission due to: metastatic disease  BLE 3/7 negative     ID: adelfo op antibiotics     MISC:    SOCIAL/FAMILY:  [x] awaiting [] updated at bedside [] family meeting    CODE STATUS:  [x] Full Code [] DNR [] DNI [] Palliative/Comfort Care    DISPOSITION:  [x] ICU [] Stroke Unit [] Floor [] EMU [] RCU [] PCU    [x] Patient is at high risk of neurologic deterioration/death due to: post op hemorrhage, respiratory failure requiring intubation     Contact: [phone] ASSESSMENT/PLAN: POD 0 L craniotomy for tumor resection- melanoma     NEURO:  Q4hr neurochecks  analgesia--IV tylenol for now  decadron for cerebral edema  keppra for seizure ppx  HOB 30  CTH this AM with increased heme in the post op bed followed by MRI  Gabapentin 300 mg BID PRN for hiccups- will discontinue   Activity: [x] mobilize as tolerated [] Bedrest [x] PT [x] OT [] PMNR    PULM:   RA  SPaO2 >92%    CV:  SBP goal 100-160  L radial monica- will dc    RENAL: IVL for now, monitor urine output    GI:  Diet: CCD  GI prophylaxis [] not indicated [x] PPI [] other:  Bowel regimen [] colace [x] senna [x] other: Miralax     ENDO:   Goal euglycemia (-180)  FIOR    HEME/ONC:  H/H  VTE prophylaxis: [x] SCDs [] chemoprophylaxis [x] hold chemoprophylaxis due to: heme in the CTH [x] high risk of DVT/PE on admission due to: metastatic disease  BLE 3/7 negative     ID: adelfo op antibiotics   Afebrile, elevated leukocytosis likely secondary to steroids    MISC:    SOCIAL/FAMILY:  [x] awaiting [] updated at bedside [] family meeting    CODE STATUS:  [x] Full Code [] DNR [] DNI [] Palliative/Comfort Care    DISPOSITION:  [] ICU [] Stroke Unit [x] Floor 4C [] EMU [] RCU [] PCU    [x] Patient is at high risk of neurologic deterioration/death due to: post op hemorrhage, respiratory failure requiring intubation     Contact: [phone]

## 2023-03-15 NOTE — DIETITIAN INITIAL EVALUATION ADULT - ADD RECOMMEND
3) Encourage PO intake, obtain food preferences, provide feeding assistance as needed.   4) Monitor nutritional intake/tolerance, weights, labs, hydration status, skin integrity, BM, GI symptoms.   5) Diet education reviewed, reinforce as needed.   6) Consider multivitamin to optimize micronutrient intake if no contraindications.

## 2023-03-15 NOTE — DIETITIAN INITIAL EVALUATION ADULT - PHYSCIAL ASSESSMENT
Weight Hx Per:  - Source: Pt   - UBW: unable to confirm  - Reported weight changes: weight loss x 4 months    Weight Hx Per [facility] HIE:  - 216 pounds (08/2022)  - 210 pounds (11/2022)  - 198 pounds (01/17/2023)    Current Admission Weights:  - Dosing weight: 210.6 pounds (03/14)  - Daily weight: 207.6 pounds (03/08)    Weight Change:  - Indicates weight loss x 4 months (4% x 6 months based on lowest weight this admit); not clinically significant. Will continue to monitor weight trends as available/able.     IBW: 160 pounds   %IBW: 130%/overweight

## 2023-03-15 NOTE — DIETITIAN INITIAL EVALUATION ADULT - ENERGY INTAKE
Fair (50-75%) Pt with fair-good appetite/PO intake, ~% of meals consumed pre-operatively per flow sheets. Per team, Pt with good PO intake today 03/15 breakfast.  - Amenable to receiving oral nutritional supplement to optimize PO intake: Glucerna 2x/day   - RD provided menu to Pt at bedside to order preferred foods PRN (Pt endorses family visits who can translate English menu).  Pt with fair-good appetite/PO intake, ~% of meals consumed pre-operatively per flow sheets. Per team, Pt with good PO intake today 03/15 breakfast.  - Amenable to receiving oral nutritional supplement to optimize PO intake: Glucerna 1x/day   - RD provided menu to Pt at bedside to order preferred foods PRN (Pt endorses family visits who can translate English menu).

## 2023-03-15 NOTE — PHYSICAL THERAPY INITIAL EVALUATION ADULT - ADDITIONAL COMMENTS
Pt lives in an apartment with 12 steps to enter. PTA patient notes that he ambulated independently without AD

## 2023-03-15 NOTE — DIETITIAN INITIAL EVALUATION ADULT - ORAL INTAKE PTA/DIET HISTORY
Decreased appetite/PO intake x 4 months PTA, eating <75% of usual intake daily  - NKFA/intolerances reported.   - No therapeutic/Mormonism restrictions.   - Micronutrient/Other supplementation: none   - Protein-energy supplementation: Ensure Shake 1x/day

## 2023-03-15 NOTE — OCCUPATIONAL THERAPY INITIAL EVALUATION ADULT - LIVES WITH, PROFILE
Pt lives in an apartment with 12 steps to enter. PTA patient notes that he ambulated independently./spouse

## 2023-03-15 NOTE — DIETITIAN INITIAL EVALUATION ADULT - PERSON TAUGHT/METHOD
Discussed importance of adequate consumption of meals/supplements to optimize protein-energy intake in setting of increased demand for nutrient secondary to surgical healing, discussed protein food sources. Discussed effect of steroids on BG, carbohydrate foods, limiting carbohydrate portions,  avoiding concentrated sweets. Pt requesting literature in English (family will translate), provided at bedside./verbal instruction/written material/individual instruction/patient instructed

## 2023-03-15 NOTE — DIETITIAN INITIAL EVALUATION ADULT - PERTINENT MEDS FT
MEDICATIONS  (STANDING):  ceFAZolin   IVPB 2000 milliGRAM(s) IV Intermittent every 8 hours  chlorhexidine 4% Liquid 1 Application(s) Topical daily  dexAMETHasone  Injectable 4 milliGRAM(s) IV Push every 6 hours  dexMEDEtomidine Infusion 0.05 MICROgram(s)/kG/Hr (1.19 mL/Hr) IV Continuous <Continuous>  dextrose 50% Injectable 25 Gram(s) IV Push once  dextrose 50% Injectable 12.5 Gram(s) IV Push once  glucagon  Injectable 1 milliGRAM(s) IntraMuscular once  HYDROmorphone  Injectable 0.5 milliGRAM(s) IV Push once  insulin lispro (ADMELOG) corrective regimen sliding scale   SubCutaneous three times a day before meals  insulin lispro (ADMELOG) corrective regimen sliding scale   SubCutaneous at bedtime  levETIRAcetam 500 milliGRAM(s) Oral two times a day  pantoprazole    Tablet 40 milliGRAM(s) Oral daily  polyethylene glycol 3350 17 Gram(s) Oral two times a day  senna 2 Tablet(s) Oral at bedtime    MEDICATIONS  (PRN):  acetaminophen     Tablet .. 650 milliGRAM(s) Oral every 6 hours PRN Temp greater or equal to 38C (100.4F), Mild Pain (1 - 3)  chlorproMAZINE    Tablet 10 milliGRAM(s) Oral four times a day PRN hiccups  gabapentin 300 milliGRAM(s) Oral two times a day PRN hiccups  ondansetron Injectable 4 milliGRAM(s) IV Push every 6 hours PRN Nausea and/or Vomiting  oxyCODONE    IR 5 milliGRAM(s) Oral every 4 hours PRN Moderate Pain (4 - 6)  oxyCODONE    IR 10 milliGRAM(s) Oral every 4 hours PRN Severe Pain (7 - 10)

## 2023-03-15 NOTE — DIETITIAN INITIAL EVALUATION ADULT - DIET TYPE
1) Change diet to optimize BG control: Consistent carbohydrate (with snack)  2) Add oral nutrition supplement: Glucerna 2x/day 1) Change diet to optimize BG control: Consistent carbohydrate (with snack)  2) Add oral nutrition supplement: Glucerna 1x/day

## 2023-03-16 ENCOUNTER — OUTPATIENT (OUTPATIENT)
Dept: OUTPATIENT SERVICES | Facility: HOSPITAL | Age: 48
LOS: 1 days | Discharge: HOME | End: 2023-03-16

## 2023-03-16 DIAGNOSIS — C34.90 MALIGNANT NEOPLASM OF UNSPECIFIED PART OF UNSPECIFIED BRONCHUS OR LUNG: ICD-10-CM

## 2023-03-16 DIAGNOSIS — C43.60 MALIGNANT MELANOMA OF UNSPECIFIED UPPER LIMB, INCLUDING SHOULDER: Chronic | ICD-10-CM

## 2023-03-16 DIAGNOSIS — Z98.890 OTHER SPECIFIED POSTPROCEDURAL STATES: Chronic | ICD-10-CM

## 2023-03-16 LAB
A1C WITH ESTIMATED AVERAGE GLUCOSE RESULT: 5.7 % — HIGH (ref 4–5.6)
ANION GAP SERPL CALC-SCNC: 10 MMOL/L — SIGNIFICANT CHANGE UP (ref 5–17)
BUN SERPL-MCNC: 24 MG/DL — HIGH (ref 7–23)
CALCIUM SERPL-MCNC: 8.8 MG/DL — SIGNIFICANT CHANGE UP (ref 8.4–10.5)
CHLORIDE SERPL-SCNC: 99 MMOL/L — SIGNIFICANT CHANGE UP (ref 96–108)
CO2 SERPL-SCNC: 28 MMOL/L — SIGNIFICANT CHANGE UP (ref 22–31)
CREAT SERPL-MCNC: 1 MG/DL — SIGNIFICANT CHANGE UP (ref 0.5–1.3)
EGFR: 93 ML/MIN/1.73M2 — SIGNIFICANT CHANGE UP
ESTIMATED AVERAGE GLUCOSE: 117 MG/DL — HIGH (ref 68–114)
GLUCOSE BLDC GLUCOMTR-MCNC: 120 MG/DL — HIGH (ref 70–99)
GLUCOSE BLDC GLUCOMTR-MCNC: 125 MG/DL — HIGH (ref 70–99)
GLUCOSE BLDC GLUCOMTR-MCNC: 142 MG/DL — HIGH (ref 70–99)
GLUCOSE BLDC GLUCOMTR-MCNC: 167 MG/DL — HIGH (ref 70–99)
GLUCOSE SERPL-MCNC: 180 MG/DL — HIGH (ref 70–99)
HCT VFR BLD CALC: 46.7 % — SIGNIFICANT CHANGE UP (ref 39–50)
HGB BLD-MCNC: 15.8 G/DL — SIGNIFICANT CHANGE UP (ref 13–17)
LACTATE SERPL-SCNC: 2.2 MMOL/L — HIGH (ref 0.5–2)
MAGNESIUM SERPL-MCNC: 2.6 MG/DL — SIGNIFICANT CHANGE UP (ref 1.6–2.6)
MCHC RBC-ENTMCNC: 29.1 PG — SIGNIFICANT CHANGE UP (ref 27–34)
MCHC RBC-ENTMCNC: 33.8 GM/DL — SIGNIFICANT CHANGE UP (ref 32–36)
MCV RBC AUTO: 86 FL — SIGNIFICANT CHANGE UP (ref 80–100)
NRBC # BLD: 0 /100 WBCS — SIGNIFICANT CHANGE UP (ref 0–0)
PHOSPHATE SERPL-MCNC: 4.1 MG/DL — SIGNIFICANT CHANGE UP (ref 2.5–4.5)
PLATELET # BLD AUTO: 127 K/UL — LOW (ref 150–400)
POTASSIUM SERPL-MCNC: 4.4 MMOL/L — SIGNIFICANT CHANGE UP (ref 3.5–5.3)
POTASSIUM SERPL-SCNC: 4.4 MMOL/L — SIGNIFICANT CHANGE UP (ref 3.5–5.3)
RBC # BLD: 5.43 M/UL — SIGNIFICANT CHANGE UP (ref 4.2–5.8)
RBC # FLD: 14.2 % — SIGNIFICANT CHANGE UP (ref 10.3–14.5)
SODIUM SERPL-SCNC: 137 MMOL/L — SIGNIFICANT CHANGE UP (ref 135–145)
WBC # BLD: 20.36 K/UL — HIGH (ref 3.8–10.5)
WBC # FLD AUTO: 20.36 K/UL — HIGH (ref 3.8–10.5)

## 2023-03-16 PROCEDURE — 93970 EXTREMITY STUDY: CPT | Mod: 26

## 2023-03-16 RX ORDER — LANOLIN ALCOHOL/MO/W.PET/CERES
3 CREAM (GRAM) TOPICAL AT BEDTIME
Refills: 0 | Status: DISCONTINUED | OUTPATIENT
Start: 2023-03-16 | End: 2023-03-17

## 2023-03-16 RX ADMIN — Medication 4 MILLIGRAM(S): at 18:02

## 2023-03-16 RX ADMIN — LEVETIRACETAM 500 MILLIGRAM(S): 250 TABLET, FILM COATED ORAL at 18:01

## 2023-03-16 RX ADMIN — OXYCODONE HYDROCHLORIDE 5 MILLIGRAM(S): 5 TABLET ORAL at 00:38

## 2023-03-16 RX ADMIN — Medication 10 MILLIGRAM(S): at 18:07

## 2023-03-16 RX ADMIN — LEVETIRACETAM 500 MILLIGRAM(S): 250 TABLET, FILM COATED ORAL at 05:41

## 2023-03-16 RX ADMIN — Medication 650 MILLIGRAM(S): at 22:08

## 2023-03-16 RX ADMIN — OXYCODONE HYDROCHLORIDE 10 MILLIGRAM(S): 5 TABLET ORAL at 04:08

## 2023-03-16 RX ADMIN — Medication 4 MILLIGRAM(S): at 05:41

## 2023-03-16 RX ADMIN — Medication 3 MILLIGRAM(S): at 22:08

## 2023-03-16 RX ADMIN — OXYCODONE HYDROCHLORIDE 5 MILLIGRAM(S): 5 TABLET ORAL at 00:08

## 2023-03-16 RX ADMIN — Medication 4 MILLIGRAM(S): at 23:48

## 2023-03-16 RX ADMIN — Medication 4 MILLIGRAM(S): at 13:36

## 2023-03-16 RX ADMIN — Medication 650 MILLIGRAM(S): at 22:38

## 2023-03-16 RX ADMIN — PANTOPRAZOLE SODIUM 40 MILLIGRAM(S): 20 TABLET, DELAYED RELEASE ORAL at 13:36

## 2023-03-16 RX ADMIN — POLYETHYLENE GLYCOL 3350 17 GRAM(S): 17 POWDER, FOR SOLUTION ORAL at 18:01

## 2023-03-16 RX ADMIN — OXYCODONE HYDROCHLORIDE 10 MILLIGRAM(S): 5 TABLET ORAL at 04:38

## 2023-03-16 RX ADMIN — Medication 4 MILLIGRAM(S): at 00:08

## 2023-03-16 NOTE — PROGRESS NOTE ADULT - ASSESSMENT
ASSESSMENT/PLAN: POD 0 L craniotomy for tumor resection    NEURO:  Q1 hr neurochecks  analgesia--IV tylenol for now  sedation--wean off precedex  decadron for cerebral edema  keppra for seizure ppx  HOB 30  Activity: [x] mobilize as tolerated [] Bedrest [x] PT [x] OT [] PMNR    PULM: intubated  CPAP 10/5 FiO2 30%  14/500/5/50%--PRVC   CPAP as tolerated, wean to extubate    CV:  SBP goal 100-140  L radial monica    RENAL: IVL for now, monitor urine output  d/c paz     GI:  Diet: NPO overnight for possible extubation   GI prophylaxis [] not indicated [x] PPI [] other:  Bowel regimen [] colace [x] senna [x] other: Miralax     ENDO:   Goal euglycemia (-180)  FIOR    HEME/ONC:  VTE prophylaxis: [x] SCDs [] chemoprophylaxis [x] hold chemoprophylaxis due to: fresh post op [x] high risk of DVT/PE on admission due to: metastatic disease  BLE 3/7 negative     ID: adelfo op antibiotics     MISC:    SOCIAL/FAMILY:  [x] awaiting [] updated at bedside [] family meeting    CODE STATUS:  [x] Full Code [] DNR [] DNI [] Palliative/Comfort Care    DISPOSITION:  [x] ICU [] Stroke Unit [] Floor [] EMU [] RCU [] PCU    [x] Patient is at high risk of neurologic deterioration/death due to: post op hemorrhage, respiratory failure requiring intubation     Contact: [phone] 47M Right-handed, PMH stage IV melanoma in Right thumb s/p amputation in 2021. With mets to axillary lymph nodes and Right lung s/p Right VAT in 11/2022. P/w dizziness x2wk and HA x1wk. CT shows Left frontal hyperdense mass w/ ~1cm MLS and small Right temporal hyperdense mass. 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 CT CAP shows no pulmonary masses, but new Right axillary soft tissue density. Exam: Creole-speaking, AOx3, EOMI, no facial/drift, WALTERS 5/5, SILT    Post-op day # 2 s/p Craniotomy for resection of tumor of left side of brain     NEURO:  Q4 hr neurochecks  analgesia--IV tylenol for now  decadron for cerebral edema  keppra for seizure ppx  Activity: [x] mobilize as tolerated [] Bedrest [x] PT [x] OT [] PMNR    PULM:     RA, satting   IS    CV:  SBP goal 100-140  L radial monica    RENAL: IVL   Voiding    GI:  Diet: Regular diet  GI prophylaxis [] not indicated [x] PPI [] other:  Bowel regimen [] colace [x] senna [x] other: Miralax , dulcolax added    ENDO:   Goal euglycemia (-180)  FIOR    HEME/ONC:  VTE prophylaxis: [x] SCDs [] chemoprophylaxis [x] hold chemoprophylaxis due to: fresh post op [x] high risk of DVT/PE on admission due to: metastatic disease  BLE 3/7 negative   Heme/oncology consult called    ID: Afebrile    DISPOSITION: home with out patient PT     47M Right-handed, PMH stage IV melanoma in Right thumb s/p amputation in 2021. With mets to axillary lymph nodes and Right lung s/p Right VAT in 11/2022. P/w dizziness x2wk and HA x1wk. CT shows Left frontal hyperdense mass w/ ~1cm MLS and small Right temporal hyperdense mass. 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 CT CAP shows no pulmonary masses, but new Right axillary soft tissue density. Exam: Creole-speaking, AOx3, EOMI, no facial/drift, WALTERS 5/5, SILT    Post-op day # 2 s/p Craniotomy for resection of tumor of left side of brain     NEURO:  Q4 hr neurochecks  analgesia--IV tylenol for now  decadron for cerebral edema  keppra for seizure ppx  Activity: [x] mobilize as tolerated [] Bedrest [x] PT [x] OT [] PMNR    PULM:    RA, satting   IS    CV:  Hemodynamically stable    RENAL: IVL   Voiding    GI:  Diet: Regular diet  GI prophylaxis [] not indicated [x] PPI [] other:  Bowel regimen [] colace [x] senna [x] other: Miralax , dulcolax added    ENDO:   Goal euglycemia (-180)  FIOR    HEME/ONC:  VTE prophylaxis: [x] SCDs [] chemoprophylaxis [x] hold chemoprophylaxis due to: fresh post op [x] high risk of DVT/PE on admission due to: metastatic disease  BLE 3/7 negative   Heme/oncology consult called    ID: Afebrile    DISPOSITION: home with out patient PT    discussed with Dr Rodriguez  spectra link [phone]

## 2023-03-16 NOTE — PROGRESS NOTE ADULT - SUBJECTIVE AND OBJECTIVE BOX
HPI:  47M Right-handed, PMH stage IV melanoma in Right thumb s/p amputation in 2021. With mets to axillary lymph nodes and Right lung s/p Right VAT in 11/2022. P/w dizziness x2wk and HA x1wk. CT shows Left frontal hyperdense mass w/ ~1cm MLS and small Right temporal hyperdense mass. 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 CT CAP shows no pulmonary masses, but new Right axillary soft tissue density. Exam: Creole-speaking, AOx3, EOMI, no facial/drift, WALTERS 5/5, SILT     (05 Mar 2023 17:12)    Craniotomy for resection of tumor of left side of brain      PAST MEDICAL & SURGICAL HISTORY:  Malignant melanoma  right thumb      Metastatic malignant melanoma  Right axillary lymph nodes      GERD (gastroesophageal reflux disease)      History of lymph node dissection of right axilla  and Right thumb melanoma excision 12/2021      Malignant melanoma of thumb  Right Thumb Amputation with aillary Lymph Node Dissection -10/2022        Vital Signs Last 24 Hrs  T(C): 37.1 (16 Mar 2023 05:12), Max: 37.1 (16 Mar 2023 05:12)  T(F): 98.7 (16 Mar 2023 05:12), Max: 98.7 (16 Mar 2023 05:12)  HR: 69 (16 Mar 2023 05:12) (60 - 92)  BP: 146/73 (16 Mar 2023 05:12) (121/82 - 146/73)  BP(mean): 104 (15 Mar 2023 16:00) (93 - 104)  RR: 18 (16 Mar 2023 05:12) (15 - 25)  SpO2: 97% (16 Mar 2023 05:12) (97% - 100%)    Parameters below as of 16 Mar 2023 05:12  Patient On (Oxygen Delivery Method): room air                              15.6   18.12 )-----------( 152      ( 14 Mar 2023 20:38 )             45.9    03-14    140  |  104  |  24<H>  ----------------------------<  155<H>  4.7   |  19<L>  |  1.04    Ca    8.8      14 Mar 2023 20:38  Phos  4.5     03-14  Mg     2.7     03-14       Stroke Core Measures      DRAIN OUTPUT:     NEUROIMAGING:     PHYSICAL EXAM:    General: No Acute Distress     Neurological: Awake, alert oriented to person, place and time, Following Commands, PERRL, EOMI, Face Symmetrical, Speech Fluent, Moving all extremities, Muscle Strength normal in all four extremities, No Drift, Sensation to Light Touch Intact    Pulmonary: Clear to Auscultation, No Rales, No Rhonchi, No Wheezes     Cardiovascular: S1, S2, Regular Rate and Rhythm     Gastrointestinal: Soft, Nontender, Nondistended     Incision:     MEDICATIONS:   Antibiotics:    Neuro:  acetaminophen     Tablet .. 650 milliGRAM(s) Oral every 6 hours PRN Temp greater or equal to 38C (100.4F), Mild Pain (1 - 3)  chlorproMAZINE    Tablet 10 milliGRAM(s) Oral four times a day PRN hiccups  levETIRAcetam 500 milliGRAM(s) Oral two times a day  ondansetron Injectable 4 milliGRAM(s) IV Push every 6 hours PRN Nausea and/or Vomiting  oxyCODONE    IR 5 milliGRAM(s) Oral every 4 hours PRN Moderate Pain (4 - 6)  oxyCODONE    IR 10 milliGRAM(s) Oral every 4 hours PRN Severe Pain (7 - 10)    Anticoagulation:    Cardiology:    Endo:   dexAMETHasone  Injectable 4 milliGRAM(s) IV Push every 6 hours  dextrose 50% Injectable 25 Gram(s) IV Push once  dextrose 50% Injectable 12.5 Gram(s) IV Push once  glucagon  Injectable 1 milliGRAM(s) IntraMuscular once  insulin lispro (ADMELOG) corrective regimen sliding scale   SubCutaneous three times a day before meals  insulin lispro (ADMELOG) corrective regimen sliding scale   SubCutaneous at bedtime    Pulm:    GI/:  pantoprazole    Tablet 40 milliGRAM(s) Oral daily  polyethylene glycol 3350 17 Gram(s) Oral two times a day  senna 2 Tablet(s) Oral at bedtime    Other:    pantoprazole    Tablet 40 milliGRAM(s) Oral daily  polyethylene glycol 3350 17 Gram(s) Oral two times a day  senna 2 Tablet(s) Oral at bedtime       47M Right-handed, PMH stage IV melanoma in Right thumb s/p amputation in 2021. With mets to axillary lymph nodes and Right lung s/p Right VAT in 11/2022. P/w dizziness x2wk and HA x1wk. CT shows Left frontal hyperdense mass w/ ~1cm MLS and small Right temporal hyperdense mass. 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 CT CAP shows no pulmonary masses, but new Right axillary soft tissue density. Exam: Creole-speaking, AOx3, EOMI, no facial/drift, WALTERS 5/5, SILT         Post-op day # 2 s/p Craniotomy for resection of tumor of left side of brain       Overnight event: patient c/o Lt facial pain, no BM for days       Vital Signs Last 24 Hrs  T(C): 37.1 (16 Mar 2023 05:12), Max: 37.1 (16 Mar 2023 05:12)  T(F): 98.7 (16 Mar 2023 05:12), Max: 98.7 (16 Mar 2023 05:12)  HR: 69 (16 Mar 2023 05:12) (60 - 92)  BP: 146/73 (16 Mar 2023 05:12) (121/82 - 146/73)  BP(mean): 104 (15 Mar 2023 16:00) (93 - 104)  RR: 18 (16 Mar 2023 05:12) (15 - 25)  SpO2: 97% (16 Mar 2023 05:12) (97% - 100%)    Parameters below as of 16 Mar 2023 05:12  Patient On (Oxygen Delivery Method): room air                              15.6   18.12 )-----------( 152      ( 14 Mar 2023 20:38 )             45.9    03-14    140  |  104  |  24<H>  ----------------------------<  155<H>  4.7   |  19<L>  |  1.04    Ca    8.8      14 Mar 2023 20:38  Phos  4.5     03-14  Mg     2.7     03-14       Stroke Core Measures      DRAIN OUTPUT:     NEUROIMAGING:     PHYSICAL EXAM:    General: No Acute Distress     Neurological: Awake, alert oriented to person, place and time, Following Commands, PERRL, EOMI, Face Symmetrical, Speech Fluent, Moving all extremities, Muscle Strength normal in all four extremities, No Drift, Sensation to Light Touch Intact    Pulmonary: Clear to Auscultation, No Rales, No Rhonchi, No Wheezes     Cardiovascular: S1, S2, Regular Rate and Rhythm     Gastrointestinal: Soft, Nontender, Nondistended     Incision: intact Lt facial swelling improved    MEDICATIONS:   Antibiotics:    Neuro:  acetaminophen     Tablet .. 650 milliGRAM(s) Oral every 6 hours PRN Temp greater or equal to 38C (100.4F), Mild Pain (1 - 3)  chlorproMAZINE    Tablet 10 milliGRAM(s) Oral four times a day PRN hiccups  levETIRAcetam 500 milliGRAM(s) Oral two times a day  ondansetron Injectable 4 milliGRAM(s) IV Push every 6 hours PRN Nausea and/or Vomiting  oxyCODONE    IR 5 milliGRAM(s) Oral every 4 hours PRN Moderate Pain (4 - 6)  oxyCODONE    IR 10 milliGRAM(s) Oral every 4 hours PRN Severe Pain (7 - 10)    Anticoagulation:    Cardiology:    Endo:   dexAMETHasone  Injectable 4 milliGRAM(s) IV Push every 6 hours  dextrose 50% Injectable 25 Gram(s) IV Push once  dextrose 50% Injectable 12.5 Gram(s) IV Push once  glucagon  Injectable 1 milliGRAM(s) IntraMuscular once  insulin lispro (ADMELOG) corrective regimen sliding scale   SubCutaneous three times a day before meals  insulin lispro (ADMELOG) corrective regimen sliding scale   SubCutaneous at bedtime    Pulm:    GI/:  pantoprazole    Tablet 40 milliGRAM(s) Oral daily  polyethylene glycol 3350 17 Gram(s) Oral two times a day  senna 2 Tablet(s) Oral at bedtime    Other:    pantoprazole    Tablet 40 milliGRAM(s) Oral daily  polyethylene glycol 3350 17 Gram(s) Oral two times a day  senna 2 Tablet(s) Oral at bedtime

## 2023-03-17 ENCOUNTER — TRANSCRIPTION ENCOUNTER (OUTPATIENT)
Age: 48
End: 2023-03-17

## 2023-03-17 VITALS
RESPIRATION RATE: 18 BRPM | TEMPERATURE: 99 F | OXYGEN SATURATION: 98 % | HEART RATE: 95 BPM | SYSTOLIC BLOOD PRESSURE: 116 MMHG | DIASTOLIC BLOOD PRESSURE: 82 MMHG

## 2023-03-17 LAB
GLUCOSE BLDC GLUCOMTR-MCNC: 133 MG/DL — HIGH (ref 70–99)
GLUCOSE BLDC GLUCOMTR-MCNC: 141 MG/DL — HIGH (ref 70–99)
GLUCOSE BLDC GLUCOMTR-MCNC: 165 MG/DL — HIGH (ref 70–99)
SURGICAL PATHOLOGY STUDY: SIGNIFICANT CHANGE UP

## 2023-03-17 PROCEDURE — 88307 TISSUE EXAM BY PATHOLOGIST: CPT

## 2023-03-17 PROCEDURE — 36415 COLL VENOUS BLD VENIPUNCTURE: CPT

## 2023-03-17 PROCEDURE — 97116 GAIT TRAINING THERAPY: CPT

## 2023-03-17 PROCEDURE — U0005: CPT

## 2023-03-17 PROCEDURE — 87641 MR-STAPH DNA AMP PROBE: CPT

## 2023-03-17 PROCEDURE — 99233 SBSQ HOSP IP/OBS HIGH 50: CPT | Mod: GC

## 2023-03-17 PROCEDURE — 83735 ASSAY OF MAGNESIUM: CPT

## 2023-03-17 PROCEDURE — 86850 RBC ANTIBODY SCREEN: CPT

## 2023-03-17 PROCEDURE — 80048 BASIC METABOLIC PNL TOTAL CA: CPT

## 2023-03-17 PROCEDURE — 84295 ASSAY OF SERUM SODIUM: CPT

## 2023-03-17 PROCEDURE — 82435 ASSAY OF BLOOD CHLORIDE: CPT

## 2023-03-17 PROCEDURE — 82330 ASSAY OF CALCIUM: CPT

## 2023-03-17 PROCEDURE — 82962 GLUCOSE BLOOD TEST: CPT

## 2023-03-17 PROCEDURE — 88334 PATH CONSLTJ SURG CYTO XM EA: CPT

## 2023-03-17 PROCEDURE — 82565 ASSAY OF CREATININE: CPT

## 2023-03-17 PROCEDURE — 80053 COMPREHEN METABOLIC PANEL: CPT

## 2023-03-17 PROCEDURE — 85014 HEMATOCRIT: CPT

## 2023-03-17 PROCEDURE — 86901 BLOOD TYPING SEROLOGIC RH(D): CPT

## 2023-03-17 PROCEDURE — 93005 ELECTROCARDIOGRAM TRACING: CPT

## 2023-03-17 PROCEDURE — 97530 THERAPEUTIC ACTIVITIES: CPT

## 2023-03-17 PROCEDURE — 86900 BLOOD TYPING SEROLOGIC ABO: CPT

## 2023-03-17 PROCEDURE — 84132 ASSAY OF SERUM POTASSIUM: CPT

## 2023-03-17 PROCEDURE — P9016: CPT

## 2023-03-17 PROCEDURE — 85610 PROTHROMBIN TIME: CPT

## 2023-03-17 PROCEDURE — U0003: CPT

## 2023-03-17 PROCEDURE — 85027 COMPLETE CBC AUTOMATED: CPT

## 2023-03-17 PROCEDURE — P9100: CPT

## 2023-03-17 PROCEDURE — 97165 OT EVAL LOW COMPLEX 30 MIN: CPT

## 2023-03-17 PROCEDURE — C1889: CPT

## 2023-03-17 PROCEDURE — C1769: CPT

## 2023-03-17 PROCEDURE — 71045 X-RAY EXAM CHEST 1 VIEW: CPT

## 2023-03-17 PROCEDURE — 93970 EXTREMITY STUDY: CPT

## 2023-03-17 PROCEDURE — 85018 HEMOGLOBIN: CPT

## 2023-03-17 PROCEDURE — 70553 MRI BRAIN STEM W/O & W/DYE: CPT

## 2023-03-17 PROCEDURE — 83036 HEMOGLOBIN GLYCOSYLATED A1C: CPT

## 2023-03-17 PROCEDURE — 86923 COMPATIBILITY TEST ELECTRIC: CPT

## 2023-03-17 PROCEDURE — 70450 CT HEAD/BRAIN W/O DYE: CPT

## 2023-03-17 PROCEDURE — 87640 STAPH A DNA AMP PROBE: CPT

## 2023-03-17 PROCEDURE — P9059: CPT

## 2023-03-17 PROCEDURE — 88333 PATH CONSLTJ SURG CYTO XM 1: CPT

## 2023-03-17 PROCEDURE — A9585: CPT

## 2023-03-17 PROCEDURE — 82803 BLOOD GASES ANY COMBINATION: CPT

## 2023-03-17 PROCEDURE — P9037: CPT

## 2023-03-17 PROCEDURE — 94002 VENT MGMT INPAT INIT DAY: CPT

## 2023-03-17 PROCEDURE — 85025 COMPLETE CBC W/AUTO DIFF WBC: CPT

## 2023-03-17 PROCEDURE — 84100 ASSAY OF PHOSPHORUS: CPT

## 2023-03-17 PROCEDURE — 82947 ASSAY GLUCOSE BLOOD QUANT: CPT

## 2023-03-17 PROCEDURE — 97164 PT RE-EVAL EST PLAN CARE: CPT

## 2023-03-17 PROCEDURE — 97161 PT EVAL LOW COMPLEX 20 MIN: CPT

## 2023-03-17 PROCEDURE — 83605 ASSAY OF LACTIC ACID: CPT

## 2023-03-17 PROCEDURE — C1713: CPT

## 2023-03-17 PROCEDURE — 85730 THROMBOPLASTIN TIME PARTIAL: CPT

## 2023-03-17 RX ORDER — LANOLIN ALCOHOL/MO/W.PET/CERES
1 CREAM (GRAM) TOPICAL
Qty: 15 | Refills: 0
Start: 2023-03-17 | End: 2023-03-31

## 2023-03-17 RX ORDER — ACETAMINOPHEN 500 MG
2 TABLET ORAL
Qty: 0 | Refills: 0 | DISCHARGE
Start: 2023-03-17

## 2023-03-17 RX ORDER — PANTOPRAZOLE SODIUM 20 MG/1
1 TABLET, DELAYED RELEASE ORAL
Qty: 0 | Refills: 0 | DISCHARGE

## 2023-03-17 RX ORDER — SENNA PLUS 8.6 MG/1
2 TABLET ORAL
Qty: 0 | Refills: 0 | DISCHARGE
Start: 2023-03-17

## 2023-03-17 RX ORDER — GABAPENTIN 400 MG/1
1 CAPSULE ORAL
Qty: 30 | Refills: 0
Start: 2023-03-17 | End: 2023-03-31

## 2023-03-17 RX ORDER — LEVETIRACETAM 250 MG/1
1 TABLET, FILM COATED ORAL
Qty: 180 | Refills: 0
Start: 2023-03-17 | End: 2023-06-14

## 2023-03-17 RX ORDER — OXYCODONE HYDROCHLORIDE 5 MG/1
1 TABLET ORAL
Qty: 28 | Refills: 0
Start: 2023-03-17 | End: 2023-03-23

## 2023-03-17 RX ORDER — POLYETHYLENE GLYCOL 3350 17 G/17G
17 POWDER, FOR SOLUTION ORAL
Qty: 0 | Refills: 0 | DISCHARGE
Start: 2023-03-17

## 2023-03-17 RX ORDER — DEXAMETHASONE 0.5 MG/5ML
4 ELIXIR ORAL EVERY 8 HOURS
Refills: 0 | Status: DISCONTINUED | OUTPATIENT
Start: 2023-03-17 | End: 2023-03-17

## 2023-03-17 RX ORDER — DEXAMETHASONE 0.5 MG/5ML
2 ELIXIR ORAL
Qty: 90 | Refills: 0
Start: 2023-03-17 | End: 2023-03-19

## 2023-03-17 RX ORDER — GABAPENTIN 400 MG/1
1 CAPSULE ORAL
Qty: 0 | Refills: 0 | DISCHARGE

## 2023-03-17 RX ORDER — PANTOPRAZOLE SODIUM 20 MG/1
1 TABLET, DELAYED RELEASE ORAL
Qty: 90 | Refills: 0
Start: 2023-03-17 | End: 2023-06-14

## 2023-03-17 RX ORDER — CHLORPROMAZINE HCL 10 MG
1 TABLET ORAL
Qty: 28 | Refills: 0
Start: 2023-03-17 | End: 2023-03-23

## 2023-03-17 RX ADMIN — Medication 4 MILLIGRAM(S): at 05:08

## 2023-03-17 RX ADMIN — Medication 4 MILLIGRAM(S): at 15:24

## 2023-03-17 RX ADMIN — Medication 650 MILLIGRAM(S): at 11:05

## 2023-03-17 RX ADMIN — LEVETIRACETAM 500 MILLIGRAM(S): 250 TABLET, FILM COATED ORAL at 17:11

## 2023-03-17 RX ADMIN — LEVETIRACETAM 500 MILLIGRAM(S): 250 TABLET, FILM COATED ORAL at 05:07

## 2023-03-17 RX ADMIN — PANTOPRAZOLE SODIUM 40 MILLIGRAM(S): 20 TABLET, DELAYED RELEASE ORAL at 15:24

## 2023-03-17 RX ADMIN — Medication 2: at 08:38

## 2023-03-17 RX ADMIN — Medication 650 MILLIGRAM(S): at 05:37

## 2023-03-17 RX ADMIN — Medication 650 MILLIGRAM(S): at 11:35

## 2023-03-17 RX ADMIN — Medication 650 MILLIGRAM(S): at 05:07

## 2023-03-17 NOTE — PROGRESS NOTE ADULT - THIS PATIENT HAS THE FOLLOWING CONDITION(S)/DIAGNOSES ON THIS ADMISSION:
None
None
Cerebral Edema/Brain Compression / Herniation
Cerebral Edema
Cerebral Edema
None

## 2023-03-17 NOTE — DISCHARGE NOTE PROVIDER - NSDCFUADDINST_GEN_ALL_CORE_FT
Please keep incision clean and dry, YOU can shower today,  do not submerge wound in water for prolonged periods of time, pat dry after showering, and do not use any creams or ointments to incision.  Please do not engage in strenuous activity, heavy lifting, drive, or return to work or school until cleared by surgeon.  please notify physician if fevers, bleeding, swelling, pain not relieved by medication, increased sluggishness or irritability, increased nausea or vomiting, inability to tolerate foods or liquids.  No heavy lifting/straining, Showering allowed, Stairs allowed, Walking - Indoors allowed, Walking - Outdoors   Do not start any Motrin /Aspirin NSAIDS( Motrin, Advil.... until cleared by your neurosurgeon

## 2023-03-17 NOTE — PROGRESS NOTE ADULT - PROVIDER SPECIALTY LIST ADULT
Neurosurgery
Hospitalist
NSICU
NSICU
Neurosurgery
Heme/Onc
Neurosurgery
Hospitalist

## 2023-03-17 NOTE — PROGRESS NOTE ADULT - ASSESSMENT
ASSESSMENT/PLAN: POD 0 L craniotomy for tumor resection    NEURO:  Q1 hr neurochecks  analgesia--IV tylenol for now  sedation--wean off precedex  decadron for cerebral edema  keppra for seizure ppx  HOB 30  Activity: [x] mobilize as tolerated [] Bedrest [x] PT [x] OT [] PMNR    PULM: intubated  CPAP 10/5 FiO2 30%  14/500/5/50%--PRVC   CPAP as tolerated, wean to extubate    CV:  SBP goal 100-140  L radial monica    RENAL: IVL for now, monitor urine output  d/c paz     GI:  Diet: NPO overnight for possible extubation   GI prophylaxis [] not indicated [x] PPI [] other:  Bowel regimen [] colace [x] senna [x] other: Miralax     ENDO:   Goal euglycemia (-180)  FIOR    HEME/ONC:  VTE prophylaxis: [x] SCDs [] chemoprophylaxis [x] hold chemoprophylaxis due to: fresh post op [x] high risk of DVT/PE on admission due to: metastatic disease  BLE 3/7 negative     ID: adelfo op antibiotics     MISC:    SOCIAL/FAMILY:  [x] awaiting [] updated at bedside [] family meeting    CODE STATUS:  [x] Full Code [] DNR [] DNI [] Palliative/Comfort Care    DISPOSITION:  [x] ICU [] Stroke Unit [] Floor [] EMU [] RCU [] PCU    [x] Patient is at high risk of neurologic deterioration/death due to: post op hemorrhage, respiratory failure requiring intubation     Contact: [phone] 47M Right-handed, PMH stage IV melanoma in Right thumb s/p amputation in 2021. With mets to axillary lymph nodes and Right lung s/p Right VAT in 11/2022. P/w dizziness x2wk and HA x1wk. CT shows Left frontal hyperdense mass w/ ~1cm MLS and small Right temporal hyperdense mass. 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 CT CAP shows no pulmonary masses, but new Right axillary soft tissue density. Exam: Creole-speaking, AOx3, EOMI, no facial/drift, WALTERS 5/5, SILT    Post-op day # 3 s/p Craniotomy for resection of tumor of left side of brain     NEURO:  Q4 hr neurochecks  analgesia--acetaminophen / oxy IR as needed  decadron for cerebral edema  keppra for seizure ppx  Activity: increase as tolerated    PULM:    RA, satting 96-98%  IS    CV:   Hemodynamically stable    RENAL: IVL   Voiding    GI:  Diet: Regular diet  GI prophylaxis [] not indicated [x] PPI [] other:  Bowel regimen [] colace [x] senna [x] other: Miralax , dulcolax added  Last BM yesterday    ENDO:   Goal euglycemia (-180)  FIOR    HEME/ONC:  VTE prophylaxis: [x] SCDs [] chemoprophylaxis [x] hold chemoprophylaxis due to: fresh post op [x] high risk of DVT/PE on admission due to: metastatic disease  BLE 3/7 negative   Heme/oncology consult saw    ID: Afebrile    DISPOSITION: home with out patient PT  will d/c home today     47M Right-handed, PMH stage IV melanoma in Right thumb s/p amputation in 2021. With mets to axillary lymph nodes and Right lung s/p Right VAT in 11/2022. P/w dizziness x2wk and HA x1wk. CT shows Left frontal hyperdense mass w/ ~1cm MLS and small Right temporal hyperdense mass. 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 CT CAP shows no pulmonary masses, but new Right axillary soft tissue density. Exam: Creole-speaking, AOx3, EOMI, no facial/drift, WALTERS 5/5, SILT    Post-op day # 3 s/p Craniotomy for resection of tumor of left side of brain     NEURO:  Q4 hr neurochecks  analgesia--acetaminophen / oxy IR as needed  decadron for cerebral edema  keppra for seizure ppx  Activity: increase as tolerated    PULM:    RA, satting 96-98%  IS    CV:   Hemodynamically stable    RENAL: IVL   Voiding    GI:  Diet: Regular diet  GI prophylaxis [] not indicated [x] PPI [] other:  Bowel regimen [] colace [x] senna [x] other: Miralax , dulcolax added  Last BM yesterday    ENDO:   Goal euglycemia (-180)  FIOR    HEME/ONC:  VTE prophylaxis: [x] SCDs [] chemoprophylaxis [x] hold chemoprophylaxis due to: fresh post op [x] high risk of DVT/PE on admission due to: metastatic disease  BLE 3/7 negative   Heme/oncology consult saw    ID: Afebrile    DISPOSITION: home with out patient PT  will d/c home today    will discuss with Dr Rodriguez  spectra link [phone]

## 2023-03-17 NOTE — DISCHARGE NOTE PROVIDER - NSDCCPCAREPLAN_GEN_ALL_CORE_FT
PRINCIPAL DISCHARGE DIAGNOSIS  Diagnosis: Intracranial tumor  Assessment and Plan of Treatment: You had surgery for brain tumor, you are stable for discharge home on Decadron tapered down to 2mg bid, Keppra for seizure prophylaxis, and analgesic as needed. you will need to be f/u by neuro/oncology, oncology and radiation oncology as out patient.      SECONDARY DISCHARGE DIAGNOSES  Diagnosis: Malignant melanoma  Assessment and Plan of Treatment: Follow up with your oncologist and radiation therapist for further treament    Diagnosis: Prophylactic measure  Assessment and Plan of Treatment: Continue Protonix for prophylaxis while on steroids

## 2023-03-17 NOTE — PROGRESS NOTE ADULT - SUBJECTIVE AND OBJECTIVE BOX
Hematology Oncology Follow-up    INTERVAL HPI/OVERNIGHT EVENTS:        VITAL SIGNS:  T(F): 98.1 (03-17-23 @ 04:50)  HR: 67 (03-17-23 @ 04:50)  BP: 147/89 (03-17-23 @ 04:50)  RR: 18 (03-17-23 @ 04:50)  SpO2: 97% (03-17-23 @ 04:50)  Wt(kg): --    03-16-23 @ 07:01  -  03-17-23 @ 07:00  --------------------------------------------------------  IN: 840 mL / OUT: 1700 mL / NET: -860 mL        PHYSICAL EXAM:  GENERAL: NAD, well-groomed  HEAD:  Atraumatic, Normocephalic  EYES: EOMI, PERRLA, conjunctiva and sclera clear  ENMT: No oropharyngeal exudates, Moist mucous membranes  NECK: Supple, no cervical lymphadenopathy  NERVOUS SYSTEM:  alert and conversant, moving all extremities spontaneously   CHEST/LUNG: Clear to auscultation bilaterally; no rhonchi  HEART: Regular rate and rhythm; No murmurs  ABDOMEN: Soft, Nontender, Nondistended  EXTREMITIES:  2+ radial Pulses, No cyanosis or edema  SKIN: warm, dry    Medications  MEDICATIONS  (STANDING):  dexAMETHasone  Injectable 4 milliGRAM(s) IV Push every 6 hours  dextrose 50% Injectable 25 Gram(s) IV Push once  dextrose 50% Injectable 12.5 Gram(s) IV Push once  glucagon  Injectable 1 milliGRAM(s) IntraMuscular once  insulin lispro (ADMELOG) corrective regimen sliding scale   SubCutaneous three times a day before meals  insulin lispro (ADMELOG) corrective regimen sliding scale   SubCutaneous at bedtime  levETIRAcetam 500 milliGRAM(s) Oral two times a day  melatonin 3 milliGRAM(s) Oral at bedtime  pantoprazole    Tablet 40 milliGRAM(s) Oral daily  polyethylene glycol 3350 17 Gram(s) Oral two times a day  senna 2 Tablet(s) Oral at bedtime    MEDICATIONS  (PRN):  acetaminophen     Tablet .. 650 milliGRAM(s) Oral every 6 hours PRN Temp greater or equal to 38C (100.4F), Mild Pain (1 - 3)  bisacodyl 5 milliGRAM(s) Oral every 12 hours PRN Constipation  chlorproMAZINE    Tablet 10 milliGRAM(s) Oral four times a day PRN hiccups  ondansetron Injectable 4 milliGRAM(s) IV Push every 6 hours PRN Nausea and/or Vomiting  oxyCODONE    IR 5 milliGRAM(s) Oral every 4 hours PRN Moderate Pain (4 - 6)  oxyCODONE    IR 10 milliGRAM(s) Oral every 4 hours PRN Severe Pain (7 - 10)      Allergies: No Known Allergies      LABS:                        15.8   20.36 )-----------( 127      ( 16 Mar 2023 09:36 )             46.7     03-16    137  |  99  |  24<H>  ----------------------------<  180<H>  4.4   |  28  |  1.00    Ca    8.8      16 Mar 2023 09:36  Phos  4.1     03-16  Mg     2.6     03-16                     RADIOLOGY & ADDITIONAL TESTS:  Studies reviewed. Hematology Oncology Follow-up    INTERVAL HPI/OVERNIGHT EVENTS:        VITAL SIGNS:  T(F): 98.1 (03-17-23 @ 04:50)  HR: 67 (03-17-23 @ 04:50)  BP: 147/89 (03-17-23 @ 04:50)  RR: 18 (03-17-23 @ 04:50)  SpO2: 97% (03-17-23 @ 04:50)  Wt(kg): --    03-16-23 @ 07:01  -  03-17-23 @ 07:00  --------------------------------------------------------  IN: 840 mL / OUT: 1700 mL / NET: -860 mL        PHYSICAL EXAM:  GENERAL: NAD, well-groomed  HEAD:  Atraumatic, Normocephalic  EYES: EOMI, PERRLA, conjunctiva and sclera clear  ENMT: No oropharyngeal exudates, Moist mucous membranes  NECK: Supple, no cervical lymphadenopathy  NERVOUS SYSTEM:  alert and conversant, moving all extremities spontaneously   CHEST/LUNG: Clear to auscultation bilaterally; no rhonchi  HEART: Regular rate and rhythm; No murmurs  ABDOMEN: Soft, Nontender, Nondistended  EXTREMITIES:  2+ radial Pulses, No cyanosis or edema  SKIN: warm, dry    Medications  MEDICATIONS  (STANDING):  dexAMETHasone  Injectable 4 milliGRAM(s) IV Push every 6 hours  dextrose 50% Injectable 25 Gram(s) IV Push once  dextrose 50% Injectable 12.5 Gram(s) IV Push once  glucagon  Injectable 1 milliGRAM(s) IntraMuscular once  insulin lispro (ADMELOG) corrective regimen sliding scale   SubCutaneous three times a day before meals  insulin lispro (ADMELOG) corrective regimen sliding scale   SubCutaneous at bedtime  levETIRAcetam 500 milliGRAM(s) Oral two times a day  melatonin 3 milliGRAM(s) Oral at bedtime  pantoprazole    Tablet 40 milliGRAM(s) Oral daily  polyethylene glycol 3350 17 Gram(s) Oral two times a day  senna 2 Tablet(s) Oral at bedtime    MEDICATIONS  (PRN):  acetaminophen     Tablet .. 650 milliGRAM(s) Oral every 6 hours PRN Temp greater or equal to 38C (100.4F), Mild Pain (1 - 3)  bisacodyl 5 milliGRAM(s) Oral every 12 hours PRN Constipation  chlorproMAZINE    Tablet 10 milliGRAM(s) Oral four times a day PRN hiccups  ondansetron Injectable 4 milliGRAM(s) IV Push every 6 hours PRN Nausea and/or Vomiting  oxyCODONE    IR 5 milliGRAM(s) Oral every 4 hours PRN Moderate Pain (4 - 6)  oxyCODONE    IR 10 milliGRAM(s) Oral every 4 hours PRN Severe Pain (7 - 10)      Allergies: No Known Allergies      LABS:                        15.8   20.36 )-----------( 127      ( 16 Mar 2023 09:36 )             46.7     03-16    137  |  99  |  24<H>  ----------------------------<  180<H>  4.4   |  28  |  1.00    Ca    8.8      16 Mar 2023 09:36  Phos  4.1     03-16  Mg     2.6     03-16    RADIOLOGY & ADDITIONAL TESTS:  Studies reviewed. Hematology Oncology Follow-up   ID [id number]    INTERVAL HPI/OVERNIGHT EVENTS:  Patient reports some L sided head pain, but denies any dizziness, arm or leg weakness or incontinence.   He is able to walk around comfortable and took a shower today. He denies fatigue. He had about 20 lbs of weight loss due to loss of appetite.   He denies N/V.     VITAL SIGNS:  T(F): 98.1 (03-17-23 @ 04:50)  HR: 67 (03-17-23 @ 04:50)  BP: 147/89 (03-17-23 @ 04:50)  RR: 18 (03-17-23 @ 04:50)  SpO2: 97% (03-17-23 @ 04:50)  Wt(kg): --    03-16-23 @ 07:01  -  03-17-23 @ 07:00  --------------------------------------------------------  IN: 840 mL / OUT: 1700 mL / NET: -860 mL    PHYSICAL EXAM:  GENERAL: NAD, well-groomed  HEAD:  + surgical staples in place without minimal dried blood over incision line  EYES: EOMI, PERRLA, + bloody sclera of L eye  ENMT: No oropharyngeal exudates, Moist mucous membranes  NECK: Supple, no cervical lymphadenopathy  NERVOUS SYSTEM:  alert and conversant, 5/5 BUE and BLE strength, sensation intact to light touch  CHEST/LUNG: Clear to auscultation bilaterally; no rhonchi  R sided chest wall surgical scars visible  HEART: Regular rate and rhythm; No murmurs  ABDOMEN: Soft, Nontender, Nondistended  EXTREMITIES:  2+ radial Pulses, No cyanosis or edema  SKIN: warm, dry    Medications  MEDICATIONS  (STANDING):  dexAMETHasone  Injectable 4 milliGRAM(s) IV Push every 6 hours  dextrose 50% Injectable 25 Gram(s) IV Push once  dextrose 50% Injectable 12.5 Gram(s) IV Push once  glucagon  Injectable 1 milliGRAM(s) IntraMuscular once  insulin lispro (ADMELOG) corrective regimen sliding scale   SubCutaneous three times a day before meals  insulin lispro (ADMELOG) corrective regimen sliding scale   SubCutaneous at bedtime  levETIRAcetam 500 milliGRAM(s) Oral two times a day  melatonin 3 milliGRAM(s) Oral at bedtime  pantoprazole    Tablet 40 milliGRAM(s) Oral daily  polyethylene glycol 3350 17 Gram(s) Oral two times a day  senna 2 Tablet(s) Oral at bedtime    MEDICATIONS  (PRN):  acetaminophen     Tablet .. 650 milliGRAM(s) Oral every 6 hours PRN Temp greater or equal to 38C (100.4F), Mild Pain (1 - 3)  bisacodyl 5 milliGRAM(s) Oral every 12 hours PRN Constipation  chlorproMAZINE    Tablet 10 milliGRAM(s) Oral four times a day PRN hiccups  ondansetron Injectable 4 milliGRAM(s) IV Push every 6 hours PRN Nausea and/or Vomiting  oxyCODONE    IR 5 milliGRAM(s) Oral every 4 hours PRN Moderate Pain (4 - 6)  oxyCODONE    IR 10 milliGRAM(s) Oral every 4 hours PRN Severe Pain (7 - 10)      Allergies: No Known Allergies      LABS:                        15.8   20.36 )-----------( 127      ( 16 Mar 2023 09:36 )             46.7     03-16    137  |  99  |  24<H>  ----------------------------<  180<H>  4.4   |  28  |  1.00    Ca    8.8      16 Mar 2023 09:36  Phos  4.1     03-16  Mg     2.6     03-16    RADIOLOGY & ADDITIONAL TESTS:  Studies reviewed.

## 2023-03-17 NOTE — DISCHARGE NOTE PROVIDER - NSDCMRMEDTOKEN_GEN_ALL_CORE_FT
acetaminophen 325 mg oral tablet: 2 tab(s) orally every 6 hours, As needed, Temp greater or equal to 38C (100.4F), Mild Pain (1 - 3)  dexamethasone 2 mg oral tablet: 2 tab(s) orally every 8 hours x 3 days  2 tabs orally every 12 hours x 3 days   1 tab orally every 12 hours to continue  gabapentin 300 mg oral capsule: 1 cap(s) orally 2 times a day, As Needed  levETIRAcetam 500 mg oral tablet: 1 tab(s) orally 2 times a day  oxyCODONE 5 mg oral tablet: 1 tab(s) orally every 6 hours, As Needed  pantoprazole 40 mg oral delayed release tablet: 1 tab(s) orally once a day  polyethylene glycol 3350 oral powder for reconstitution: 17 gram(s) orally 2 times a day  senna leaf extract oral tablet: 2 tab(s) orally once a day (at bedtime)   acetaminophen 325 mg oral tablet: 2 tab(s) orally every 6 hours, As needed, Temp greater or equal to 38C (100.4F), Mild Pain (1 - 3)  chlorproMAZINE 10 mg oral tablet: 1 tab(s) orally 4 times a day, As needed, hiccups MDD:4  dexamethasone 2 mg oral tablet: 2 tab(s) orally every 8 hours x 3 days  2 tabs orally every 12 hours x 3 days   1 tab orally every 12 hours to continue  gabapentin 300 mg oral capsule: 1 cap(s) orally 2 times a day, As Needed MDD:2  levETIRAcetam 500 mg oral tablet: 1 tab(s) orally 2 times a day  melatonin 3 mg oral tablet: 1 tab(s) orally once a day (at bedtime)  oxyCODONE 5 mg oral tablet: 1 tab(s) orally every 6 hours, As Needed MDD:4  pantoprazole 40 mg oral delayed release tablet: 1 tab(s) orally once a day  polyethylene glycol 3350 oral powder for reconstitution: 17 gram(s) orally 2 times a day  senna leaf extract oral tablet: 2 tab(s) orally once a day (at bedtime)

## 2023-03-17 NOTE — PROGRESS NOTE ADULT - SUBJECTIVE AND OBJECTIVE BOX
HPI:  47M Right-handed, PMH stage IV melanoma in Right thumb s/p amputation in 2021. With mets to axillary lymph nodes and Right lung s/p Right VAT in 11/2022. P/w dizziness x2wk and HA x1wk. CT shows Left frontal hyperdense mass w/ ~1cm MLS and small Right temporal hyperdense mass. 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 CT CAP shows no pulmonary masses, but new Right axillary soft tissue density. Exam: Creole-speaking, AOx3, EOMI, no facial/drift, WALTERS 5/5, SILT     (05 Mar 2023 17:12)    Craniotomy for resection of tumor of left side of brain      PAST MEDICAL & SURGICAL HISTORY:  Malignant melanoma  right thumb      Metastatic malignant melanoma  Right axillary lymph nodes      GERD (gastroesophageal reflux disease)      History of lymph node dissection of right axilla  and Right thumb melanoma excision 12/2021      Malignant melanoma of thumb  Right Thumb Amputation with aillary Lymph Node Dissection -10/2022        Vital Signs Last 24 Hrs  T(C): 36.7 (17 Mar 2023 04:50), Max: 37.3 (16 Mar 2023 13:01)  T(F): 98.1 (17 Mar 2023 04:50), Max: 99.1 (16 Mar 2023 13:01)  HR: 67 (17 Mar 2023 04:50) (64 - 95)  BP: 147/89 (17 Mar 2023 04:50) (115/75 - 147/89)  BP(mean): --  RR: 18 (17 Mar 2023 04:50) (18 - 20)  SpO2: 97% (17 Mar 2023 04:50) (96% - 98%)    Parameters below as of 17 Mar 2023 04:50  Patient On (Oxygen Delivery Method): room air                              15.8   20.36 )-----------( 127      ( 16 Mar 2023 09:36 )             46.7    03-16    137  |  99  |  24<H>  ----------------------------<  180<H>  4.4   |  28  |  1.00    Ca    8.8      16 Mar 2023 09:36  Phos  4.1     03-16  Mg     2.6     03-16       Stroke Core Measures      DRAIN OUTPUT:     NEUROIMAGING:     PHYSICAL EXAM:    General: No Acute Distress     Neurological: Awake, alert oriented to person, place and time, Following Commands, PERRL, EOMI, Face Symmetrical, Speech Fluent, Moving all extremities, Muscle Strength normal in all four extremities, No Drift, Sensation to Light Touch Intact    Pulmonary: Clear to Auscultation, No Rales, No Rhonchi, No Wheezes     Cardiovascular: S1, S2, Regular Rate and Rhythm     Gastrointestinal: Soft, Nontender, Nondistended     Incision:     MEDICATIONS:   Antibiotics:    Neuro:  acetaminophen     Tablet .. 650 milliGRAM(s) Oral every 6 hours PRN Temp greater or equal to 38C (100.4F), Mild Pain (1 - 3)  chlorproMAZINE    Tablet 10 milliGRAM(s) Oral four times a day PRN hiccups  levETIRAcetam 500 milliGRAM(s) Oral two times a day  melatonin 3 milliGRAM(s) Oral at bedtime  ondansetron Injectable 4 milliGRAM(s) IV Push every 6 hours PRN Nausea and/or Vomiting  oxyCODONE    IR 5 milliGRAM(s) Oral every 4 hours PRN Moderate Pain (4 - 6)  oxyCODONE    IR 10 milliGRAM(s) Oral every 4 hours PRN Severe Pain (7 - 10)    Anticoagulation:    Cardiology:    Endo:   dexAMETHasone  Injectable 4 milliGRAM(s) IV Push every 6 hours  dextrose 50% Injectable 25 Gram(s) IV Push once  dextrose 50% Injectable 12.5 Gram(s) IV Push once  glucagon  Injectable 1 milliGRAM(s) IntraMuscular once  insulin lispro (ADMELOG) corrective regimen sliding scale   SubCutaneous three times a day before meals  insulin lispro (ADMELOG) corrective regimen sliding scale   SubCutaneous at bedtime    Pulm:    GI/:  bisacodyl 5 milliGRAM(s) Oral every 12 hours PRN  pantoprazole    Tablet 40 milliGRAM(s) Oral daily  polyethylene glycol 3350 17 Gram(s) Oral two times a day  senna 2 Tablet(s) Oral at bedtime    Other:      bisacodyl 5 milliGRAM(s) Oral every 12 hours PRN  pantoprazole    Tablet 40 milliGRAM(s) Oral daily  polyethylene glycol 3350 17 Gram(s) Oral two times a day  senna 2 Tablet(s) Oral at bedtime    47M Right-handed, PMH stage IV melanoma in Right thumb s/p amputation in 2021. With mets to axillary lymph nodes and Right lung s/p Right VAT in 11/2022. P/w dizziness x2wk and HA x1wk. CT shows Left frontal hyperdense mass w/ ~1cm MLS and small Right temporal hyperdense mass. 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 CT CAP shows no pulmonary masses, but new Right axillary soft tissue density. Exam: Creole-speaking, AOx3, EOMI, no facial/drift, WALTERS 5/5, SILT    Post-op day # 3 s/p Craniotomy for resection of tumor of left side of brain     Overnight event: patient still c/o post-op pain, Lt facial swelling      Vital Signs Last 24 Hrs  T(C): 36.7 (17 Mar 2023 04:50), Max: 37.3 (16 Mar 2023 13:01)  T(F): 98.1 (17 Mar 2023 04:50), Max: 99.1 (16 Mar 2023 13:01)  HR: 67 (17 Mar 2023 04:50) (64 - 95)  BP: 147/89 (17 Mar 2023 04:50) (115/75 - 147/89)  BP(mean): --  RR: 18 (17 Mar 2023 04:50) (18 - 20)  SpO2: 97% (17 Mar 2023 04:50) (96% - 98%)    Parameters below as of 17 Mar 2023 04:50  Patient On (Oxygen Delivery Method): room air                              15.8   20.36 )-----------( 127      ( 16 Mar 2023 09:36 )             46.7    03-16    137  |  99  |  24<H>  ----------------------------<  180<H>  4.4   |  28  |  1.00    Ca    8.8      16 Mar 2023 09:36  Phos  4.1     03-16  Mg     2.6     03-16       Stroke Core Measures      DRAIN OUTPUT:     NEUROIMAGING:     PHYSICAL EXAM:    General: No Acute Distress     Neurological: Awake, alert oriented to person, place and time, Following Commands, PERRL, EOMI, Face Symmetrical, Speech Fluent, Moving all extremities, Muscle Strength normal in all four extremities, No Drift, Sensation to Light Touch Intact    Pulmonary: Clear to Auscultation, No Rales, No Rhonchi, No Wheezes     Cardiovascular: S1, S2, Regular Rate and Rhythm     Gastrointestinal: Soft, Nontender, Nondistended     Incision: Lt facial swelling improved, patient is able to open his eyes today    MEDICATIONS:   Antibiotics:    Neuro:  acetaminophen     Tablet .. 650 milliGRAM(s) Oral every 6 hours PRN Temp greater or equal to 38C (100.4F), Mild Pain (1 - 3)  chlorproMAZINE    Tablet 10 milliGRAM(s) Oral four times a day PRN hiccups  levETIRAcetam 500 milliGRAM(s) Oral two times a day  melatonin 3 milliGRAM(s) Oral at bedtime  ondansetron Injectable 4 milliGRAM(s) IV Push every 6 hours PRN Nausea and/or Vomiting  oxyCODONE    IR 5 milliGRAM(s) Oral every 4 hours PRN Moderate Pain (4 - 6)  oxyCODONE    IR 10 milliGRAM(s) Oral every 4 hours PRN Severe Pain (7 - 10)    Anticoagulation:    Cardiology:    Endo:   dexAMETHasone  Injectable 4 milliGRAM(s) IV Push every 6 hours  dextrose 50% Injectable 25 Gram(s) IV Push once  dextrose 50% Injectable 12.5 Gram(s) IV Push once  glucagon  Injectable 1 milliGRAM(s) IntraMuscular once  insulin lispro (ADMELOG) corrective regimen sliding scale   SubCutaneous three times a day before meals  insulin lispro (ADMELOG) corrective regimen sliding scale   SubCutaneous at bedtime    Pulm:    GI/:  bisacodyl 5 milliGRAM(s) Oral every 12 hours PRN  pantoprazole    Tablet 40 milliGRAM(s) Oral daily  polyethylene glycol 3350 17 Gram(s) Oral two times a day  senna 2 Tablet(s) Oral at bedtime    Other:      bisacodyl 5 milliGRAM(s) Oral every 12 hours PRN  pantoprazole    Tablet 40 milliGRAM(s) Oral daily  polyethylene glycol 3350 17 Gram(s) Oral two times a day  senna 2 Tablet(s) Oral at bedtime

## 2023-03-17 NOTE — DISCHARGE NOTE PROVIDER - CARE PROVIDER_API CALL
Monster Rodriguez)  Neurosurgery  General  [address] [facility], [address]  [place] [address]  Phone: [phone]  Fax: [phone]  Follow Up Time:

## 2023-03-17 NOTE — PROGRESS NOTE ADULT - ASSESSMENT
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 He presented with dizziness x2wk and HA x1wk. CTH and MRI brain findings suspicious for metastatic melanoma.    # Metastatic melanoma  Patient has history of ungual melanoma of right thumb with metastases to multiple right axillary lymph nodes.  He first noticed the change in color of the nail > 1 year.  He underwent node resection 12/2021 and 3 nodes between 6-9 cm were removed at the time.  He received treatment from January 10 to March 28 2022 at [facility] in [facility].   Patient received 4 cycles of dacarbazine 250 mg/m2 x 5 days, every 28 days. He states that the nail tumor improved with chemotherapy. The bleeding stopped. Since off of chemotherapy the pain is getting worse.  In 11/202 he completed right tumb amputation and right lung resection. This is [id number] stage.   In 1/2023, he was started on Opdivo for 1 year adjuvant.  He was admitted with dizziness and headaches.  3/4/23- CT Head: Dominant left anterior inferior frontal and additional right temporal lobe hyperattenuating masses, with associated vasogenic edema and mass effect contributing to rightward midline shift.   No pawel hydrocephalus at this time. In the setting of metastatic melanoma, findings likely represent melanoma metastases. More sensitive evaluation with contrast-enhanced brain MRI may be obtained, as clinically warranted, as no contraindications exist.  CTA Neck: No significant flow-limiting stenosis or evidence of acute dissection within the cervical carotid or vertebral arteries.  CTA Head: No proximal large vessel occlusion, significant stenosis, or  evidence of intracranial aneurysm.  3/6/23:  MR  head: Markedly enlarged left anteromedial frontal mass, consistent with   progression of metastatic melanoma. New T1 shortening and susceptibility artifact may represent the presence of melanin and intralesional hemorrhage.  New 1.6 x 1.3 x 0.9 cm right anterior temporal T1 hyperintense lesion  with susceptibility artifact mild surrounding vasogenic edema, consistent with metastatic melanoma.  No abnormal leptomeningeal enhancement.  Rightward subfalcine herniation of 1.4 cm. Rightward midline shift of 1.2 cm.  Partial mild effacement of the left anterolateral suprasellar cistern. No  hydrocephalus.  3/14/23- Patient underwent left lateral supraorbital craniotomy for resection of tumor, 750cc  blood loss.   Surgical pathology from brain biopsy- found metastatic malignant melanoma.    47M  PMH stage IV melanoma in right thumb ,s/p amputation in 2021, with mets to axillary lymph nodes and right lung s/p right VAT in 11/2022. He presented with dizziness x2wk and HA x1wk. CTH and MRI brain findings suspicious for metastatic melanoma.    # Metastatic melanoma  Patient has history of ungual melanoma of right thumb with metastases to multiple right axillary lymph nodes.  He first noticed the change in color of the nail > 1 year.  He underwent node resection 12/2021 and 3 nodes between 6-9 cm were removed at the time.  He received treatment from January 10 to March 28 2022 at [facility]"" in [facility].   Patient received 4 cycles of dacarbazine 250 mg/m2 x 5 days, every 28 days. He states that the nail tumor improved with chemotherapy. The bleeding stopped. Since off of chemotherapy the pain is getting worse.  8/5/2022- MRI head found no intracranial mets.  In 11/202 he completed right tumb amputation and right lung resection. This is [id number] stage.   CT imaging in Jan 2022 found no evidence of metastatic disease in abdomen or pelvis. No new pulmonary nodules. New post-op changes s/p RML wedge resection. New confluent soft tissue density in R axilla, measuring 2.4x2.3cm is prior exam 10/4/2022. Findings may be postoperative.     In 1/2023, he was started on Nivolumab for 1 year adjuvant, and completed 2 cycles (last dose 2/20/2023).  He was admitted with dizziness and headaches.  3/4/23- CT Head: Dominant left anterior inferior frontal and additional right temporal lobe hyperattenuating masses, with associated vasogenic edema and mass effect contributing to rightward midline shift.   No pawel hydrocephalus at this time. In the setting of metastatic melanoma, findings likely represent melanoma metastases. More sensitive evaluation with contrast-enhanced brain MRI may be obtained, as clinically warranted, as no contraindications exist.  CTA Neck: No significant flow-limiting stenosis or evidence of acute dissection within the cervical carotid or vertebral arteries.  CTA Head: No proximal large vessel occlusion, significant stenosis, or  evidence of intracranial aneurysm.  3/6/23:  MR  head: Markedly enlarged left anteromedial frontal mass, consistent with   progression of metastatic melanoma. New T1 shortening and susceptibility artifact may represent the presence of melanin and intralesional hemorrhage.  New 1.6 x 1.3 x 0.9 cm right anterior temporal T1 hyperintense lesion  with susceptibility artifact mild surrounding vasogenic edema, consistent with metastatic melanoma.  No abnormal leptomeningeal enhancement.  Rightward subfalcine herniation of 1.4 cm. Rightward midline shift of 1.2 cm.  Partial mild effacement of the left anterolateral suprasellar cistern. No  hydrocephalus.  3/14/23- Patient underwent left lateral supraorbital craniotomy for resection of tumor, 750cc  blood loss.   Surgical pathology from brain biopsy- found metastatic malignant melanoma.     note not finalized  #Metastatic acral melanoma  - Patient is currently on dexamethasone 4mg PO q8h for edema and keppra 500mg PO BID for seizure ppx. He is on protonix 40mg PO for GI ppx.  - PD-L1 testing is pending. Will request Foundation testing to evaluate if patient may be eligible for targeted therapy.  - Patient had 2 cycles of nivolumab. Can consider obtaining CT CAP with IVC to evaluate if patient  had any response to being on nivolumab therapy.   - No plan for inpatient systemic therapy. Patient will follow up with Dr. Marcos at [facility] as an outpatient.     Note not finalized until signed by attending.   Please do not hesitate to page with questions.     Tameka Jeffrey MD PGY5   [phone]  Hematology-Oncology Fellow  WEEKENDS- Please call  to page on-call fellow 47M  PMH stage IV melanoma in right thumb ,s/p amputation in 2021, with mets to axillary lymph nodes and right lung s/p right VAT in 11/2022. He presented with dizziness x2wk and HA x1wk. CTH and MRI brain findings suspicious for metastatic melanoma.    # Metastatic melanoma  Patient has history of ungual melanoma of right thumb with metastases to multiple right axillary lymph nodes.  He first noticed the change in color of the nail > 1 year.  He underwent node resection 12/2021 and 3 nodes between 6-9 cm were removed at the time.  He received treatment from January 10 to March 28 2022 at [facility] in [facility].   Patient received 4 cycles of dacarbazine 250 mg/m2 x 5 days, every 28 days. He states that the nail tumor improved with chemotherapy. The bleeding stopped. Since off of chemotherapy the pain is getting worse.  8/5/2022- MRI head found no intracranial mets.  In 11/202 he completed right tumb amputation and right lung resection. This is [id number] stage.   CT imaging in Jan 2022 found no evidence of metastatic disease in abdomen or pelvis. No new pulmonary nodules. New post-op changes s/p RML wedge resection. New confluent soft tissue density in R axilla, measuring 2.4x2.3cm is prior exam 10/4/2022. Findings may be postoperative.     In 1/2023, he was started on Nivolumab for 1 year adjuvant, and completed 2 cycles (last dose 2/20/2023).  He was admitted with dizziness and headaches.  3/4/23- CT Head: Dominant left anterior inferior frontal and additional right temporal lobe hyperattenuating masses, with associated vasogenic edema and mass effect contributing to rightward midline shift.   No pawel hydrocephalus at this time. In the setting of metastatic melanoma, findings likely represent melanoma metastases. More sensitive evaluation with contrast-enhanced brain MRI may be obtained, as clinically warranted, as no contraindications exist.  CTA Neck: No significant flow-limiting stenosis or evidence of acute dissection within the cervical carotid or vertebral arteries.  CTA Head: No proximal large vessel occlusion, significant stenosis, or  evidence of intracranial aneurysm.  3/6/23:  MR  head: Markedly enlarged left anteromedial frontal mass, consistent with   progression of metastatic melanoma. New T1 shortening and susceptibility artifact may represent the presence of melanin and intralesional hemorrhage.  New 1.6 x 1.3 x 0.9 cm right anterior temporal T1 hyperintense lesion  with susceptibility artifact mild surrounding vasogenic edema, consistent with metastatic melanoma.  No abnormal leptomeningeal enhancement.  Rightward subfalcine herniation of 1.4 cm. Rightward midline shift of 1.2 cm.  Partial mild effacement of the left anterolateral suprasellar cistern. No  hydrocephalus.  3/14/23- Patient underwent left lateral supraorbital craniotomy for resection of tumor, 750cc  blood loss.   Surgical pathology from brain biopsy- found metastatic malignant melanoma.     #Metastatic acral melanoma  - Patient is currently on dexamethasone 4mg PO q8h for edema and keppra 500mg PO BID for seizure ppx. He is on protonix 40mg PO for GI ppx.  - PD-L1 testing is pending. Will discuss possible Foundation testing to evaluate if patient may be eligible for targeted therapy.  - Patient had 2 cycles of nivolumab. Can consider obtaining CT CAP with IVC to evaluate if patient  had any response to being on nivolumab therapy- this can be pursued outpatient.   - Patient will require post-operative radiation therapy to srugical site and R temporal mass which was not removed.   - No plan for inpatient systemic therapy. Patient will follow up with Dr. Marcos at [facility] as an outpatient.       Note not finalized until signed by attending.   Please do not hesitate to page with questions.     Tameka Jeffrey MD PGY5   [phone]  Hematology-Oncology Fellow  WEEKENDS- Please call  to page on-call fellow

## 2023-03-17 NOTE — DISCHARGE NOTE NURSING/CASE MANAGEMENT/SOCIAL WORK - PATIENT PORTAL LINK FT
You can access the FollowMyHealth Patient Portal offered by [facility] by registering at the following website: [email or web address]. By joining [employer]’s FollowMyHealth portal, you will also be able to view your health information using other applications (apps) compatible with our system.

## 2023-03-17 NOTE — DISCHARGE NOTE PROVIDER - HOSPITAL COURSE
47M Right-handed, PMH stage IV melanoma in Right thumb s/p amputation in 2021. With mets to axillary lymph nodes and Right lung s/p Right VAT in 11/2022. P/w dizziness x2wk and HA x1wk. CT shows Left frontal hyperdense mass w/ ~1cm MLS and small Right temporal hyperdense mass. 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 CT CAP shows no pulmonary masses, but new Right axillary soft tissue density.   Exam on admission: Creole-speaking, AOx3, EOMI, no facial/drift, WALTERS 5/5, SILT  Patient was found to be covid+ but asymptomatic, was on isolation for 14 days and then went for Left Craniotomy resection of brain tumor. frozen c/w mets melanoma. Post-op patient had Lt periorbital swelling which improved. He was seen by Pt/Ot and out patient services recommended. He is stable for discharge home on Decadron tapered down to 2mg bid, Keppra for seizure prophylaxis, and analgesic as needed. He will be f/u by neuro/oncology, oncology and radiation oncology as out patient.

## 2023-03-17 NOTE — PROGRESS NOTE ADULT - ATTENDING COMMENTS
Agree with above.
Patient interviewed via video .    Metastatic melanoma involving lungs admitted with symptomatic brain metastases now s/p craniotomy resection of dominant mass.    MRI's reviewed and d/w Neuroradiologist.    Will need outpatient RT to brain:  post-op treatment of surgical bed and GK-SRS of remaining metastatic focus in brain.    To f/u with med-onc Dr. Marcos for further systemic therapy as outpatient.  Patient is s/p 2 cycles of Nivolumab.      Gerardo Bernabe MD

## 2023-03-17 NOTE — DISCHARGE NOTE NURSING/CASE MANAGEMENT/SOCIAL WORK - NSDCPEFALRISK_GEN_ALL_CORE
For information on Fall & Injury Prevention, visit: [email or web address] OR  [email or web address] OR  https://www.cdc.gov/steadi/patient.html

## 2023-03-17 NOTE — DISCHARGE NOTE PROVIDER - NSDCFUSCHEDAPPT_GEN_ALL_CORE_FT
Neo Marcos  [facility]  [facility]  Scheduled Appointment: 03/20/2023    [facility]  [facility]  Scheduled Appointment: 03/20/2023    [facility]  [facility]  Scheduled Appointment: 04/17/2023    [facility]  [facility]  Scheduled Appointment: 05/15/2023    [facility]  [facility]  Scheduled Appointment: 06/12/2023     Neo Marcos  [facility]  [facility]  Scheduled Appointment: 03/20/2023    [facility]  [facility]  Scheduled Appointment: 04/17/2023    [facility]  [facility]  Scheduled Appointment: 05/15/2023    [facility]  [facility]  Scheduled Appointment: 06/12/2023

## 2023-03-20 ENCOUNTER — APPOINTMENT (OUTPATIENT)
Dept: HEMATOLOGY ONCOLOGY | Facility: CLINIC | Age: 48
End: 2023-03-20
Payer: MEDICAID

## 2023-03-20 ENCOUNTER — RESULT REVIEW (OUTPATIENT)
Age: 48
End: 2023-03-20

## 2023-03-20 ENCOUNTER — APPOINTMENT (OUTPATIENT)
Dept: INFUSION THERAPY | Facility: HOSPITAL | Age: 48
End: 2023-03-20

## 2023-03-20 VITALS
TEMPERATURE: 98.2 F | HEART RATE: 119 BPM | WEIGHT: 189.59 LBS | DIASTOLIC BLOOD PRESSURE: 81 MMHG | OXYGEN SATURATION: 99 % | SYSTOLIC BLOOD PRESSURE: 118 MMHG | BODY MASS INDEX: 28 KG/M2 | RESPIRATION RATE: 21 BRPM

## 2023-03-20 LAB
BASOPHILS # BLD AUTO: 0.01 K/UL — SIGNIFICANT CHANGE UP (ref 0–0.2)
BASOPHILS NFR BLD AUTO: 0.1 % — SIGNIFICANT CHANGE UP (ref 0–2)
EOSINOPHIL # BLD AUTO: 0 K/UL — SIGNIFICANT CHANGE UP (ref 0–0.5)
EOSINOPHIL NFR BLD AUTO: 0 % — SIGNIFICANT CHANGE UP (ref 0–6)
ERYTHROCYTE [SEDIMENTATION RATE] IN BLOOD: 21 MM/HR — HIGH (ref 0–15)
HCT VFR BLD CALC: 42.3 % — SIGNIFICANT CHANGE UP (ref 39–50)
HGB BLD-MCNC: 14.5 G/DL — SIGNIFICANT CHANGE UP (ref 13–17)
IMM GRANULOCYTES NFR BLD AUTO: 0.5 % — SIGNIFICANT CHANGE UP (ref 0–0.9)
LYMPHOCYTES # BLD AUTO: 0.54 K/UL — LOW (ref 1–3.3)
LYMPHOCYTES # BLD AUTO: 3.8 % — LOW (ref 13–44)
MCHC RBC-ENTMCNC: 29.7 PG — SIGNIFICANT CHANGE UP (ref 27–34)
MCHC RBC-ENTMCNC: 34.3 G/DL — SIGNIFICANT CHANGE UP (ref 32–36)
MCV RBC AUTO: 86.5 FL — SIGNIFICANT CHANGE UP (ref 80–100)
MONOCYTES # BLD AUTO: 0.59 K/UL — SIGNIFICANT CHANGE UP (ref 0–0.9)
MONOCYTES NFR BLD AUTO: 4.1 % — SIGNIFICANT CHANGE UP (ref 2–14)
NEUTROPHILS # BLD AUTO: 13.16 K/UL — HIGH (ref 1.8–7.4)
NEUTROPHILS NFR BLD AUTO: 91.5 % — HIGH (ref 43–77)
NRBC # BLD: 0 /100 WBCS — SIGNIFICANT CHANGE UP (ref 0–0)
PLATELET # BLD AUTO: 165 K/UL — SIGNIFICANT CHANGE UP (ref 150–400)
RBC # BLD: 4.89 M/UL — SIGNIFICANT CHANGE UP (ref 4.2–5.8)
RBC # FLD: 14 % — SIGNIFICANT CHANGE UP (ref 10.3–14.5)
WBC # BLD: 14.37 K/UL — HIGH (ref 3.8–10.5)
WBC # FLD AUTO: 14.37 K/UL — HIGH (ref 3.8–10.5)

## 2023-03-20 PROCEDURE — G2212 PROLONG OUTPT/OFFICE VIS: CPT

## 2023-03-20 PROCEDURE — 99215 OFFICE O/P EST HI 40 MIN: CPT

## 2023-03-20 NOTE — HISTORY OF PRESENT ILLNESS
[de-identified] : Mr. Burk is a 47 year old Estonian speaking gentlemen presenting to the office for an initial consultation of melanoma.\par \par Patient has history of ungual melanoma of right thumb with metastases to multiple right axillary lymph nodes.\par He first noticed the change in color of the nail > 1 year.\par He underwent node resection 12/2021 and 3 nodes between 6-9 cm were removed at the time.\par \par He received treatment from January 10 to March 28 2022 at [facility] in [facility].\par Patient received 4 cycles of dacarbazine 250 mg/m2 x 5 days every 28 days.\par He states that the nail tumor improved with chemotherapy.\par The bleeding stopped.\par Since off of chemotherapy the pain is getting worse.\par \par He notes pain in the epigastrum for the past 3 weeks that is worse at night.\par It is ~ 3/10 in severity and is persistent.\par \par Patient referred to US oncology for treatment with immunotherapy.\par \par COVID Vaccine Capzles J&J 12/5/22 (no booster)\par \par 9/20/2022\par Both the right axilla biopsy and lung biopsy are negative.\par However could be false negative.\par His thumb pain is significant and might need amputation.\par We still need pathologic diagnosis and molecular genetics.\par 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 Herrera on 9/27.\par \par 10/22/2022\par I discussed case with surgeon.\par Given lack of tissue positive on lung or axilla biopsy, will proceed with distal right thumb amputation.\par Patient has been in pain and also notes intermittent discharge.\par Repeat CT scan of Rr Lung lesion shows mile increase in size of lesion.\par Patient will then also need to have this lesion resected.\par He will return to see me 2-3 weeks post-op and we will begin 1 year of adjuvant immunotherapy. \par \par 1/17/2023\par Patient had both surgeries\par 11/9/22 - Right thumb resection:\par 1. Right thumb, disarticulated between proximal and distal phalanges\par -  Melanoma, ulcerated, acral type, approximately 8.5 mm in thickness, mitotic rate 5/mm2, margins negative\par - See synoptic summary\par 2. Right thumb distal phalanx proximal bone margin, excision\par - Negative for tumor\par 3. Right axillary node dissection levels 1, 2, and 3\par - Two of eleven lymph nodes positive for metastatic melanoma (2/11)\par - Present as aggregates of tumor cells in subcapsular spaces (0.6 mmin greatest dimension)\par - No extracapsular extension seen\par Note: Melan-A and SOX10 immunostains on representative sections support the interpretation.\par Mitotic Rate:   5 mitoses per mm2\par pT Category:  pT4b\par pN Category:   pN2\par pM Category:   pM1b\par \par 11/15/2022: Right lung resection\par 1. Lung, right middle lobe, wedge resection\par - Metastatic melanoma in lung. Margin is free of melanoma. Moleculartesting and PDL1 testing is pending.\par 2. Lymph node, level 9:\par - Fibrous tissue, negative for melanoma.\par PDL1 IHC = 30% (TPS)\par Nodule:   1.5 x 1.3 x 0.8 cm\par \par Will need repeat labs and imaging\par Pacheco start Nivolumab q 4 weeks x 1 year.\par \par 03/20/2023:\par 1) Hospital admission: The patient is a 47 gentleman with PMH stage IV melanoma (on Opdivo in 1/2023 last dose was 2/20/23) in right thumb s/p amputation in 2021, with known metastatic disease to the axilla R lung s/p Right VAT in 11/2022. He presented to [facility] from 03/05/2023-03/18/2023 with dizziness and headache. CTH identified a L frontal hyperdense mass and a small R temporal mass. He underwent MRI brain (3/6) which found markedly enlarged left anteromedial frontal mass, consistent with progression of metastatic melanoma. New T1 shortening and susceptibility artifact may represent the presence of melanin and intralesional hemorrhage. New 1.6 x 1.3 x 0.9 cm right anterior temporal T1 hyperintense lesion with susceptibility artifact mild surrounding vasogenic edema, consistent with metastatic melanoma.He was started on decadron and Keppra.?   \par On 3/14/23, the patient underwent craniotomy for resection of only the L sided brain tumor.  He had a post-surgical MRI head on 3/15/23.   \par On 3/16/23 he underwent Duplex US BLE which was negative for DVT.  On 3/17, Path from the brain specimen confirmed metastatic melanoma.   \par He will require outpatient RT to the brain to the post-operative surgical bed and GK-SRS to the remaining metastatic focus at the R side of the brain.       \par He is currently on Dexamethasone 4mg PO Q8H for edema + Keppra 500 mg PO BID for seizure PPX.\par 2) acral melanoma : He will need to start Ipilimumab (3) + Nivolumab (1).\par He is s/p 2 cycles of Nivolumab, last treatment on 02/20/2023.\par We reviewed logistics, mechanism of action and most common irAEs.\par 3) Social: Patient is currently living at home with his spouse.  He is not receiving PT or OT.  He requires assistance with his routine/instrumental ADLs such as going to restroom, taking a shower.  As per spouse patient is incoherent at times.  Patient spends majority of the day sitting in bed or laying down.\par  [de-identified] : Medical Oncology ([facility]): Guy Shetty  [phone]\par Neurosx: Monster Rodriguez [phone]\par \par PTs Contact: Inocencia Tovar (spouse) / [phone]\par Friend: Cipriano

## 2023-03-20 NOTE — ASSESSMENT
[FreeTextEntry1] : Patient has history of ungual melanoma of right thumb with metastases to multiple right axillary lymph nodes.\par He first noticed the change in color of the nail > 1 year.\par He underwent node resection 12/2021 and 3 nodes between 6-9 cm were removed at the time.\par \par He received treatment from January 10 to March 28 2022 at [facility] in [facility]. Patient received 4 cycles of dacarbazine 250 mg/m2 x 5 days every 28 days. He states that the nail tumor improved with chemotherapy. The bleeding stopped. Since off of chemotherapy the pain is getting worse.\par \par in 11/202 he completed right thumb amputation and right lung resection. This is a [id number] stage. \par \par C1 Nivolumab 01/23/2023\par C2 02/20/2023\par \par He presented to [facility] from 03/05/2023-03/18/2023 with dizziness and headache. CTH identified a L frontal hyperdense mass and a small R temporal mass. He underwent MRI brain (3/6) which found markedly enlarged left anteromedial frontal mass, consistent with progression of metastatic melanoma. New T1 shortening and susceptibility artifact may represent the presence of melanin and intralesional hemorrhage. New 1.6 x 1.3 x 0.9 cm right anterior temporal T1 hyperintense lesion with susceptibility artifact mild surrounding vasogenic edema, consistent with metastatic melanoma .\par \par He was started on Decadron and Keppra.\par Currently on Dexamethasone 4 mg PO every 8 hours for edema + Keppra 500 mg PO BID for seizure PPX. \par \par On 3/14/23, the patient underwent craniotomy for resection of only the L sided brain tumor. \par F/U with neuroSx on 03/31/2023\par \par He will require outpatient RT to the brain to the post-operative surgical bed and GK-SRS to the remaining metastatic focus at the R side of the brain.  Email sent to Neurology and Radiation oncology for patient to be evaluated.\par Would like to taper patient off Dexamethasone prior to starting dual checkpoint therapy.\par \par He is scheduled for Ipilimumab (3) + Nivolumab (1) on 04/17/2023.\par We reviewed most common irAEs.\par \par Labs today.\par \par Time spent reviewing above information = 60 minutes.    \par \par

## 2023-03-20 NOTE — REVIEW OF SYSTEMS
[Negative] : Allergic/Immunologic [FreeTextEntry9] : right thumb pain [de-identified] : Pain from left craniotomy suture site.

## 2023-03-20 NOTE — PHYSICAL EXAM
[Ambulatory and capable of all self care but unable to carry out any work activities] : Status 2- Ambulatory and capable of all self care but unable to carry out any work activities. Up and about more than 50% of waking hours [Normal] : affect appropriate [de-identified] : tender RUQ; no masses [de-identified] : right thumb amputation is well healed [de-identified] : Left frontal lobe staples noted from craniotomy site on 03/14/2023.

## 2023-03-21 LAB
ALBUMIN SERPL ELPH-MCNC: 4 G/DL
ALP BLD-CCNC: 62 U/L
ALT SERPL-CCNC: 69 U/L
ANION GAP SERPL CALC-SCNC: 14 MMOL/L
AST SERPL-CCNC: 46 U/L
BILIRUB SERPL-MCNC: 0.5 MG/DL
BUN SERPL-MCNC: 22 MG/DL
CALCIUM SERPL-MCNC: 9.2 MG/DL
CHLORIDE SERPL-SCNC: 98 MMOL/L
CO2 SERPL-SCNC: 26 MMOL/L
CREAT SERPL-MCNC: 1.13 MG/DL
CRP SERPL-MCNC: 50 MG/L
EGFR: 81 ML/MIN/1.73M2
GLUCOSE SERPL-MCNC: 142 MG/DL
LDH SERPL-CCNC: 263 U/L
POTASSIUM SERPL-SCNC: 4.7 MMOL/L
PROT SERPL-MCNC: 6.6 G/DL
SODIUM SERPL-SCNC: 137 MMOL/L

## 2023-03-27 ENCOUNTER — APPOINTMENT (OUTPATIENT)
Dept: RADIATION ONCOLOGY | Facility: CLINIC | Age: 48
End: 2023-03-27
Payer: MEDICAID

## 2023-03-27 VITALS
OXYGEN SATURATION: 98 % | HEART RATE: 98 BPM | DIASTOLIC BLOOD PRESSURE: 79 MMHG | HEIGHT: 69 IN | WEIGHT: 189 LBS | RESPIRATION RATE: 18 BRPM | BODY MASS INDEX: 27.99 KG/M2 | TEMPERATURE: 97.16 F | SYSTOLIC BLOOD PRESSURE: 114 MMHG

## 2023-03-27 PROCEDURE — 99205 OFFICE O/P NEW HI 60 MIN: CPT | Mod: 25,GC

## 2023-03-27 NOTE — VITALS
[Maximal Pain Intensity: 8/10] : 8/10 [Least Pain Intensity: 3/10] : 3/10 [Pain Location: ___] : Pain Location: [unfilled] [80: Normal activity with effort; some signs or symptoms of disease.] : 80: Normal activity with effort; some signs or symptoms of disease.

## 2023-03-28 ENCOUNTER — OUTPATIENT (OUTPATIENT)
Dept: OUTPATIENT SERVICES | Facility: HOSPITAL | Age: 48
LOS: 1 days | Discharge: HOME | End: 2023-03-28
Payer: MEDICAID

## 2023-03-28 DIAGNOSIS — Z98.890 OTHER SPECIFIED POSTPROCEDURAL STATES: Chronic | ICD-10-CM

## 2023-03-28 DIAGNOSIS — C43.60 MALIGNANT MELANOMA OF UNSPECIFIED UPPER LIMB, INCLUDING SHOULDER: Chronic | ICD-10-CM

## 2023-03-28 PROCEDURE — 77263 THER RADIOLOGY TX PLNG CPLX: CPT

## 2023-03-28 NOTE — PHYSICAL EXAM
[Oriented To Time, Place, And Person] : oriented to person, place, and time [Abdomen Soft] : soft [Normal] : no joint swelling, no clubbing or cyanosis of the fingernails and muscle strength and tone were normal [No Focal Deficits] : no focal deficits [de-identified] : Left eye conjunctival hemorrhage [de-identified] : RUQ TTP, + Simon sign [de-identified] : left head surgical staples in place, dry. s/p  Right thumb amputation healed. [de-identified] : s/p left frontal lobe craniotomy  [de-identified] : flat affect

## 2023-03-28 NOTE — HISTORY OF PRESENT ILLNESS
[FreeTextEntry1] : Mr. Burk is a 47 year old male with Left sided brain tumor s/p craniotomy on 3/14/23. 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 Patient received 4 cycles of dacarbazine 250 mg/m2 x 5 days every 28 days. He states that the nail tumor improved with chemotherapy. The bleeding stopped. Since off of chemotherapy the pain is getting worse.\par \par 11/202 he completed right thumb amputation and right lung resection. This is a [id number] stage. \par \par C1 Nivolumab 01/23/2023\par C2 02/20/2023\par \par He presented to [facility] from 03/05/2023-03/18/2023 with dizziness and headache. CTH identified a L frontal hyperdense mass and a small R temporal mass. He underwent MRI brain (3/6) which found markedly enlarged left anteromedial frontal mass, consistent with progression of metastatic melanoma. New T1 shortening and susceptibility artifact may represent the presence of melanin and intralesional hemorrhage. New 1.6 x 1.3 x 0.9 cm right anterior temporal T1 hyperintense lesion with susceptibility artifact mild surrounding vasogenic edema, consistent with metastatic melanoma.\par \par On 3/14/23, the patient underwent craniotomy for resection of only the L sided brain tumor. \par \par He was started on Decadron and Keppra. Currently on Dexamethasone 4 mg PO every 8 hours for edema + Keppra 500 mg PO BID for seizure PPX. \par \par He is scheduled for Ipilimumab (3) + Nivolumab (1) on 04/17/2023.\par \par 3/27/23 Mr Burk presents for consideration of RT to post-operative surgical bed and GK-SRS to the remaining metastatic focus at the R side of the brain.\par \par Today, he reports pain at the left head surgical area with staples in place, left eye conjunctival hemorrhage, and blurry vision. He denies unsteady gait, dizziness, nausea. Additionally complains of smoldering RUQ pain for the past several months since his initial amputation/lung resection.

## 2023-03-28 NOTE — REASON FOR VISIT
[Consideration for Non-Curative Therapy] : consideration for non-curative therapy for [Spouse] : spouse [Patient Declined  Services] : - None: Patient declined  services [Brain Tumor] : brain tumor [Other: ___] : [unfilled] [Interpreters_FullName] : Leonela [Interpreters_Relationshiptopatient] : Spouse [TWNoteComboBox1] : Lisa

## 2023-03-28 NOTE — REVIEW OF SYSTEMS
[Fatigue] : fatigue [Visual Disturbances] : visual disturbances [Abdominal Pain] : abdominal pain [Negative] : Allergic/Immunologic [FreeTextEntry3] : left eye conjunctival hemorrhage

## 2023-03-30 ENCOUNTER — APPOINTMENT (OUTPATIENT)
Dept: NEUROSURGERY | Facility: CLINIC | Age: 48
End: 2023-03-30
Payer: MEDICAID

## 2023-03-30 ENCOUNTER — NON-APPOINTMENT (OUTPATIENT)
Age: 48
End: 2023-03-30

## 2023-03-30 VITALS
HEART RATE: 95 BPM | WEIGHT: 188.25 LBS | BODY MASS INDEX: 27.88 KG/M2 | OXYGEN SATURATION: 100 % | TEMPERATURE: 210.2 F | SYSTOLIC BLOOD PRESSURE: 105 MMHG | DIASTOLIC BLOOD PRESSURE: 69 MMHG | HEIGHT: 69 IN

## 2023-03-30 DIAGNOSIS — Z78.9 OTHER SPECIFIED HEALTH STATUS: ICD-10-CM

## 2023-03-30 PROCEDURE — 99024 POSTOP FOLLOW-UP VISIT: CPT

## 2023-03-30 NOTE — REVIEW OF SYSTEMS
[Suicidal] : suicidal [Negative] : Respiratory [de-identified] : headaches [FreeTextEntry3] : left eye redness, blurry vision

## 2023-03-30 NOTE — HISTORY OF PRESENT ILLNESS
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  The lesion creates significant mass effect and extends all the way up toward the ventricle and create midline shift, all the [place] of distal vessels are pushed medially and posteriorly.  Considering the significant size of the tumor, surgical  resection is indicated.  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  Pt declined translation services.  Left lateral incision is well approximated without any evidence of redness, drainage  and pt denies fever.  Staples intact.\par \par Pt has followed up with Dr. Almaguer for consideration of RT and pending f/u with Dr. Lozano.\par \par Pt reports left eye pain and occasional headaches 5-6/10 that are managed with Tylenol and Oxycodone PRN.  He states that left eye vision is blurry and denies double vision.\par

## 2023-03-30 NOTE — ASSESSMENT
[FreeTextEntry1] : IMPRESSION:\par 1. 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 F/U with Dr. Almaguer for consideration of RT.\par 2. F/U with Dr. Lozano.\par 3. F/U with Dr. Rodriguez and NP in 1 month.\par 4. Okay to wash hair and pat dry the incision thoroughly.\par 5. Avoid going to the childs until 3 months post op.\par 6. Staples removed without difficulty.\par

## 2023-03-30 NOTE — REVIEW OF SYSTEMS
[Suicidal] : suicidal [Negative] : Respiratory [de-identified] : headaches [FreeTextEntry3] : left eye redness, blurry vision

## 2023-03-30 NOTE — HISTORY OF PRESENT ILLNESS
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  The lesion creates significant mass effect and extends all the way up toward the ventricle and create midline shift, all the [place] of distal vessels are pushed medially and posteriorly.  Considering the significant size of the tumor, surgical  resection is indicated.  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  Pt declined translation services.  Left lateral incision is well approximated without any evidence of redness, drainage  and pt denies fever.  Staples intact.\par \par Pt has followed up with Dr. Almaguer for consideration of RT and pending f/u with Dr. Lozano.\par \par Pt reports left eye pain and occasional headaches 5-6/10 that are managed with Tylenol and Oxycodone PRN.  He states that left eye vision is blurry and denies double vision.\par

## 2023-03-30 NOTE — PHYSICAL EXAM
[General Appearance - Alert] : alert [General Appearance - Well Nourished] : well nourished [General Appearance - Well-Appearing] : healthy appearing [Clean] : clean [Dry] : dry [Healing Well] : healing well [Staple Intact] : closed with intact staples [No Drainage] : without drainage [Normal Skin] : normal [Oriented To Time, Place, And Person] : oriented to person, place, and time [Person] : oriented to person [Place] : oriented to place [Time] : oriented to time [] : no respiratory distress [Respiration, Rhythm And Depth] : normal respiratory rhythm and effort [Exaggerated Use Of Accessory Muscles For Inspiration] : no accessory muscle use [Heart Rate And Rhythm] : heart rate was normal and rhythm regular [Erythema] : not erythematous [Tender] : not tender [Warm] : not warm [Indurated] : not indurated [Fluctuant] : not fluctuant [FreeTextEntry1] : left lateral supraorbital incision

## 2023-04-03 ENCOUNTER — APPOINTMENT (OUTPATIENT)
Dept: NEUROLOGY | Facility: CLINIC | Age: 48
End: 2023-04-03
Payer: MEDICAID

## 2023-04-03 ENCOUNTER — NON-APPOINTMENT (OUTPATIENT)
Age: 48
End: 2023-04-03

## 2023-04-03 VITALS
RESPIRATION RATE: 16 BRPM | SYSTOLIC BLOOD PRESSURE: 119 MMHG | HEIGHT: 69 IN | DIASTOLIC BLOOD PRESSURE: 79 MMHG | OXYGEN SATURATION: 100 % | BODY MASS INDEX: 27.85 KG/M2 | WEIGHT: 188 LBS | HEART RATE: 115 BPM

## 2023-04-03 DIAGNOSIS — H53.8 OTHER VISUAL DISTURBANCES: ICD-10-CM

## 2023-04-03 PROCEDURE — 99205 OFFICE O/P NEW HI 60 MIN: CPT

## 2023-04-06 ENCOUNTER — RESULT REVIEW (OUTPATIENT)
Age: 48
End: 2023-04-06

## 2023-04-06 ENCOUNTER — OUTPATIENT (OUTPATIENT)
Dept: OUTPATIENT SERVICES | Facility: HOSPITAL | Age: 48
LOS: 1 days | End: 2023-04-06
Payer: MEDICAID

## 2023-04-06 ENCOUNTER — APPOINTMENT (OUTPATIENT)
Dept: MRI IMAGING | Facility: IMAGING CENTER | Age: 48
End: 2023-04-06

## 2023-04-06 DIAGNOSIS — C43.60 MALIGNANT MELANOMA OF UNSPECIFIED UPPER LIMB, INCLUDING SHOULDER: Chronic | ICD-10-CM

## 2023-04-06 DIAGNOSIS — Z98.890 OTHER SPECIFIED POSTPROCEDURAL STATES: Chronic | ICD-10-CM

## 2023-04-06 DIAGNOSIS — C79.9 SECONDARY MALIGNANT NEOPLASM OF UNSPECIFIED SITE: ICD-10-CM

## 2023-04-06 PROCEDURE — A9585: CPT

## 2023-04-06 PROCEDURE — 76498 UNLISTED MR PROCEDURE: CPT

## 2023-04-07 ENCOUNTER — NON-APPOINTMENT (OUTPATIENT)
Age: 48
End: 2023-04-07

## 2023-04-07 PROCEDURE — 77290 THER RAD SIMULAJ FIELD CPLX: CPT | Mod: 26

## 2023-04-07 PROCEDURE — 77334 RADIATION TREATMENT AID(S): CPT | Mod: 26

## 2023-04-09 NOTE — DATA REVIEWED
[de-identified] : Personally reviewed pre and op MRI from 3/23 and prior from 8/22 showing growth then resection of inferior left frontal lesion and new right frontal lesion without much edema.  The dominant lesion does compress the left optic nerve

## 2023-04-09 NOTE — DISCUSSION/SUMMARY
[FreeTextEntry1] : 47 year old man with metastatic melanoma to the brain. We reviewed plan for SRS, helped coordinate residual staple removal, and steroid taper. \par \par TUMOR:\par Decrease steroids to 2 mg daily.\par Maintain Keppra for now. Will taper in future. \par \par LEFT EYE BLURRINESS: \par Referral to Dr. Tolentino for further evaluation of left eye blurriness. \par No abnormality noted on exam. \par \par RTC 3 weeks.\par

## 2023-04-09 NOTE — PHYSICAL EXAM
[FreeTextEntry1] : Exam as below (with documented exceptions in parentheses) \par \par General: Healthy appearing man well groomed and without deformities. KPS is 80. \par \par Mental status: Alert, awake and attentive. Oriented to person, place and time. Recent and remote memory intact. Normal concentration. Fluent spontaneous speech with intact naming and repetition. Normal fund of knowledge.  (flat affect, somewhat abulic)\par \par Cranial Nerves:  II: Full visual fields. III, IV, VI: Pupils round, reactive to light. Full extraocular movements. No nystagmus. V: Normal bilateral bite, facial sensation. VII: No facial weakness. VII: Hearing intact. IX, X: Palate midline, intact gag. XI: Sternocleidomastoids normal. XII: Tongue protrudes midline. No dysarthria.  (no apd or vision loss OS)\par \par Motor: Normal tone, bulk and power throughout including arms and legs, proximal and distal. No pronator drift. No abnormal movements. \par \par Sensation: Normal in arms, legs and trunk to pin, proprioception and vibration. Negative Romberg. \par \par Coordination: No dysmetria or dysdiadochokinesis bilaterally. Normal heel-shin testing.  \par \par Reflexes: Normal and symmetric throughout. Absent Babinski bilaterally. \par \par Vascular: No peripheral edema/calf tenderness. \par \par Eyes: Fundoscopic exam without papilledema (deferred) \par \par Heart: Regular rate and rhythm. Normal s1-s2. No murmurs, gallops, or rubs.  \par \par Respiratory: Lungs clear to auscultation bilaterally. \par \par Abdomen: Nontender, non-distended. No hepatosplenomegaly. Normal bowel sounds in four quadrants.  \par \par

## 2023-04-09 NOTE — HISTORY OF PRESENT ILLNESS
[FreeTextEntry1] : This 47 year old right handed man is referred by Dr. Marcos for my advice and opinion regarding brain metastases\par \par He presented with ungual melanoma of the right thumb that metastasized to multiple right axillary lymph nodes in 2021 and had node resection in 12/21 and treatment with 4 cycles of dacarbazine in [facility].  In 11/22 he had recurrence, with a thumb resection and pulmonary lesionectomy. He then initiated nivolumab, but presented with headaches and was found to have a left inferior frontal tumor.  In retrospect he'd had a smaller lesion present there in 8/22 on MRI, called a meningioma.  An additional lesion in the right frontal lobe was noted as well. He underwent resection of the tumor by Dr. Rodriguez on 3/14/23.  Plan is for SRS and ipi/nivo.   \par \par Currently still having mild daily headaches that subside after taking the steroid, issues with short term memory and left eye blurriness. Denies diplopia, seizures, trouble with language, trouble walking, or weakness.\par \par PMHX: Melanoma\par \par PSHX: right thumb amputation, right lung tumor resection, left frontal craniotomy.\par \par SHX: Creole-speaking. Here with wife Livier who is serving as a . Refusing  services at this time.  in [facility]. No smoking or alcohol use.\par \par FHX: No brain tumors.

## 2023-04-17 ENCOUNTER — RESULT REVIEW (OUTPATIENT)
Age: 48
End: 2023-04-17

## 2023-04-17 ENCOUNTER — APPOINTMENT (OUTPATIENT)
Dept: INFUSION THERAPY | Facility: HOSPITAL | Age: 48
End: 2023-04-17

## 2023-04-17 LAB
ALBUMIN SERPL ELPH-MCNC: 4.4 G/DL — SIGNIFICANT CHANGE UP (ref 3.3–5)
ALP SERPL-CCNC: 59 U/L — SIGNIFICANT CHANGE UP (ref 40–120)
ALT FLD-CCNC: 40 U/L — SIGNIFICANT CHANGE UP (ref 10–45)
ANION GAP SERPL CALC-SCNC: 15 MMOL/L — SIGNIFICANT CHANGE UP (ref 5–17)
AST SERPL-CCNC: 16 U/L — SIGNIFICANT CHANGE UP (ref 10–40)
BASOPHILS # BLD AUTO: 0.03 K/UL — SIGNIFICANT CHANGE UP (ref 0–0.2)
BASOPHILS NFR BLD AUTO: 0.4 % — SIGNIFICANT CHANGE UP (ref 0–2)
BILIRUB SERPL-MCNC: 0.3 MG/DL — SIGNIFICANT CHANGE UP (ref 0.2–1.2)
BUN SERPL-MCNC: 18 MG/DL — SIGNIFICANT CHANGE UP (ref 7–23)
CALCIUM SERPL-MCNC: 9.7 MG/DL — SIGNIFICANT CHANGE UP (ref 8.4–10.5)
CHLORIDE SERPL-SCNC: 101 MMOL/L — SIGNIFICANT CHANGE UP (ref 96–108)
CK SERPL-CCNC: 59 U/L — SIGNIFICANT CHANGE UP (ref 30–200)
CO2 SERPL-SCNC: 29 MMOL/L — SIGNIFICANT CHANGE UP (ref 22–31)
CREAT SERPL-MCNC: 1.17 MG/DL — SIGNIFICANT CHANGE UP (ref 0.5–1.3)
CRP SERPL-MCNC: 4 MG/L — SIGNIFICANT CHANGE UP
EGFR: 77 ML/MIN/1.73M2 — SIGNIFICANT CHANGE UP
EOSINOPHIL # BLD AUTO: 0.01 K/UL — SIGNIFICANT CHANGE UP (ref 0–0.5)
EOSINOPHIL NFR BLD AUTO: 0.1 % — SIGNIFICANT CHANGE UP (ref 0–6)
ERYTHROCYTE [SEDIMENTATION RATE] IN BLOOD: 9 MM/HR — SIGNIFICANT CHANGE UP (ref 0–15)
GLUCOSE SERPL-MCNC: 152 MG/DL — HIGH (ref 70–99)
HCT VFR BLD CALC: 41.5 % — SIGNIFICANT CHANGE UP (ref 39–50)
HGB BLD-MCNC: 14 G/DL — SIGNIFICANT CHANGE UP (ref 13–17)
IMM GRANULOCYTES NFR BLD AUTO: 1.6 % — HIGH (ref 0–0.9)
LDH SERPL L TO P-CCNC: 219 U/L — SIGNIFICANT CHANGE UP (ref 50–242)
LYMPHOCYTES # BLD AUTO: 0.99 K/UL — LOW (ref 1–3.3)
LYMPHOCYTES # BLD AUTO: 12.6 % — LOW (ref 13–44)
MCHC RBC-ENTMCNC: 29.2 PG — SIGNIFICANT CHANGE UP (ref 27–34)
MCHC RBC-ENTMCNC: 33.7 G/DL — SIGNIFICANT CHANGE UP (ref 32–36)
MCV RBC AUTO: 86.6 FL — SIGNIFICANT CHANGE UP (ref 80–100)
MONOCYTES # BLD AUTO: 0.44 K/UL — SIGNIFICANT CHANGE UP (ref 0–0.9)
MONOCYTES NFR BLD AUTO: 5.6 % — SIGNIFICANT CHANGE UP (ref 2–14)
NEUTROPHILS # BLD AUTO: 6.28 K/UL — SIGNIFICANT CHANGE UP (ref 1.8–7.4)
NEUTROPHILS NFR BLD AUTO: 79.7 % — HIGH (ref 43–77)
NRBC # BLD: 0 /100 WBCS — SIGNIFICANT CHANGE UP (ref 0–0)
PLATELET # BLD AUTO: 169 K/UL — SIGNIFICANT CHANGE UP (ref 150–400)
POTASSIUM SERPL-MCNC: 4.3 MMOL/L — SIGNIFICANT CHANGE UP (ref 3.5–5.3)
POTASSIUM SERPL-SCNC: 4.3 MMOL/L — SIGNIFICANT CHANGE UP (ref 3.5–5.3)
PROT SERPL-MCNC: 6.7 G/DL — SIGNIFICANT CHANGE UP (ref 6–8.3)
RBC # BLD: 4.79 M/UL — SIGNIFICANT CHANGE UP (ref 4.2–5.8)
RBC # FLD: 14.4 % — SIGNIFICANT CHANGE UP (ref 10.3–14.5)
SODIUM SERPL-SCNC: 144 MMOL/L — SIGNIFICANT CHANGE UP (ref 135–145)
WBC # BLD: 7.88 K/UL — SIGNIFICANT CHANGE UP (ref 3.8–10.5)
WBC # FLD AUTO: 7.88 K/UL — SIGNIFICANT CHANGE UP (ref 3.8–10.5)

## 2023-04-17 PROCEDURE — 77334 RADIATION TREATMENT AID(S): CPT | Mod: 26

## 2023-04-17 PROCEDURE — 77295 3-D RADIOTHERAPY PLAN: CPT | Mod: 26

## 2023-04-17 PROCEDURE — 77300 RADIATION THERAPY DOSE PLAN: CPT | Mod: 26

## 2023-04-18 ENCOUNTER — NON-APPOINTMENT (OUTPATIENT)
Age: 48
End: 2023-04-18

## 2023-04-18 DIAGNOSIS — C79.9 SECONDARY MALIGNANT NEOPLASM OF UNSPECIFIED SITE: ICD-10-CM

## 2023-04-18 DIAGNOSIS — Z51.11 ENCOUNTER FOR ANTINEOPLASTIC CHEMOTHERAPY: ICD-10-CM

## 2023-04-18 LAB
T3 SERPL-MCNC: 87 NG/DL — SIGNIFICANT CHANGE UP (ref 80–200)
T4 FREE SERPL-MCNC: 1 NG/DL — SIGNIFICANT CHANGE UP (ref 0.9–1.8)
TSH SERPL-MCNC: 0.12 UIU/ML — LOW (ref 0.27–4.2)

## 2023-04-20 DIAGNOSIS — C79.9 SECONDARY MALIGNANT NEOPLASM OF UNSPECIFIED SITE: ICD-10-CM

## 2023-04-24 ENCOUNTER — APPOINTMENT (OUTPATIENT)
Dept: NEUROLOGY | Facility: CLINIC | Age: 48
End: 2023-04-24
Payer: MEDICAID

## 2023-04-24 VITALS
TEMPERATURE: 209.12 F | HEIGHT: 69 IN | RESPIRATION RATE: 16 BRPM | WEIGHT: 194 LBS | SYSTOLIC BLOOD PRESSURE: 146 MMHG | HEART RATE: 112 BPM | DIASTOLIC BLOOD PRESSURE: 91 MMHG | OXYGEN SATURATION: 99 % | BODY MASS INDEX: 28.73 KG/M2

## 2023-04-24 PROCEDURE — 99214 OFFICE O/P EST MOD 30 MIN: CPT

## 2023-04-24 RX ORDER — DEXAMETHASONE 2 MG/1
2 TABLET ORAL DAILY
Qty: 30 | Refills: 4 | Status: DISCONTINUED | COMMUNITY
End: 2023-04-24

## 2023-04-24 RX ORDER — LEVETIRACETAM 500 MG/1
500 TABLET, FILM COATED ORAL
Refills: 0 | Status: DISCONTINUED | COMMUNITY
End: 2023-04-24

## 2023-04-25 NOTE — DISCUSSION/SUMMARY
[FreeTextEntry1] : 47 year old man with metastatic melanoma to the brain. Ongoing treatment with ipi/nivo as per Dr. Marcos and SRS as per Dr. Almaguer. Instructed to contact and follow up with Dr. Marcos for RUQ pain as this may be an adverse effect due to the immunotherapy.\par \par TUMOR:\par Decrease steroids to 1 mg daily.\par \par LEFT EYE BLURRINESS: \par Referral to Dr. Tolentino for further evaluation of left eye blurriness. \par No abnormality noted on exam. \par \par RTC 4 weeks.\par

## 2023-04-25 NOTE — PHYSICAL EXAM
[FreeTextEntry1] : Exam as below (with documented exceptions in parentheses) \par \par General: Healthy appearing man well groomed and without deformities. KPS is 80. \par \par Mental status: Alert, awake and attentive. Oriented to person, place and time. Recent and remote memory intact. Normal concentration. Fluent spontaneous speech with intact naming and repetition. Normal fund of knowledge.  (flat affect, somewhat abulic)\par \par Cranial Nerves:  II: Full visual fields. III, IV, VI: Pupils round, reactive to light. Full extraocular movements. No nystagmus. V: Normal bilateral bite, facial sensation. VII: No facial weakness. VII: Hearing intact. IX, X: Palate midline, intact gag. XI: Sternocleidomastoids normal. XII: Tongue protrudes midline. No dysarthria.  (no apd or vision loss OS)\par \par Motor: Normal tone, bulk and power throughout including arms and legs, proximal and distal. No pronator drift. No abnormal movements. \par \par Sensation: Normal in arms, legs and trunk to pin, proprioception and vibration. Negative Romberg. \par \par Coordination: No dysmetria or dysdiadochokinesis bilaterally. Normal heel-shin testing.  \par \par Reflexes: Normal and symmetric throughout. Absent Babinski bilaterally. \par \par Vascular: No peripheral edema/calf tenderness. \par \par Eyes: Fundoscopic exam without papilledema (deferred) \par \par Heart: Regular rate and rhythm. Normal s1-s2. No murmurs, gallops, or rubs.  \par \par Respiratory: Lungs clear to auscultation bilaterally. \par \par Abdomen: Nontender, non-distended. No hepatosplenomegaly. Normal bowel sounds in four quadrants. (RUQ tenderness upon palpation).\par \par

## 2023-04-25 NOTE — HISTORY OF PRESENT ILLNESS
[FreeTextEntry1] : This 47 year old right handed man is seen in follow up for evaluation and management of brain metastases\par \par He presented with ungual melanoma of the right thumb that metastasized to multiple right axillary lymph nodes in 2021 and had node resection in 12/21 and treatment with 4 cycles of dacarbazine in [facility].  In 11/22 he had recurrence, with a thumb resection and pulmonary lesionectomy. He then initiated nivolumab, but presented with headaches and was found to have a left inferior frontal tumor.  In retrospect he'd had a smaller lesion present there in 8/22 on MRI, called a meningioma.  An additional lesion in the right frontal lobe was noted as well. He underwent resection of the tumor by Dr. Rodriguez on 3/14/23.  \par \par INTERVAL HISTORY:  ongoing RT to right frontal and left frontal cavity.  Main complaint is RUQ pain for 1 week.  Started ipi/nivo. stopped keppra on own.  Still with mild confusion, blurred vision OS, pending eval by Dr. Tolentino. \par \par PMHX: Melanoma\par \par PSHX: right thumb amputation, right lung tumor resection, left frontal craniotomy.\par \par SHX: Creole-speaking. Here with wife Livier who is serving as a . Refusing  services at this time.  in [facility]. No smoking or alcohol use.\par \par FHX: No brain tumors.

## 2023-04-25 NOTE — DATA REVIEWED
[de-identified] : Personally reviewed pre and op MRI from 3/23 and prior from 8/22 showing growth then resection of inferior left frontal lesion and new right frontal lesion without much edema.  The dominant lesion does compress the left optic nerve, 4/23 pre SRS study with enhancement around left frotnal cavity and slight increase of right frontal lesion

## 2023-04-25 NOTE — REVIEW OF SYSTEMS
[Negative] : Heme/Lymph [As Noted in HPI] : as noted in HPI [Abdominal Pain] : abdominal pain [Seizures] : no convulsions

## 2023-04-27 PROCEDURE — 77435 SBRT MANAGEMENT: CPT

## 2023-04-28 ENCOUNTER — APPOINTMENT (OUTPATIENT)
Dept: NEUROSURGERY | Facility: CLINIC | Age: 48
End: 2023-04-28
Payer: MEDICAID

## 2023-04-28 VITALS
OXYGEN SATURATION: 98 % | HEIGHT: 69 IN | SYSTOLIC BLOOD PRESSURE: 125 MMHG | BODY MASS INDEX: 28.73 KG/M2 | HEART RATE: 94 BPM | DIASTOLIC BLOOD PRESSURE: 88 MMHG | WEIGHT: 194 LBS

## 2023-04-28 PROCEDURE — 99024 POSTOP FOLLOW-UP VISIT: CPT

## 2023-05-04 NOTE — ASSESSMENT
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 Lopez yesterday\par \par \par PLAN:\par Continue to follow up with heme onc and radiation oncology as planned \par MRI brain w.wo in 9months

## 2023-05-04 NOTE — HISTORY OF PRESENT ILLNESS
[FreeTextEntry1] : Veronica Burk is a pleasant 47 year old man with history of melanoma and the thumb amputation and immunotherapy, presents with a very large left frontal mass in the region of the olfactory groove which has grown significantly since last 08/2022. The lesion creates significant mass effect and extends all the way up toward the ventricle and create midline shift, all the [place] of distal vessels are pushed medially and posteriorly. Considering the significant size of the tumor, surgical resection is indicated. 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 Pt declined translation services. Left lateral incision is well approximated without any evidence of redness, drainage and pt denies fever. \par \par \par States he finished radiation therapy yesterday with Dr. Almaguer.

## 2023-05-04 NOTE — PHYSICAL EXAM
[Clean] : clean [Dry] : dry [Healing Well] : healing well [No Drainage] : without drainage [Person] : oriented to person [Time] : oriented to time [Place] : oriented to place [Motor Strength] : muscle strength was normal in all four extremities [Erythema] : not erythematous [Warm] : not warm [FreeTextEntry1] : crani

## 2023-05-08 ENCOUNTER — RESULT REVIEW (OUTPATIENT)
Age: 48
End: 2023-05-08

## 2023-05-08 ENCOUNTER — APPOINTMENT (OUTPATIENT)
Dept: HEMATOLOGY ONCOLOGY | Facility: CLINIC | Age: 48
End: 2023-05-08

## 2023-05-08 ENCOUNTER — APPOINTMENT (OUTPATIENT)
Dept: INFUSION THERAPY | Facility: HOSPITAL | Age: 48
End: 2023-05-08

## 2023-05-08 ENCOUNTER — APPOINTMENT (OUTPATIENT)
Dept: HEMATOLOGY ONCOLOGY | Facility: CLINIC | Age: 48
End: 2023-05-08
Payer: MEDICAID

## 2023-05-08 LAB
ALBUMIN SERPL ELPH-MCNC: 4.6 G/DL — SIGNIFICANT CHANGE UP (ref 3.3–5)
ALP SERPL-CCNC: 58 U/L — SIGNIFICANT CHANGE UP (ref 40–120)
ALT FLD-CCNC: 18 U/L — SIGNIFICANT CHANGE UP (ref 10–45)
ANION GAP SERPL CALC-SCNC: 14 MMOL/L — SIGNIFICANT CHANGE UP (ref 5–17)
AST SERPL-CCNC: 14 U/L — SIGNIFICANT CHANGE UP (ref 10–40)
BASOPHILS # BLD AUTO: 0.03 K/UL — SIGNIFICANT CHANGE UP (ref 0–0.2)
BASOPHILS NFR BLD AUTO: 0.4 % — SIGNIFICANT CHANGE UP (ref 0–2)
BILIRUB SERPL-MCNC: 0.6 MG/DL — SIGNIFICANT CHANGE UP (ref 0.2–1.2)
BUN SERPL-MCNC: 19 MG/DL — SIGNIFICANT CHANGE UP (ref 7–23)
CALCIUM SERPL-MCNC: 9.7 MG/DL — SIGNIFICANT CHANGE UP (ref 8.4–10.5)
CHLORIDE SERPL-SCNC: 105 MMOL/L — SIGNIFICANT CHANGE UP (ref 96–108)
CK SERPL-CCNC: 95 U/L — SIGNIFICANT CHANGE UP (ref 30–200)
CO2 SERPL-SCNC: 26 MMOL/L — SIGNIFICANT CHANGE UP (ref 22–31)
CREAT SERPL-MCNC: 1.34 MG/DL — HIGH (ref 0.5–1.3)
CRP SERPL-MCNC: 5 MG/L — HIGH
EGFR: 65 ML/MIN/1.73M2 — SIGNIFICANT CHANGE UP
EOSINOPHIL # BLD AUTO: 0.09 K/UL — SIGNIFICANT CHANGE UP (ref 0–0.5)
EOSINOPHIL NFR BLD AUTO: 1.1 % — SIGNIFICANT CHANGE UP (ref 0–6)
GLUCOSE SERPL-MCNC: 139 MG/DL — HIGH (ref 70–99)
HCT VFR BLD CALC: 38.8 % — LOW (ref 39–50)
HGB BLD-MCNC: 13.2 G/DL — SIGNIFICANT CHANGE UP (ref 13–17)
IMM GRANULOCYTES NFR BLD AUTO: 2.8 % — HIGH (ref 0–0.9)
LDH SERPL L TO P-CCNC: 496 U/L — HIGH (ref 50–242)
LYMPHOCYTES # BLD AUTO: 1.45 K/UL — SIGNIFICANT CHANGE UP (ref 1–3.3)
LYMPHOCYTES # BLD AUTO: 17.2 % — SIGNIFICANT CHANGE UP (ref 13–44)
MCHC RBC-ENTMCNC: 29.5 PG — SIGNIFICANT CHANGE UP (ref 27–34)
MCHC RBC-ENTMCNC: 34 G/DL — SIGNIFICANT CHANGE UP (ref 32–36)
MCV RBC AUTO: 86.8 FL — SIGNIFICANT CHANGE UP (ref 80–100)
MONOCYTES # BLD AUTO: 0.67 K/UL — SIGNIFICANT CHANGE UP (ref 0–0.9)
MONOCYTES NFR BLD AUTO: 7.9 % — SIGNIFICANT CHANGE UP (ref 2–14)
NEUTROPHILS # BLD AUTO: 5.95 K/UL — SIGNIFICANT CHANGE UP (ref 1.8–7.4)
NEUTROPHILS NFR BLD AUTO: 70.6 % — SIGNIFICANT CHANGE UP (ref 43–77)
NRBC # BLD: 0 /100 WBCS — SIGNIFICANT CHANGE UP (ref 0–0)
PLATELET # BLD AUTO: 176 K/UL — SIGNIFICANT CHANGE UP (ref 150–400)
POTASSIUM SERPL-MCNC: 4.6 MMOL/L — SIGNIFICANT CHANGE UP (ref 3.5–5.3)
POTASSIUM SERPL-SCNC: 4.6 MMOL/L — SIGNIFICANT CHANGE UP (ref 3.5–5.3)
PROT SERPL-MCNC: 7.3 G/DL — SIGNIFICANT CHANGE UP (ref 6–8.3)
RBC # BLD: 4.47 M/UL — SIGNIFICANT CHANGE UP (ref 4.2–5.8)
RBC # FLD: 14.3 % — SIGNIFICANT CHANGE UP (ref 10.3–14.5)
SODIUM SERPL-SCNC: 145 MMOL/L — SIGNIFICANT CHANGE UP (ref 135–145)
WBC # BLD: 8.43 K/UL — SIGNIFICANT CHANGE UP (ref 3.8–10.5)
WBC # FLD AUTO: 8.43 K/UL — SIGNIFICANT CHANGE UP (ref 3.8–10.5)

## 2023-05-08 PROCEDURE — 99215 OFFICE O/P EST HI 40 MIN: CPT

## 2023-05-08 NOTE — REVIEW OF SYSTEMS
[Negative] : Allergic/Immunologic [FreeTextEntry9] : right thumb pain [de-identified] : staples removed from surgical site.  Mild confusion s/p surgery as per spouse.

## 2023-05-08 NOTE — HISTORY OF PRESENT ILLNESS
[de-identified] : Mr. 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.\par He first noticed the change in color of the nail > 1 year.\par He underwent node resection 12/2021 and 3 nodes between 6-9 cm were removed at the time.\par \par He received treatment from January 10 to March 28 2022 at [facility] in [facility].\par Patient received 4 cycles of dacarbazine 250 mg/m2 x 5 days every 28 days.\par He states that the nail tumor improved with chemotherapy.\par The bleeding stopped.\par Since off of chemotherapy the pain is getting worse.\par \par He notes pain in the epigastrum for the past 3 weeks that is worse at night.\par It is ~ 3/10 in severity and is persistent.\par \par Patient referred to US oncology for treatment with immunotherapy.\par \par COVID Vaccine Vyclone J&J 12/5/22 (no booster)\par \par 9/20/2022\par Both the right axilla biopsy and lung biopsy are negative.\par However could be false negative.\par His thumb pain is significant and might need amputation.\par We still need pathologic diagnosis and molecular genetics.\par 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 Herrera on 9/27.\par \par 10/22/2022\par I discussed case with surgeon.\par Given lack of tissue positive on lung or axilla biopsy, will proceed with distal right thumb amputation.\par Patient has been in pain and also notes intermittent discharge.\par Repeat CT scan of Rr Lung lesion shows mile increase in size of lesion.\par Patient will then also need to have this lesion resected.\par He will return to see me 2-3 weeks post-op and we will begin 1 year of adjuvant immunotherapy. \par \par 1/17/2023\par Patient had both surgeries\par 11/9/22 - Right thumb resection:\par 1. Right thumb, disarticulated between proximal and distal phalanges\par -  Melanoma, ulcerated, acral type, approximately 8.5 mm in thickness, mitotic rate 5/mm2, margins negative\par - See synoptic summary\par 2. Right thumb distal phalanx proximal bone margin, excision\par - Negative for tumor\par 3. Right axillary node dissection levels 1, 2, and 3\par - Two of eleven lymph nodes positive for metastatic melanoma (2/11)\par - Present as aggregates of tumor cells in subcapsular spaces (0.6 mmin greatest dimension)\par - No extracapsular extension seen\par Note: Melan-A and SOX10 immunostains on representative sections support the interpretation.\par Mitotic Rate:   5 mitoses per mm2\par pT Category:  pT4b\par pN Category:   pN2\par pM Category:   pM1b\par \par 11/15/2022: Right lung resection\par 1. Lung, right middle lobe, wedge resection\par - Metastatic melanoma in lung. Margin is free of melanoma. Moleculartesting and PDL1 testing is pending.\par 2. Lymph node, level 9:\par - Fibrous tissue, negative for melanoma.\par PDL1 IHC = 30% (TPS)\par Nodule:   1.5 x 1.3 x 0.8 cm\par \par Will need repeat labs and imaging\par Pacheco start Nivolumab q 4 weeks x 1 year.\par \par 03/20/2023:\par 1) Hospital admission: The patient is a 47 gentleman with PMH stage IV melanoma (on Opdivo in 1/2023 last dose was 2/20/23) in right thumb s/p amputation in 2021, with known metastatic disease to the axilla R lung s/p Right VAT in 11/2022. He presented to [facility] from 03/05/2023-03/18/2023 with dizziness and headache. CTH identified a L frontal hyperdense mass and a small R temporal mass. He underwent MRI brain (3/6) which found markedly enlarged left anteromedial frontal mass, consistent with progression of metastatic melanoma. New T1 shortening and susceptibility artifact may represent the presence of melanin and intralesional hemorrhage. New 1.6 x 1.3 x 0.9 cm right anterior temporal T1 hyperintense lesion with susceptibility artifact mild surrounding vasogenic edema, consistent with metastatic melanoma.He was started on decadron and Keppra.?   \par On 3/14/23, the patient underwent craniotomy for resection of only the L sided brain tumor.  He had a post-surgical MRI head on 3/15/23.   \par On 3/16/23 he underwent Duplex US BLE which was negative for DVT.  On 3/17, Path from the brain specimen confirmed metastatic melanoma.   \par He will require outpatient RT to the brain to the post-operative surgical bed and GK-SRS to the remaining metastatic focus at the R side of the brain.       \par He is currently on Dexamethasone 4mg PO Q8H for edema + Keppra 500 mg PO BID for seizure PPX.\par 2) acral melanoma : He will need to start Ipilimumab (3) + Nivolumab (1).\par He is s/p 2 cycles of Nivolumab, last treatment on 02/20/2023.\par We reviewed logistics, mechanism of action and most common irAEs.\par 3) Social: Patient is currently living at home with his spouse.  He is not receiving PT or OT.  He requires assistance with his routine/instrumental ADLs such as going to restroom, taking a shower.  As per spouse patient is incoherent at times.  Patient spends majority of the day sitting in bed or laying down.\par \par 05/08/2023: [employer]  ([id number])\par Patient is here for C2D1 Ipilimumab (3) + Nivolumab (1).\par Patient completed SRS to the left frontal brain in 5 fractions as well as 1 fraction to the right temporal area on 04/27/2023.\par Currently on Dexamethasone 1 mg daily.\par His left lateral incisional site is healed well.\par Admits to left eye blurriness and is scheduled to be seen by Dr. Tolentino (Opthalmology) \par As per patient, he is having RUQ abdominal pain x 2 weeks.\par Pain is mild however constant and is worse when laying in bed.\par At worse the pain is 7/10 in intensity and is using Motrin with relief.\par He denies N/V or diarrhea.  He is having one bowel movement/day.\par He is eating well and has no restrictions with his appetite.\par Will order amylase + lipase to r/o immune mediated pancreatitis.\par CT chest, abdomen/pelvis ordered.\par His strength has increased and patient is doing better at home.\par \par \par \par  [de-identified] : Medical Oncology ([facility]): Guy Shetty  [phone]\par Neurosx: Monster Rodriguez [phone]\par \par PTs Contact: Inocencia Tovar (spouse) / [phone]\par Friend: Cipriano

## 2023-05-08 NOTE — PHYSICAL EXAM
[Ambulatory and capable of all self care but unable to carry out any work activities] : Status 2- Ambulatory and capable of all self care but unable to carry out any work activities. Up and about more than 50% of waking hours [Normal] : affect appropriate [de-identified] : tender RUQ; no masses [de-identified] : right thumb amputation is well healed [de-identified] : Left frontal lobe staples noted from craniotomy site on 03/14/2023.

## 2023-05-08 NOTE — ASSESSMENT
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 Patient received 4 cycles of dacarbazine 250 mg/m2 x 5 days every 28 days. He states that the nail tumor improved with chemotherapy. The bleeding stopped. Since off of chemotherapy the pain is getting worse.\par \par in 11/202 he completed right thumb amputation and right lung resection. This is a [id number] stage. \par \par C1 Nivolumab 01/23/2023\par C2 02/20/2023\par \par He presented to [facility] from 03/05/2023-03/18/2023 with dizziness and headache. CTH identified a L frontal hyperdense mass and a small R temporal mass. He underwent MRI brain (3/6) which found markedly enlarged left anteromedial frontal mass, consistent with progression of metastatic melanoma. New T1 shortening and susceptibility artifact may represent the presence of melanin and intralesional hemorrhage. New 1.6 x 1.3 x 0.9 cm right anterior temporal T1 hyperintense lesion with susceptibility artifact mild surrounding vasogenic edema, consistent with metastatic melanoma .\par \par \par On 3/14/23, the patient underwent craniotomy for resection of only the L sided brain tumor. \par \par C1 Ipilimumab (3) + Nivolumab (1) on 04/17/2023.\par C2 today\par \par Patient is here for C2D1 Ipilimumab (3) + Nivolumab (1).\par \par Patient completed SRS to the left frontal brain in 5 fractions as well as 1 fraction to the right temporal area on 04/27/2023.Currently on Dexamethasone 1 mg daily.\par \par Admits to left eye blurriness and is scheduled to be seen by Dr. Tolentino (Opthalmology) \par \par Abdominal pain: As per patient, he is having RUQ abdominal pain x 2 weeks.\par Pain is mild however constant and is worse when laying in bed.\par At worse the pain is 7/10 in intensity and is using Motrin with relief.\par He denies N/V or diarrhea.  He is having one bowel movement/day.\par He is eating well and has no restrictions with his appetite.\par Will order amylase + lipase to r/o immune mediated pancreatitis.\par CT chest, abdomen/pelvis ordered.\par \par f/u one week prior to each cycle for toxicity evaluation.\par \par Dr. Marcos agrees with above plan.

## 2023-05-08 NOTE — REASON FOR VISIT
[Follow-Up Visit] : a follow-up [[employer] ] : provided by [employer]   [FreeTextEntry2] : melanoma [Interpreters_IDNumber] : [id number] [Interpreters_FullName] : Tamia [FreeTextEntry3] : Shade Gonzalez

## 2023-05-09 ENCOUNTER — APPOINTMENT (OUTPATIENT)
Dept: HEMATOLOGY ONCOLOGY | Facility: CLINIC | Age: 48
End: 2023-05-09

## 2023-05-09 ENCOUNTER — RESULT REVIEW (OUTPATIENT)
Age: 48
End: 2023-05-09

## 2023-05-09 ENCOUNTER — NON-APPOINTMENT (OUTPATIENT)
Age: 48
End: 2023-05-09

## 2023-05-09 LAB
BASOPHILS # BLD AUTO: 0.04 K/UL — SIGNIFICANT CHANGE UP (ref 0–0.2)
BASOPHILS NFR BLD AUTO: 0.5 % — SIGNIFICANT CHANGE UP (ref 0–2)
EOSINOPHIL # BLD AUTO: 0.09 K/UL — SIGNIFICANT CHANGE UP (ref 0–0.5)
EOSINOPHIL NFR BLD AUTO: 1.1 % — SIGNIFICANT CHANGE UP (ref 0–6)
HCT VFR BLD CALC: 38.9 % — LOW (ref 39–50)
HGB BLD-MCNC: 13 G/DL — SIGNIFICANT CHANGE UP (ref 13–17)
IMM GRANULOCYTES NFR BLD AUTO: 3 % — HIGH (ref 0–0.9)
LYMPHOCYTES # BLD AUTO: 1.72 K/UL — SIGNIFICANT CHANGE UP (ref 1–3.3)
LYMPHOCYTES # BLD AUTO: 20.4 % — SIGNIFICANT CHANGE UP (ref 13–44)
MCHC RBC-ENTMCNC: 29.3 PG — SIGNIFICANT CHANGE UP (ref 27–34)
MCHC RBC-ENTMCNC: 33.4 G/DL — SIGNIFICANT CHANGE UP (ref 32–36)
MCV RBC AUTO: 87.8 FL — SIGNIFICANT CHANGE UP (ref 80–100)
MONOCYTES # BLD AUTO: 0.59 K/UL — SIGNIFICANT CHANGE UP (ref 0–0.9)
MONOCYTES NFR BLD AUTO: 7 % — SIGNIFICANT CHANGE UP (ref 2–14)
NEUTROPHILS # BLD AUTO: 5.76 K/UL — SIGNIFICANT CHANGE UP (ref 1.8–7.4)
NEUTROPHILS NFR BLD AUTO: 68 % — SIGNIFICANT CHANGE UP (ref 43–77)
NRBC # BLD: 0 /100 WBCS — SIGNIFICANT CHANGE UP (ref 0–0)
PLATELET # BLD AUTO: 174 K/UL — SIGNIFICANT CHANGE UP (ref 150–400)
RBC # BLD: 4.43 M/UL — SIGNIFICANT CHANGE UP (ref 4.2–5.8)
RBC # FLD: 14.6 % — HIGH (ref 10.3–14.5)
T3 SERPL-MCNC: 117 NG/DL — SIGNIFICANT CHANGE UP (ref 80–200)
T4 FREE SERPL-MCNC: 1.1 NG/DL — SIGNIFICANT CHANGE UP (ref 0.9–1.8)
TSH SERPL-MCNC: 0.13 UIU/ML — LOW (ref 0.27–4.2)
WBC # BLD: 8.45 K/UL — SIGNIFICANT CHANGE UP (ref 3.8–10.5)
WBC # FLD AUTO: 8.45 K/UL — SIGNIFICANT CHANGE UP (ref 3.8–10.5)

## 2023-05-10 LAB
AMYLASE/CREAT SERPL: 146 U/L
LPL SERPL-CCNC: 43 U/L

## 2023-05-12 ENCOUNTER — NON-APPOINTMENT (OUTPATIENT)
Age: 48
End: 2023-05-12

## 2023-05-12 ENCOUNTER — APPOINTMENT (OUTPATIENT)
Dept: OPHTHALMOLOGY | Facility: CLINIC | Age: 48
End: 2023-05-12
Payer: MEDICAID

## 2023-05-12 PROCEDURE — 92004 COMPRE OPH EXAM NEW PT 1/>: CPT

## 2023-05-12 PROCEDURE — 92083 EXTENDED VISUAL FIELD XM: CPT

## 2023-05-13 ENCOUNTER — OUTPATIENT (OUTPATIENT)
Dept: OUTPATIENT SERVICES | Facility: HOSPITAL | Age: 48
LOS: 1 days | End: 2023-05-13
Payer: MEDICAID

## 2023-05-13 ENCOUNTER — APPOINTMENT (OUTPATIENT)
Dept: CT IMAGING | Facility: IMAGING CENTER | Age: 48
End: 2023-05-13
Payer: MEDICAID

## 2023-05-13 DIAGNOSIS — Z98.890 OTHER SPECIFIED POSTPROCEDURAL STATES: Chronic | ICD-10-CM

## 2023-05-13 DIAGNOSIS — C79.31 SECONDARY MALIGNANT NEOPLASM OF BRAIN: ICD-10-CM

## 2023-05-13 PROCEDURE — 71260 CT THORAX DX C+: CPT | Mod: 26

## 2023-05-13 PROCEDURE — 74177 CT ABD & PELVIS W/CONTRAST: CPT | Mod: 26

## 2023-05-13 PROCEDURE — 71260 CT THORAX DX C+: CPT

## 2023-05-13 PROCEDURE — 74177 CT ABD & PELVIS W/CONTRAST: CPT

## 2023-05-16 ENCOUNTER — OUTPATIENT (OUTPATIENT)
Dept: OUTPATIENT SERVICES | Facility: HOSPITAL | Age: 48
LOS: 1 days | Discharge: HOME | End: 2023-05-16

## 2023-05-16 DIAGNOSIS — C43.60 MALIGNANT MELANOMA OF UNSPECIFIED UPPER LIMB, INCLUDING SHOULDER: Chronic | ICD-10-CM

## 2023-05-16 DIAGNOSIS — Z98.890 OTHER SPECIFIED POSTPROCEDURAL STATES: Chronic | ICD-10-CM

## 2023-05-19 ENCOUNTER — APPOINTMENT (OUTPATIENT)
Dept: OPHTHALMOLOGY | Facility: CLINIC | Age: 48
End: 2023-05-19

## 2023-05-24 ENCOUNTER — APPOINTMENT (OUTPATIENT)
Dept: NEUROLOGY | Facility: CLINIC | Age: 48
End: 2023-05-24
Payer: MEDICAID

## 2023-05-24 VITALS
WEIGHT: 200 LBS | SYSTOLIC BLOOD PRESSURE: 127 MMHG | HEART RATE: 112 BPM | RESPIRATION RATE: 16 BRPM | HEIGHT: 69 IN | TEMPERATURE: 98.4 F | DIASTOLIC BLOOD PRESSURE: 82 MMHG | BODY MASS INDEX: 29.62 KG/M2 | OXYGEN SATURATION: 98 %

## 2023-05-24 PROCEDURE — 99214 OFFICE O/P EST MOD 30 MIN: CPT

## 2023-05-24 RX ORDER — OXYCODONE 5 MG/1
5 TABLET ORAL
Qty: 60 | Refills: 0 | Status: DISCONTINUED | COMMUNITY
Start: 2022-09-20 | End: 2023-05-24

## 2023-05-24 RX ORDER — PANTOPRAZOLE 40 MG/1
40 TABLET, DELAYED RELEASE ORAL
Qty: 30 | Refills: 2 | Status: DISCONTINUED | COMMUNITY
Start: 2022-08-03 | End: 2023-05-24

## 2023-05-25 NOTE — REVIEW OF SYSTEMS
[As Noted in HPI] : as noted in HPI [Abdominal Pain] : abdominal pain [Negative] : Heme/Lymph [Seizures] : no convulsions

## 2023-05-25 NOTE — DISCUSSION/SUMMARY
[FreeTextEntry1] : 47 year old man with metastatic melanoma to the brain. Ongoing treatment with ipi/nivo as per Dr. Marcos Completed SRS. \par \par TUMOR:\par Instructed to stop steroids.\par MRI head in 6 weeks coordinated.\par \par LEFT EYE BLURRINESS: \par Due to dry eye as per Dr. Tolentino. Improved.\par To continue with artificial tears.\par No abnormality noted on exam. \par \par RTC 6 weeks.\par

## 2023-05-25 NOTE — DATA REVIEWED
[de-identified] : Personally reviewed pre and op MRI from 3/23 and prior from 8/22 showing growth then resection of inferior left frontal lesion and new right frontal lesion without much edema.  The dominant lesion does compress the left optic nerve, 4/23 pre SRS study with enhancement around left frontal cavity and slight increase of right frontal lesion [de-identified] : Ct chest, abdomen and pelvis 05/23: Previously noted confluent soft tissue density in the right axilla has decreased in size, and is likely postoperative.\par \par Postoperative changes in the right middle lobe.\par \par No evidence of metastatic disease or suspicious adenopathy in the chest, abdomen, and pelvis.\par

## 2023-05-25 NOTE — HISTORY OF PRESENT ILLNESS
[FreeTextEntry1] : This 47 year old right handed man is seen in follow up for evaluation and management of brain metastases\par \par He presented with ungual melanoma of the right thumb that metastasized to multiple right axillary lymph nodes in 2021 and had node resection in 12/21 and treatment with 4 cycles of dacarbazine in [facility].  In 11/22 he had recurrence, with a thumb resection and pulmonary lesionectomy. He then initiated nivolumab, but presented with headaches and was found to have a left inferior frontal tumor.  In retrospect he'd had a smaller lesion present there in 8/22 on MRI, called a meningioma.  An additional lesion in the right frontal lobe was noted as well. He underwent resection of the tumor by Dr. Rodriguez on 3/14/23, followed by SRT (30/5) to cavity and SRS (20/1) to right temporal lesion.   \par \par INTERVAL HISTORY: RUQ pain improving. CT abdomen/pelvis negative for any malignancy. To f/u with Dr. Marcos for next cycle of nivo/ipi and on imaging. Blurry vision improved with artificial tear use. Occasional mild headaches. Confusion a little better as per wife. \par \par PMHX: Melanoma\par \par PSHX: right thumb amputation, right lung tumor resection, left frontal craniotomy.\par \par SHX: Creole-speaking. Here with wife Livier who is serving as a . Refusing  services at this time.  in [facility]. No smoking or alcohol use.\par \par FHX: No brain tumors.

## 2023-05-30 ENCOUNTER — RESULT REVIEW (OUTPATIENT)
Age: 48
End: 2023-05-30

## 2023-05-30 ENCOUNTER — APPOINTMENT (OUTPATIENT)
Dept: HEMATOLOGY ONCOLOGY | Facility: CLINIC | Age: 48
End: 2023-05-30

## 2023-05-30 ENCOUNTER — APPOINTMENT (OUTPATIENT)
Dept: HEMATOLOGY ONCOLOGY | Facility: CLINIC | Age: 48
End: 2023-05-30
Payer: MEDICAID

## 2023-05-30 ENCOUNTER — APPOINTMENT (OUTPATIENT)
Dept: INFUSION THERAPY | Facility: HOSPITAL | Age: 48
End: 2023-05-30

## 2023-05-30 LAB
ALBUMIN SERPL ELPH-MCNC: 3.7 G/DL — SIGNIFICANT CHANGE UP (ref 3.3–5)
ALP SERPL-CCNC: 59 U/L — SIGNIFICANT CHANGE UP (ref 40–120)
ALT FLD-CCNC: 10 U/L — SIGNIFICANT CHANGE UP (ref 10–45)
ANION GAP SERPL CALC-SCNC: 12 MMOL/L — SIGNIFICANT CHANGE UP (ref 5–17)
AST SERPL-CCNC: 18 U/L — SIGNIFICANT CHANGE UP (ref 10–40)
BASOPHILS # BLD AUTO: 0.07 K/UL — SIGNIFICANT CHANGE UP (ref 0–0.2)
BASOPHILS NFR BLD AUTO: 0.9 % — SIGNIFICANT CHANGE UP (ref 0–2)
BILIRUB SERPL-MCNC: 0.4 MG/DL — SIGNIFICANT CHANGE UP (ref 0.2–1.2)
BUN SERPL-MCNC: 12 MG/DL — SIGNIFICANT CHANGE UP (ref 7–23)
CALCIUM SERPL-MCNC: 8.6 MG/DL — SIGNIFICANT CHANGE UP (ref 8.4–10.5)
CHLORIDE SERPL-SCNC: 107 MMOL/L — SIGNIFICANT CHANGE UP (ref 96–108)
CK SERPL-CCNC: 84 U/L — SIGNIFICANT CHANGE UP (ref 30–200)
CO2 SERPL-SCNC: 25 MMOL/L — SIGNIFICANT CHANGE UP (ref 22–31)
CREAT SERPL-MCNC: 1.16 MG/DL — SIGNIFICANT CHANGE UP (ref 0.5–1.3)
CRP SERPL-MCNC: 69 MG/L — HIGH
EGFR: 78 ML/MIN/1.73M2 — SIGNIFICANT CHANGE UP
EOSINOPHIL # BLD AUTO: 0.66 K/UL — HIGH (ref 0–0.5)
EOSINOPHIL NFR BLD AUTO: 8.2 % — HIGH (ref 0–6)
GLUCOSE SERPL-MCNC: 64 MG/DL — LOW (ref 70–99)
HCT VFR BLD CALC: 35.4 % — LOW (ref 39–50)
HGB BLD-MCNC: 11.7 G/DL — LOW (ref 13–17)
IMM GRANULOCYTES NFR BLD AUTO: 0.4 % — SIGNIFICANT CHANGE UP (ref 0–0.9)
LDH SERPL L TO P-CCNC: 355 U/L — HIGH (ref 50–242)
LYMPHOCYTES # BLD AUTO: 1.37 K/UL — SIGNIFICANT CHANGE UP (ref 1–3.3)
LYMPHOCYTES # BLD AUTO: 17 % — SIGNIFICANT CHANGE UP (ref 13–44)
MCHC RBC-ENTMCNC: 29 PG — SIGNIFICANT CHANGE UP (ref 27–34)
MCHC RBC-ENTMCNC: 33.1 G/DL — SIGNIFICANT CHANGE UP (ref 32–36)
MCV RBC AUTO: 87.6 FL — SIGNIFICANT CHANGE UP (ref 80–100)
MONOCYTES # BLD AUTO: 1.37 K/UL — HIGH (ref 0–0.9)
MONOCYTES NFR BLD AUTO: 17 % — HIGH (ref 2–14)
NEUTROPHILS # BLD AUTO: 4.58 K/UL — SIGNIFICANT CHANGE UP (ref 1.8–7.4)
NEUTROPHILS NFR BLD AUTO: 56.5 % — SIGNIFICANT CHANGE UP (ref 43–77)
NRBC # BLD: 0 /100 WBCS — SIGNIFICANT CHANGE UP (ref 0–0)
PLATELET # BLD AUTO: 339 K/UL — SIGNIFICANT CHANGE UP (ref 150–400)
POTASSIUM SERPL-MCNC: 3.7 MMOL/L — SIGNIFICANT CHANGE UP (ref 3.5–5.3)
POTASSIUM SERPL-SCNC: 3.7 MMOL/L — SIGNIFICANT CHANGE UP (ref 3.5–5.3)
PROT SERPL-MCNC: 6.3 G/DL — SIGNIFICANT CHANGE UP (ref 6–8.3)
RBC # BLD: 4.04 M/UL — LOW (ref 4.2–5.8)
RBC # FLD: 14.2 % — SIGNIFICANT CHANGE UP (ref 10.3–14.5)
SODIUM SERPL-SCNC: 145 MMOL/L — SIGNIFICANT CHANGE UP (ref 135–145)
WBC # BLD: 8.08 K/UL — SIGNIFICANT CHANGE UP (ref 3.8–10.5)
WBC # FLD AUTO: 8.08 K/UL — SIGNIFICANT CHANGE UP (ref 3.8–10.5)

## 2023-05-30 PROCEDURE — 99214 OFFICE O/P EST MOD 30 MIN: CPT

## 2023-05-30 NOTE — REASON FOR VISIT
[Follow-Up Visit] : a follow-up [[employer] ] : provided by [employer]   [FreeTextEntry2] : melanoma [Interpreters_IDNumber] : [id number] [Interpreters_FullName] : Tamia [FreeTextEntry3] : Shade Gonzalez

## 2023-05-30 NOTE — HISTORY OF PRESENT ILLNESS
[de-identified] : Mr. 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.\par He first noticed the change in color of the nail > 1 year.\par He underwent node resection 12/2021 and 3 nodes between 6-9 cm were removed at the time.\par \par He received treatment from January 10 to March 28 2022 at [facility] in [facility].\par Patient received 4 cycles of dacarbazine 250 mg/m2 x 5 days every 28 days.\par He states that the nail tumor improved with chemotherapy.\par The bleeding stopped.\par Since off of chemotherapy the pain is getting worse.\par \par He notes pain in the epigastrum for the past 3 weeks that is worse at night.\par It is ~ 3/10 in severity and is persistent.\par \par Patient referred to [facility] oncology for treatment with immunotherapy.\par \par COVID Vaccine [employer] J&J 12/5/22 (no booster)\par \par 9/20/2022\par Both the right axilla biopsy and lung biopsy are negative.\par However could be false negative.\par His thumb pain is significant and might need amputation.\par We still need pathologic diagnosis and molecular genetics.\par 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 Herrera on 9/27.\par \par 10/22/2022\par I discussed case with surgeon.\par Given lack of tissue positive on lung or axilla biopsy, will proceed with distal right thumb amputation.\par Patient has been in pain and also notes intermittent discharge.\par Repeat CT scan of Rr Lung lesion shows mile increase in size of lesion.\par Patient will then also need to have this lesion resected.\par He will return to see me 2-3 weeks post-op and we will begin 1 year of adjuvant immunotherapy. \par \par 1/17/2023\par Patient had both surgeries\par 11/9/22 - Right thumb resection:\par 1. Right thumb, disarticulated between proximal and distal phalanges\par -  Melanoma, ulcerated, acral type, approximately 8.5 mm in thickness, mitotic rate 5/mm2, margins negative\par - See synoptic summary\par 2. Right thumb distal phalanx proximal bone margin, excision\par - Negative for tumor\par 3. Right axillary node dissection levels 1, 2, and 3\par - Two of eleven lymph nodes positive for metastatic melanoma (2/11)\par - Present as aggregates of tumor cells in subcapsular spaces (0.6 mmin greatest dimension)\par - No extracapsular extension seen\par Note: Melan-A and SOX10 immunostains on representative sections support the interpretation.\par Mitotic Rate:   5 mitoses per mm2\par pT Category:  pT4b\par pN Category:   pN2\par pM Category:   pM1b\par \par 11/15/2022: Right lung resection\par 1. Lung, right middle lobe, wedge resection\par - Metastatic melanoma in lung. Margin is free of melanoma. Moleculartesting and PDL1 testing is pending.\par 2. Lymph node, level 9:\par - Fibrous tissue, negative for melanoma.\par PDL1 IHC = 30% (TPS)\par Nodule:   1.5 x 1.3 x 0.8 cm\par \par Will need repeat labs and imaging\par Pacheco start Nivolumab q 4 weeks x 1 year.\par \par 03/20/2023:\par 1) Hospital admission: The patient is a 47 gentleman with PMH stage IV melanoma (on Opdivo in 1/2023 last dose was 2/20/23) in right thumb s/p amputation in 2021, with known metastatic disease to the axilla R lung s/p Right VAT in 11/2022. He presented to [facility] from 03/05/2023-03/18/2023 with dizziness and headache. CTH identified a L frontal hyperdense mass and a small R temporal mass. He underwent MRI brain (3/6) which found markedly enlarged left anteromedial frontal mass, consistent with progression of metastatic melanoma. New T1 shortening and susceptibility artifact may represent the presence of melanin and intralesional hemorrhage. New 1.6 x 1.3 x 0.9 cm right anterior temporal T1 hyperintense lesion with susceptibility artifact mild surrounding vasogenic edema, consistent with metastatic melanoma.He was started on decadron and Keppra.?   \par On 3/14/23, the patient underwent craniotomy for resection of only the L sided brain tumor.  He had a post-surgical MRI head on 3/15/23.   \par On 3/16/23 he underwent Duplex US BLE which was negative for DVT.  On 3/17, Path from the brain specimen confirmed metastatic melanoma.   \par He will require outpatient RT to the brain to the post-operative surgical bed and GK-SRS to the remaining metastatic focus at the R side of the brain.       \par He is currently on Dexamethasone 4mg PO Q8H for edema + Keppra 500 mg PO BID for seizure PPX.\par 2) acral melanoma : He will need to start Ipilimumab (3) + Nivolumab (1).\par He is s/p 2 cycles of Nivolumab, last treatment on 02/20/2023.\par We reviewed logistics, mechanism of action and most common irAEs.\par 3) Social: Patient is currently living at home with his spouse.  He is not receiving PT or OT.  He requires assistance with his routine/instrumental ADLs such as going to restroom, taking a shower.  As per spouse patient is incoherent at times.  Patient spends majority of the day sitting in bed or laying down.\par \par 05/08/2023: [employer]  ([id number])\par Patient is here for C2D1 Ipilimumab (3) + Nivolumab (1).\par Patient completed SRS to the left frontal brain in 5 fractions as well as 1 fraction to the right temporal area on 04/27/2023.\par Currently on Dexamethasone 1 mg daily.\par His left lateral incisional site is healed well.\par Admits to left eye blurriness and is scheduled to be seen by Dr. Tolentino (Opthalmology) \par As per patient, he is having RUQ abdominal pain x 2 weeks.\par Pain is mild however constant and is worse when laying in bed.\par At worse the pain is 7/10 in intensity and is using Motrin with relief.\par He denies N/V or diarrhea.  He is having one bowel movement/day.\par He is eating well and has no restrictions with his appetite.\par Will order amylase + lipase to r/o immune mediated pancreatitis.\par CT chest, abdomen/pelvis ordered.\par His strength has increased and patient is doing better at home.\par \par 05/30/2023:\par C3D1  Ipilimumab (3) + Nivolumab (1).\par He is off Dexamethasone.\par Admits to pruritus located on his anterior chest and back.\par No visible rash noted however patient is scratching his anterior chest and back frequently.\par He was advised to start Zyrtec 10 mg in AM and Benadryl at bedtime.\par Otherwise no other significant irAEs.\par CT on 05/13/2023 revealed previously noted confluent soft tissue density in the right axilla has decreased in size, and is likely postoperative.\par Postoperative changes in the right middle lobe.\par No evidence of metastatic disease or suspicious adenopathy in the chest, abdomen, and pelvis.\par \par \par  [de-identified] : Medical Oncology ([facility]): Guy Shetty  [phone]\par Neurosx: Monster Rodriguez [phone]\par \par PTs Contact: Inocencia Tovar (spouse) / [phone]\par Friend: Cipriano

## 2023-05-30 NOTE — PHYSICAL EXAM
[Ambulatory and capable of all self care but unable to carry out any work activities] : Status 2- Ambulatory and capable of all self care but unable to carry out any work activities. Up and about more than 50% of waking hours [Normal] : affect appropriate [de-identified] : tender RUQ; no masses [de-identified] : right thumb amputation is well healed [de-identified] : Left frontal lobe staples noted from craniotomy site on 03/14/2023.

## 2023-05-30 NOTE — ASSESSMENT
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 Patient received 4 cycles of dacarbazine 250 mg/m2 x 5 days every 28 days. He states that the nail tumor improved with chemotherapy. The bleeding stopped. Since off of chemotherapy the pain is getting worse.\par \par in 11/202 he completed right thumb amputation and right lung resection. This is a [id number] stage. \par \par C1 Nivolumab 01/23/2023\par C2 02/20/2023\par \par He presented to [facility] from 03/05/2023-03/18/2023 with dizziness and headache. CTH identified a L frontal hyperdense mass and a small R temporal mass. He underwent MRI brain (3/6) which found markedly enlarged left anteromedial frontal mass, consistent with progression of metastatic melanoma. New T1 shortening and susceptibility artifact may represent the presence of melanin and intralesional hemorrhage. New 1.6 x 1.3 x 0.9 cm right anterior temporal T1 hyperintense lesion with susceptibility artifact mild surrounding vasogenic edema, consistent with metastatic melanoma .\par \par \par On 3/14/23, the patient underwent craniotomy for resection of only the L sided brain tumor. \par \par C1 Ipilimumab (3) + Nivolumab (1) on 04/17/2023.\par C2 05/09/2023\par C3 05/30\par \par Patient is here for C3D1 Ipilimumab (3) + Nivolumab (1).\par \par Patient completed SRS to the left frontal brain in 5 fractions as well as 1 fraction to the right temporal area on 04/27/2023.  Completed Dexamethasone.\par \par Pruritus w/o rash: Admits to pruritus located on his anterior chest and back.\par No visible rash noted however patient is scratching his anterior chest and back frequently.\par He was advised to start Zyrtec 10 mg in AM and Benadryl at bedtime.\par Otherwise no other significant irAEs.\par \par CT on 05/13/2023 revealed previously noted confluent soft tissue density in the right axilla has decreased in size, and is likely postoperative. Postoperative changes in the right middle lobe.\par No evidence of metastatic disease or suspicious adenopathy in the chest, abdomen, and pelvis.\par \par labs reviewed.\par \par f/u q cycle.\par \par Dr. Marcos agrees with above plan.

## 2023-05-30 NOTE — REVIEW OF SYSTEMS
[Negative] : Allergic/Immunologic [FreeTextEntry9] : right thumb pain [de-identified] : staples removed from surgical site.  Mild confusion s/p surgery as per spouse.

## 2023-05-31 DIAGNOSIS — C79.9 SECONDARY MALIGNANT NEOPLASM OF UNSPECIFIED SITE: ICD-10-CM

## 2023-05-31 DIAGNOSIS — Z51.11 ENCOUNTER FOR ANTINEOPLASTIC CHEMOTHERAPY: ICD-10-CM

## 2023-05-31 LAB
T3 SERPL-MCNC: 113 NG/DL — SIGNIFICANT CHANGE UP (ref 80–200)
T4 FREE SERPL-MCNC: 1 NG/DL — SIGNIFICANT CHANGE UP (ref 0.9–1.8)
TSH SERPL-MCNC: 0.12 UIU/ML — LOW (ref 0.27–4.2)

## 2023-06-06 ENCOUNTER — APPOINTMENT (OUTPATIENT)
Dept: HEMATOLOGY ONCOLOGY | Facility: CLINIC | Age: 48
End: 2023-06-06

## 2023-06-09 ENCOUNTER — RESULT REVIEW (OUTPATIENT)
Age: 48
End: 2023-06-09

## 2023-06-09 ENCOUNTER — APPOINTMENT (OUTPATIENT)
Dept: CT IMAGING | Facility: IMAGING CENTER | Age: 48
End: 2023-06-09
Payer: MEDICAID

## 2023-06-09 ENCOUNTER — APPOINTMENT (OUTPATIENT)
Dept: HEMATOLOGY ONCOLOGY | Facility: CLINIC | Age: 48
End: 2023-06-09
Payer: MEDICAID

## 2023-06-09 ENCOUNTER — OUTPATIENT (OUTPATIENT)
Dept: OUTPATIENT SERVICES | Facility: HOSPITAL | Age: 48
LOS: 1 days | End: 2023-06-09
Payer: MEDICAID

## 2023-06-09 VITALS
WEIGHT: 192.24 LBS | BODY MASS INDEX: 28.47 KG/M2 | TEMPERATURE: 98.8 F | HEART RATE: 120 BPM | DIASTOLIC BLOOD PRESSURE: 69 MMHG | OXYGEN SATURATION: 94 % | SYSTOLIC BLOOD PRESSURE: 103 MMHG | HEIGHT: 69 IN | RESPIRATION RATE: 16 BRPM

## 2023-06-09 DIAGNOSIS — C43.60 MALIGNANT MELANOMA OF UNSPECIFIED UPPER LIMB, INCLUDING SHOULDER: Chronic | ICD-10-CM

## 2023-06-09 DIAGNOSIS — Z98.890 OTHER SPECIFIED POSTPROCEDURAL STATES: Chronic | ICD-10-CM

## 2023-06-09 DIAGNOSIS — C43.61 MALIGNANT MELANOMA OF RIGHT UPPER LIMB, INCLUDING SHOULDER: ICD-10-CM

## 2023-06-09 DIAGNOSIS — R04.2 HEMOPTYSIS: ICD-10-CM

## 2023-06-09 LAB
ALBUMIN SERPL ELPH-MCNC: 3.7 G/DL
ALP BLD-CCNC: 62 U/L
ALT SERPL-CCNC: 17 U/L
ANION GAP SERPL CALC-SCNC: 11 MMOL/L
AST SERPL-CCNC: 20 U/L
BASOPHILS # BLD AUTO: 0.06 K/UL — SIGNIFICANT CHANGE UP (ref 0–0.2)
BASOPHILS NFR BLD AUTO: 0.5 % — SIGNIFICANT CHANGE UP (ref 0–2)
BILIRUB SERPL-MCNC: 0.4 MG/DL
BUN SERPL-MCNC: 10 MG/DL
CALCIUM SERPL-MCNC: 9 MG/DL
CHLORIDE SERPL-SCNC: 103 MMOL/L
CK SERPL-CCNC: 71 U/L
CO2 SERPL-SCNC: 26 MMOL/L
CREAT SERPL-MCNC: 1.17 MG/DL
CRP SERPL-MCNC: 140 MG/L
EGFR: 77 ML/MIN/1.73M2
EOSINOPHIL # BLD AUTO: 0.21 K/UL — SIGNIFICANT CHANGE UP (ref 0–0.5)
EOSINOPHIL NFR BLD AUTO: 1.9 % — SIGNIFICANT CHANGE UP (ref 0–6)
GLUCOSE SERPL-MCNC: 109 MG/DL
HCT VFR BLD CALC: 29.7 % — LOW (ref 39–50)
HGB BLD-MCNC: 9.6 G/DL — LOW (ref 13–17)
IMM GRANULOCYTES NFR BLD AUTO: 0.9 % — SIGNIFICANT CHANGE UP (ref 0–0.9)
LDH SERPL-CCNC: 401 U/L
LYMPHOCYTES # BLD AUTO: 1.2 K/UL — SIGNIFICANT CHANGE UP (ref 1–3.3)
LYMPHOCYTES # BLD AUTO: 10.8 % — LOW (ref 13–44)
MCHC RBC-ENTMCNC: 27.9 PG — SIGNIFICANT CHANGE UP (ref 27–34)
MCHC RBC-ENTMCNC: 32.3 G/DL — SIGNIFICANT CHANGE UP (ref 32–36)
MCV RBC AUTO: 86.3 FL — SIGNIFICANT CHANGE UP (ref 80–100)
MONOCYTES # BLD AUTO: 1.29 K/UL — HIGH (ref 0–0.9)
MONOCYTES NFR BLD AUTO: 11.6 % — SIGNIFICANT CHANGE UP (ref 2–14)
NEUTROPHILS # BLD AUTO: 8.27 K/UL — HIGH (ref 1.8–7.4)
NEUTROPHILS NFR BLD AUTO: 74.3 % — SIGNIFICANT CHANGE UP (ref 43–77)
NRBC # BLD: 0 /100 WBCS — SIGNIFICANT CHANGE UP (ref 0–0)
NT-PROBNP SERPL-MCNC: 46 PG/ML
PLATELET # BLD AUTO: 487 K/UL — HIGH (ref 150–400)
POTASSIUM SERPL-SCNC: 3.7 MMOL/L
PROT SERPL-MCNC: 7.3 G/DL
RBC # BLD: 3.44 M/UL — LOW (ref 4.2–5.8)
RBC # FLD: 13.3 % — SIGNIFICANT CHANGE UP (ref 10.3–14.5)
SODIUM SERPL-SCNC: 140 MMOL/L
TROPONIN-T, HIGH SENSITIVITY: 12 NG/L
TROPONIN-T, HIGH SENSITIVITY: 12 NG/L
WBC # BLD: 11.13 K/UL — HIGH (ref 3.8–10.5)
WBC # FLD AUTO: 11.13 K/UL — HIGH (ref 3.8–10.5)

## 2023-06-09 PROCEDURE — 71275 CT ANGIOGRAPHY CHEST: CPT

## 2023-06-09 PROCEDURE — 99214 OFFICE O/P EST MOD 30 MIN: CPT

## 2023-06-09 PROCEDURE — 71275 CT ANGIOGRAPHY CHEST: CPT | Mod: 26

## 2023-06-09 NOTE — PHYSICAL EXAM
[Ambulatory and capable of all self care but unable to carry out any work activities] : Status 2- Ambulatory and capable of all self care but unable to carry out any work activities. Up and about more than 50% of waking hours [Normal] : affect appropriate [de-identified] : tender RUQ; no masses [de-identified] : right thumb amputation is well healed [de-identified] : Left frontal lobe staples noted from craniotomy site on 03/14/2023.

## 2023-06-09 NOTE — REASON FOR VISIT
[[employer] ] : provided by [employer]   [Urgent Visit] : an urgent  [FreeTextEntry2] : hemoptysis [Interpreters_IDNumber] : [id number] [Interpreters_FullName] : Tamia [FreeTextEntry3] : Shade Gonzalez

## 2023-06-09 NOTE — HISTORY OF PRESENT ILLNESS
[de-identified] : Mr. 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.\par He first noticed the change in color of the nail > 1 year.\par He underwent node resection 12/2021 and 3 nodes between 6-9 cm were removed at the time.\par \par He received treatment from January 10 to March 28 2022 at [facility] in [facility].\par Patient received 4 cycles of dacarbazine 250 mg/m2 x 5 days every 28 days.\par He states that the nail tumor improved with chemotherapy.\par The bleeding stopped.\par Since off of chemotherapy the pain is getting worse.\par \par He notes pain in the epigastrum for the past 3 weeks that is worse at night.\par It is ~ 3/10 in severity and is persistent.\par \par Patient referred to US oncology for treatment with immunotherapy.\par \par COVID Vaccine Liiiike J&J 12/5/22 (no booster)\par \par 9/20/2022\par Both the right axilla biopsy and lung biopsy are negative.\par However could be false negative.\par His thumb pain is significant and might need amputation.\par We still need pathologic diagnosis and molecular genetics.\par 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 Herrera on 9/27.\par \par 10/22/2022\par I discussed case with surgeon.\par Given lack of tissue positive on lung or axilla biopsy, will proceed with distal right thumb amputation.\par Patient has been in pain and also notes intermittent discharge.\par Repeat CT scan of Rr Lung lesion shows mile increase in size of lesion.\par Patient will then also need to have this lesion resected.\par He will return to see me 2-3 weeks post-op and we will begin 1 year of adjuvant immunotherapy. \par \par 1/17/2023\par Patient had both surgeries\par 11/9/22 - Right thumb resection:\par 1. Right thumb, disarticulated between proximal and distal phalanges\par -  Melanoma, ulcerated, acral type, approximately 8.5 mm in thickness, mitotic rate 5/mm2, margins negative\par - See synoptic summary\par 2. Right thumb distal phalanx proximal bone margin, excision\par - Negative for tumor\par 3. Right axillary node dissection levels 1, 2, and 3\par - Two of eleven lymph nodes positive for metastatic melanoma (2/11)\par - Present as aggregates of tumor cells in subcapsular spaces (0.6 mmin greatest dimension)\par - No extracapsular extension seen\par Note: Melan-A and SOX10 immunostains on representative sections support the interpretation.\par Mitotic Rate:   5 mitoses per mm2\par pT Category:  pT4b\par pN Category:   pN2\par pM Category:   pM1b\par \par 11/15/2022: Right lung resection\par 1. Lung, right middle lobe, wedge resection\par - Metastatic melanoma in lung. Margin is free of melanoma. Moleculartesting and PDL1 testing is pending.\par 2. Lymph node, level 9:\par - Fibrous tissue, negative for melanoma.\par PDL1 IHC = 30% (TPS)\par Nodule:   1.5 x 1.3 x 0.8 cm\par \par Will need repeat labs and imaging\par Pacheco start Nivolumab q 4 weeks x 1 year.\par \par 03/20/2023:\par 1) Hospital admission: The patient is a 47 gentleman with PMH stage IV melanoma (on Opdivo in 1/2023 last dose was 2/20/23) in right thumb s/p amputation in 2021, with known metastatic disease to the axilla R lung s/p Right VAT in 11/2022. He presented to [facility] from 03/05/2023-03/18/2023 with dizziness and headache. CTH identified a L frontal hyperdense mass and a small R temporal mass. He underwent MRI brain (3/6) which found markedly enlarged left anteromedial frontal mass, consistent with progression of metastatic melanoma. New T1 shortening and susceptibility artifact may represent the presence of melanin and intralesional hemorrhage. New 1.6 x 1.3 x 0.9 cm right anterior temporal T1 hyperintense lesion with susceptibility artifact mild surrounding vasogenic edema, consistent with metastatic melanoma.He was started on decadron and Keppra.?   \par On 3/14/23, the patient underwent craniotomy for resection of only the L sided brain tumor.  He had a post-surgical MRI head on 3/15/23.   \par On 3/16/23 he underwent Duplex US BLE which was negative for DVT.  On 3/17, Path from the brain specimen confirmed metastatic melanoma.   \par He will require outpatient RT to the brain to the post-operative surgical bed and GK-SRS to the remaining metastatic focus at the R side of the brain.       \par He is currently on Dexamethasone 4mg PO Q8H for edema + Keppra 500 mg PO BID for seizure PPX.\par 2) acral melanoma : He will need to start Ipilimumab (3) + Nivolumab (1).\par He is s/p 2 cycles of Nivolumab, last treatment on 02/20/2023.\par We reviewed logistics, mechanism of action and most common irAEs.\par 3) Social: Patient is currently living at home with his spouse.  He is not receiving PT or OT.  He requires assistance with his routine/instrumental ADLs such as going to restroom, taking a shower.  As per spouse patient is incoherent at times.  Patient spends majority of the day sitting in bed or laying down.\par \par 05/08/2023: [facility]  ([id number])\par Patient is here for C2D1 Ipilimumab (3) + Nivolumab (1).\par Patient completed SRS to the left frontal brain in 5 fractions as well as 1 fraction to the right temporal area on 04/27/2023.\par Currently on Dexamethasone 1 mg daily.\par His left lateral incisional site is healed well.\par Admits to left eye blurriness and is scheduled to be seen by Dr. Tolentino (Opthalmology) \par As per patient, he is having RUQ abdominal pain x 2 weeks.\par Pain is mild however constant and is worse when laying in bed.\par At worse the pain is 7/10 in intensity and is using Motrin with relief.\par He denies N/V or diarrhea.  He is having one bowel movement/day.\par He is eating well and has no restrictions with his appetite.\par Will order amylase + lipase to r/o immune mediated pancreatitis.\par CT chest, abdomen/pelvis ordered.\par His strength has increased and patient is doing better at home.\par \par 05/30/2023:\par C3D1  Ipilimumab (3) + Nivolumab (1).\par He is off Dexamethasone.\par Admits to pruritus located on his anterior chest and back.\par No visible rash noted however patient is scratching his anterior chest and back frequently.\par He was advised to start Zyrtec 10 mg in AM and Benadryl at bedtime.\par Otherwise no other significant irAEs.\par CT on 05/13/2023 revealed previously noted confluent soft tissue density in the right axilla has decreased in size, and is likely postoperative.\par Postoperative changes in the right middle lobe.\par No evidence of metastatic disease or suspicious adenopathy in the chest, abdomen, and pelvis.\par \par 06/09/2023:\par Patient is here for an urgent evaluation.\par Today his main complaint is hemoptysis.\par Patient is accompanied with family friend, Ari.\par As per patient he noticed hemoptysis since his last treatment on 05/30/2023.\par Patient believed it was related to having food go down the "wrong pipe" while he was having a meal however persisted.\par Accompanied with coughing is SOB and elevated HR.\par Last night patient admits to chills but did not take his temperature.\par \par \par  [de-identified] : Medical Oncology ([facility]): Guy Shetty  [phone]\par Neurosx: Monster Rodriguez [phone]\par \par PTs Contact: Inocencia Tovar (spouse) / [phone]\par Friend: Cipriano

## 2023-06-09 NOTE — REVIEW OF SYSTEMS
[Negative] : Allergic/Immunologic [FreeTextEntry9] : right thumb pain [de-identified] : staples removed from surgical site.  Mild confusion s/p surgery as per spouse.

## 2023-06-15 LAB — BACTERIA BLD CULT: NORMAL

## 2023-06-22 NOTE — ED PROVIDER NOTE - RELATIONSHIP TO PATIENT
"  Caller: Haroon Clark \"Hans\"    Relationship: Self    Best call back number:  [phone]     What is the best time to reach you:  ANYTIME    Who are you requesting to speak with:  CLINICAL    What was the call regarding:  PATIENT REPORTED THAT HE WASN'T ABLE TO FIND ANY PHARMACY THAT HAS THE 1.0 ML WEGOVY IN STOCK, BUT THAT HE FOUND SOME PHARMACIES THAT HAS THE 1.7 ML WEGOVY IN STOCK.  PATIENT REQUESTS A NEW PRESCRIPTION FOR 1.7 ML TO BE SENT TO [facility].  PATIENT SAID HE WAS SUPPOSED TO HAVE STARTED TAKING WEGOVY 4 DAYS AGO.      Pharmacy:    [facility] - [place], [place] - [address] - [phone]  - [phone]  [phone]       " wife

## 2023-06-29 ENCOUNTER — APPOINTMENT (OUTPATIENT)
Dept: MRI IMAGING | Facility: IMAGING CENTER | Age: 48
End: 2023-06-29
Payer: MEDICAID

## 2023-06-29 ENCOUNTER — OUTPATIENT (OUTPATIENT)
Dept: OUTPATIENT SERVICES | Facility: HOSPITAL | Age: 48
LOS: 1 days | End: 2023-06-29
Payer: MEDICAID

## 2023-06-29 DIAGNOSIS — Z00.8 ENCOUNTER FOR OTHER GENERAL EXAMINATION: ICD-10-CM

## 2023-06-29 DIAGNOSIS — C79.31 SECONDARY MALIGNANT NEOPLASM OF BRAIN: ICD-10-CM

## 2023-06-29 DIAGNOSIS — Z98.890 OTHER SPECIFIED POSTPROCEDURAL STATES: Chronic | ICD-10-CM

## 2023-06-29 DIAGNOSIS — C43.60 MALIGNANT MELANOMA OF UNSPECIFIED UPPER LIMB, INCLUDING SHOULDER: Chronic | ICD-10-CM

## 2023-06-29 PROCEDURE — 70553 MRI BRAIN STEM W/O & W/DYE: CPT | Mod: 26

## 2023-06-29 PROCEDURE — 70553 MRI BRAIN STEM W/O & W/DYE: CPT

## 2023-06-29 PROCEDURE — A9585: CPT

## 2023-07-24 ENCOUNTER — APPOINTMENT (OUTPATIENT)
Dept: NEUROLOGY | Facility: CLINIC | Age: 48
End: 2023-07-24
Payer: MEDICAID

## 2023-07-24 VITALS
RESPIRATION RATE: 16 BRPM | OXYGEN SATURATION: 95 % | TEMPERATURE: 98.5 F | HEART RATE: 107 BPM | HEIGHT: 69 IN | SYSTOLIC BLOOD PRESSURE: 113 MMHG | BODY MASS INDEX: 27.85 KG/M2 | WEIGHT: 188 LBS | DIASTOLIC BLOOD PRESSURE: 71 MMHG

## 2023-07-24 PROCEDURE — 99214 OFFICE O/P EST MOD 30 MIN: CPT

## 2023-07-24 NOTE — DATA REVIEWED
[de-identified] : Personally reviewed pre and op MRI from 3/23 and prior from 8/22 showing growth then resection of inferior left frontal lesion and new right frontal lesion without much edema.  The dominant lesion does compress the left optic nerve, 4/23 pre SRS study with enhancement around left frontal cavity and slight increase of right frontal lesion; 6/23 MRI with improved enhancement around cavity and slight increase in enhancement of right temporal lesion

## 2023-07-24 NOTE — HISTORY OF PRESENT ILLNESS
[FreeTextEntry1] : NEURO-ONCOLOGY\par \par This 47 year old right handed man is seen in follow up for evaluation and management of brain metastases\par \par He presented with ungual melanoma of the right thumb that metastasized to multiple right axillary lymph nodes in 2021 and had node resection in 12/21 and treatment with 4 cycles of dacarbazine in [facility].  In 11/22 he had recurrence, with a thumb resection and pulmonary lesionectomy. He then initiated nivolumab, but presented with headaches and was found to have a left inferior frontal tumor.  In retrospect he'd had a smaller lesion present there in 8/22 on MRI, called a meningioma.  An additional lesion in the right frontal lobe was noted as well. He underwent resection of the tumor by Dr. Rodriguez on 3/14/23, followed by SRT (30/5) to cavity and SRS (20/1) to right temporal lesion.   \par \par INTERVAL HISTORY: MRI done a month ago with improved enhancement around cavity and slight increase in right temporal lesion.  No new neurologic symptoms.  Immunotherapy on hold for hemoptysis, had organizing PNA on CT scan. \par \par PMHX: Melanoma\par \par PSHX: right thumb amputation, right lung tumor resection, left frontal craniotomy.\par \par SHX: Creole-speaking. Here with wife Livier who is serving as a . Refusing  services at this time.  in [facility]. No smoking or alcohol use.\par \par FHX: No brain tumors.

## 2023-07-24 NOTE — DISCUSSION/SUMMARY
[FreeTextEntry1] : 47 year old man with metastatic melanoma to the brain. S/p SRS.  \par \par TUMOR:\par New MRI brain for 1 mo coordinated\par No current neurologic indication for steroids/seizure meds\par Helped coordinate f/u with Dr. Marcos \par \par RTC 4 weeks.\par

## 2023-07-24 NOTE — REVIEW OF SYSTEMS
[Seizures] : no convulsions [As Noted in HPI] : as noted in HPI [Abdominal Pain] : abdominal pain [Negative] : Heme/Lymph

## 2023-07-26 ENCOUNTER — OUTPATIENT (OUTPATIENT)
Dept: OUTPATIENT SERVICES | Facility: HOSPITAL | Age: 48
LOS: 1 days | Discharge: HOME | End: 2023-07-26

## 2023-07-26 DIAGNOSIS — C43.60 MALIGNANT MELANOMA OF UNSPECIFIED UPPER LIMB, INCLUDING SHOULDER: Chronic | ICD-10-CM

## 2023-07-26 DIAGNOSIS — Z98.890 OTHER SPECIFIED POSTPROCEDURAL STATES: Chronic | ICD-10-CM

## 2023-08-03 ENCOUNTER — RESULT REVIEW (OUTPATIENT)
Age: 48
End: 2023-08-03

## 2023-08-03 ENCOUNTER — APPOINTMENT (OUTPATIENT)
Dept: HEMATOLOGY ONCOLOGY | Facility: CLINIC | Age: 48
End: 2023-08-03
Payer: MEDICAID

## 2023-08-03 VITALS
DIASTOLIC BLOOD PRESSURE: 81 MMHG | HEART RATE: 87 BPM | TEMPERATURE: 97.2 F | SYSTOLIC BLOOD PRESSURE: 118 MMHG | WEIGHT: 184.09 LBS | BODY MASS INDEX: 27.18 KG/M2 | RESPIRATION RATE: 15 BRPM | OXYGEN SATURATION: 93 %

## 2023-08-03 LAB
ALBUMIN SERPL ELPH-MCNC: 4.3 G/DL
ALP BLD-CCNC: 61 U/L
ALT SERPL-CCNC: 11 U/L
ANION GAP SERPL CALC-SCNC: 11 MMOL/L
AST SERPL-CCNC: 21 U/L
BASOPHILS # BLD AUTO: 0.07 K/UL — SIGNIFICANT CHANGE UP (ref 0–0.2)
BASOPHILS NFR BLD AUTO: 1.3 % — SIGNIFICANT CHANGE UP (ref 0–2)
BILIRUB SERPL-MCNC: 0.5 MG/DL
BUN SERPL-MCNC: 7 MG/DL
CALCIUM SERPL-MCNC: 9.7 MG/DL
CHLORIDE SERPL-SCNC: 104 MMOL/L
CK SERPL-CCNC: 130 U/L
CO2 SERPL-SCNC: 26 MMOL/L
CREAT SERPL-MCNC: 1.15 MG/DL
CRP SERPL-MCNC: 6 MG/L
EGFR: 78 ML/MIN/1.73M2
EOSINOPHIL # BLD AUTO: 0.35 K/UL — SIGNIFICANT CHANGE UP (ref 0–0.5)
EOSINOPHIL NFR BLD AUTO: 6.7 % — HIGH (ref 0–6)
GLUCOSE SERPL-MCNC: 88 MG/DL
HCT VFR BLD CALC: 46.2 % — SIGNIFICANT CHANGE UP (ref 39–50)
HGB BLD-MCNC: 14.8 G/DL — SIGNIFICANT CHANGE UP (ref 13–17)
IMM GRANULOCYTES NFR BLD AUTO: 0.2 % — SIGNIFICANT CHANGE UP (ref 0–0.9)
LDH SERPL-CCNC: 369 U/L
LYMPHOCYTES # BLD AUTO: 1.72 K/UL — SIGNIFICANT CHANGE UP (ref 1–3.3)
LYMPHOCYTES # BLD AUTO: 32.8 % — SIGNIFICANT CHANGE UP (ref 13–44)
MCHC RBC-ENTMCNC: 27 PG — SIGNIFICANT CHANGE UP (ref 27–34)
MCHC RBC-ENTMCNC: 32.4 G/DL — SIGNIFICANT CHANGE UP (ref 32–36)
MCV RBC AUTO: 83.3 FL — SIGNIFICANT CHANGE UP (ref 80–100)
MONOCYTES # BLD AUTO: 0.72 K/UL — SIGNIFICANT CHANGE UP (ref 0–0.9)
MONOCYTES NFR BLD AUTO: 13.7 % — SIGNIFICANT CHANGE UP (ref 2–14)
NEUTROPHILS # BLD AUTO: 2.37 K/UL — SIGNIFICANT CHANGE UP (ref 1.8–7.4)
NEUTROPHILS NFR BLD AUTO: 45.3 % — SIGNIFICANT CHANGE UP (ref 43–77)
NRBC # BLD: 0 /100 WBCS — SIGNIFICANT CHANGE UP (ref 0–0)
PLATELET # BLD AUTO: 277 K/UL — SIGNIFICANT CHANGE UP (ref 150–400)
POTASSIUM SERPL-SCNC: 4.7 MMOL/L
PROT SERPL-MCNC: 7.8 G/DL
RBC # BLD: 5.56 M/UL — SIGNIFICANT CHANGE UP (ref 4.2–5.8)
RBC # FLD: 14.6 % — HIGH (ref 10.3–14.5)
SODIUM SERPL-SCNC: 142 MMOL/L
T3 SERPL-MCNC: 142 NG/DL
T4 FREE SERPL-MCNC: 1 NG/DL
TSH SERPL-ACNC: 0.4 UIU/ML
WBC # BLD: 5.24 K/UL — SIGNIFICANT CHANGE UP (ref 3.8–10.5)
WBC # FLD AUTO: 5.24 K/UL — SIGNIFICANT CHANGE UP (ref 3.8–10.5)

## 2023-08-03 PROCEDURE — 99214 OFFICE O/P EST MOD 30 MIN: CPT

## 2023-08-03 NOTE — HISTORY OF PRESENT ILLNESS
[de-identified] : Mr. Burk is a 47 year old [place]ib[place] speaking gentlemen presenting to the office for an initial consultation of melanoma.  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 He first noticed the change in color of the nail > 1 year. He underwent node resection 12/2021 and 3 nodes between 6-9 cm were removed at the time.  He received treatment from January 10 to March 28 2022 at [facility] in [facility]. Patient received 4 cycles of dacarbazine 250 mg/m2 x 5 days every 28 days. He states that the nail tumor improved with chemotherapy. The bleeding stopped. Since off of chemotherapy the pain is getting worse.  He notes pain in the epigastrum for the past 3 weeks that is worse at night. It is ~ 3/10 in severity and is persistent.  Patient referred to US oncology for treatment with immunotherapy.  COVID Vaccine [place] J&J 12/5/22 (no booster)  9/20/2022 Both the right axilla biopsy and lung biopsy are negative. However could be false negative. His thumb pain is significant and might need amputation. We still need pathologic diagnosis and molecular genetics. 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 Various approaches are to resect lung lesion and thumb, and provide adjuvant therapy. Also, could try "neoadjuvant" ipi + nivo for this. Patient is scheduled for evaluation with Dr. Herrera on 9/27.  10/22/2022 I discussed case with surgeon. Given lack of tissue positive on lung or axilla biopsy, will proceed with distal right thumb amputation. Patient has been in pain and also notes intermittent discharge. Repeat CT scan of Rr Lung lesion shows mile increase in size of lesion. Patient will then also need to have this lesion resected. He will return to see me 2-3 weeks post-op and we will begin 1 year of adjuvant immunotherapy.   1/17/2023 Patient had both surgeries 11/9/22 - Right thumb resection: 1. Right thumb, disarticulated between proximal and distal phalanges -  Melanoma, ulcerated, acral type, approximately 8.5 mm in thickness, mitotic rate 5/mm2, margins negative - See synoptic summary 2. Right thumb distal phalanx proximal bone margin, excision - Negative for tumor 3. Right axillary node dissection levels 1, 2, and 3 - Two of eleven lymph nodes positive for metastatic melanoma (2/11) - Present as aggregates of tumor cells in subcapsular spaces (0.6 mmin greatest dimension) - No extracapsular extension seen Note: Melan-A and SOX10 immunostains on representative sections support the interpretation. Mitotic Rate:   5 mitoses per mm2 pT Category:  pT4b pN Category:   pN2 pM Category:   pM1b  11/15/2022: Right lung resection 1. Lung, right middle lobe, wedge resection - Metastatic melanoma in lung. Margin is free of melanoma. Moleculartesting and PDL1 testing is pending. 2. Lymph node, level 9: - Fibrous tissue, negative for melanoma. PDL1 IHC = 30% (TPS) Nodule:   1.5 x 1.3 x 0.8 cm  Will need repeat labs and imaging Pacheco start Nivolumab q 4 weeks x 1 year.  03/20/2023: 1) Hospital admission: The patient is a 47 gentleman with PMH stage IV melanoma (on Opdivo in 1/2023 last dose was 2/20/23) in right thumb s/p amputation in 2021, with known metastatic disease to the axilla R lung s/p Right VAT in 11/2022. He presented to [facility] from 03/05/2023-03/18/2023 with dizziness and headache. CTH identified a L frontal hyperdense mass and a small R temporal mass. He underwent MRI brain (3/6) which found markedly enlarged left anteromedial frontal mass, consistent with progression of metastatic melanoma. New T1 shortening and susceptibility artifact may represent the presence of melanin and intralesional hemorrhage. New 1.6 x 1.3 x 0.9 cm right anterior temporal T1 hyperintense lesion with susceptibility artifact mild surrounding vasogenic edema, consistent with metastatic melanoma.He was started on decadron and Keppra.?    On 3/14/23, the patient underwent craniotomy for resection of only the L sided brain tumor.  He had a post-surgical MRI head on 3/15/23.    On 3/16/23 he underwent Duplex US BLE which was negative for DVT.  On 3/17, Path from the brain specimen confirmed metastatic melanoma.    He will require outpatient RT to the brain to the post-operative surgical bed and GK-SRS to the remaining metastatic focus at the R side of the brain.        He is currently on Dexamethasone 4mg PO Q8H for edema + Keppra 500 mg PO BID for seizure PPX. 2) acral melanoma : He will need to start Ipilimumab (3) + Nivolumab (1). He is s/p 2 cycles of Nivolumab, last treatment on 02/20/2023. We reviewed logistics, mechanism of action and most common irAEs. 3) Social: Patient is currently living at home with his spouse.  He is not receiving PT or OT.  He requires assistance with his routine/instrumental ADLs such as going to restroom, taking a shower.  As per spouse patient is incoherent at times.  Patient spends majority of the day sitting in bed or laying down.  05/08/2023: [facility]  ([id number]) Patient is here for C2D1 Ipilimumab (3) + Nivolumab (1). Patient completed SRS to the left frontal brain in 5 fractions as well as 1 fraction to the right temporal area on 04/27/2023. Currently on Dexamethasone 1 mg daily. His left lateral incisional site is healed well. Admits to left eye blurriness and is scheduled to be seen by Dr. Tolentino (Opthalmology)  As per patient, he is having RUQ abdominal pain x 2 weeks. Pain is mild however constant and is worse when laying in bed. At worse the pain is 7/10 in intensity and is using Motrin with relief. He denies N/V or diarrhea.  He is having one bowel movement/day. He is eating well and has no restrictions with his appetite. Will order amylase + lipase to r/o immune mediated pancreatitis. CT chest, abdomen/pelvis ordered. His strength has increased and patient is doing better at home.  05/30/2023: C3D1  Ipilimumab (3) + Nivolumab (1). He is off Dexamethasone. Admits to pruritus located on his anterior chest and back. No visible rash noted however patient is scratching his anterior chest and back frequently. He was advised to start Zyrtec 10 mg in AM and Benadryl at bedtime. Otherwise no other significant irAEs. CT on 05/13/2023 revealed previously noted confluent soft tissue density in the right axilla has decreased in size, and is likely postoperative. Postoperative changes in the right middle lobe. No evidence of metastatic disease or suspicious adenopathy in the chest, abdomen, and pelvis.  06/09/2023: Patient is here for an urgent evaluation. Today his main complaint is hemoptysis. Patient is accompanied with family friend, Ari. As per patient he noticed hemoptysis since his last treatment on 05/30/2023. Patient believed it was related to having food go down the "wrong pipe" while he was having a meal however persisted. Accompanied with coughing is SOB and elevated HR. Last night patient admits to chills but did not take his temperature.  8/3/23 Patient for follow up of acral melanoma Had developed diffuse inflitrates in lungs at last visit 6/9/23 No clear evidence of cancer or PE Was managed by PCP, and did not see us after this He states that cough and hemoptysis is completely resolved. Has been following with neuro-onc. [de-identified] : Medical Oncology ([facility]): Guy Shetty  [phone] Neurosx: Monster Rodriguez [phone]  PTs Contact: Inocencia Tovar (spouse) / [phone] Friend: Cipriano

## 2023-08-03 NOTE — REASON FOR VISIT
[Follow-Up Visit] : a follow-up [[employer] ] : provided by [employer]   [FreeTextEntry2] : hemoptysis [Interpreters_IDNumber] : [id number] [Interpreters_FullName] : Tamia [FreeTextEntry3] : Shade Gonzalez

## 2023-08-03 NOTE — PHYSICAL EXAM
[Ambulatory and capable of all self care but unable to carry out any work activities] : Status 2- Ambulatory and capable of all self care but unable to carry out any work activities. Up and about more than 50% of waking hours [de-identified] : Left frontal lobe staples noted from craniotomy site on 03/14/2023.  [Normal] : grossly intact [de-identified] : tender RUQ; no masses [de-identified] : right thumb amputation is well healed

## 2023-08-03 NOTE — REVIEW OF SYSTEMS
[Negative] : Allergic/Immunologic [FreeTextEntry9] : right thumb pain [de-identified] : staples removed from surgical site.  Mild confusion s/p surgery as per spouse.

## 2023-08-03 NOTE — ASSESSMENT
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 He first noticed the change in color of the nail > 1 year. He underwent node resection 12/2021 and 3 nodes between 6-9 cm were removed at the time.  He received treatment from January 10 to March 28 2022 at [facility] in [facility]. Patient received 4 cycles of dacarbazine 250 mg/m2 x 5 days every 28 days. He states that the nail tumor improved with chemotherapy. The bleeding stopped. Since off of chemotherapy the pain is getting worse.  In  11/202 he completed right tumb amputation and right lung resection. This is a [id number] stage. 11/15/2022: had Right lung resection showed - Metastatic melanoma in lung.   He presented to [facility] from 03/05/2023-03/18/2023 with dizziness and headache.  He underwent MRI brain (3/6/23) which found markedly enlarged left anteromedial frontal mass, consistent with progression of metastatic melanoma. On 3/14/23, the patient underwent craniotomy for resection of only the L sided brain tumor. He had a post-surgical MRI head on 3/15/23.  Had Ipi 3 + Nivo 1:  C1 4/17, C2 5/8, C3 5/30  Then developed diffuse inflitrates in lungs at last visit 6/9/23 No clear evidence of cancer or PE Was managed by PCP, and did not see us after this He states that cough and hemoptysis is completely resolved.  used  today: [id number] Will repeat imaging asap, and labs today. then RTO Would like to restart Nivo alone if possible.  Time = 40 minutes

## 2023-08-07 NOTE — PROGRESS NOTE ADULT - REASON FOR ADMISSION
Headaches, intrancranial mass Lab results reviewed:     Latest Reference Range & Units 08/03/23 10:45   Sodium 135 - 145 mmol/L 139   Potassium 3.4 - 5.1 mmol/L 3.9   Chloride 97 - 110 mmol/L 105   CO2 21 - 32 mmol/L 26   ANION GAP 7 - 19 mmol/L 12   Glucose 70 - 99 mg/dL 120 (H)   BUN 6 - 20 mg/dL 31 (H)   Creatinine 0.51 - 0.95 mg/dL 1.20 (H)   BUN/CREATININE RATIO 7 - 25  26 (H)   Glomerular Filtration Rate >=60  48 (L)   CALCIUM 8.4 - 10.2 mg/dL 9.4   NT proBNP <=125 pg/mL 320 (H)   (H): Data is abnormally high  (L): Data is abnormally low    Labs stable, creat mildly improved. Continue present cardiac medications & follow up as scheduled later this month.    SILVIANO Mclaughlin  Pager # [phone]  8/7/2023  10:19 AM

## 2023-08-11 DIAGNOSIS — C79.9 SECONDARY MALIGNANT NEOPLASM OF UNSPECIFIED SITE: ICD-10-CM

## 2023-08-18 ENCOUNTER — APPOINTMENT (OUTPATIENT)
Dept: CT IMAGING | Facility: IMAGING CENTER | Age: 48
End: 2023-08-18

## 2023-08-22 ENCOUNTER — APPOINTMENT (OUTPATIENT)
Dept: NEUROLOGY | Facility: CLINIC | Age: 48
End: 2023-08-22

## 2023-08-23 ENCOUNTER — OUTPATIENT (OUTPATIENT)
Dept: OUTPATIENT SERVICES | Facility: HOSPITAL | Age: 48
LOS: 1 days | End: 2023-08-23
Payer: MEDICAID

## 2023-08-23 ENCOUNTER — APPOINTMENT (OUTPATIENT)
Dept: MRI IMAGING | Facility: IMAGING CENTER | Age: 48
End: 2023-08-23
Payer: MEDICAID

## 2023-08-23 ENCOUNTER — APPOINTMENT (OUTPATIENT)
Dept: NEUROLOGY | Facility: CLINIC | Age: 48
End: 2023-08-23
Payer: MEDICAID

## 2023-08-23 VITALS
DIASTOLIC BLOOD PRESSURE: 79 MMHG | HEART RATE: 64 BPM | RESPIRATION RATE: 16 BRPM | WEIGHT: 185 LBS | OXYGEN SATURATION: 97 % | HEIGHT: 69 IN | SYSTOLIC BLOOD PRESSURE: 112 MMHG | BODY MASS INDEX: 27.4 KG/M2 | TEMPERATURE: 97.7 F

## 2023-08-23 DIAGNOSIS — C79.31 SECONDARY MALIGNANT NEOPLASM OF BRAIN: ICD-10-CM

## 2023-08-23 DIAGNOSIS — Z98.890 OTHER SPECIFIED POSTPROCEDURAL STATES: Chronic | ICD-10-CM

## 2023-08-23 DIAGNOSIS — C43.60 MALIGNANT MELANOMA OF UNSPECIFIED UPPER LIMB, INCLUDING SHOULDER: Chronic | ICD-10-CM

## 2023-08-23 DIAGNOSIS — Z00.8 ENCOUNTER FOR OTHER GENERAL EXAMINATION: ICD-10-CM

## 2023-08-23 PROCEDURE — 70553 MRI BRAIN STEM W/O & W/DYE: CPT

## 2023-08-23 PROCEDURE — 99214 OFFICE O/P EST MOD 30 MIN: CPT

## 2023-08-23 PROCEDURE — A9585: CPT

## 2023-08-23 PROCEDURE — 70553 MRI BRAIN STEM W/O & W/DYE: CPT | Mod: 26

## 2023-08-23 RX ORDER — DEXAMETHASONE 1 MG/1
1 TABLET ORAL DAILY
Qty: 30 | Refills: 1 | Status: DISCONTINUED | COMMUNITY
Start: 2023-04-24 | End: 2023-08-23

## 2023-08-23 RX ORDER — PREDNISONE 20 MG/1
20 TABLET ORAL
Qty: 30 | Refills: 0 | Status: DISCONTINUED | COMMUNITY
Start: 2023-06-09 | End: 2023-08-23

## 2023-08-23 RX ORDER — PANTOPRAZOLE 40 MG/1
40 TABLET, DELAYED RELEASE ORAL
Qty: 30 | Refills: 1 | Status: DISCONTINUED | COMMUNITY
Start: 2023-05-24 | End: 2023-08-23

## 2023-08-23 RX ORDER — GABAPENTIN 300 MG/1
300 CAPSULE ORAL TWICE DAILY
Qty: 60 | Refills: 3 | Status: DISCONTINUED | COMMUNITY
Start: 2022-12-13 | End: 2023-08-23

## 2023-08-23 NOTE — HISTORY OF PRESENT ILLNESS
[FreeTextEntry1] : NEURO-ONCOLOGY  This 47 year old right handed man is seen in follow up for evaluation and management of brain metastases  He presented with ungual melanoma of the right thumb that metastasized to multiple right axillary lymph nodes in 2021 and had node resection in 12/21 and treatment with 4 cycles of dacarbazine in [facility].  In 11/22 he had recurrence, with a thumb resection and pulmonary lesionectomy. He then initiated nivolumab, but presented with headaches and was found to have a left inferior frontal tumor.  In retrospect he'd had a smaller lesion present there in 8/22 on MRI, called a meningioma.  An additional lesion in the right frontal lobe was noted as well. He underwent resection of the tumor by Dr. Rodriguez on 3/14/23, followed by SRT (30/5) to cavity and SRS (20/1) to right temporal lesion.     INTERVAL HISTORY: New MRI with furher increase in size of t1 intrinsic right temporal lesion with increased edema, but with hypoperfusion. Discussed with neuroradiology by phone today.  No new symptoms. He does not have scheduled f/u with Dr. Marcos.   PMHX: Melanoma PSHX: right thumb amputation, right lung tumor resection, left frontal craniotomy. SHX: Creole-speaking. Here with wife Livier who is serving as a . Refusing  services at this time.  in [facility]. No smoking or alcohol use. FHX: No brain tumors.

## 2023-08-23 NOTE — DATA REVIEWED
[de-identified] : Personally reviewed pre and op MRI from 3/23 and prior from 8/22 showing growth then resection of inferior left frontal lesion and new right frontal lesion without much edema.  The dominant lesion does compress the left optic nerve, 4/23 pre SRS study with enhancement around left frontal cavity and slight increase of right frontal lesion; 6/23 MRI with improved enhancement around cavity and slight increase in enhancement of right temporal lesion, further increased (t1 intrinsic) on 8/23 MRI with increased edema, hypoperfused

## 2023-08-23 NOTE — DISCUSSION/SUMMARY
[FreeTextEntry1] : 47 year old man with metastatic melanoma to the brain. S/p SRS.  Scan changes likely treatment effect.  Monitor.   TUMOR: New MRI brain fo6w coordinated No current neurologic indication for steroids/seizure meds Helped coordinate f/u with Dr. Marcos   RTC 6w.

## 2023-09-06 ENCOUNTER — APPOINTMENT (OUTPATIENT)
Dept: RADIATION ONCOLOGY | Facility: CLINIC | Age: 48
End: 2023-09-06
Payer: MEDICAID

## 2023-09-06 PROCEDURE — 99213 OFFICE O/P EST LOW 20 MIN: CPT | Mod: 95

## 2023-09-06 NOTE — VITALS
[Least Pain Intensity: 3/10] : 3/10 [80: Normal activity with effort; some signs or symptoms of disease.] : 80: Normal activity with effort; some signs or symptoms of disease.  [Maximal Pain Intensity: 5/10] : 5/10 [Pain Location: ___] : Pain Location: [unfilled] [OTC] : OTC

## 2023-09-08 NOTE — REVIEW OF SYSTEMS
[Fatigue] : fatigue [Visual Disturbances] : visual disturbances [Abdominal Pain] : abdominal pain [Negative] : Integumentary [de-identified] : occasional HA

## 2023-09-08 NOTE — HISTORY OF PRESENT ILLNESS
[Home] : at home, [unfilled] , at the time of the visit. [Medical Office: ([facility])___] : at the medical office located in  [Verbal consent obtained from patient] : the patient, [unfilled] [FreeTextEntry1] : RT hx: LEFT frontal 3000cgy over 5 Fxs 4/21-4/27/2023 RIGHT temporal 2000cgy over  Fx  4/21/2023  Mr. Burk is a 47 year old male with Left sided brain tumor s/p craniotomy on 3/14/23. Here for consideration of RT to the post-operative surgical bed and GK-SRS to the remaining metastatic focus at the R side of the brain.  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 He first noticed the change in color of the nail > 1 year. He underwent node resection 12/2021 and 3 nodes between 6-9 cm were removed at the time.  He received treatment from January 10 to March 28 2022 at [facility] in [facility]. Patient received 4 cycles of dacarbazine 250 mg/m2 x 5 days every 28 days. He states that the nail tumor improved with chemotherapy. The bleeding stopped. Since off of chemotherapy the pain is getting worse.  11/202 he completed right thumb amputation and right lung resection. This is a [id number] stage.   C1 Nivolumab 01/23/2023 C2 02/20/2023  He presented to [facility] from 03/05/2023-03/18/2023 with dizziness and headache. CTH identified a L frontal hyperdense mass and a small R temporal mass. He underwent MRI brain (3/6) which found markedly enlarged left anteromedial frontal mass, consistent with progression of metastatic melanoma. New T1 shortening and susceptibility artifact may represent the presence of melanin and intralesional hemorrhage. New 1.6 x 1.3 x 0.9 cm right anterior temporal T1 hyperintense lesion with susceptibility artifact mild surrounding vasogenic edema, consistent with metastatic melanoma.  On 3/14/23, the patient underwent craniotomy for resection of only the L sided brain tumor by Dr. Rodriguez  He was started on Decadron and Keppra. Currently on Dexamethasone 4 mg PO every 8 hours for edema + Keppra 500 mg PO BID for seizure PPX.   He is scheduled for Ipilimumab (3) + Nivolumab (1) on 04/17/2023.  3/27/23 Mr Burk presents for consideration of RT to post-operative surgical bed and GK-SRS to the remaining metastatic focus at the R side of the brain.  Today, he reports pain at the left head surgical area with staples in place, left eye conjunctival hemorrhage, and blurry vision. He denies unsteady gait, dizziness, nausea. Additionally complains of smoldering RUQ pain for the past several months since his initial amputation/lung resection.  Visit dated 9/6/2023 Accompanied by his wife. Patient returns for continuation of care after completing ordered imaging studies. Reports doing okay post treatment. Verbalized a having a HA which is not similar to pretreatment. Continues to follow with Dr. Marcos with plans to restart Nivo   MRI brain w w/o contrast 8/23/2023 IMPRESSION: No evidence of intracranial metastasis. Left planum sphenoidale 1.7 cm meningioma.  CT C/A/P 5/19/2023 IMPRESSION: When compared with PET/CT dated August 6, 2022: The previously seen right lung nodule measuring 1.2 x 1.1 cm now measures 1.3 x 1.4 cm. This may be due to differences in slice selection. Stable left lower lobe 0.3 cm nodule. No new nodules are identified. No new consolidations, edema, effusion or pneumothorax.   Denies any  unilateral extremity weakness, numbness,tingling, nausea, vomiting, headaches or other neurologic symptoms. No issues with speech or comprehension. Denies any new neurological deficits. She offers no new symptoms/concerns today

## 2023-09-08 NOTE — PHYSICAL EXAM
[General Appearance - Well Developed] : well developed [Oriented To Time, Place, And Person] : oriented to person, place, and time [de-identified] : LIMITED d/t TELEHEALTH

## 2023-09-08 NOTE — REASON FOR VISIT
[Post-Treatment Evaluation] : post-treatment evaluation for [Brain Metastasis] : brain metastasis [Other: ___] : [unfilled] [[employer] ] : provided by [employer]   [Interpreters_IDNumber] : [id number] [Interpreters_FullName] : Chad [FreeTextEntry3] : [facility] [TWNoteComboBox1] : Lisa

## 2023-10-13 ENCOUNTER — OUTPATIENT (OUTPATIENT)
Dept: OUTPATIENT SERVICES | Facility: HOSPITAL | Age: 48
LOS: 1 days | End: 2023-10-13
Payer: MEDICAID

## 2023-10-13 ENCOUNTER — APPOINTMENT (OUTPATIENT)
Dept: MRI IMAGING | Facility: IMAGING CENTER | Age: 48
End: 2023-10-13
Payer: MEDICAID

## 2023-10-13 DIAGNOSIS — Z00.8 ENCOUNTER FOR OTHER GENERAL EXAMINATION: ICD-10-CM

## 2023-10-13 DIAGNOSIS — Z98.890 OTHER SPECIFIED POSTPROCEDURAL STATES: Chronic | ICD-10-CM

## 2023-10-13 DIAGNOSIS — C43.60 MALIGNANT MELANOMA OF UNSPECIFIED UPPER LIMB, INCLUDING SHOULDER: Chronic | ICD-10-CM

## 2023-10-13 DIAGNOSIS — C79.31 SECONDARY MALIGNANT NEOPLASM OF BRAIN: ICD-10-CM

## 2023-10-13 PROCEDURE — 70553 MRI BRAIN STEM W/O & W/DYE: CPT

## 2023-10-13 PROCEDURE — 70553 MRI BRAIN STEM W/O & W/DYE: CPT | Mod: 26

## 2023-10-16 ENCOUNTER — APPOINTMENT (OUTPATIENT)
Dept: NEUROLOGY | Facility: CLINIC | Age: 48
End: 2023-10-16
Payer: MEDICAID

## 2023-10-16 PROCEDURE — 99215 OFFICE O/P EST HI 40 MIN: CPT

## 2023-10-18 ENCOUNTER — APPOINTMENT (OUTPATIENT)
Dept: RADIATION ONCOLOGY | Facility: CLINIC | Age: 48
End: 2023-10-18

## 2023-10-19 ENCOUNTER — APPOINTMENT (OUTPATIENT)
Dept: RADIATION ONCOLOGY | Facility: CLINIC | Age: 48
End: 2023-10-19
Payer: MEDICAID

## 2023-10-19 ENCOUNTER — OUTPATIENT (OUTPATIENT)
Dept: OUTPATIENT SERVICES | Facility: HOSPITAL | Age: 48
LOS: 1 days | Discharge: HOME | End: 2023-10-19
Payer: MEDICAID

## 2023-10-19 ENCOUNTER — NON-APPOINTMENT (OUTPATIENT)
Age: 48
End: 2023-10-19

## 2023-10-19 DIAGNOSIS — C43.60 MALIGNANT MELANOMA OF UNSPECIFIED UPPER LIMB, INCLUDING SHOULDER: Chronic | ICD-10-CM

## 2023-10-19 DIAGNOSIS — Z98.890 OTHER SPECIFIED POSTPROCEDURAL STATES: Chronic | ICD-10-CM

## 2023-10-19 PROCEDURE — 77263 THER RADIOLOGY TX PLNG CPLX: CPT

## 2023-10-19 PROCEDURE — 99024 POSTOP FOLLOW-UP VISIT: CPT

## 2023-10-31 ENCOUNTER — APPOINTMENT (OUTPATIENT)
Dept: NEUROSURGERY | Facility: HOSPITAL | Age: 48
End: 2023-10-31
Payer: MEDICAID

## 2023-10-31 PROCEDURE — 77432 STEREOTACTIC RADIATION TRMT: CPT

## 2023-10-31 PROCEDURE — 61797 SRS CRAN LES SIMPLE ADDL: CPT

## 2023-10-31 PROCEDURE — 61798 SRS CRANIAL LESION COMPLEX: CPT

## 2023-10-31 PROCEDURE — 77290 THER RAD SIMULAJ FIELD CPLX: CPT | Mod: 26

## 2023-10-31 PROCEDURE — 77295 3-D RADIOTHERAPY PLAN: CPT | Mod: 26

## 2023-10-31 PROCEDURE — 77300 RADIATION THERAPY DOSE PLAN: CPT | Mod: 26

## 2023-10-31 PROCEDURE — 61799 SRS CRAN LES COMPLEX ADDL: CPT

## 2023-10-31 PROCEDURE — 77334 RADIATION TREATMENT AID(S): CPT | Mod: 26

## 2023-11-01 ENCOUNTER — OUTPATIENT (OUTPATIENT)
Dept: OUTPATIENT SERVICES | Facility: HOSPITAL | Age: 48
LOS: 1 days | Discharge: HOME | End: 2023-11-01

## 2023-11-01 DIAGNOSIS — C43.0 MALIGNANT MELANOMA OF LIP: ICD-10-CM

## 2023-11-01 DIAGNOSIS — Z98.890 OTHER SPECIFIED POSTPROCEDURAL STATES: Chronic | ICD-10-CM

## 2023-11-01 DIAGNOSIS — C43.9 MALIGNANT MELANOMA OF SKIN, UNSPECIFIED: ICD-10-CM

## 2023-11-01 DIAGNOSIS — C43.60 MALIGNANT MELANOMA OF UNSPECIFIED UPPER LIMB, INCLUDING SHOULDER: Chronic | ICD-10-CM

## 2023-11-09 ENCOUNTER — APPOINTMENT (OUTPATIENT)
Dept: HEMATOLOGY ONCOLOGY | Facility: CLINIC | Age: 48
End: 2023-11-09
Payer: MEDICAID

## 2023-11-09 ENCOUNTER — RESULT REVIEW (OUTPATIENT)
Age: 48
End: 2023-11-09

## 2023-11-09 VITALS
OXYGEN SATURATION: 99 % | DIASTOLIC BLOOD PRESSURE: 81 MMHG | SYSTOLIC BLOOD PRESSURE: 113 MMHG | HEART RATE: 74 BPM | WEIGHT: 187.39 LBS | RESPIRATION RATE: 17 BRPM | BODY MASS INDEX: 27.67 KG/M2 | TEMPERATURE: 98.7 F

## 2023-11-09 LAB
BASOPHILS # BLD AUTO: 0.04 K/UL — SIGNIFICANT CHANGE UP (ref 0–0.2)
BASOPHILS # BLD AUTO: 0.04 K/UL — SIGNIFICANT CHANGE UP (ref 0–0.2)
BASOPHILS NFR BLD AUTO: 0.9 % — SIGNIFICANT CHANGE UP (ref 0–2)
BASOPHILS NFR BLD AUTO: 0.9 % — SIGNIFICANT CHANGE UP (ref 0–2)
EOSINOPHIL # BLD AUTO: 0.47 K/UL — SIGNIFICANT CHANGE UP (ref 0–0.5)
EOSINOPHIL # BLD AUTO: 0.47 K/UL — SIGNIFICANT CHANGE UP (ref 0–0.5)
EOSINOPHIL NFR BLD AUTO: 10.3 % — HIGH (ref 0–6)
EOSINOPHIL NFR BLD AUTO: 10.3 % — HIGH (ref 0–6)
HCT VFR BLD CALC: 46.1 % — SIGNIFICANT CHANGE UP (ref 39–50)
HCT VFR BLD CALC: 46.1 % — SIGNIFICANT CHANGE UP (ref 39–50)
HGB BLD-MCNC: 15.5 G/DL — SIGNIFICANT CHANGE UP (ref 13–17)
HGB BLD-MCNC: 15.5 G/DL — SIGNIFICANT CHANGE UP (ref 13–17)
IMM GRANULOCYTES NFR BLD AUTO: 0.2 % — SIGNIFICANT CHANGE UP (ref 0–0.9)
IMM GRANULOCYTES NFR BLD AUTO: 0.2 % — SIGNIFICANT CHANGE UP (ref 0–0.9)
LYMPHOCYTES # BLD AUTO: 1.95 K/UL — SIGNIFICANT CHANGE UP (ref 1–3.3)
LYMPHOCYTES # BLD AUTO: 1.95 K/UL — SIGNIFICANT CHANGE UP (ref 1–3.3)
LYMPHOCYTES # BLD AUTO: 42.6 % — SIGNIFICANT CHANGE UP (ref 13–44)
LYMPHOCYTES # BLD AUTO: 42.6 % — SIGNIFICANT CHANGE UP (ref 13–44)
MCHC RBC-ENTMCNC: 27.7 PG — SIGNIFICANT CHANGE UP (ref 27–34)
MCHC RBC-ENTMCNC: 27.7 PG — SIGNIFICANT CHANGE UP (ref 27–34)
MCHC RBC-ENTMCNC: 33.6 G/DL — SIGNIFICANT CHANGE UP (ref 32–36)
MCHC RBC-ENTMCNC: 33.6 G/DL — SIGNIFICANT CHANGE UP (ref 32–36)
MCV RBC AUTO: 82.5 FL — SIGNIFICANT CHANGE UP (ref 80–100)
MCV RBC AUTO: 82.5 FL — SIGNIFICANT CHANGE UP (ref 80–100)
MONOCYTES # BLD AUTO: 0.34 K/UL — SIGNIFICANT CHANGE UP (ref 0–0.9)
MONOCYTES # BLD AUTO: 0.34 K/UL — SIGNIFICANT CHANGE UP (ref 0–0.9)
MONOCYTES NFR BLD AUTO: 7.4 % — SIGNIFICANT CHANGE UP (ref 2–14)
MONOCYTES NFR BLD AUTO: 7.4 % — SIGNIFICANT CHANGE UP (ref 2–14)
NEUTROPHILS # BLD AUTO: 1.77 K/UL — LOW (ref 1.8–7.4)
NEUTROPHILS # BLD AUTO: 1.77 K/UL — LOW (ref 1.8–7.4)
NEUTROPHILS NFR BLD AUTO: 38.6 % — LOW (ref 43–77)
NEUTROPHILS NFR BLD AUTO: 38.6 % — LOW (ref 43–77)
NRBC # BLD: 0 /100 WBCS — SIGNIFICANT CHANGE UP (ref 0–0)
NRBC # BLD: 0 /100 WBCS — SIGNIFICANT CHANGE UP (ref 0–0)
PLATELET # BLD AUTO: 233 K/UL — SIGNIFICANT CHANGE UP (ref 150–400)
PLATELET # BLD AUTO: 233 K/UL — SIGNIFICANT CHANGE UP (ref 150–400)
RBC # BLD: 5.59 M/UL — SIGNIFICANT CHANGE UP (ref 4.2–5.8)
RBC # BLD: 5.59 M/UL — SIGNIFICANT CHANGE UP (ref 4.2–5.8)
RBC # FLD: 14.1 % — SIGNIFICANT CHANGE UP (ref 10.3–14.5)
RBC # FLD: 14.1 % — SIGNIFICANT CHANGE UP (ref 10.3–14.5)
WBC # BLD: 4.58 K/UL — SIGNIFICANT CHANGE UP (ref 3.8–10.5)
WBC # BLD: 4.58 K/UL — SIGNIFICANT CHANGE UP (ref 3.8–10.5)
WBC # FLD AUTO: 4.58 K/UL — SIGNIFICANT CHANGE UP (ref 3.8–10.5)
WBC # FLD AUTO: 4.58 K/UL — SIGNIFICANT CHANGE UP (ref 3.8–10.5)

## 2023-11-09 PROCEDURE — 99215 OFFICE O/P EST HI 40 MIN: CPT

## 2023-11-10 LAB
ALBUMIN SERPL ELPH-MCNC: 4.6 G/DL
ALP BLD-CCNC: 75 U/L
ALT SERPL-CCNC: 10 U/L
ANION GAP SERPL CALC-SCNC: 11 MMOL/L
AST SERPL-CCNC: 14 U/L
BILIRUB SERPL-MCNC: 0.5 MG/DL
BUN SERPL-MCNC: 11 MG/DL
CALCIUM SERPL-MCNC: 9.7 MG/DL
CHLORIDE SERPL-SCNC: 102 MMOL/L
CO2 SERPL-SCNC: 30 MMOL/L
CREAT SERPL-MCNC: 1.17 MG/DL
CRP SERPL-MCNC: <3 MG/L
EGFR: 77 ML/MIN/1.73M2
GLUCOSE SERPL-MCNC: 89 MG/DL
LDH SERPL-CCNC: 149 U/L
POTASSIUM SERPL-SCNC: 4.2 MMOL/L
PROT SERPL-MCNC: 8 G/DL
SODIUM SERPL-SCNC: 143 MMOL/L

## 2023-11-13 LAB — ANA SER IF-ACNC: NEGATIVE

## 2023-11-15 ENCOUNTER — APPOINTMENT (OUTPATIENT)
Dept: NEUROLOGY | Facility: CLINIC | Age: 48
End: 2023-11-15
Payer: MEDICAID

## 2023-11-15 VITALS
BODY MASS INDEX: 27.55 KG/M2 | RESPIRATION RATE: 16 BRPM | SYSTOLIC BLOOD PRESSURE: 119 MMHG | WEIGHT: 186 LBS | OXYGEN SATURATION: 96 % | HEIGHT: 69 IN | DIASTOLIC BLOOD PRESSURE: 82 MMHG | HEART RATE: 95 BPM | TEMPERATURE: 98.9 F

## 2023-11-15 PROCEDURE — 99213 OFFICE O/P EST LOW 20 MIN: CPT

## 2023-11-20 ENCOUNTER — RESULT REVIEW (OUTPATIENT)
Age: 48
End: 2023-11-20

## 2023-11-20 ENCOUNTER — APPOINTMENT (OUTPATIENT)
Dept: HEMATOLOGY ONCOLOGY | Facility: CLINIC | Age: 48
End: 2023-11-20

## 2023-11-20 ENCOUNTER — APPOINTMENT (OUTPATIENT)
Dept: INFUSION THERAPY | Facility: HOSPITAL | Age: 48
End: 2023-11-20

## 2023-11-20 LAB
ALBUMIN SERPL ELPH-MCNC: 4.5 G/DL — SIGNIFICANT CHANGE UP (ref 3.3–5)
ALBUMIN SERPL ELPH-MCNC: 4.5 G/DL — SIGNIFICANT CHANGE UP (ref 3.3–5)
ALP SERPL-CCNC: 66 U/L — SIGNIFICANT CHANGE UP (ref 40–120)
ALP SERPL-CCNC: 66 U/L — SIGNIFICANT CHANGE UP (ref 40–120)
ALT FLD-CCNC: <5 U/L — LOW (ref 10–45)
ALT FLD-CCNC: <5 U/L — LOW (ref 10–45)
ANION GAP SERPL CALC-SCNC: 9 MMOL/L — SIGNIFICANT CHANGE UP (ref 5–17)
ANION GAP SERPL CALC-SCNC: 9 MMOL/L — SIGNIFICANT CHANGE UP (ref 5–17)
AST SERPL-CCNC: 26 U/L — SIGNIFICANT CHANGE UP (ref 10–40)
AST SERPL-CCNC: 26 U/L — SIGNIFICANT CHANGE UP (ref 10–40)
BILIRUB SERPL-MCNC: 0.6 MG/DL — SIGNIFICANT CHANGE UP (ref 0.2–1.2)
BILIRUB SERPL-MCNC: 0.6 MG/DL — SIGNIFICANT CHANGE UP (ref 0.2–1.2)
BUN SERPL-MCNC: 15 MG/DL — SIGNIFICANT CHANGE UP (ref 7–23)
BUN SERPL-MCNC: 15 MG/DL — SIGNIFICANT CHANGE UP (ref 7–23)
CALCIUM SERPL-MCNC: 9.6 MG/DL — SIGNIFICANT CHANGE UP (ref 8.4–10.5)
CALCIUM SERPL-MCNC: 9.6 MG/DL — SIGNIFICANT CHANGE UP (ref 8.4–10.5)
CHLORIDE SERPL-SCNC: 103 MMOL/L — SIGNIFICANT CHANGE UP (ref 96–108)
CHLORIDE SERPL-SCNC: 103 MMOL/L — SIGNIFICANT CHANGE UP (ref 96–108)
CO2 SERPL-SCNC: 28 MMOL/L — SIGNIFICANT CHANGE UP (ref 22–31)
CO2 SERPL-SCNC: 28 MMOL/L — SIGNIFICANT CHANGE UP (ref 22–31)
CREAT SERPL-MCNC: 1.21 MG/DL — SIGNIFICANT CHANGE UP (ref 0.5–1.3)
CREAT SERPL-MCNC: 1.21 MG/DL — SIGNIFICANT CHANGE UP (ref 0.5–1.3)
CRP SERPL-MCNC: <3 MG/L — SIGNIFICANT CHANGE UP
CRP SERPL-MCNC: <3 MG/L — SIGNIFICANT CHANGE UP
EGFR: 74 ML/MIN/1.73M2 — SIGNIFICANT CHANGE UP
EGFR: 74 ML/MIN/1.73M2 — SIGNIFICANT CHANGE UP
GLUCOSE SERPL-MCNC: 74 MG/DL — SIGNIFICANT CHANGE UP (ref 70–99)
GLUCOSE SERPL-MCNC: 74 MG/DL — SIGNIFICANT CHANGE UP (ref 70–99)
LDH SERPL L TO P-CCNC: 213 U/L — SIGNIFICANT CHANGE UP (ref 50–242)
LDH SERPL L TO P-CCNC: 213 U/L — SIGNIFICANT CHANGE UP (ref 50–242)
POTASSIUM SERPL-MCNC: 4.2 MMOL/L — SIGNIFICANT CHANGE UP (ref 3.5–5.3)
POTASSIUM SERPL-MCNC: 4.2 MMOL/L — SIGNIFICANT CHANGE UP (ref 3.5–5.3)
POTASSIUM SERPL-SCNC: 4.2 MMOL/L — SIGNIFICANT CHANGE UP (ref 3.5–5.3)
POTASSIUM SERPL-SCNC: 4.2 MMOL/L — SIGNIFICANT CHANGE UP (ref 3.5–5.3)
PROT SERPL-MCNC: 7.5 G/DL — SIGNIFICANT CHANGE UP (ref 6–8.3)
PROT SERPL-MCNC: 7.5 G/DL — SIGNIFICANT CHANGE UP (ref 6–8.3)
SODIUM SERPL-SCNC: 140 MMOL/L — SIGNIFICANT CHANGE UP (ref 135–145)
SODIUM SERPL-SCNC: 140 MMOL/L — SIGNIFICANT CHANGE UP (ref 135–145)

## 2023-11-21 ENCOUNTER — NON-APPOINTMENT (OUTPATIENT)
Age: 48
End: 2023-11-21

## 2023-11-21 DIAGNOSIS — C79.9 SECONDARY MALIGNANT NEOPLASM OF UNSPECIFIED SITE: ICD-10-CM

## 2023-11-21 DIAGNOSIS — Z51.11 ENCOUNTER FOR ANTINEOPLASTIC CHEMOTHERAPY: ICD-10-CM

## 2023-11-21 LAB
MISCELLANEOUS TEST: NORMAL
PROC NAME: NORMAL

## 2023-11-27 DIAGNOSIS — C43.61 MALIGNANT MELANOMA OF RIGHT UPPER LIMB, INCLUDING SHOULDER: ICD-10-CM

## 2023-11-27 DIAGNOSIS — C79.31 SECONDARY MALIGNANT NEOPLASM OF BRAIN: ICD-10-CM

## 2023-12-18 ENCOUNTER — APPOINTMENT (OUTPATIENT)
Dept: HEMATOLOGY ONCOLOGY | Facility: CLINIC | Age: 48
End: 2023-12-18
Payer: MEDICAID

## 2023-12-18 VITALS
OXYGEN SATURATION: 97 % | WEIGHT: 184.74 LBS | RESPIRATION RATE: 16 BRPM | HEART RATE: 91 BPM | DIASTOLIC BLOOD PRESSURE: 81 MMHG | TEMPERATURE: 97.3 F | SYSTOLIC BLOOD PRESSURE: 112 MMHG | BODY MASS INDEX: 27.28 KG/M2

## 2023-12-18 PROCEDURE — 99214 OFFICE O/P EST MOD 30 MIN: CPT

## 2023-12-18 NOTE — HISTORY OF PRESENT ILLNESS
[de-identified] : Mr. Burk is a 47 year old Malagasy speaking gentlemen presenting to the office for an initial consultation of melanoma.  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 He first noticed the change in color of the nail > 1 year. He underwent node resection 12/2021 and 3 nodes between 6-9 cm were removed at the time.  He received treatment from January 10 to March 28 2022 at \[facility]\"" in [facility]. Patient received 4 cycles of dacarbazine 250 mg/m2 x 5 days every 28 days. He states that the nail tumor improved with chemotherapy. The bleeding stopped. Since off of chemotherapy the pain is getting worse.  He notes pain in the epigastrum for the past 3 weeks that is worse at night. It is ~ 3/10 in severity and is persistent.  Patient referred to US oncology for treatment with immunotherapy.  COVID Vaccine [employer] 12/5/22 (no booster)  9/20/2022 Both the right axilla biopsy and lung biopsy are negative. However could be false negative. His thumb pain is significant and might need amputation. We still need pathologic diagnosis and molecular genetics. 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 Various approaches are to resect lung lesion and thumb, and provide adjuvant therapy. Also, could try "neoadjuvant" ipi + nivo for this. Patient is scheduled for evaluation with Dr. Herrera on 9/27.  10/22/2022 I discussed case with surgeon. Given lack of tissue positive on lung or axilla biopsy, will proceed with distal right thumb amputation. Patient has been in pain and also notes intermittent discharge. Repeat CT scan of Rr Lung lesion shows mile increase in size of lesion. Patient will then also need to have this lesion resected. He will return to see me 2-3 weeks post-op and we will begin 1 year of adjuvant immunotherapy.   1/17/2023 Patient had both surgeries 11/9/22 - Right thumb resection: 1. Right thumb, disarticulated between proximal and distal phalanges -  Melanoma, ulcerated, acral type, approximately 8.5 mm in thickness, mitotic rate 5/mm2, margins negative - See synoptic summary 2. Right thumb distal phalanx proximal bone margin, excision - Negative for tumor 3. Right axillary node dissection levels 1, 2, and 3 - Two of eleven lymph nodes positive for metastatic melanoma (2/11) - Present as aggregates of tumor cells in subcapsular spaces (0.6 mmin greatest dimension) - No extracapsular extension seen Note: Melan-A and SOX10 immunostains on representative sections support the interpretation. Mitotic Rate:   5 mitoses per mm2 pT Category:  pT4b pN Category:   pN2 pM Category:   pM1b  11/15/2022: Right lung resection 1. Lung, right middle lobe, wedge resection - Metastatic melanoma in lung. Margin is free of melanoma. Moleculartesting and PDL1 testing is pending. 2. Lymph node, level 9: - Fibrous tissue, negative for melanoma. PDL1 IHC = 30% (TPS) Nodule:   1.5 x 1.3 x 0.8 cm  Will need repeat labs and imaging Pacheco start Nivolumab q 4 weeks x 1 year.  03/20/2023: 1) Hospital admission: The patient is a 47 gentleman with PMH stage IV melanoma (on Opdivo in 1/2023 last dose was 2/20/23) in right thumb s/p amputation in 2021, with known metastatic disease to the axilla R lung s/p Right VAT in 11/2022. He presented to [facility] from 03/05/2023-03/18/2023 with dizziness and headache. CTH identified a L frontal hyperdense mass and a small R temporal mass. He underwent MRI brain (3/6) which found markedly enlarged left anteromedial frontal mass, consistent with progression of metastatic melanoma. New T1 shortening and susceptibility artifact may represent the presence of melanin and intralesional hemorrhage. New 1.6 x 1.3 x 0.9 cm right anterior temporal T1 hyperintense lesion with susceptibility artifact mild surrounding vasogenic edema, consistent with metastatic melanoma.He was started on decadron and Keppra.?    On 3/14/23, the patient underwent craniotomy for resection of only the L sided brain tumor.  He had a post-surgical MRI head on 3/15/23.    On 3/16/23 he underwent Duplex US BLE which was negative for DVT.  On 3/17, Path from the brain specimen confirmed metastatic melanoma.    He will require outpatient RT to the brain to the post-operative surgical bed and GK-SRS to the remaining metastatic focus at the R side of the brain.        He is currently on Dexamethasone 4mg PO Q8H for edema + Keppra 500 mg PO BID for seizure PPX. 2) acral melanoma : He will need to start Ipilimumab (3) + Nivolumab (1). He is s/p 2 cycles of Nivolumab, last treatment on 02/20/2023. We reviewed logistics, mechanism of action and most common irAEs. 3) Social: Patient is currently living at home with his spouse.  He is not receiving PT or OT.  He requires assistance with his routine/instrumental ADLs such as going to restroom, taking a shower.  As per spouse patient is incoherent at times.  Patient spends majority of the day sitting in bed or laying down.  05/08/2023: [facility]  ([id number]) Patient is here for C2D1 Ipilimumab (3) + Nivolumab (1). Patient completed SRS to the left frontal brain in 5 fractions as well as 1 fraction to the right temporal area on 04/27/2023. Currently on Dexamethasone 1 mg daily. His left lateral incisional site is healed well. Admits to left eye blurriness and is scheduled to be seen by Dr. Tolentino (Opthalmology)  As per patient, he is having RUQ abdominal pain x 2 weeks. Pain is mild however constant and is worse when laying in bed. At worse the pain is 7/10 in intensity and is using Motrin with relief. He denies N/V or diarrhea.  He is having one bowel movement/day. He is eating well and has no restrictions with his appetite. Will order amylase + lipase to r/o immune mediated pancreatitis. CT chest, abdomen/pelvis ordered. His strength has increased and patient is doing better at home.  05/30/2023: C3D1  Ipilimumab (3) + Nivolumab (1). He is off Dexamethasone. Admits to pruritus located on his anterior chest and back. No visible rash noted however patient is scratching his anterior chest and back frequently. He was advised to start Zyrtec 10 mg in AM and Benadryl at bedtime. Otherwise no other significant irAEs. CT on 05/13/2023 revealed previously noted confluent soft tissue density in the right axilla has decreased in size, and is likely postoperative. Postoperative changes in the right middle lobe. No evidence of metastatic disease or suspicious adenopathy in the chest, abdomen, and pelvis.  06/09/2023: Patient is here for an urgent evaluation. Today his main complaint is hemoptysis. Patient is accompanied with family friend, Ari. As per patient he noticed hemoptysis since his last treatment on 05/30/2023. Patient believed it was related to having food go down the "wrong pipe" while he was having a meal however persisted. Accompanied with coughing is SOB and elevated HR. Last night patient admits to chills but did not take his temperature.  8/3/23 Patient for follow up of acral melanoma Had developed diffuse inflitrates in lungs at last visit 6/9/23 No clear evidence of cancer or PE Was managed by PCP, and did not see us after this He states that cough and hemoptysis is completely resolved. Has been following with neuro-onc.  11/9/23 Patient is a 49 y/o M with metastatic acral melanoma (primary RIGHT thumb) who is s/p LEFT craniotomy in March followed by LINAC SRS to left frontal and right temporal in April 2023. On recent cranial imaging he is noted to have 4 progressive lesions which Dr. Almaguer plans to treat with GKSRS. This was done on 10/31/23. Patient was last seen here in August with recommendation for CT C/A/P ASAP but seems patient did not complete the ordered studies.  Given that the patient developed severe pneumonitis on IPI + NIVO would avoid rechallenge with IPI at this time. As response rates were with Opdualag for acral, would like to proceed in this direction. Will need updated body imaging and labs. Will also check HLA A high resolution status.  Pet ordered asap.  Treatment options include:  1 - clinical trial assesment for harry [place]s HLA-A high resolution status 2 - Opdualag - responses were higher in the combo immuno group in the acral subset. 3 - consider high dose Ipi 10 mg/kg (not inclined to do so since pneumonitis in the past) 4 - chemotherapy with carbo + Taxol 5 - other trials at other institutions.  12/18/2023 Patient is here for toxicity evaluation from Opdualag. C1 Opdualag 11/20/2023 Due to insurance delay, patient will not be receiving treatment today. C2 scheduled for 01/04/2024. HLA testing pending. Continues to have headache and will need repeat MR head on 01/2024. F/U with neurology afterwards.   [de-identified] : Medical Oncology ([facility]): Guy Shetty  [phone] Neurosx: Monster Rodriguez [phone]  PTs Contact: Inocencia Tovar (spouse) / [phone] Friend: Cipriano

## 2023-12-18 NOTE — ASSESSMENT
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 He first noticed the change in color of the nail > 1 year. He underwent node resection 12/2021 and 3 nodes between 6-9 cm were removed at the time.  He received treatment from January 10 to March 28 2022 at [facility] in [facility]. Patient received 4 cycles of dacarbazine 250 mg/m2 x 5 days every 28 days. He states that the nail tumor improved with chemotherapy. The bleeding stopped. Since off of chemotherapy the pain is getting worse.  In 11/202 he completed right tumb amputation and right lung resection. This is a [id number] stage. 11/15/2022: had Right lung resection showed - Metastatic melanoma in lung.  He presented to [facility] from 03/05/2023-03/18/2023 with dizziness and headache. He underwent MRI brain (3/6/23) which found markedly enlarged left anteromedial frontal mass, consistent with progression of metastatic melanoma. On 3/14/23, the patient underwent craniotomy for resection of only the L sided brain tumor. He had a post-surgical MRI head on 3/15/23.  Had Ipi 3 + Nivo 1: C1 4/17, C2 5/8, C3 5/30  Then developed diffuse inflitrates in lungs at last visit 6/9/23 No clear evidence of cancer or PE Was managed by PCP, and did not see us after this He states that cough and hemoptysis is completely resolved.  On recent cranial imaging he is noted to have 4 progressive lesions which Dr. Almaguer plans to treat with GKSRS. This was done on 10/31/23.  Patient was last seen here in August with recommendation for CT C/A/P ASAP but seems patient did not complete the ordered studies.  Given that the patient developed severe pneumonitis on IPI + NIVO would avoid rechallenge with IPI at this time. As response rates were with Opdualag for acral, would like to proceed in this direction  Treatment options include:  1 - clinical trial assesment for har[place]s HLA-A high resolution status 2 - Opdualag - responses were higher in the combo immuno group in the acral subset. 3 - consider high dose Ipi 10 mg/kg (not inclined to do so since pneumonitis in the past) 4 - chemotherapy with carbo + Taxol 5 - other trials at other institutions.  Patient is here for toxicity evaluation from [facility]. C1 [facility] 11/20/2023 Due to insurance delay, patient will not be receiving treatment today.  C2 scheduled for 01/04/2024.  HLA testing pending.  Continues to have headache and will need repeat MR head on 01/2024. F/U with neurology afterwards.  f/u q cycle.

## 2023-12-18 NOTE — PHYSICAL EXAM
[Ambulatory and capable of all self care but unable to carry out any work activities] : Status 2- Ambulatory and capable of all self care but unable to carry out any work activities. Up and about more than 50% of waking hours [Normal] : affect appropriate [de-identified] : tender RUQ; no masses [de-identified] : right thumb amputation is well healed

## 2023-12-18 NOTE — REASON FOR VISIT
[Follow-Up Visit] : a follow-up [[employer] ] : provided by [employer]   [Interpreters_IDNumber] : [id number] [Interpreters_FullName] : Tamia [FreeTextEntry3] : Shade Gonzalez

## 2023-12-18 NOTE — REVIEW OF SYSTEMS
[Negative] : Allergic/Immunologic [FreeTextEntry9] : right thumb pain [de-identified] : staples removed from surgical site.  Mild confusion s/p surgery as per spouse.

## 2023-12-23 ENCOUNTER — APPOINTMENT (OUTPATIENT)
Dept: NUCLEAR MEDICINE | Facility: IMAGING CENTER | Age: 48
End: 2023-12-23

## 2023-12-26 ENCOUNTER — OUTPATIENT (OUTPATIENT)
Dept: OUTPATIENT SERVICES | Facility: HOSPITAL | Age: 48
LOS: 1 days | Discharge: HOME | End: 2023-12-26

## 2023-12-26 DIAGNOSIS — C43.60 MALIGNANT MELANOMA OF UNSPECIFIED UPPER LIMB, INCLUDING SHOULDER: Chronic | ICD-10-CM

## 2023-12-26 DIAGNOSIS — C79.31 SECONDARY MALIGNANT NEOPLASM OF BRAIN: ICD-10-CM

## 2023-12-26 DIAGNOSIS — C79.9 SECONDARY MALIGNANT NEOPLASM OF UNSPECIFIED SITE: ICD-10-CM

## 2023-12-26 DIAGNOSIS — C43.9 MALIGNANT MELANOMA OF SKIN, UNSPECIFIED: ICD-10-CM

## 2023-12-26 DIAGNOSIS — Z98.890 OTHER SPECIFIED POSTPROCEDURAL STATES: Chronic | ICD-10-CM

## 2023-12-29 ENCOUNTER — APPOINTMENT (OUTPATIENT)
Dept: NUCLEAR MEDICINE | Facility: CLINIC | Age: 48
End: 2023-12-29
Payer: MEDICAID

## 2023-12-29 PROCEDURE — A9552: CPT

## 2023-12-29 PROCEDURE — 78816 PET IMAGE W/CT FULL BODY: CPT | Mod: PS

## 2024-01-04 ENCOUNTER — APPOINTMENT (OUTPATIENT)
Dept: HEMATOLOGY ONCOLOGY | Facility: CLINIC | Age: 49
End: 2024-01-04

## 2024-01-04 ENCOUNTER — APPOINTMENT (OUTPATIENT)
Dept: INFUSION THERAPY | Facility: HOSPITAL | Age: 49
End: 2024-01-04

## 2024-01-12 ENCOUNTER — OUTPATIENT (OUTPATIENT)
Dept: OUTPATIENT SERVICES | Facility: HOSPITAL | Age: 49
LOS: 1 days | End: 2024-01-12
Payer: COMMERCIAL

## 2024-01-12 ENCOUNTER — APPOINTMENT (OUTPATIENT)
Dept: MRI IMAGING | Facility: IMAGING CENTER | Age: 49
End: 2024-01-12
Payer: COMMERCIAL

## 2024-01-12 DIAGNOSIS — Z98.890 OTHER SPECIFIED POSTPROCEDURAL STATES: Chronic | ICD-10-CM

## 2024-01-12 DIAGNOSIS — C79.31 SECONDARY MALIGNANT NEOPLASM OF BRAIN: ICD-10-CM

## 2024-01-12 DIAGNOSIS — C43.60 MALIGNANT MELANOMA OF UNSPECIFIED UPPER LIMB, INCLUDING SHOULDER: Chronic | ICD-10-CM

## 2024-01-12 DIAGNOSIS — C79.9 SECONDARY MALIGNANT NEOPLASM OF UNSPECIFIED SITE: ICD-10-CM

## 2024-01-12 PROCEDURE — A9585: CPT

## 2024-01-12 PROCEDURE — 70553 MRI BRAIN STEM W/O & W/DYE: CPT

## 2024-01-12 PROCEDURE — 70553 MRI BRAIN STEM W/O & W/DYE: CPT | Mod: 26

## 2024-01-17 ENCOUNTER — APPOINTMENT (OUTPATIENT)
Dept: RADIATION ONCOLOGY | Facility: CLINIC | Age: 49
End: 2024-01-17
Payer: MEDICAID

## 2024-01-17 ENCOUNTER — APPOINTMENT (OUTPATIENT)
Dept: NEUROLOGY | Facility: CLINIC | Age: 49
End: 2024-01-17
Payer: MEDICAID

## 2024-01-17 VITALS
BODY MASS INDEX: 26.36 KG/M2 | DIASTOLIC BLOOD PRESSURE: 83 MMHG | OXYGEN SATURATION: 99 % | HEIGHT: 69 IN | SYSTOLIC BLOOD PRESSURE: 124 MMHG | RESPIRATION RATE: 16 BRPM | HEART RATE: 66 BPM | TEMPERATURE: 98.5 F | WEIGHT: 178 LBS

## 2024-01-17 VITALS
DIASTOLIC BLOOD PRESSURE: 86 MMHG | RESPIRATION RATE: 18 BRPM | TEMPERATURE: 98.2 F | HEART RATE: 66 BPM | WEIGHT: 178.68 LBS | BODY MASS INDEX: 26.47 KG/M2 | SYSTOLIC BLOOD PRESSURE: 130 MMHG | HEIGHT: 69 IN

## 2024-01-17 PROCEDURE — 99215 OFFICE O/P EST HI 40 MIN: CPT

## 2024-01-17 NOTE — PHYSICAL EXAM
[General Appearance - Well Developed] : well developed [Oriented To Time, Place, And Person] : oriented to person, place, and time [de-identified] : LIMITED d/t TELEphone call [] : no respiratory distress [Nondistended] : nondistended [Abdomen Tenderness] : non-tender [No Focal Deficits] : no focal deficits

## 2024-01-17 NOTE — HISTORY OF PRESENT ILLNESS
[FreeTextEntry1] : NEURO-ONCOLOGY  This 48 year old right handed man is seen in follow up for evaluation and management of brain metastases  He presented with ungual melanoma of the right thumb that metastasized to multiple right axillary lymph nodes in 2021 and had node resection in 12/21 and treatment with 4 cycles of dacarbazine in [facility].  In 11/22 he had recurrence, with a thumb resection and pulmonary lesionectomy. He then initiated nivolumab, but presented with headaches and was found to have a left inferior frontal tumor.  In retrospect he'd had a smaller lesion present there in 8/22 on MRI, called a meningioma.  An additional lesion in the right frontal lobe was noted as well. He underwent resection of the tumor by Dr. Rodriguez on 3/14/23, followed by SRT (30/5) to cavity and SRS (20/1) to right temporal lesion.   He had SRS to dural mets in 11/23, and started opdualag, but only one dose.   INTERVAL HISTORY: He has right scapular pain.  He has headaches, but no n/v.  Gait is ok.   Complains of epigastric pain post prandially.  PET scan with mets, including apparently in C cord.  MRI brain with lepto.   PMHX: Melanoma PSHX: right thumb amputation, right lung tumor resection, left frontal craniotomy. SHX: Creole-speaking. Here without wife Livier, visit conducted with creole  by phone.  in [facility]. No smoking or alcohol use. FHX: No brain tumors.

## 2024-01-17 NOTE — REASON FOR VISIT
[Routine Follow-Up] : routine follow-up visit for [Brain Metastasis] : brain metastasis [Other: ___] : [unfilled] [Ad Hoc ] : provided by an ad hoc  [Interpreters_FullName] : Livier Tovar  [Interpreters_Relationshiptopatient] : wife [TWNoteComboBox1] : Lisa

## 2024-01-17 NOTE — PHYSICAL EXAM
[FreeTextEntry1] : Exam as below (with documented exceptions in parentheses)   General: Healthy appearing man well groomed and without deformities. KPS is 80.   Mental status: Alert, awake and attentive. Oriented to person, place and time. Recent and remote memory intact. Normal concentration. Fluent spontaneous speech with intact naming and repetition. Normal fund of knowledge.  (flat affect, somewhat abulic)  Cranial Nerves:  II: Full visual fields. III, IV, VI: Pupils round, reactive to light. Full extraocular movements. No nystagmus. V: Normal bilateral bite, facial sensation. VII: No facial weakness. VII: Hearing intact. IX, X: Palate midline, intact gag. XI: Sternocleidomastoids normal. XII: Tongue protrudes midline. No dysarthria.  (no apd or vision loss OS)  Motor: Normal tone, bulk and power throughout including arms and legs, proximal and distal. No pronator drift. No abnormal movements.   Sensation: Normal in arms, legs and trunk to pin, proprioception and vibration. Negative Romberg.   Coordination: No dysmetria or dysdiadochokinesis bilaterally. Normal heel-shin testing.    Reflexes: Normal and symmetric throughout. Absent Babinski bilaterally. (brisk)  Vascular: No peripheral edema/calf tenderness.   Eyes: Fundoscopic exam without papilledema (deferred)   Heart: Regular rate and rhythm. Normal s1-s2. No murmurs, gallops, or rubs.    Respiratory: Lungs clear to auscultation bilaterally.   Abdomen: Nontender, non-distended. No hepatosplenomegaly. Normal bowel sounds in four quadrants. (RUQ tenderness upon palpation).

## 2024-01-17 NOTE — VITALS
[Maximal Pain Intensity: 0/10] : 0/10 [Least Pain Intensity: 0/10] : 0/10 [Pain Location: ___] : Pain Location: [unfilled] [OTC] : OTC [80: Normal activity with effort; some signs or symptoms of disease.] : 80: Normal activity with effort; some signs or symptoms of disease.  [Maximal Pain Intensity: 7/10] : 7/10

## 2024-01-17 NOTE — HISTORY OF PRESENT ILLNESS
[FreeTextEntry1] : RT hx: LINAC SRS LEFT frontal 3000cgy over 5 Fxs 4/21-4/27/2023 RIGHT temporal 2000cgy over 1  Fx  4/21/2023 GKRS LEFT temp dura, LEFT frontal dura, RIGHT frontal dura, Left frontal dura 20GY over 1 Fx 10/31/2023  Mr. Burk is a 47 year old male with Left sided brain tumor s/p craniotomy on 3/14/23.  He presents for f/u in context of abnormal surveillance MRI.    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 He first noticed the change in color of the nail > 1 year.  He underwent node resection 12/2021 and 3 nodes between 6-9 cm were removed at the time.  He received treatment from January 10 to March 28 2022 at [facility] in [facility]. Patient received 4 cycles of dacarbazine 250 mg/m2 x 5 days every 28 days. He states that the nail tumor improved with chemotherapy. The bleeding stopped. Since off of chemotherapy the pain is getting worse. 11/202 he completed right thumb amputation and right lung resection. This is a [id number] stage.   C1 Nivolumab 01/23/2023 C2 02/20/2023  He presented to [facility] from 03/05/2023-03/18/2023 with dizziness and headache. CTH identified a L frontal hyperdense mass and a small R temporal mass. He underwent MRI brain (3/6) which found markedly enlarged left anteromedial frontal mass, consistent with progression of metastatic melanoma. New T1 shortening and susceptibility artifact may represent the presence of melanin and intralesional hemorrhage. New 1.6 x 1.3 x 0.9 cm right anterior temporal T1 hyperintense lesion with susceptibility artifact mild surrounding vasogenic edema, consistent with metastatic melanoma.On 3/14/23, the patient underwent craniotomy for resection of the L sided brain tumor by Dr. Rodriguez  3/27/23 Mr Burk presents for consideration of RT to post-operative surgical bed and GK-SRS to the remaining metastatic focus at the R side of the brain. Today, he reports pain at the left head surgical area with staples in place, left eye conjunctival hemorrhage, and blurry vision. He denies unsteady gait, dizziness, nausea. Additionally complains of smoldering RUQ pain for the past several months since his initial amputation/lung resection.  RT hx: LINAC SRS LEFT frontal 3000cgy over 5 Fxs 4/21-4/27/2023 RIGHT temporal 2000cgy over 1  Fx  4/21/2023  Visit dated 9/6/2023 Accompanied by his wife. Patient returns for continuation of care after completing ordered imaging studies. Reports doing okay post treatment. Verbalized a having a HA which is not similar to pretreatment.  Continues to follow with Dr. Marcos with plans to restart Nivo   MRI brain w w/o contrast 8/23/2023 IMPRESSION: Left frontal cortical postoperative changes unchanged since ex 20/9/2023. Right superficial lateral temporal mass with melanin or hemorrhage consistent with metastatic melanoma with increased vasogenic edema compared with 6/29/2023 although perfusion imaging demonstrates decreased blood flow suggesting post therapy change rather than tumor progression. Continued follow-up is advised. Denies any unilateral extremity weakness, numbness, tingling, nausea, vomiting, headaches or other neurologic symptoms. No issues with speech or comprehension. Denies any new neurological deficits. He offers no new symptoms/concerns today.  Visit dated 10/19/2023 Patient presents for progress check with cranial images for review. Patient wishes that his wife Livier lazo  Denies N/V, /unilateral extremity weakness/memory changes/gait disturbance/bowel/bladder dysfunction or other neurologic symptoms. No issues with speech or comprehension. Reports intermittent mild HAs significantly improved and insomnia. NO steroids or systemic therapy at this time.  Saw Dr. Marcos in August CT C/A/P to guide POC which was scheduled for 8//18/23 but scans still not completed.  MRI brain w/o contrast 10/13/2023 Final read pending at time of this entry.  Visit dated 1/17/2024 Patient returns for follow s/p recent GKRS to multiple brain mets completed 10/31/2023. Reports persistent headache which is daily mostly affecting the left side (surgical site) Utilized OTC Ibuprofen which only gives a short-lived relief. Also, with right shoulder pain exacerbated by coughing onset about 15 days ago Denies any known contributing events/triggers.  Continues to follow with Dr. Marcos with recommendation to start Opdualag but seems treatment start delayed 2/2 insurance issues.  MRI brain w w/o contrast 1/12/2024 final read pending at time of this entry.

## 2024-01-17 NOTE — DATA REVIEWED
[de-identified] : Personally reviewed pre and op MRI from 3/23 and prior from 8/22 showing growth then resection of inferior left frontal lesion and new right frontal lesion without much edema.  The dominant lesion does compress the left optic nerve, 4/23 pre SRS study with enhancement around left frontal cavity and slight increase of right frontal lesion; 6/23 MRI with improved enhancement around cavity and slight increase in enhancement of right temporal lesion, further increased (t1 intrinsic) on 8/23 MRI with increased edema, hypoperfused, stable in 10/23 but with new multifocal dural progression as above, 1/24 MRi with multiple nodules of lepto in ventricles, not much edema

## 2024-01-17 NOTE — REVIEW OF SYSTEMS
[Fatigue] : fatigue [Visual Disturbances] : visual disturbances [Negative] : Respiratory [Abdominal Pain] : abdominal pain [FreeTextEntry7] : mainly when he eats. [FreeTextEntry9] : Right shoulder pain onset x 15 days ago [de-identified] : persistent intermittent mild HA

## 2024-01-17 NOTE — DISCUSSION/SUMMARY
[FreeTextEntry1] : 47 year old man with metastatic melanoma to the brain. S/p SRS.  Now lepto.  Likely spine disase as well.  I discussed his case with Dr. Almaguer, we both favor WBRT and possibly spine RT after MRI, with concurrent immunotherapy if possible. We discussed that prognosis at this point was very guarded.   TUMOR: MRI total spine coordinted ASAP plan for WBRT and likely some degree of spine RT reached out to Dr. Ndiaye to discuss resuming opdualag PPI for epigastric pain RTC 3w

## 2024-01-18 ENCOUNTER — APPOINTMENT (OUTPATIENT)
Dept: INFUSION THERAPY | Facility: HOSPITAL | Age: 49
End: 2024-01-18

## 2024-01-18 ENCOUNTER — RESULT REVIEW (OUTPATIENT)
Age: 49
End: 2024-01-18

## 2024-01-18 ENCOUNTER — APPOINTMENT (OUTPATIENT)
Dept: HEMATOLOGY ONCOLOGY | Facility: CLINIC | Age: 49
End: 2024-01-18
Payer: MEDICAID

## 2024-01-18 VITALS
OXYGEN SATURATION: 98 % | HEART RATE: 75 BPM | TEMPERATURE: 97 F | DIASTOLIC BLOOD PRESSURE: 72 MMHG | RESPIRATION RATE: 16 BRPM | SYSTOLIC BLOOD PRESSURE: 111 MMHG

## 2024-01-18 DIAGNOSIS — C43.61 MALIGNANT MELANOMA OF RIGHT UPPER LIMB, INCLUDING SHOULDER: ICD-10-CM

## 2024-01-18 LAB
BASOPHILS # BLD AUTO: 0.05 K/UL — SIGNIFICANT CHANGE UP (ref 0–0.2)
BASOPHILS NFR BLD AUTO: 1 % — SIGNIFICANT CHANGE UP (ref 0–2)
EOSINOPHIL # BLD AUTO: 0.37 K/UL — SIGNIFICANT CHANGE UP (ref 0–0.5)
EOSINOPHIL NFR BLD AUTO: 7.1 % — HIGH (ref 0–6)
HCT VFR BLD CALC: 42 % — SIGNIFICANT CHANGE UP (ref 39–50)
HGB BLD-MCNC: 14.1 G/DL — SIGNIFICANT CHANGE UP (ref 13–17)
IMM GRANULOCYTES NFR BLD AUTO: 0.2 % — SIGNIFICANT CHANGE UP (ref 0–0.9)
LYMPHOCYTES # BLD AUTO: 2.08 K/UL — SIGNIFICANT CHANGE UP (ref 1–3.3)
LYMPHOCYTES # BLD AUTO: 40.1 % — SIGNIFICANT CHANGE UP (ref 13–44)
MCHC RBC-ENTMCNC: 28.4 PG — SIGNIFICANT CHANGE UP (ref 27–34)
MCHC RBC-ENTMCNC: 33.6 G/DL — SIGNIFICANT CHANGE UP (ref 32–36)
MCV RBC AUTO: 84.7 FL — SIGNIFICANT CHANGE UP (ref 80–100)
MONOCYTES # BLD AUTO: 0.41 K/UL — SIGNIFICANT CHANGE UP (ref 0–0.9)
MONOCYTES NFR BLD AUTO: 7.9 % — SIGNIFICANT CHANGE UP (ref 2–14)
NEUTROPHILS # BLD AUTO: 2.27 K/UL — SIGNIFICANT CHANGE UP (ref 1.8–7.4)
NEUTROPHILS NFR BLD AUTO: 43.7 % — SIGNIFICANT CHANGE UP (ref 43–77)
NRBC # BLD: 0 /100 WBCS — SIGNIFICANT CHANGE UP (ref 0–0)
PLATELET # BLD AUTO: 195 K/UL — SIGNIFICANT CHANGE UP (ref 150–400)
RBC # BLD: 4.96 M/UL — SIGNIFICANT CHANGE UP (ref 4.2–5.8)
RBC # FLD: 13 % — SIGNIFICANT CHANGE UP (ref 10.3–14.5)
WBC # BLD: 5.19 K/UL — SIGNIFICANT CHANGE UP (ref 3.8–10.5)
WBC # FLD AUTO: 5.19 K/UL — SIGNIFICANT CHANGE UP (ref 3.8–10.5)

## 2024-01-18 PROCEDURE — 99214 OFFICE O/P EST MOD 30 MIN: CPT

## 2024-01-18 NOTE — HISTORY OF PRESENT ILLNESS
[de-identified] : Mr. Burk is a 47 year old Citizen of the [place] speaking gentlemen presenting to the office for an initial consultation of melanoma.  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 He first noticed the change in color of the nail > 1 year. He underwent node resection 12/2021 and 3 nodes between 6-9 cm were removed at the time.  He received treatment from January 10 to March 28 2022 at [facility] in [facility]. Patient received 4 cycles of dacarbazine 250 mg/m2 x 5 days every 28 days. He states that the nail tumor improved with chemotherapy. The bleeding stopped. Since off of chemotherapy the pain is getting worse.  He notes pain in the epigastrum for the past 3 weeks that is worse at night. It is ~ 3/10 in severity and is persistent.  Patient referred to US oncology for treatment with immunotherapy.  COVID Vaccine [employer] 12/5/22 (no booster)  9/20/2022 Both the right axilla biopsy and lung biopsy are negative. However could be false negative. His thumb pain is significant and might need amputation. We still need pathologic diagnosis and molecular genetics. 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 Various approaches are to resect lung lesion and thumb, and provide adjuvant therapy. Also, could try "neoadjuvant" ipi + nivo for this. Patient is scheduled for evaluation with Dr. Herrera on 9/27.  10/22/2022 I discussed case with surgeon. Given lack of tissue positive on lung or axilla biopsy, will proceed with distal right thumb amputation. Patient has been in pain and also notes intermittent discharge. Repeat CT scan of Rr Lung lesion shows mile increase in size of lesion. Patient will then also need to have this lesion resected. He will return to see me 2-3 weeks post-op and we will begin 1 year of adjuvant immunotherapy.   1/17/2023 Patient had both surgeries 11/9/22 - Right thumb resection: 1. Right thumb, disarticulated between proximal and distal phalanges -  Melanoma, ulcerated, acral type, approximately 8.5 mm in thickness, mitotic rate 5/mm2, margins negative - See synoptic summary 2. Right thumb distal phalanx proximal bone margin, excision - Negative for tumor 3. Right axillary node dissection levels 1, 2, and 3 - Two of eleven lymph nodes positive for metastatic melanoma (2/11) - Present as aggregates of tumor cells in subcapsular spaces (0.6 mmin greatest dimension) - No extracapsular extension seen Note: Melan-A and SOX10 immunostains on representative sections support the interpretation. Mitotic Rate:   5 mitoses per mm2 pT Category:  pT4b pN Category:   pN2 pM Category:   pM1b  11/15/2022: Right lung resection 1. Lung, right middle lobe, wedge resection - Metastatic melanoma in lung. Margin is free of melanoma. Moleculartesting and PDL1 testing is pending. 2. Lymph node, level 9: - Fibrous tissue, negative for melanoma. PDL1 IHC = 30% (TPS) Nodule:   1.5 x 1.3 x 0.8 cm  Will need repeat labs and imaging Pacheco start Nivolumab q 4 weeks x 1 year.  03/20/2023: 1) Hospital admission: The patient is a 47 gentleman with PMH stage IV melanoma (on Opdivo in 1/2023 last dose was 2/20/23) in right thumb s/p amputation in 2021, with known metastatic disease to the axilla R lung s/p Right VAT in 11/2022. He presented to [facility] from 03/05/2023-03/18/2023 with dizziness and headache. CTH identified a L frontal hyperdense mass and a small R temporal mass. He underwent MRI brain (3/6) which found markedly enlarged left anteromedial frontal mass, consistent with progression of metastatic melanoma. New T1 shortening and susceptibility artifact may represent the presence of melanin and intralesional hemorrhage. New 1.6 x 1.3 x 0.9 cm right anterior temporal T1 hyperintense lesion with susceptibility artifact mild surrounding vasogenic edema, consistent with metastatic melanoma.He was started on decadron and Keppra.?    On 3/14/23, the patient underwent craniotomy for resection of only the L sided brain tumor.  He had a post-surgical MRI head on 3/15/23.    On 3/16/23 he underwent Duplex US BLE which was negative for DVT.  On 3/17, Path from the brain specimen confirmed metastatic melanoma.    He will require outpatient RT to the brain to the post-operative surgical bed and GK-SRS to the remaining metastatic focus at the R side of the brain.        He is currently on Dexamethasone 4mg PO Q8H for edema + Keppra 500 mg PO BID for seizure PPX. 2) acral melanoma : He will need to start Ipilimumab (3) + Nivolumab (1). He is s/p 2 cycles of Nivolumab, last treatment on 02/20/2023. We reviewed logistics, mechanism of action and most common irAEs. 3) Social: Patient is currently living at home with his spouse.  He is not receiving PT or OT.  He requires assistance with his routine/instrumental ADLs such as going to restroom, taking a shower.  As per spouse patient is incoherent at times.  Patient spends majority of the day sitting in bed or laying down.  05/08/2023: [facility]  ([id number]) Patient is here for C2D1 Ipilimumab (3) + Nivolumab (1). Patient completed SRS to the left frontal brain in 5 fractions as well as 1 fraction to the right temporal area on 04/27/2023. Currently on Dexamethasone 1 mg daily. His left lateral incisional site is healed well. Admits to left eye blurriness and is scheduled to be seen by Dr. Tolentino (Opthalmology)  As per patient, he is having RUQ abdominal pain x 2 weeks. Pain is mild however constant and is worse when laying in bed. At worse the pain is 7/10 in intensity and is using Motrin with relief. He denies N/V or diarrhea.  He is having one bowel movement/day. He is eating well and has no restrictions with his appetite. Will order amylase + lipase to r/o immune mediated pancreatitis. CT chest, abdomen/pelvis ordered. His strength has increased and patient is doing better at home.  05/30/2023: C3D1  Ipilimumab (3) + Nivolumab (1). He is off Dexamethasone. Admits to pruritus located on his anterior chest and back. No visible rash noted however patient is scratching his anterior chest and back frequently. He was advised to start Zyrtec 10 mg in AM and Benadryl at bedtime. Otherwise no other significant irAEs. CT on 05/13/2023 revealed previously noted confluent soft tissue density in the right axilla has decreased in size, and is likely postoperative. Postoperative changes in the right middle lobe. No evidence of metastatic disease or suspicious adenopathy in the chest, abdomen, and pelvis.  06/09/2023: Patient is here for an urgent evaluation. Today his main complaint is hemoptysis. Patient is accompanied with family friend, Ari. As per patient he noticed hemoptysis since his last treatment on 05/30/2023. Patient believed it was related to having food go down the "wrong pipe" while he was having a meal however persisted. Accompanied with coughing is SOB and elevated HR. Last night patient admits to chills but did not take his temperature.  8/3/23 Patient for follow up of acral melanoma Had developed diffuse inflitrates in lungs at last visit 6/9/23 No clear evidence of cancer or PE Was managed by PCP, and did not see us after this He states that cough and hemoptysis is completely resolved. Has been following with neuro-onc.  11/9/23 Patient is a 49 y/o M with metastatic acral melanoma (primary RIGHT thumb) who is s/p LEFT craniotomy in March followed by LINAC SRS to left frontal and right temporal in April 2023. On recent cranial imaging he is noted to have 4 progressive lesions which Dr. Almaguer plans to treat with GKSRS. This was done on 10/31/23. Patient was last seen here in August with recommendation for CT C/A/P ASAP but seems patient did not complete the ordered studies.  Given that the patient developed severe pneumonitis on IPI + NIVO would avoid rechallenge with IPI at this time. As response rates were with Opdualag for acral, would like to proceed in this direction. Will need updated body imaging and labs. Will also check HLA A high resolution status.  Pet ordered asap.  Treatment options include:  1 - clinical trial assesment for [place] HLA-A high resolution status 2 - Opdualag - responses were higher in the combo immuno group in the acral subset. 3 - consider high dose Ipi 10 mg/kg (not inclined to do so since pneumonitis in the past) 4 - chemotherapy with carbo + Taxol 5 - other trials at other institutions.  12/18/2023 Patient is here for toxicity evaluation from Opdualag. C1 Opdualag 11/20/2023 Due to insurance delay, patient will not be receiving treatment today. C2 scheduled for 01/04/2024. HLA testing pending. Continues to have headache and will need repeat MR head on 01/2024. F/U with neurology afterwards.  01/18/2024 Unfortunately his MRI brain shows significant lepto Likely also spine disease He's relatively intact now He is going to get WBRT and likely C spine with Dr. Almaguer He is here for C2 Opdualag today. Admits to headache on the left side of his head. It radiates to his neck and is 7/10 in intensity. No change in mental status however admits to dizziness. No change in behavior.     [de-identified] : Medical Oncology ([facility]): Guy Shetty  [phone] Neurosx: Monster Rodriguez [phone]  PTs Contact: Inocencia Tovar (spouse) / [phone] Friend: Cipriano

## 2024-01-18 NOTE — REVIEW OF SYSTEMS
[Negative] : Allergic/Immunologic [FreeTextEntry9] : right thumb pain [de-identified] : staples removed from surgical site.  Mild confusion s/p surgery as per spouse.

## 2024-01-18 NOTE — PHYSICAL EXAM
[Ambulatory and capable of all self care but unable to carry out any work activities] : Status 2- Ambulatory and capable of all self care but unable to carry out any work activities. Up and about more than 50% of waking hours [Normal] : affect appropriate [de-identified] : tender RUQ; no masses [de-identified] : right thumb amputation is well healed

## 2024-01-18 NOTE — REASON FOR VISIT
[Follow-Up Visit] : a follow-up [[employer] ] : provided by [employer]   [Interpreters_IDNumber] : [id number] [Interpreters_FullName] : Tamia [FreeTextEntry3] : Shade Gonzalez

## 2024-01-18 NOTE — ASSESSMENT
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 He first noticed the change in color of the nail > 1 year. He underwent node resection 12/2021 and 3 nodes between 6-9 cm were removed at the time.  He received treatment from January 10 to March 28 2022 at [facility] in [facility]. Patient received 4 cycles of dacarbazine 250 mg/m2 x 5 days every 28 days. He states that the nail tumor improved with chemotherapy. The bleeding stopped. Since off of chemotherapy the pain is getting worse.  In 11/202 he completed right tumb amputation and right lung resection. This is a [id number] stage. 11/15/2022: had Right lung resection showed - Metastatic melanoma in lung.  He presented to [facility] from 03/05/2023-03/18/2023 with dizziness and headache. He underwent MRI brain (3/6/23) which found markedly enlarged left anteromedial frontal mass, consistent with progression of metastatic melanoma. On 3/14/23, the patient underwent craniotomy for resection of only the L sided brain tumor. He had a post-surgical MRI head on 3/15/23.  Had Ipi 3 + Nivo 1: C1 4/17, C2 5/8, C3 5/30  Then developed diffuse inflitrates in lungs at last visit 6/9/23 No clear evidence of cancer or PE Was managed by PCP, and did not see us after this He states that cough and hemoptysis is completely resolved.  On recent cranial imaging he is noted to have 4 progressive lesions which Dr. Almaguer plans to treat with GKSRS. This was done on 10/31/23.  Patient was last seen here in August with recommendation for CT C/A/P ASAP but seems patient did not complete the ordered studies.  Given that the patient developed severe pneumonitis on IPI + NIVO would avoid rechallenge with IPI at this time. As response rates were with Opdualag for acral, would like to proceed in this direction  Treatment options include:  1 - clinical trial assesment for [place] HLA-A high resolution status 2 - Opdualag - responses were higher in the combo immuno group in the acral subset. 3 - consider high dose Ipi 10 mg/kg (not inclined to do so since pneumonitis in the past) 4 - chemotherapy with carbo + Taxol 5 - other trials at other institutions.  Patient is here for toxicity evaluation from [facility]. C1 Opdualag 11/20/2023 C2 01/18/2024 Treatment delayed due to insurance issues.  Unfortunately his MRI brain shows significant lepto Likely also spine disease He's relatively intact now He is going to get WBRT and likely C spine with Dr. Almaguer MR thoracic and lumbar spine pending - ordered.  He is here for C2 Opdualag today.  Admits to headache on the left side of his head. It radiates to his neck and is 7/10 in intensity. No change in mental status however admits to dizziness. No change in behavior. He is using Tylenol with no relief. He was encouraged to use his dosage.  FMLA form completed.  F/U q cycle.  Dr. Marcos agrees with above plan.

## 2024-01-19 DIAGNOSIS — Z51.11 ENCOUNTER FOR ANTINEOPLASTIC CHEMOTHERAPY: ICD-10-CM

## 2024-01-19 LAB
CRP SERPL-MCNC: <3 MG/L — SIGNIFICANT CHANGE UP
T3 SERPL-MCNC: 110 NG/DL — SIGNIFICANT CHANGE UP (ref 80–200)
T4 FREE SERPL-MCNC: 1.1 NG/DL — SIGNIFICANT CHANGE UP (ref 0.9–1.8)
TSH SERPL-MCNC: 0.19 UIU/ML — LOW (ref 0.27–4.2)

## 2024-01-25 ENCOUNTER — NON-APPOINTMENT (OUTPATIENT)
Age: 49
End: 2024-01-25

## 2024-01-25 PROCEDURE — 77290 THER RAD SIMULAJ FIELD CPLX: CPT | Mod: 26

## 2024-01-25 PROCEDURE — 77334 RADIATION TREATMENT AID(S): CPT | Mod: 26

## 2024-01-25 PROCEDURE — 77263 THER RADIOLOGY TX PLNG CPLX: CPT

## 2024-01-26 ENCOUNTER — OUTPATIENT (OUTPATIENT)
Dept: OUTPATIENT SERVICES | Facility: HOSPITAL | Age: 49
LOS: 1 days | Discharge: HOME | End: 2024-01-26
Payer: MEDICAID

## 2024-01-26 DIAGNOSIS — Z98.890 OTHER SPECIFIED POSTPROCEDURAL STATES: Chronic | ICD-10-CM

## 2024-01-26 DIAGNOSIS — C43.60 MALIGNANT MELANOMA OF UNSPECIFIED UPPER LIMB, INCLUDING SHOULDER: Chronic | ICD-10-CM

## 2024-01-29 ENCOUNTER — APPOINTMENT (OUTPATIENT)
Dept: MRI IMAGING | Facility: IMAGING CENTER | Age: 49
End: 2024-01-29
Payer: COMMERCIAL

## 2024-01-29 ENCOUNTER — OUTPATIENT (OUTPATIENT)
Dept: OUTPATIENT SERVICES | Facility: HOSPITAL | Age: 49
LOS: 1 days | End: 2024-01-29
Payer: COMMERCIAL

## 2024-01-29 DIAGNOSIS — C79.31 SECONDARY MALIGNANT NEOPLASM OF BRAIN: ICD-10-CM

## 2024-01-29 DIAGNOSIS — Z98.890 OTHER SPECIFIED POSTPROCEDURAL STATES: Chronic | ICD-10-CM

## 2024-01-29 DIAGNOSIS — C43.60 MALIGNANT MELANOMA OF UNSPECIFIED UPPER LIMB, INCLUDING SHOULDER: Chronic | ICD-10-CM

## 2024-01-29 PROCEDURE — 72156 MRI NECK SPINE W/O & W/DYE: CPT | Mod: 26

## 2024-01-29 PROCEDURE — 72157 MRI CHEST SPINE W/O & W/DYE: CPT | Mod: 26

## 2024-01-29 PROCEDURE — 72158 MRI LUMBAR SPINE W/O & W/DYE: CPT | Mod: 26

## 2024-01-29 PROCEDURE — 72157 MRI CHEST SPINE W/O & W/DYE: CPT

## 2024-01-29 PROCEDURE — 72158 MRI LUMBAR SPINE W/O & W/DYE: CPT

## 2024-01-29 PROCEDURE — 72156 MRI NECK SPINE W/O & W/DYE: CPT

## 2024-01-29 PROCEDURE — A9585: CPT

## 2024-01-30 ENCOUNTER — APPOINTMENT (OUTPATIENT)
Dept: NEUROLOGY | Facility: CLINIC | Age: 49
End: 2024-01-30
Payer: MEDICAID

## 2024-01-30 VITALS
WEIGHT: 178 LBS | TEMPERATURE: 98.2 F | SYSTOLIC BLOOD PRESSURE: 111 MMHG | DIASTOLIC BLOOD PRESSURE: 76 MMHG | OXYGEN SATURATION: 99 % | BODY MASS INDEX: 26.36 KG/M2 | HEIGHT: 69 IN | HEART RATE: 76 BPM | RESPIRATION RATE: 16 BRPM

## 2024-01-30 DIAGNOSIS — C79.49 SECONDARY MALIGNANT NEOPLASM OF OTHER PARTS OF NERVOUS SYSTEM: ICD-10-CM

## 2024-01-30 PROCEDURE — 99214 OFFICE O/P EST MOD 30 MIN: CPT

## 2024-01-30 NOTE — PHYSICAL EXAM
[FreeTextEntry1] : Exam as below (with documented exceptions in parentheses)   General: Healthy appearing man well groomed and without deformities. KPS is 80.   Mental status: Alert, awake and attentive. Oriented to person, place and time. Recent and remote memory intact. Normal concentration. Fluent spontaneous speech with intact naming and repetition. Normal fund of knowledge.  (flat affect, somewhat abulic)  Cranial Nerves:  II: Full visual fields. III, IV, VI: Pupils round, reactive to light. Full extraocular movements. No nystagmus. V: Normal bilateral bite, facial sensation. VII: No facial weakness. VII: Hearing intact. IX, X: Palate midline, intact gag. XI: Sternocleidomastoids normal. XII: Tongue protrudes midline. No dysarthria.  (no apd or vision loss OS)  Motor: Normal tone, bulk and power throughout including arms and legs, proximal and distal. No pronator drift. No abnormal movements.   Sensation: Normal in arms, legs and trunk to pin, proprioception and vibration. Negative Romberg.   Coordination: No dysmetria or dysdiadochokinesis bilaterally. Normal heel-shin testing.    Reflexes: Normal and symmetric throughout. Absent Babinski bilaterally. (brisk in LEs)  Vascular: No peripheral edema/calf tenderness.   Eyes: Fundoscopic exam without papilledema (deferred)   Heart: Regular rate and rhythm. Normal s1-s2. No murmurs, gallops, or rubs.    Respiratory: Lungs clear to auscultation bilaterally.   Abdomen: Nontender, non-distended. No hepatosplenomegaly. Normal bowel sounds in four quadrants. (RUQ tenderness upon palpation).

## 2024-01-30 NOTE — HISTORY OF PRESENT ILLNESS
[FreeTextEntry1] : NEURO-ONCOLOGY  This 48 year old right handed man is seen in follow up for evaluation and management of brain metastases  He presented with ungual melanoma of the right thumb that metastasized to multiple right axillary lymph nodes in 2021 and had node resection in 12/21 and treatment with 4 cycles of dacarbazine in [facility].  In 11/22 he had recurrence, with a thumb resection and pulmonary lesionectomy. He then initiated nivolumab, but presented with headaches and was found to have a left inferior frontal tumor.  In retrospect he'd had a smaller lesion present there in 8/22 on MRI, called a meningioma.  An additional lesion in the right frontal lobe was noted as well. He underwent resection of the tumor by Dr. Rodriguez on 3/14/23, followed by SRT (30/5) to cavity and SRS (20/1) to right temporal lesion.   He had SRS to dural mets in 11/23, and started opdualag.  Developed extensive lepto in brain and spine in 1/24.    INTERVAL HISTORY: Continued shoulder pain on right.  No new headaches, weakness or difficulty walking.  He has no confusion.  He resumed Opdualag last week.  Awaiting start of RT.  MRI Total spine with lepto in cauda and cord mets in lower C and upper T spine.   PMHX: Melanoma PSHX: right thumb amputation, right lung tumor resection, left frontal craniotomy. SHX: Creole-speaking. Here without wife Livier, visit conducted with creole  by phone.  in [facility]. No smoking or alcohol use. FHX: No brain tumors.

## 2024-01-30 NOTE — DATA REVIEWED
[de-identified] : Personally reviewed pre and op MRI from 3/23 and prior from 8/22 showing growth then resection of inferior left frontal lesion and new right frontal lesion without much edema.  The dominant lesion does compress the left optic nerve, 4/23 pre SRS study with enhancement around left frontal cavity and slight increase of right frontal lesion; 6/23 MRI with improved enhancement around cavity and slight increase in enhancement of right temporal lesion, further increased (t1 intrinsic) on 8/23 MRI with increased edema, hypoperfused, stable in 10/23 but with new multifocal dural progression as above, 1/24 MRi with multiple nodules of lepto in ventricles, not much edema  Total spine MRI reviewed personally from 1/24 as above

## 2024-01-30 NOTE — DISCUSSION/SUMMARY
[FreeTextEntry1] : 47 year old man with metastatic melanoma to the brain. S/p SRS.  Now lepto.  Likely spine disase as well.  I discussed his case with Dr. Almaguer, in addition to WBRT, will consider spine RT with concurrent immunotherapy.  Currently neurologic findings are minimal on exam despite intramedullary mets.   TUMOR: plan for WBRT and likely some degree of spine RT Ongoing Opdualag PPI for epigastric pain RTC 3w

## 2024-02-01 ENCOUNTER — APPOINTMENT (OUTPATIENT)
Dept: RADIATION ONCOLOGY | Facility: CLINIC | Age: 49
End: 2024-02-01
Payer: COMMERCIAL

## 2024-02-01 VITALS
BODY MASS INDEX: 25.48 KG/M2 | OXYGEN SATURATION: 100 % | DIASTOLIC BLOOD PRESSURE: 76 MMHG | SYSTOLIC BLOOD PRESSURE: 108 MMHG | HEART RATE: 94 BPM | WEIGHT: 172.51 LBS

## 2024-02-01 DIAGNOSIS — C79.9 SECONDARY MALIGNANT NEOPLASM OF UNSPECIFIED SITE: ICD-10-CM

## 2024-02-01 PROBLEM — C79.31 MALIGNANT MELANOMA METASTATIC TO BRAIN: Status: ACTIVE | Noted: 2023-03-30

## 2024-02-01 PROCEDURE — 99215 OFFICE O/P EST HI 40 MIN: CPT | Mod: 25

## 2024-02-01 NOTE — REVIEW OF SYSTEMS
[Fatigue] : fatigue [Abdominal Pain] : abdominal pain [Negative] : Respiratory [FreeTextEntry7] : mainly when he eats. [FreeTextEntry9] : Right shoulder pain onset x 15 days ago. Pain to mid back as well [de-identified] :  no Headaches currently

## 2024-02-01 NOTE — PHYSICAL EXAM
[General Appearance - Well Developed] : well developed [] : no respiratory distress [Nondistended] : nondistended [Abdomen Tenderness] : non-tender [No Focal Deficits] : no focal deficits [Oriented To Time, Place, And Person] : oriented to person, place, and time [Motor Tone] : muscle strength and tone were normal [de-identified] : no tenderness to palpation

## 2024-02-01 NOTE — HISTORY OF PRESENT ILLNESS
[FreeTextEntry1] : RT hx: LINAC SRS LEFT frontal 3000cgy over 5 Fxs 4/21-4/27/2023 RIGHT temporal 2000cgy over 1  Fx  4/21/2023 GKRS LEFT temp dura, LEFT frontal dura, RIGHT frontal dura, Left frontal dura 20GY over 1 Fx 10/31/2023  Mr. Burk is a 47 year old male with Left sided brain tumor s/p craniotomy on 3/14/23.  He presents for f/u in context of abnormal surveillance MRI.    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 He first noticed the change in color of the nail > 1 year.  He underwent node resection 12/2021 and 3 nodes between 6-9 cm were removed at the time.  He received treatment from January 10 to March 28 2022 at [facility] in [facility]. Patient received 4 cycles of dacarbazine 250 mg/m2 x 5 days every 28 days. He states that the nail tumor improved with chemotherapy. The bleeding stopped. Since off of chemotherapy the pain is getting worse. 11/202 he completed right thumb amputation and right lung resection. This is a [id number] stage.   C1 Nivolumab 01/23/2023 C2 02/20/2023  He presented to [facility] from 03/05/2023-03/18/2023 with dizziness and headache. CTH identified a L frontal hyperdense mass and a small R temporal mass. He underwent MRI brain (3/6) which found markedly enlarged left anteromedial frontal mass, consistent with progression of metastatic melanoma. New T1 shortening and susceptibility artifact may represent the presence of melanin and intralesional hemorrhage. New 1.6 x 1.3 x 0.9 cm right anterior temporal T1 hyperintense lesion with susceptibility artifact mild surrounding vasogenic edema, consistent with metastatic melanoma.On 3/14/23, the patient underwent craniotomy for resection of the L sided brain tumor by Dr. Rodriguez  3/27/23 Mr Burk presents for consideration of RT to post-operative surgical bed and GK-SRS to the remaining metastatic focus at the R side of the brain. Today, he reports pain at the left head surgical area with staples in place, left eye conjunctival hemorrhage, and blurry vision. He denies unsteady gait, dizziness, nausea. Additionally complains of smoldering RUQ pain for the past several months since his initial amputation/lung resection.  RT hx: LINAC SRS LEFT frontal 3000cgy over 5 Fxs 4/21-4/27/2023 RIGHT temporal 2000cgy over 1  Fx  4/21/2023  Visit dated 9/6/2023 Accompanied by his wife. Patient returns for continuation of care after completing ordered imaging studies. Reports doing okay post treatment. Verbalized a having a HA which is not similar to pretreatment.  Continues to follow with Dr. Marcos with plans to restart Nivo   MRI brain w w/o contrast 8/23/2023 IMPRESSION: Left frontal cortical postoperative changes unchanged since ex 20/9/2023. Right superficial lateral temporal mass with melanin or hemorrhage consistent with metastatic melanoma with increased vasogenic edema compared with 6/29/2023 although perfusion imaging demonstrates decreased blood flow suggesting post therapy change rather than tumor progression. Continued follow-up is advised. Denies any unilateral extremity weakness, numbness, tingling, nausea, vomiting, headaches or other neurologic symptoms. No issues with speech or comprehension. Denies any new neurological deficits. He offers no new symptoms/concerns today.  Visit dated 10/19/2023 Patient presents for progress check with cranial images for review. Patient wishes that his wife Livier lazo  Denies N/V, /unilateral extremity weakness/memory changes/gait disturbance/bowel/bladder dysfunction or other neurologic symptoms. No issues with speech or comprehension. Reports intermittent mild HAs significantly improved and insomnia. NO steroids or systemic therapy at this time.  Saw Dr. Marcos in August CT C/A/P to guide POC which was scheduled for 8//18/23 but scans still not completed.  MRI brain w/o contrast 10/13/2023 Final read pending at time of this entry.  Visit dated 1/17/2024 Patient returns for follow s/p recent GKRS to multiple brain mets completed 10/31/2023. Reports persistent headache which is daily mostly affecting the left side (surgical site) Utilized OTC Ibuprofen which only gives a short-lived relief. Also, with right shoulder pain exacerbated by coughing onset about 15 days ago Denies any known contributing events/triggers.  Continues to follow with Dr. Marcos with recommendation to start Opdualag but seems treatment start delayed 2/2 insurance issues.  MRI brain w w/o contrast 1/12/2024 final read pending at time of this entry.  Visit dated 2/1/2024 Patient to undergo WBRT . He now presents for imaging of the spine to further guide treatment planning. Reports he continues to be with a decreased appetite. Is with persistent pain right scapula to flank area which is affecting his ability to participate in most activity. Denies use of pain medication. Subjective extremity weakness evidence by difficulty with extended walking/standing/lifting a glass of water Denies extremity weakness/numbness/bowel/bladder dysfunction/ headaches today  Continues to follow with Dr. Hemant Garay awaiting initiation.  MRI C/T/L spine w w/o contrast 1/29/2024 IMPRESSION: 1. CERVICAL SPINE: Multiple metastases within the canal most prominently at the C6 and C7 levels along the cord surface. 2. THORACIC SPINE: Multiple metastases within the canal including cord parenchymal lesions at T1 and T4 and multiple lesions along the cord surface. 3. LUMBAR SPINE: Multiple metastases within the cauda equina and distal canal ependymal surfaces.

## 2024-02-01 NOTE — VITALS
[Maximal Pain Intensity: 7/10] : 7/10 [Least Pain Intensity: 0/10] : 0/10 [Pain Location: ___] : Pain Location: [unfilled] [OTC] : OTC [80: Normal activity with effort; some signs or symptoms of disease.] : 80: Normal activity with effort; some signs or symptoms of disease.  [Maximal Pain Intensity: 9/10] : 9/10

## 2024-02-01 NOTE — REASON FOR VISIT
[Routine Follow-Up] : routine follow-up visit for [Brain Metastasis] : brain metastasis [Other: ___] : [unfilled] [Ad Hoc ] : provided by an ad hoc  [Interpreters_Relationshiptopatient] : wife [[employer] ] : provided by [employer]   [Interpreters_IDNumber] : [id number] [Interpreters_FullName] : Radha [TWNoteComboBox1] : Lisa

## 2024-02-02 ENCOUNTER — APPOINTMENT (OUTPATIENT)
Dept: NEUROSURGERY | Facility: CLINIC | Age: 49
End: 2024-02-02
Payer: COMMERCIAL

## 2024-02-02 ENCOUNTER — EMERGENCY (EMERGENCY)
Facility: HOSPITAL | Age: 49
LOS: 1 days | Discharge: HOME | End: 2024-02-02
Attending: STUDENT IN AN ORGANIZED HEALTH CARE EDUCATION/TRAINING PROGRAM | Admitting: STUDENT IN AN ORGANIZED HEALTH CARE EDUCATION/TRAINING PROGRAM
Payer: COMMERCIAL

## 2024-02-02 VITALS
RESPIRATION RATE: 18 BRPM | OXYGEN SATURATION: 97 % | HEART RATE: 75 BPM | DIASTOLIC BLOOD PRESSURE: 85 MMHG | TEMPERATURE: 98 F | SYSTOLIC BLOOD PRESSURE: 136 MMHG

## 2024-02-02 VITALS
HEIGHT: 69 IN | TEMPERATURE: 98 F | HEART RATE: 87 BPM | OXYGEN SATURATION: 97 % | WEIGHT: 173 LBS | DIASTOLIC BLOOD PRESSURE: 52 MMHG | SYSTOLIC BLOOD PRESSURE: 79 MMHG | BODY MASS INDEX: 25.62 KG/M2

## 2024-02-02 VITALS
DIASTOLIC BLOOD PRESSURE: 72 MMHG | RESPIRATION RATE: 18 BRPM | HEART RATE: 78 BPM | OXYGEN SATURATION: 97 % | TEMPERATURE: 98 F | SYSTOLIC BLOOD PRESSURE: 104 MMHG

## 2024-02-02 VITALS — DIASTOLIC BLOOD PRESSURE: 46 MMHG | SYSTOLIC BLOOD PRESSURE: 76 MMHG

## 2024-02-02 DIAGNOSIS — C79.31 SECONDARY MALIGNANT NEOPLASM OF BRAIN: ICD-10-CM

## 2024-02-02 DIAGNOSIS — C43.60 MALIGNANT MELANOMA OF UNSPECIFIED UPPER LIMB, INCLUDING SHOULDER: Chronic | ICD-10-CM

## 2024-02-02 DIAGNOSIS — Z98.890 OTHER SPECIFIED POSTPROCEDURAL STATES: Chronic | ICD-10-CM

## 2024-02-02 LAB
ALBUMIN SERPL ELPH-MCNC: 4.3 G/DL — SIGNIFICANT CHANGE UP (ref 3.3–5)
ALP SERPL-CCNC: 64 U/L — SIGNIFICANT CHANGE UP (ref 40–120)
ALT FLD-CCNC: 17 U/L — SIGNIFICANT CHANGE UP (ref 4–41)
ANION GAP SERPL CALC-SCNC: 10 MMOL/L — SIGNIFICANT CHANGE UP (ref 7–14)
APPEARANCE UR: CLEAR — SIGNIFICANT CHANGE UP
AST SERPL-CCNC: 21 U/L — SIGNIFICANT CHANGE UP (ref 4–40)
BACTERIA # UR AUTO: NEGATIVE /HPF — SIGNIFICANT CHANGE UP
BASE EXCESS BLDV CALC-SCNC: 5.4 MMOL/L — HIGH (ref -2–3)
BASOPHILS # BLD AUTO: 0.04 K/UL — SIGNIFICANT CHANGE UP (ref 0–0.2)
BASOPHILS NFR BLD AUTO: 0.7 % — SIGNIFICANT CHANGE UP (ref 0–2)
BILIRUB SERPL-MCNC: 0.5 MG/DL — SIGNIFICANT CHANGE UP (ref 0.2–1.2)
BILIRUB UR-MCNC: NEGATIVE — SIGNIFICANT CHANGE UP
BLOOD GAS VENOUS COMPREHENSIVE RESULT: SIGNIFICANT CHANGE UP
BUN SERPL-MCNC: 12 MG/DL — SIGNIFICANT CHANGE UP (ref 7–23)
CALCIUM SERPL-MCNC: 9.3 MG/DL — SIGNIFICANT CHANGE UP (ref 8.4–10.5)
CAST: 0 /LPF — SIGNIFICANT CHANGE UP (ref 0–4)
CHLORIDE BLDV-SCNC: 102 MMOL/L — SIGNIFICANT CHANGE UP (ref 96–108)
CHLORIDE SERPL-SCNC: 102 MMOL/L — SIGNIFICANT CHANGE UP (ref 98–107)
CO2 BLDV-SCNC: 36 MMOL/L — HIGH (ref 22–26)
CO2 SERPL-SCNC: 30 MMOL/L — SIGNIFICANT CHANGE UP (ref 22–31)
COLOR SPEC: YELLOW — SIGNIFICANT CHANGE UP
CREAT SERPL-MCNC: 1.17 MG/DL — SIGNIFICANT CHANGE UP (ref 0.5–1.3)
DIFF PNL FLD: NEGATIVE — SIGNIFICANT CHANGE UP
EGFR: 77 ML/MIN/1.73M2 — SIGNIFICANT CHANGE UP
EOSINOPHIL # BLD AUTO: 0.22 K/UL — SIGNIFICANT CHANGE UP (ref 0–0.5)
EOSINOPHIL NFR BLD AUTO: 4 % — SIGNIFICANT CHANGE UP (ref 0–6)
GAS PNL BLDV: 137 MMOL/L — SIGNIFICANT CHANGE UP (ref 136–145)
GLUCOSE BLDV-MCNC: 90 MG/DL — SIGNIFICANT CHANGE UP (ref 70–99)
GLUCOSE SERPL-MCNC: 93 MG/DL — SIGNIFICANT CHANGE UP (ref 70–99)
GLUCOSE UR QL: NEGATIVE MG/DL — SIGNIFICANT CHANGE UP
HCO3 BLDV-SCNC: 34 MMOL/L — HIGH (ref 22–29)
HCT VFR BLD CALC: 43.3 % — SIGNIFICANT CHANGE UP (ref 39–50)
HCT VFR BLDA CALC: 45 % — SIGNIFICANT CHANGE UP (ref 39–51)
HGB BLD CALC-MCNC: 14.9 G/DL — SIGNIFICANT CHANGE UP (ref 12.6–17.4)
HGB BLD-MCNC: 14.4 G/DL — SIGNIFICANT CHANGE UP (ref 13–17)
IANC: 3.42 K/UL — SIGNIFICANT CHANGE UP (ref 1.8–7.4)
IMM GRANULOCYTES NFR BLD AUTO: 0.2 % — SIGNIFICANT CHANGE UP (ref 0–0.9)
KETONES UR-MCNC: ABNORMAL MG/DL
LACTATE BLDV-MCNC: 1.4 MMOL/L — SIGNIFICANT CHANGE UP (ref 0.5–2)
LEUKOCYTE ESTERASE UR-ACNC: NEGATIVE — SIGNIFICANT CHANGE UP
LIDOCAIN IGE QN: 24 U/L — SIGNIFICANT CHANGE UP (ref 7–60)
LYMPHOCYTES # BLD AUTO: 1.39 K/UL — SIGNIFICANT CHANGE UP (ref 1–3.3)
LYMPHOCYTES # BLD AUTO: 25.5 % — SIGNIFICANT CHANGE UP (ref 13–44)
MCHC RBC-ENTMCNC: 28.3 PG — SIGNIFICANT CHANGE UP (ref 27–34)
MCHC RBC-ENTMCNC: 33.3 GM/DL — SIGNIFICANT CHANGE UP (ref 32–36)
MCV RBC AUTO: 85.2 FL — SIGNIFICANT CHANGE UP (ref 80–100)
MONOCYTES # BLD AUTO: 0.38 K/UL — SIGNIFICANT CHANGE UP (ref 0–0.9)
MONOCYTES NFR BLD AUTO: 7 % — SIGNIFICANT CHANGE UP (ref 2–14)
NEUTROPHILS # BLD AUTO: 3.42 K/UL — SIGNIFICANT CHANGE UP (ref 1.8–7.4)
NEUTROPHILS NFR BLD AUTO: 62.6 % — SIGNIFICANT CHANGE UP (ref 43–77)
NITRITE UR-MCNC: NEGATIVE — SIGNIFICANT CHANGE UP
NRBC # BLD: 0 /100 WBCS — SIGNIFICANT CHANGE UP (ref 0–0)
NRBC # FLD: 0 K/UL — SIGNIFICANT CHANGE UP (ref 0–0)
PCO2 BLDV: 66 MMHG — HIGH (ref 42–55)
PH BLDV: 7.32 — SIGNIFICANT CHANGE UP (ref 7.32–7.43)
PH UR: 7 — SIGNIFICANT CHANGE UP (ref 5–8)
PLATELET # BLD AUTO: 197 K/UL — SIGNIFICANT CHANGE UP (ref 150–400)
PO2 BLDV: 28 MMHG — SIGNIFICANT CHANGE UP (ref 25–45)
POTASSIUM BLDV-SCNC: 4.4 MMOL/L — SIGNIFICANT CHANGE UP (ref 3.5–5.1)
POTASSIUM SERPL-MCNC: 4.4 MMOL/L — SIGNIFICANT CHANGE UP (ref 3.5–5.3)
POTASSIUM SERPL-SCNC: 4.4 MMOL/L — SIGNIFICANT CHANGE UP (ref 3.5–5.3)
PROT SERPL-MCNC: 7.6 G/DL — SIGNIFICANT CHANGE UP (ref 6–8.3)
PROT UR-MCNC: NEGATIVE MG/DL — SIGNIFICANT CHANGE UP
RBC # BLD: 5.08 M/UL — SIGNIFICANT CHANGE UP (ref 4.2–5.8)
RBC # FLD: 12.6 % — SIGNIFICANT CHANGE UP (ref 10.3–14.5)
RBC CASTS # UR COMP ASSIST: 0 /HPF — SIGNIFICANT CHANGE UP (ref 0–4)
SAO2 % BLDV: 34.9 % — LOW (ref 67–88)
SODIUM SERPL-SCNC: 142 MMOL/L — SIGNIFICANT CHANGE UP (ref 135–145)
SP GR SPEC: 1.01 — SIGNIFICANT CHANGE UP (ref 1–1.03)
SQUAMOUS # UR AUTO: 0 /HPF — SIGNIFICANT CHANGE UP (ref 0–5)
UROBILINOGEN FLD QL: 0.2 MG/DL — SIGNIFICANT CHANGE UP (ref 0.2–1)
WBC # BLD: 5.46 K/UL — SIGNIFICANT CHANGE UP (ref 3.8–10.5)
WBC # FLD AUTO: 5.46 K/UL — SIGNIFICANT CHANGE UP (ref 3.8–10.5)
WBC UR QL: 0 /HPF — SIGNIFICANT CHANGE UP (ref 0–5)

## 2024-02-02 PROCEDURE — 77334 RADIATION TREATMENT AID(S): CPT | Mod: 26

## 2024-02-02 PROCEDURE — 99215 OFFICE O/P EST HI 40 MIN: CPT

## 2024-02-02 PROCEDURE — 99285 EMERGENCY DEPT VISIT HI MDM: CPT

## 2024-02-02 PROCEDURE — 74177 CT ABD & PELVIS W/CONTRAST: CPT | Mod: 26,MA

## 2024-02-02 PROCEDURE — 70450 CT HEAD/BRAIN W/O DYE: CPT | Mod: 26,MA

## 2024-02-02 PROCEDURE — 77307 TELETHX ISODOSE PLAN CPLX: CPT | Mod: 26

## 2024-02-02 PROCEDURE — 93010 ELECTROCARDIOGRAM REPORT: CPT

## 2024-02-02 PROCEDURE — 76705 ECHO EXAM OF ABDOMEN: CPT | Mod: 26

## 2024-02-02 RX ORDER — ACETAMINOPHEN 500 MG
1000 TABLET ORAL ONCE
Refills: 0 | Status: COMPLETED | OUTPATIENT
Start: 2024-02-02 | End: 2024-02-02

## 2024-02-02 RX ORDER — PANTOPRAZOLE 40 MG/1
40 TABLET, DELAYED RELEASE ORAL DAILY
Qty: 30 | Refills: 2 | Status: DISCONTINUED | COMMUNITY
Start: 2024-01-17 | End: 2024-02-02

## 2024-02-02 RX ORDER — SODIUM CHLORIDE 9 MG/ML
1000 INJECTION, SOLUTION INTRAVENOUS ONCE
Refills: 0 | Status: COMPLETED | OUTPATIENT
Start: 2024-02-02 | End: 2024-02-02

## 2024-02-02 RX ADMIN — SODIUM CHLORIDE 1000 MILLILITER(S): 9 INJECTION, SOLUTION INTRAVENOUS at 13:55

## 2024-02-02 RX ADMIN — Medication 1000 MILLIGRAM(S): at 15:50

## 2024-02-02 RX ADMIN — Medication 400 MILLIGRAM(S): at 13:47

## 2024-02-02 NOTE — ASSESSMENT
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 Pathology showed metastatic melignant melanoma. Pt had SRS with Dr. Almaguer LEFT frontal 3000cgy over 5 Fxs 4/21 - 4/27/2023 RIGHT temporal 2000cgy over 1 Fx 4/21/2023 GKRS LEFT temp dura, LEFT frontal dura, RIGHT frontal dura, Left frontal dura 20GY over 1 Fx 10/31/2023 1/29/2024 mr spine and 1/12/2024 mri brain I have reviewed his brain and spine MRI.  He has quite extensive leptomeningeal disease, particularly in the spine. Plan:  Discussed plan of care with Dr Almaguer and I agree recommended whole brain radiation and spinal radiation from approx C5-T5, representing the greatest areas of burden of disease. Follow up with neurosurgery PRN *patient hypotensive called EMS and patient brought to [facility] ER*

## 2024-02-02 NOTE — ED PROVIDER NOTE - ATTENDING CONTRIBUTION TO CARE
I (Jason) agree with above, I performed a history and physical. Counseled solange medical staff, physician assistant, and/or medical student on medical decision making as documented. Medical decisions and treatment interventions were made in real time during the patient encounter. Additionally and/or with the following exceptions: 48-year-old past medical history of GERD metastatic melanoma presents emergency department with dizziness and abdominal pain.  Patient was neurologically intact, noted to have amputation of right thumb.  Additional history was obtained from family who is at bedside.  Patient was signed out to oncoming attending pending basic labs and CT abdomen pelvis to rule out surgical pathology and CT head given history of dizziness.  If workup negative plan for discharge.

## 2024-02-02 NOTE — ED ADULT TRIAGE NOTE - CHIEF COMPLAINT QUOTE
Patient brought to ER by EMS from PMD office for followup for brain ca with mets. Patient noted to be hypotensive, dizziness, nauseous and abdominal pain.

## 2024-02-02 NOTE — ED PROVIDER NOTE - PATIENT PORTAL LINK FT
You can access the FollowMyHealth Patient Portal offered by [facility] by registering at the following website: [email or web address]. By joining [facility]’s FollowMyHealth portal, you will also be able to view your health information using other applications (apps) compatible with our system.

## 2024-02-02 NOTE — ED PROVIDER NOTE - CLINICAL SUMMARY MEDICAL DECISION MAKING FREE TEXT BOX
48-year-old male with a history of GERD, metastatic melanoma with mets to spine and brain (not currently on treatment), presents to the ED sent in by MD for abdominal pain associated with dizziness and hypotension.  Patient BP here 104/72.  Per wife at bedside helping translate as patient is Kittitian Creole speaking, patient is having intermittent epigastric/right upper quad abdominal pain for the last 2 weeks, some radiation to the right sided back, pain is sharp in character, last night it was severe 10 out of 10 intermittent woke him from sleep, is associated with nausea but denies vomiting, denies diarrhea, dysuria, hematuria, fever/chills, CP, SOB.  /72, remaining vital signs stable, patient appears in no acute distress, lungs CTA B/L, RRR, normal S1-S2, abdomen soft, ND, + RUQ/epigastric TTP, + guarding, no rebound TTP, no rigidity, no CVA TTP bilaterally, no suprapubic TTP.  Concern for possible intra-abdominal infection, concern for acute Sylvie, possibly pancreatitis, possibly nephrolithiasis given character and location of pain, possibly related to metastatic malignancy.  Will get labs, UA, US, CT, give IVF, and reassess after pharmacological intervention.

## 2024-02-02 NOTE — HISTORY OF PRESENT ILLNESS
[FreeTextEntry1] : Veronica Burk is a pleasant 48 year old man with history of melanoma and the thumb amputation and immunotherapy, presents with a very large left frontal mass in the region of the olfactory groove which has grown significantly since last 08/2022. The lesion creates significant mass effect and extends all the way up toward the ventricle and create midline shift, all the [place] of distal vessels are pushed medially and posteriorly. Considering the significant size of the tumor, surgical resection is indicated. The patient also has a right anterior temporal melanoma that is stable in size and configuration compared to before.  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  Pathology showed metastatic melignant melanoma.  Pt had SRS with Dr. Almaguer  LEFT frontal 3000cgy over 5 Fxs 4/21 - 4/27/2023 RIGHT temporal 2000cgy over 1 Fx 4/21/2023 GKRS LEFT temp dura, LEFT frontal dura, RIGHT frontal dura, Left frontal dura 20GY over 1 Fx 10/31/2023  Today he presents for a follow up visit with new MRI lumbar spine that demonstrated:  IMPRESSION: 1. CERVICAL SPINE:   Multiple metastases within the canal most prominently at the C6 and C7 levels along the cord surface.  2. THORACIC SPINE:   Multiple metastases within the canal including cord parenchymal lesions at T1 and T4 and multiple lesions along the cord surface.  3. LUMBAR SPINE:   Multiple metastases within the cauda equina and distal canal ependymal surfaces.r a follow up visit recent MRI Lumbar spine dated 1/29/24 that demonstrated:

## 2024-02-02 NOTE — ED ADULT NURSE NOTE - OBJECTIVE STATEMENT
Pt received to room 1. Pt is A&Ox3, ambulatory, Hx of GERD, melanoma with mets. Pt presents to ED from MD endorsing dizziness and abdominal pain. Pt Bulgarian/creole speaking with wife at bedside translating. Pt describes dizziness as room spinning. endorsing intermittent abdominal pain with nausea x2 weeks. abdomen nondistended, +tender on palpation. Denies chest pain, SOB, headache, vomiting, diarrhea, fevers/chills. breathing even and nonlabored. tip of right thumb amputation noted. left AC 20g IV placed, +blood return, flushes without difficulty, labs drawn and sent. Medications administered as ordered. Stretcher set in lowest position, call bell within reach, safety maintained.

## 2024-02-02 NOTE — ED ADULT NURSE NOTE - PAIN: PRESENCE, MLM
Discharge Summary/Instructions after an Endoscopic Procedure  Patient Name: Ermelinda Verde  Patient MRN: [id number]  Patient YOB: 1959 Monday, July 29, 2019  Pernell Rosas MD  RESTRICTIONS:  During your procedure today, you received medications for sedation.  These   medications may affect your judgment, balance and coordination.  Therefore,   for 24 hours, you have the following restrictions:   - DO NOT drive a car, operate machinery, make legal/financial decisions,   sign important papers or drink alcohol.    ACTIVITY:  Today: no heavy lifting, straining or running due to procedural   sedation/anesthesia.  The following day: return to full activity including work.  DIET:  Eat and drink normally unless instructed otherwise.     TREATMENT FOR COMMON SIDE EFFECTS:  - Mild abdominal pain, nausea, belching, bloating or excessive gas:  rest,   eat lightly and use a heating pad.  - Sore Throat: treat with throat lozenges and/or gargle with warm salt   water.  - Because air was used during the procedure, expelling large amounts of air   from your rectum or belching is normal.  - If a bowel prep was taken, you may not have a bowel movement for 1-3 days.    This is normal.  SYMPTOMS TO WATCH FOR AND REPORT TO YOUR PHYSICIAN:  1. Abdominal pain or bloating, other than gas cramps.  2. Chest pain.  3. Back pain.  4. Signs of infection such as: chills or fever occurring within 24 hours   after the procedure.  5. Rectal bleeding, which would show as bright red, maroon, or black stools.   (A tablespoon of blood from the rectum is not serious, especially if   hemorrhoids are present.)  6. Vomiting.  7. Weakness or dizziness.  GO DIRECTLY TO THE NEAREST EMERGENCY ROOM IF YOU HAVE ANY OF THE FOLLOWING:      Difficulty breathing              Chills and/or fever over 101 F   Persistent vomiting and/or vomiting blood   Severe abdominal pain   Severe chest pain   Black, tarry stools   Bleeding- more than one tablespoon   Any  other symptom or condition that you feel may need urgent attention  Your doctor recommends these additional instructions:  If any biopsies were taken, your doctors clinic will contact you in 1 to 2   weeks with any results.  - Discharge patient to home.   - Perform a colonoscopy today.   - The findings and recommendations were discussed with the designated   responsible adult.   For questions, problems or results please call your physician - Pernell Rosas MD at Work:  [phone].  [facility], EMERGENCY ROOM PHONE NUMBER: [phone]  IF A COMPLICATION OR EMERGENCY SITUATION ARISES AND YOU ARE UNABLE TO REACH   YOUR PHYSICIAN - GO DIRECTLY TO THE EMERGENCY ROOM.  Pernell Rosas MD  7/29/2019 2:19:13 PM  This report has been verified and signed electronically.  [employer]   complains of pain/discomfort

## 2024-02-02 NOTE — ED PROVIDER NOTE - NSFOLLOWUPINSTRUCTIONS_ED_ALL_ED_FT
You were seen in the ER today for abdominal pain.    You can take pepcid 20 mg twice daily for the next 7 days for upper abdominal pain/burning.    Please follow up with your primary care doctor within 1 - 3 days. Call and let them know you were seen in the ER today.   Bring the results of your blood work and imaging with you to your appointment, if applicable.    Return to the ER for any worsening symptoms or concerns, including chest pain, shortness of breath, lightheadedness, weakness, or any other concerns.

## 2024-02-02 NOTE — ED ADULT NURSE REASSESSMENT NOTE - NS ED NURSE REASSESS COMMENT FT1
Pt returned to unit from ultrasound. Pt reports feeling better. Wife at bedside. Pt offers no new complaints at this time. breathing even and nonlabored. vital signs as noted. awaiting results. Stretcher set in lowest position, call bell within reach, safety maintained.

## 2024-02-02 NOTE — ED PROVIDER NOTE - PHYSICAL EXAMINATION
GENERAL: well appearing in no acute distress, non-toxic appearing  HEENT:  +oral mucosa dry  CARDIAC: regular rate and rhythm, normal S1S2,   PULM: normal breath sounds, clear to ascultation bilaterally, no rales, rhonchi, wheezing  GI: Abd soft, nondistended, +RUQ/epigastric ttp, +guarding, no rebound tenderness, no rigidity  : no CVA tenderness b/l, no suprapubic tenderness  NEURO: normal speech  MSK: ROM intact, no peripheral edema, no calf tenderness b/l

## 2024-02-02 NOTE — PHYSICAL EXAM
[General Appearance - Alert] : alert [General Appearance - In No Acute Distress] : in no acute distress [Motor Strength] : muscle strength was normal in all four extremities

## 2024-02-03 LAB
CULTURE RESULTS: SIGNIFICANT CHANGE UP
SPECIMEN SOURCE: SIGNIFICANT CHANGE UP

## 2024-02-11 ENCOUNTER — INPATIENT (INPATIENT)
Facility: HOSPITAL | Age: 49
LOS: 23 days | Discharge: OTHER INSTITUTION | End: 2024-03-06
Attending: INTERNAL MEDICINE | Admitting: INTERNAL MEDICINE
Payer: MEDICAID

## 2024-02-11 VITALS
TEMPERATURE: 98 F | RESPIRATION RATE: 16 BRPM | SYSTOLIC BLOOD PRESSURE: 89 MMHG | DIASTOLIC BLOOD PRESSURE: 58 MMHG | OXYGEN SATURATION: 100 % | HEART RATE: 104 BPM

## 2024-02-11 DIAGNOSIS — Z29.9 ENCOUNTER FOR PROPHYLACTIC MEASURES, UNSPECIFIED: ICD-10-CM

## 2024-02-11 DIAGNOSIS — Z98.890 OTHER SPECIFIED POSTPROCEDURAL STATES: Chronic | ICD-10-CM

## 2024-02-11 DIAGNOSIS — R56.9 UNSPECIFIED CONVULSIONS: ICD-10-CM

## 2024-02-11 DIAGNOSIS — C43.60 MALIGNANT MELANOMA OF UNSPECIFIED UPPER LIMB, INCLUDING SHOULDER: Chronic | ICD-10-CM

## 2024-02-11 DIAGNOSIS — G89.3 NEOPLASM RELATED PAIN (ACUTE) (CHRONIC): ICD-10-CM

## 2024-02-11 DIAGNOSIS — C43.9 MALIGNANT MELANOMA OF SKIN, UNSPECIFIED: ICD-10-CM

## 2024-02-11 LAB
ALBUMIN SERPL ELPH-MCNC: 4.3 G/DL — SIGNIFICANT CHANGE UP (ref 3.3–5)
ALP SERPL-CCNC: 70 U/L — SIGNIFICANT CHANGE UP (ref 40–120)
ALT FLD-CCNC: 16 U/L — SIGNIFICANT CHANGE UP (ref 4–41)
ANION GAP SERPL CALC-SCNC: 13 MMOL/L — SIGNIFICANT CHANGE UP (ref 7–14)
AST SERPL-CCNC: 20 U/L — SIGNIFICANT CHANGE UP (ref 4–40)
BASOPHILS # BLD AUTO: 0.05 K/UL — SIGNIFICANT CHANGE UP (ref 0–0.2)
BASOPHILS NFR BLD AUTO: 0.9 % — SIGNIFICANT CHANGE UP (ref 0–2)
BILIRUB SERPL-MCNC: 0.6 MG/DL — SIGNIFICANT CHANGE UP (ref 0.2–1.2)
BUN SERPL-MCNC: 12 MG/DL — SIGNIFICANT CHANGE UP (ref 7–23)
CALCIUM SERPL-MCNC: 9.7 MG/DL — SIGNIFICANT CHANGE UP (ref 8.4–10.5)
CHLORIDE SERPL-SCNC: 100 MMOL/L — SIGNIFICANT CHANGE UP (ref 98–107)
CO2 SERPL-SCNC: 30 MMOL/L — SIGNIFICANT CHANGE UP (ref 22–31)
CREAT SERPL-MCNC: 1.27 MG/DL — SIGNIFICANT CHANGE UP (ref 0.5–1.3)
EGFR: 70 ML/MIN/1.73M2 — SIGNIFICANT CHANGE UP
EOSINOPHIL # BLD AUTO: 0.13 K/UL — SIGNIFICANT CHANGE UP (ref 0–0.5)
EOSINOPHIL NFR BLD AUTO: 2.3 % — SIGNIFICANT CHANGE UP (ref 0–6)
GLUCOSE SERPL-MCNC: 93 MG/DL — SIGNIFICANT CHANGE UP (ref 70–99)
HCT VFR BLD CALC: 43.9 % — SIGNIFICANT CHANGE UP (ref 39–50)
HGB BLD-MCNC: 14.8 G/DL — SIGNIFICANT CHANGE UP (ref 13–17)
IANC: 3.58 K/UL — SIGNIFICANT CHANGE UP (ref 1.8–7.4)
IMM GRANULOCYTES NFR BLD AUTO: 0 % — SIGNIFICANT CHANGE UP (ref 0–0.9)
LYMPHOCYTES # BLD AUTO: 1.48 K/UL — SIGNIFICANT CHANGE UP (ref 1–3.3)
LYMPHOCYTES # BLD AUTO: 26.6 % — SIGNIFICANT CHANGE UP (ref 13–44)
MAGNESIUM SERPL-MCNC: 2.4 MG/DL — SIGNIFICANT CHANGE UP (ref 1.6–2.6)
MCHC RBC-ENTMCNC: 28.4 PG — SIGNIFICANT CHANGE UP (ref 27–34)
MCHC RBC-ENTMCNC: 33.7 GM/DL — SIGNIFICANT CHANGE UP (ref 32–36)
MCV RBC AUTO: 84.1 FL — SIGNIFICANT CHANGE UP (ref 80–100)
MONOCYTES # BLD AUTO: 0.32 K/UL — SIGNIFICANT CHANGE UP (ref 0–0.9)
MONOCYTES NFR BLD AUTO: 5.8 % — SIGNIFICANT CHANGE UP (ref 2–14)
NEUTROPHILS # BLD AUTO: 3.58 K/UL — SIGNIFICANT CHANGE UP (ref 1.8–7.4)
NEUTROPHILS NFR BLD AUTO: 64.4 % — SIGNIFICANT CHANGE UP (ref 43–77)
NRBC # BLD: 0 /100 WBCS — SIGNIFICANT CHANGE UP (ref 0–0)
NRBC # FLD: 0 K/UL — SIGNIFICANT CHANGE UP (ref 0–0)
PHOSPHATE SERPL-MCNC: 3.7 MG/DL — SIGNIFICANT CHANGE UP (ref 2.5–4.5)
PLATELET # BLD AUTO: 207 K/UL — SIGNIFICANT CHANGE UP (ref 150–400)
POTASSIUM SERPL-MCNC: 4.1 MMOL/L — SIGNIFICANT CHANGE UP (ref 3.5–5.3)
POTASSIUM SERPL-SCNC: 4.1 MMOL/L — SIGNIFICANT CHANGE UP (ref 3.5–5.3)
PROT SERPL-MCNC: 7.9 G/DL — SIGNIFICANT CHANGE UP (ref 6–8.3)
RBC # BLD: 5.22 M/UL — SIGNIFICANT CHANGE UP (ref 4.2–5.8)
RBC # FLD: 12.5 % — SIGNIFICANT CHANGE UP (ref 10.3–14.5)
SODIUM SERPL-SCNC: 143 MMOL/L — SIGNIFICANT CHANGE UP (ref 135–145)
WBC # BLD: 5.56 K/UL — SIGNIFICANT CHANGE UP (ref 3.8–10.5)
WBC # FLD AUTO: 5.56 K/UL — SIGNIFICANT CHANGE UP (ref 3.8–10.5)

## 2024-02-11 PROCEDURE — 99223 1ST HOSP IP/OBS HIGH 75: CPT

## 2024-02-11 PROCEDURE — 99285 EMERGENCY DEPT VISIT HI MDM: CPT

## 2024-02-11 RX ORDER — OXYCODONE AND ACETAMINOPHEN 5; 325 MG/1; MG/1
1 TABLET ORAL EVERY 6 HOURS
Refills: 0 | Status: DISCONTINUED | OUTPATIENT
Start: 2024-02-11 | End: 2024-02-11

## 2024-02-11 RX ORDER — LIDOCAINE 4 G/100G
1 CREAM TOPICAL EVERY 24 HOURS
Refills: 0 | Status: DISCONTINUED | OUTPATIENT
Start: 2024-02-11 | End: 2024-03-06

## 2024-02-11 RX ORDER — OXYCODONE HYDROCHLORIDE 5 MG/1
5 TABLET ORAL EVERY 6 HOURS
Refills: 0 | Status: DISCONTINUED | OUTPATIENT
Start: 2024-02-11 | End: 2024-02-15

## 2024-02-11 RX ORDER — POLYETHYLENE GLYCOL 3350 17 G/17G
17 POWDER, FOR SOLUTION ORAL DAILY
Refills: 0 | Status: DISCONTINUED | OUTPATIENT
Start: 2024-02-11 | End: 2024-02-19

## 2024-02-11 RX ORDER — LANOLIN ALCOHOL/MO/W.PET/CERES
3 CREAM (GRAM) TOPICAL AT BEDTIME
Refills: 0 | Status: DISCONTINUED | OUTPATIENT
Start: 2024-02-11 | End: 2024-03-06

## 2024-02-11 RX ORDER — SENNA PLUS 8.6 MG/1
2 TABLET ORAL AT BEDTIME
Refills: 0 | Status: DISCONTINUED | OUTPATIENT
Start: 2024-02-11 | End: 2024-02-20

## 2024-02-11 RX ORDER — SODIUM CHLORIDE 9 MG/ML
2000 INJECTION, SOLUTION INTRAVENOUS ONCE
Refills: 0 | Status: COMPLETED | OUTPATIENT
Start: 2024-02-11 | End: 2024-02-11

## 2024-02-11 RX ORDER — DEXAMETHASONE 0.5 MG/5ML
6 ELIXIR ORAL ONCE
Refills: 0 | Status: COMPLETED | OUTPATIENT
Start: 2024-02-11 | End: 2024-02-11

## 2024-02-11 RX ORDER — LEVETIRACETAM 250 MG/1
1000 TABLET, FILM COATED ORAL EVERY 12 HOURS
Refills: 0 | Status: DISCONTINUED | OUTPATIENT
Start: 2024-02-11 | End: 2024-03-06

## 2024-02-11 RX ORDER — LEVETIRACETAM 250 MG/1
4000 TABLET, FILM COATED ORAL ONCE
Refills: 0 | Status: COMPLETED | OUTPATIENT
Start: 2024-02-11 | End: 2024-02-11

## 2024-02-11 RX ORDER — ACETAMINOPHEN 500 MG
650 TABLET ORAL EVERY 6 HOURS
Refills: 0 | Status: DISCONTINUED | OUTPATIENT
Start: 2024-02-11 | End: 2024-02-15

## 2024-02-11 RX ADMIN — LEVETIRACETAM 400 MILLIGRAM(S): 250 TABLET, FILM COATED ORAL at 15:58

## 2024-02-11 RX ADMIN — SENNA PLUS 2 TABLET(S): 8.6 TABLET ORAL at 22:21

## 2024-02-11 RX ADMIN — SODIUM CHLORIDE 2000 MILLILITER(S): 9 INJECTION, SOLUTION INTRAVENOUS at 15:16

## 2024-02-11 RX ADMIN — Medication 6 MILLIGRAM(S): at 15:58

## 2024-02-11 RX ADMIN — LEVETIRACETAM 1000 MILLIGRAM(S): 250 TABLET, FILM COATED ORAL at 22:21

## 2024-02-11 NOTE — ED PROVIDER NOTE - PHYSICAL EXAMINATION
Well-appearing, well nourished, awake, alert, oriented to person, place, time/situation and in no apparent distress.    Airway patent. Neck supple.    Eyes without scleral injection. No jaundice.    Strong pulse.    Respirations unlabored.    Abdomen soft, non-tender, no guarding.    MSK: Distal R thumb s/p remote amputation. No clinical e/o complication.    Alert and oriented, no gross motor or sensory deficits.    Skin: normal color for race, warm, dry and intact. No evidence of rash.    No SI/HI.

## 2024-02-11 NOTE — H&P ADULT - PROBLEM SELECTOR PLAN 1
- in setting of known melanoma with metastases to brain  - s/p Keppra load 4g IVPB and dexamethasone 6mg IV x1 in ED  - neurology consulted, appreciate guidance regarding further AEDs, possibly steroids  - 24h vEEG if neurology agree  - reach out to neuro-onc Dr Lozano who follows patient in clinic  - seizure, fall, aspiration precautions

## 2024-02-11 NOTE — H&P ADULT - PROBLEM SELECTOR PLAN 3
- pain in right mid-axillary region  - takes Tylenol at home but minimal relief  - start Percocet 5/325mg po q6h PRN  - bowel regimen: miralax, senna

## 2024-02-11 NOTE — H&P ADULT - NSHPOUTPATIENTPROVIDERS_GEN_ALL_CORE
Oncology: Dr Neo Marcos at [facility]  Neuro-oncology: Dr Wong Lozano   Rad/onc: Dr Chucky Almaguer

## 2024-02-11 NOTE — ED PROVIDER NOTE - OBJECTIVE STATEMENT
Gerda: Gerda: Melanoma w/ mets to brain w/ vasogenic edema and skeletal mets. Not on steroids or chemo. Intending to get XRT. P/w 4 seizure-like episodes (new) in the last 24 hrs. Not on AEDs. Family describes episodes of convulsions lasting 20 min f/b post-ictal phase. Last brain CT 1 wk

## 2024-02-11 NOTE — CONSULT NOTE ADULT - SUBJECTIVE AND OBJECTIVE BOX
Neurology - Consult Note    Spectra: [phone] ([facility]), [phone] ([facility])    HPI: 48-year-old man with history significant for 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 He presents with seizure episodes reportedly two times in the last day.    Neurology consulted for seizures.  Patient's spouse as  per patient + spouse preferences, also patient's spouse providing additional information.   One day prior to presentation - patient wife noticed generalized shaking events, loss of consciousness, and seizure-like activity lasting 20 minutes and followed by a period of confusion. Reports eyes closed, patient's head turned to left. Denies tongue biting/incontinence. Episode happened at 1800 and at 12am, both 20 minutes in duration followed by a period of confusion lasting 45 minutes.    Patient spouse also notes that patient has been having a asymmetric smile, unclear duration but states last normal smile noted 5 days prior to presentation. Also reports leg and arm numbness on right side which are also of unclear duration ?at least two days prior to presentation, as family at bedside notes was limping then, had difficulty walking.    Per spouse no prior history of seizures, was previously prophylactically on ASMs but 3/2023 was d/gaby as after the resection remained stable. Not on any home antiseizure medications, intact with ADLs, no reported risk factors or recent illnesses.  Labs unremarkable. In ED received 4g IV keppra, 6 mg of decadron.    Review of Systems:    NEUROLOGICAL: +As stated in HPI above  All other review of systems is negative unless indicated above.    Allergies:  No Known Allergies      PMHx/PSHx/Family Hx: As above, otherwise see below   No pertinent past medical history    Malignant melanoma    Metastatic malignant melanoma    GERD (gastroesophageal reflux disease)        Social Hx:  as above    Medications:  MEDICATIONS  (STANDING):  lidocaine   4% Patch 1 Patch Transdermal every 24 hours  polyethylene glycol 3350 17 Gram(s) Oral daily  senna 2 Tablet(s) Oral at bedtime    MEDICATIONS  (PRN):  acetaminophen     Tablet .. 650 milliGRAM(s) Oral every 6 hours PRN Mild Pain (1 - 3), Moderate Pain (4 - 6)  melatonin 3 milliGRAM(s) Oral at bedtime PRN Insomnia  oxyCODONE    IR 5 milliGRAM(s) Oral every 6 hours PRN Severe Pain (7 - 10)      Vitals:  T(C): 36.7 (02-11-24 @ 13:09), Max: 36.7 (02-11-24 @ 13:09)  HR: 60 (02-11-24 @ 16:12) (60 - 104)  BP: 110/81 (02-11-24 @ 16:12) (89/58 - 110/81)  RR: 17 (02-11-24 @ 16:12) (16 - 17)  SpO2: 99% (02-11-24 @ 16:12) (99% - 100%)    Physical Examination:   General - non-toxic appearing male in no acute distress    Neurologic Exam:  Mental status - Eyes closed but opens to light verbal stimuli. Oriented to person, place, and day of week, month and year but not date. Speech fluent, repetition and naming intact. Follows simple commands.  Cranial nerves - PERRLA (4mm -> 3mm b/l), BTT intact b/l, EOMI, face sensation (V1-V3) intact b/l, noted slight decreased smile on right side, hearing intact b/l, palate with symmetric elevation, sternocleidomastiod 5/5 strength b/l, tongue midline on protrusion with full lateral movement  Motor - Normal bulk and tone throughout. No pronator drift.  Strength testing             Deltoid      Biceps      Triceps     Wrist Extension    Wrist Flexion     Interossei         R            5                 5               5                     5                              5                        5                 5  L             5                 5               5                     5                              5                        5                 5              Hip Flexion    Hip Extension    Knee Flexion    Knee Extension    Dorsiflexion    Plantar Flexion  R              5                         5                       5                           5                            5                          5  L              5                         5                        5                           5                            5                          5    Sensation - Light touch reports 60% in RUE compared to 100% LUE, and hardly grimaces to noxious stimuli in LLE compared to immediate grimace in RLE.  DTR's - hyperreflexic throughout, upgoing toes b/l  Coordination - Finger to Nose intact b/l.  Gait and station -deferred    Labs:                        14.8   5.56  )-----------( 207      ( 11 Feb 2024 15:33 )             43.9     02-11    143  |  100  |  12  ----------------------------<  93  4.1   |  30  |  1.27    Ca    9.7      11 Feb 2024 15:33  Phos  3.7     02-11  Mg     2.40     02-11    TPro  7.9  /  Alb  4.3  /  TBili  0.6  /  DBili  x   /  AST  20  /  ALT  16  /  AlkPhos  70  02-11    CAPILLARY BLOOD GLUCOSE      POCT Blood Glucose.: 121 mg/dL (11 Feb 2024 13:10)    LIVER FUNCTIONS - ( 11 Feb 2024 15:33 )  Alb: 4.3 g/dL / Pro: 7.9 g/dL / ALK PHOS: 70 U/L / ALT: 16 U/L / AST: 20 U/L / GGT: x               CSF:                  Radiology:

## 2024-02-11 NOTE — CONSULT NOTE ADULT - ATTENDING COMMENTS
[employer] translation with family.     Exam:  Mild right facial weakness and ?slightly weaker in the right arm and leg vs. variable effort.     < from: CT Angio Neck w/ IV Cont (02.12.24 @ 01:31) >    IMPRESSION: Multiple enhancing brain metastases which have increased   since 2/2/2024. No midline shift. Normal CTA of the head and neck. No   large vessel occlusion.    < end of copied text >    < from: MR Head w/wo IV Cont (01.12.24 @ 12:21) >    IMPRESSION:    Interval increase in size of numerous intracranial lesions, compatible   with progression of metastatic disease.    < end of copied text >      A/P  Mr. Burk is a 47 yo man with new onset seizure due to brain metastases.    I agree with work up and management as above.   D/W wife and patient.   Reviewed with his neuro-oncologist - please consider hospice care at this point.   Thank you.

## 2024-02-11 NOTE — H&P ADULT - NSICDXPASTMEDICALHX_GEN_ALL_CORE_FT
No PAST MEDICAL HISTORY:  GERD (gastroesophageal reflux disease)     Malignant melanoma right thumb    Metastatic malignant melanoma Right axillary lymph nodes

## 2024-02-11 NOTE — CONSULT NOTE ADULT - ASSESSMENT
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 He presents with seizure episodes reportedly two times in the last day. Neurology consulted for seizures. On exam also noted to have asymmetric smile on right, decreased sensation in right arm and right leg.    IMPRESSION   (1) ?20 minute episode of shaking 2x episodes, concerning for seizures, i/s/o brain mets. S/p 4 g IV keppra in ED.  (2) Hemisensory loss on R side c/f L brain dysfunction i/s/o melanoma mets, would recommend evaluating for bleed on head CT.    RECOMMENDATION   [] please obtain stat head CT w/o contrast  --following CT head , would recommend NSGY consult for steroid management if noted to have significant edema, recommendations appreciated  --will update Dr. Lozano regarding patient presentation  []  please obtain CT angio head/neck but prioritize CT head w/o contrast  [] 6 hours after receiving keppra load, please start keppra 1000 mg BID  [] vEEG    Discussed with neurology attending on call.  Patient to be seen by team and attending. Note finalized upon attending attestation.  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 He presents with seizure episodes reportedly two times in the last day. Neurology consulted for seizures. On exam also noted to have asymmetric smile on right, decreased sensation in right arm and right leg.    IMPRESSION   (1) ?20 minute episode of shaking 2x episodes, concerning for seizures, i/s/o brain mets. S/p 4 g IV keppra in ED.  (2) Hemisensory loss on R side c/f L brain dysfunction i/s/o melanoma mets, would recommend evaluating for bleed on head CT.    RECOMMENDATION   [] please obtain stat head CT w/o contrast  --following CT head , if notable edema would consider NSGY consult for steroid management, recommendations appreciated  --will update Dr. Lozano regarding patient presentation  []  can obtain CT angio head/neck but prioritize CT head w/o contrast first  [] 6 hours after receiving keppra load, please start keppra 1000 mg BID  [] vEEG    Discussed with neurology attending on call.  Patient to be seen by team and attending. Note finalized upon attending attestation.

## 2024-02-11 NOTE — ED ADULT NURSE REASSESSMENT NOTE - NS ED NURSE REASSESS COMMENT FT1
pt sitting quietly in stretcher family present at bedside NAD noted a this time. pt admitted awaiting bed placement. NSR on CM. No new orders a this time pt safety ongoing. PM medications administered pt tolerated well.

## 2024-02-11 NOTE — H&P ADULT - HISTORY OF PRESENT ILLNESS
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 He presents with seizure episodes 4x over the past 24h.     He was seen by neuro-oncology and rad/onc recently with intention for WBRT and likely some degree of spinal RT (C5-T5). He has been continued on nivolumab/relatlimab.   Patient’s wife at bedside, patient asked for her to ask as . She also provides corroborating history. He has had increased difficulty ambulating due to generalized weakness for the past few days. No focal numbness, loss of temperature sensation, urinary or fecal incontinence. No fevers, chills, cough, sore throat, chest pain, n/v/d, palpitations, dyspnea, dysuria. Last BM 3 days prior. Wife noticed generalized shaking events, loss of consciousness, and seizure-like activity lasting 20 minutes and followed by a period of confusion.     Vital signs significant for: afebrile, HR up to 104 now 60, BP initially 89/58 now 110/81, RR 16-17, SpO2 % on RA  Labs significant for: CBC unremarkable, CMP largely unremarkable (SCr 1.27 largely stable since 11/2023)  Imaging significant for: none this admission.  - CTAP 2/2: new b/l lobar hepatic metastatic disease; pulmonary nodules again concerning for metastases; postoperative changes of right thumb with adjacent nonspecific soft tissue thickening; slight gastric inflammatory changes along the greater curvature suggestive of gastritis.  - CT Head 2/2: relatively unchanged numerous intracranial metastatic lesions and areas of vasogenic edema

## 2024-02-11 NOTE — H&P ADULT - TIME BILLING
Time-based billing (NON-critical care).     75 minutes spent on total encounter; more than 50% of the visit was spent counseling and / or coordinating care by the attending physician.  The necessity of the time spent during the encounter on this date of service was due to:     review of laboratory data, radiology results, consultants' recommendations, documentation in [place], discussion with patient/wife, ER physician.

## 2024-02-11 NOTE — ED PROVIDER NOTE - ATTENDING CONTRIBUTION TO CARE
I performed a face-to-face evaluation of the patient and performed a history and physical examination. I agree with the history and physical examination. If this was a PA visit, I personally saw the patient with the PA and performed a substantive portion of the visit including all aspects of the medical decision making.    Melanoma w/ mets to brain w/ vasogenic edema and skeletal mets. Not on steroids or chemo. Intending to get XRT. P/w 4 seizure-like episodes (new) in the last 24 hrs. Not on AEDs. Family describes episodes of convulsions lasting 20 min f/b post-ictal phase. Last brain CT 1 wk ago. Check electrolytes. Admit for seizure monitoring. Give Keppra and Ativan. Neuro consult.

## 2024-02-11 NOTE — H&P ADULT - NSHPLABSRESULTS_GEN_ALL_CORE
LABS:                        14.8   5.56  )-----------( 207      ( 11 Feb 2024 15:33 )             43.9     02-11    143  |  100  |  12  ----------------------------<  93  4.1   |  30  |  1.27    Ca    9.7      11 Feb 2024 15:33  Phos  3.7     02-11  Mg     2.40     02-11    TPro  7.9  /  Alb  4.3  /  TBili  0.6  /  DBili  x   /  AST  20  /  ALT  16  /  AlkPhos  70  02-11          Urinalysis Basic - ( 11 Feb 2024 15:33 )    Color: x / Appearance: x / SG: x / pH: x  Gluc: 93 mg/dL / Ketone: x  / Bili: x / Urobili: x   Blood: x / Protein: x / Nitrite: x   Leuk Esterase: x / RBC: x / WBC x   Sq Epi: x / Non Sq Epi: x / Bacteria: x

## 2024-02-11 NOTE — H&P ADULT - NSHPPHYSICALEXAM_GEN_ALL_CORE
PHYSICAL EXAM:  Vital Signs Last 24 Hrs  T(C): 36.7 (11 Feb 2024 13:09), Max: 36.7 (11 Feb 2024 13:09)  T(F): 98.1 (11 Feb 2024 13:09), Max: 98.1 (11 Feb 2024 13:09)  HR: 60 (11 Feb 2024 16:12) (60 - 104)  BP: 110/81 (11 Feb 2024 16:12) (89/58 - 110/81)  BP(mean): 90 (11 Feb 2024 16:12) (90 - 90)  RR: 17 (11 Feb 2024 16:12) (16 - 17)  SpO2: 99% (11 Feb 2024 16:12) (99% - 100%)    Parameters below as of 11 Feb 2024 16:12  Patient On (Oxygen Delivery Method): room air      CONSTITUTIONAL: NAD,  well-groomed  EYES: PERRLA; conjunctiva and sclera clear  ENMT: Moist oral mucosa, no pharyngeal injection or exudates; normal dentition  NECK: Supple, no palpable masses; no thyromegaly  RESPIRATORY: Normal respiratory effort; lungs are clear to auscultation bilaterally  CARDIOVASCULAR: Regular rate and rhythm, normal S1 and S2, no murmur/rub/gallop; No lower extremity edema; Peripheral pulses are 2+ bilaterally  ABDOMEN: Nontender to palpation, normoactive bowel sounds, no rebound/guarding; No hepatosplenomegaly  MUSCULOSKELETAL:  no clubbing or cyanosis of digits; no joint swelling or tenderness to palpation; b/l LE and UE 5/5 strength with flexion, extension, and internal and external rotation  PSYCH: A+O to person, place, and time; affect appropriate  NEUROLOGY: CN 2-12 are intact and symmetric; no gross sensory deficits; RUE fine resting tremor  SKIN: No rashes; no palpable lesions

## 2024-02-11 NOTE — ED ADULT TRIAGE NOTE - CHIEF COMPLAINT QUOTE
Pt. c/o several syncopal episodes since yesterday evening. Family states he loses consciousness for approx. 20 min. Also c/o abdominal pain, weakness and vomiting since yesterday. Denies diarrhea or fevers. h/o brain cancer

## 2024-02-11 NOTE — H&P ADULT - ASSESSMENT
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 He presents with seizure episodes 4x over the past 24h.

## 2024-02-11 NOTE — H&P ADULT - PROBLEM SELECTOR PLAN 2
- onc: Dr Marcos; neuro-onc: Dr Lozano; rad/onc: Dr Almaguer  - receiving Opdualag q4wks as outpatient  - appreciate outpatient oncology guidance regarding further inpatient treatment

## 2024-02-11 NOTE — ED PROVIDER NOTE - CLINICAL SUMMARY MEDICAL DECISION MAKING FREE TEXT BOX
Gerda: Melanoma w/ mets to brain w/ vasogenic edema and skeletal mets. Not on steroids or chemo. Intending to get XRT. P/w 4 seizure-like episodes (new) in the last 24 hrs. Not on AEDs. Family describes episodes of convulsions lasting 20 min f/b post-ictal phase. Last brain CT 1 wk ago. Check electrolytes. Admit for seizure monitoring. Give Keppra and Ativan. Neuro consult.

## 2024-02-12 LAB
ALBUMIN SERPL ELPH-MCNC: 4.2 G/DL — SIGNIFICANT CHANGE UP (ref 3.3–5)
ALP SERPL-CCNC: 63 U/L — SIGNIFICANT CHANGE UP (ref 40–120)
ALT FLD-CCNC: 16 U/L — SIGNIFICANT CHANGE UP (ref 4–41)
ANION GAP SERPL CALC-SCNC: 12 MMOL/L — SIGNIFICANT CHANGE UP (ref 7–14)
AST SERPL-CCNC: 23 U/L — SIGNIFICANT CHANGE UP (ref 4–40)
BASOPHILS # BLD AUTO: 0.02 K/UL — SIGNIFICANT CHANGE UP (ref 0–0.2)
BASOPHILS NFR BLD AUTO: 0.4 % — SIGNIFICANT CHANGE UP (ref 0–2)
BILIRUB SERPL-MCNC: 0.5 MG/DL — SIGNIFICANT CHANGE UP (ref 0.2–1.2)
BUN SERPL-MCNC: 11 MG/DL — SIGNIFICANT CHANGE UP (ref 7–23)
CALCIUM SERPL-MCNC: 9.4 MG/DL — SIGNIFICANT CHANGE UP (ref 8.4–10.5)
CHLORIDE SERPL-SCNC: 101 MMOL/L — SIGNIFICANT CHANGE UP (ref 98–107)
CO2 SERPL-SCNC: 29 MMOL/L — SIGNIFICANT CHANGE UP (ref 22–31)
CREAT SERPL-MCNC: 1.1 MG/DL — SIGNIFICANT CHANGE UP (ref 0.5–1.3)
EGFR: 83 ML/MIN/1.73M2 — SIGNIFICANT CHANGE UP
EOSINOPHIL # BLD AUTO: 0.02 K/UL — SIGNIFICANT CHANGE UP (ref 0–0.5)
EOSINOPHIL NFR BLD AUTO: 0.4 % — SIGNIFICANT CHANGE UP (ref 0–6)
GLUCOSE SERPL-MCNC: 97 MG/DL — SIGNIFICANT CHANGE UP (ref 70–99)
HCT VFR BLD CALC: 42.2 % — SIGNIFICANT CHANGE UP (ref 39–50)
HGB BLD-MCNC: 14.1 G/DL — SIGNIFICANT CHANGE UP (ref 13–17)
IANC: 2.67 K/UL — SIGNIFICANT CHANGE UP (ref 1.8–7.4)
IMM GRANULOCYTES NFR BLD AUTO: 0.2 % — SIGNIFICANT CHANGE UP (ref 0–0.9)
LYMPHOCYTES # BLD AUTO: 1.62 K/UL — SIGNIFICANT CHANGE UP (ref 1–3.3)
LYMPHOCYTES # BLD AUTO: 34.2 % — SIGNIFICANT CHANGE UP (ref 13–44)
MCHC RBC-ENTMCNC: 28.1 PG — SIGNIFICANT CHANGE UP (ref 27–34)
MCHC RBC-ENTMCNC: 33.4 GM/DL — SIGNIFICANT CHANGE UP (ref 32–36)
MCV RBC AUTO: 84.2 FL — SIGNIFICANT CHANGE UP (ref 80–100)
MONOCYTES # BLD AUTO: 0.39 K/UL — SIGNIFICANT CHANGE UP (ref 0–0.9)
MONOCYTES NFR BLD AUTO: 8.2 % — SIGNIFICANT CHANGE UP (ref 2–14)
NEUTROPHILS # BLD AUTO: 2.67 K/UL — SIGNIFICANT CHANGE UP (ref 1.8–7.4)
NEUTROPHILS NFR BLD AUTO: 56.6 % — SIGNIFICANT CHANGE UP (ref 43–77)
NRBC # BLD: 0 /100 WBCS — SIGNIFICANT CHANGE UP (ref 0–0)
NRBC # FLD: 0 K/UL — SIGNIFICANT CHANGE UP (ref 0–0)
PLATELET # BLD AUTO: 189 K/UL — SIGNIFICANT CHANGE UP (ref 150–400)
POTASSIUM SERPL-MCNC: 4.2 MMOL/L — SIGNIFICANT CHANGE UP (ref 3.5–5.3)
POTASSIUM SERPL-SCNC: 4.2 MMOL/L — SIGNIFICANT CHANGE UP (ref 3.5–5.3)
PROLACTIN SERPL-MCNC: 2.9 NG/ML — LOW (ref 4.1–18.4)
PROT SERPL-MCNC: 7.2 G/DL — SIGNIFICANT CHANGE UP (ref 6–8.3)
RBC # BLD: 5.01 M/UL — SIGNIFICANT CHANGE UP (ref 4.2–5.8)
RBC # FLD: 12.5 % — SIGNIFICANT CHANGE UP (ref 10.3–14.5)
SODIUM SERPL-SCNC: 142 MMOL/L — SIGNIFICANT CHANGE UP (ref 135–145)
WBC # BLD: 4.73 K/UL — SIGNIFICANT CHANGE UP (ref 3.8–10.5)
WBC # FLD AUTO: 4.73 K/UL — SIGNIFICANT CHANGE UP (ref 3.8–10.5)

## 2024-02-12 PROCEDURE — 99222 1ST HOSP IP/OBS MODERATE 55: CPT

## 2024-02-12 PROCEDURE — 70496 CT ANGIOGRAPHY HEAD: CPT | Mod: 26

## 2024-02-12 PROCEDURE — 99233 SBSQ HOSP IP/OBS HIGH 50: CPT

## 2024-02-12 PROCEDURE — 70498 CT ANGIOGRAPHY NECK: CPT | Mod: 26

## 2024-02-12 RX ORDER — INFLUENZA VIRUS VACCINE 15; 15; 15; 15 UG/.5ML; UG/.5ML; UG/.5ML; UG/.5ML
0.5 SUSPENSION INTRAMUSCULAR ONCE
Refills: 0 | Status: DISCONTINUED | OUTPATIENT
Start: 2024-02-12 | End: 2024-03-06

## 2024-02-12 RX ADMIN — OXYCODONE HYDROCHLORIDE 5 MILLIGRAM(S): 5 TABLET ORAL at 23:16

## 2024-02-12 RX ADMIN — LEVETIRACETAM 1000 MILLIGRAM(S): 250 TABLET, FILM COATED ORAL at 22:17

## 2024-02-12 RX ADMIN — LIDOCAINE 1 PATCH: 4 CREAM TOPICAL at 06:13

## 2024-02-12 RX ADMIN — LEVETIRACETAM 1000 MILLIGRAM(S): 250 TABLET, FILM COATED ORAL at 10:10

## 2024-02-12 RX ADMIN — POLYETHYLENE GLYCOL 3350 17 GRAM(S): 17 POWDER, FOR SOLUTION ORAL at 10:10

## 2024-02-12 RX ADMIN — SENNA PLUS 2 TABLET(S): 8.6 TABLET ORAL at 22:17

## 2024-02-12 RX ADMIN — OXYCODONE HYDROCHLORIDE 5 MILLIGRAM(S): 5 TABLET ORAL at 22:16

## 2024-02-12 RX ADMIN — LIDOCAINE 1 PATCH: 4 CREAM TOPICAL at 04:42

## 2024-02-12 NOTE — OCCUPATIONAL THERAPY INITIAL EVALUATION ADULT - GENERAL OBSERVATIONS, REHAB EVAL
Upon entry, patient semi-supine in bed, family member present at bedside, patient agreeable to OT eval, cleared for OT evaluation as per RN. Patient left semi-supine in bed, call bell in reach, all lines/tubes intact, bed alarm activated, vitals stable HR 83 bpm, NAD.

## 2024-02-12 NOTE — CHART NOTE - NSCHARTNOTEFT_GEN_A_CORE
RN notified that patient with R sided weakness and decreased sensation on R side which is per RN. Patient evaluated at bedside. Patient is creole speaking. Offered  services, but prefers wife at bedside ot translate. Per patient, he has been having R sided weakness and decreased sensation on R side which is why he came in to the hospital. RN notified that patient with R sided weakness and decreased sensation on R side which is per RN. Patient evaluated at bedside. Patient is creole speaking. Offered  services, but prefers wife at bedside ot translate. Per patient and wife, he has been having R sided weakness and decreased sensation on R side which is why he came in to the hospital. He also states there is no change in his weakness or sensation since arrival. Patient states he also felt dizziness while ambulating to the bathroom. Denies any dizziness or new complaints currently.   General: no distress  Neuro: Slightly decreased smile on R side, Decreased sensation in RUE compared to LUE. He also has very low sensation in RLE. sensation intact on LLE.  Strength: Equal b/l UE and LE. [no change in neuro exam compared to neuro note and OT note]    Dizziness     -Advised patient to use urinal and always call for help if he needs to ambulate    -Fall risk protocol      monitor neuro status closely    Check orthostatics

## 2024-02-12 NOTE — PHYSICAL THERAPY INITIAL EVALUATION ADULT - ADDITIONAL COMMENTS
Pt. was left in stretcher post PT Evaluation, no apparent distress, all lines intact, wife present. RN made aware of pt. status and participation in PT.

## 2024-02-12 NOTE — CHART NOTE - NSCHARTNOTEFT_GEN_A_CORE
48 y.o. M, h/o known metastatic melanoma, R hand thumb amputated due to melanoma,  now in [facility] ED s/p seizure, no seizure since yesterday.    Had prior RT SRS to the brain 04/2023 and again 10/2023 (GK- SRS)  s/p 1/25/24 CT sim for WBRT ; we are now requested to do the WBRT/ C/T spine  RT while inpatient.  Dr. Almaguer aware.  KPS 60.     Will replan for inpatient.       < from: CT Angio Head w/ IV Cont (02.12.24 @ 01:31) >        IMPRESSION: Multiple enhancing brain metastases which have increased   since 2/2/2024. No midline shift. Normal CTA of the head and neck. No   large vessel occlusion.    < end of copied text >

## 2024-02-12 NOTE — PATIENT PROFILE ADULT - FALL HARM RISK - HARM RISK INTERVENTIONS

## 2024-02-12 NOTE — PHYSICAL THERAPY INITIAL EVALUATION ADULT - GAIT DISTANCE, PT EVAL
25 feet x 2 with bathroom rest break. Pt. reported dizziness/blurry vision during ambulation. Pt. also reported right LE felt heavy. /88. Symptoms improved when pt. returned to stretcher. RN made aware.

## 2024-02-12 NOTE — PHYSICAL THERAPY INITIAL EVALUATION ADULT - PERTINENT HX OF CURRENT PROBLEM, REHAB EVAL
Pt. admitted for seizure episodes. 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 Pt. s/p Keppra load.

## 2024-02-12 NOTE — PROGRESS NOTE ADULT - ASSESSMENT
47 yo man with h/o metastatic ungual melanoma of the R thumb and axillary LN (s/p LN resection 12/2021; R thumb amputated and L lung wedge resection was performed in 11/2022- [id number]; completed adjuvant Dacarbazine until 3/2022) with mets to the lung, liver, LN, brain with lepto of brain/spine- s/p craniotomy/resection of L brain tumor in 3/2023 followed by post-op RT (30Gy/5fxs) to surgical bed and SRS to R temporal lesion (20Gy/1fx) in 4/2023, as well as GKR to 4 dural based lesions in 10/2023; on Dex/Keppra); PD-L1 TPS 30%; s/p Ipi/Nivo x3 c/b pneumonitis (Ipi held) > last received C2 Nivo monotherapy on 1/18/24. He was also planned for outpt WBRT as well as RT to C5-T5 recently (unclear if tx has started/completed).  He was readmitted to [facility] on 2/10 s/p seizure at home- admission imaging confirms progression of CNS mets since 2/2/24 imaging.    ACTIVE PROBLEMS  Metastatic melanoma to multiple sites including brain  Secondary seizures    - Metastatic melanoma 47 yo man with h/o metastatic ungual melanoma of the R thumb and axillary LN (s/p LN resection 12/2021; R thumb amputated and L lung wedge resection was performed in 11/2022- [id number]; completed adjuvant Dacarbazine until 3/2022) with mets to the lung, liver, LN, brain with lepto of brain/spine- s/p craniotomy/resection of L brain tumor in 3/2023 followed by post-op RT (30Gy/5fxs) to surgical bed and SRS to R temporal lesion (20Gy/1fx) in 4/2023, as well as GKR to 4 dural based lesions in 10/2023; on Dex/Keppra); PD-L1 TPS 30%; s/p Ipi/Nivo x3 c/b pneumonitis (Ipi held) > last received C2 Nivo monotherapy on 1/18/24. He also began outpt WBRT as well as RT to C5-T5 recently (unclear how many fxs are completed to date).  He was readmitted to [facility] on 2/10 s/p seizure at home- admission imaging confirms progression of CNS mets since 2/2/24 imaging.    ACTIVE PROBLEMS  Metastatic melanoma to multiple sites including brain/LMD  Goals of care counseling, discussion  Secondary seizures  Cancer related pain    - Metastatic melanoma  - Goals of care counseling, discussion  Pt with rapidly progressive disease despite receiving SOC systemic therapy  Unclear if HLA typing has been performed, pt may be a candidate for Tebentefusp (will clarify with Dr. Marcos)  If pt is not a candidate for further systemic therapy, then hospice is appropriate  Planning to initiate end-of-life care discussion with pt and his wife on this admission  Long-term prognosis remains poor    - Secondary seizures  - CNS mets, LMD  Will fu with Rad Onc re: completion of WBRT and pall RT to C5-T5  Dr. Lozano (Neuro Onc) aware of pt's admission  Repeat MRI Brain pending  Continuing Keppra IV 1000mg q12  No role for steroids at this time    - Cancer related pain: continuing Oxy 5mg q6/prn with bowel regimen to prevent OIC

## 2024-02-12 NOTE — PHYSICAL THERAPY INITIAL EVALUATION ADULT - GENERAL OBSERVATIONS, REHAB EVAL
Consult received, chart reviewed. Patient received in stretcher, no apparent distress, +tele, HR 92 bpm, wife present. Pt. agreeable to participate in PT.

## 2024-02-12 NOTE — OCCUPATIONAL THERAPY INITIAL EVALUATION ADULT - LIVES WITH, PROFILE
Lives with wife in a private home, 3 steps to enter and bedroom and bathroom on the main level of the home/spouse

## 2024-02-12 NOTE — ED ADULT NURSE REASSESSMENT NOTE - NS ED NURSE REASSESS COMMENT FT1
Report received from SANDY Galdamez. Pt resting in stretcher, at baseline orientation status, offers no complaints of pain or discomfort at this time, NSR on monitor, RR equal and unlabored, taken for CT scan, safety maintained, comfort provided, awaiting bed placement at this time.

## 2024-02-12 NOTE — PROGRESS NOTE ADULT - SUBJECTIVE AND OBJECTIVE BOX
SOLID TUMOR ONCOLOGY HOSPITALIST PROGRESS NOTE    S: No acute events overnight.  Pt reported that he feels unwell and has occasional HA and generalized weakness, but he denied vision changes, n/v.  His wife reported that he had a seizure on the Sat prior to admission, lasting 20min, a/w generalized tonic/clonic movement of the extremities and neck contracture as well as unresponsiveness, and post-ictal n/v x1; resolved without intervention. She confirmed that pt has been compliant with his seizure medication since his brain tumor resection.    CURRENT MEDICATIONS  (STANDING):  levETIRAcetam   Injectable 1000 milliGRAM(s) IV Push every 12 hours  lidocaine   4% Patch 1 Patch Transdermal every 24 hours  polyethylene glycol 3350 17 Gram(s) Oral daily  senna 2 Tablet(s) Oral at bedtime  (PRN):  acetaminophen     Tablet .. 650 milliGRAM(s) Oral every 6 hours PRN Mild Pain (1 - 3), Moderate Pain (4 - 6)  melatonin 3 milliGRAM(s) Oral at bedtime PRN Insomnia  oxyCODONE    IR 5 milliGRAM(s) Oral every 6 hours PRN Severe Pain (7 - 10)    PHYSICAL EXAM  T(C): 36.7 (02-12-24 @ 10:25), Max: 36.8 (02-12-24 @ 04:06)  HR: 93 (02-12-24 @ 10:25) (60 - 104)  BP: 97/68 (02-12-24 @ 10:25) (89/58 - 110/81)  RR: 18 (02-12-24 @ 10:25) (16 - 18)  SpO2: 100% (02-12-24 @ 10:25) (98% - 100%)  Eyugdqvzumq-kyc-xlgonxrvt young Omani/Creole speaking male, laying in bed, nad, answering questions appropriately and following commands  Anicteric, but injected sclera, no oral lesions/thrush  RRR, no m/r/g  CTAB, no w/c/r  Abd soft/nt/nd, normoactive bowel sounds  No peripheral edema; R thumb amputated  5/5 strength and normal ROM in all 4 extremities (L slightly weaker than R)  CN 2-12 grossly intact; no gross focal motor/sensory deficits    LABS                        14.1   4.73  )-----------( 189      ( 12 Feb 2024 06:45 )             42.2     02-12    142  |  101  |  11  ----------------------------<  97  4.2   |  29  |  1.10    Ca    9.4      12 Feb 2024 06:45  Phos  3.7     02-11  Mg     2.40     02-11    TPro  7.2  /  Alb  4.2  /  TBili  0.5  /  DBili  x   /  AST  23  /  ALT  16  /  AlkPhos  63  02-12    CAPILLARY BLOOD GLUCOSE  POCT Blood Glucose.: 121 mg/dL (11 Feb 2024 13:10)      MICROBIOLOGY  No recent cx data; pt not currently on abx.      PERTINENT RADIOLOGY  2/12 CTA h/n: Multiple enhancing brain metastases which have increased   since 2/2/2024. No midline shift. Normal CTA of the head and neck. No   large vessel occlusion.    2/2 CT a/p with IV contrast: New bilateral lobar hepatic metastatic disease. Pulmonary nodules better characterized on prior PET/CT, again concerning for metastases.   Postoperative changes of the right thumb with adjacent nonspecific soft   tissue thickening. Correlate with recent PET/CT findings. Slight gastric inflammatory changes along the greater curvature suggestive of gastritis. Correlate clinically.    1/29 MRI Total spine  1. CERVICAL SPINE:   Multiple metastases within the canal most   prominently at the C6 and C7 levels along the cord surface.  2. THORACIC SPINE:   Multiple metastases within the canal including cord   parenchymal lesions at T1 and T4 and multiple lesions along the cord   surface.  3. LUMBAR SPINE:   Multiple metastases within the cauda equina and distal   canal ependymal surfaces. SOLID TUMOR ONCOLOGY HOSPITALIST PROGRESS NOTE    S: No acute events overnight.  Pt reported that he feels unwell and has occasional HA and generalized weakness, but he denied vision changes, n/v. He also complained of mild back pain exacerbated by movement, deep breathing.  His wife reported that he had a seizure on the Sat prior to admission, lasting 20min, a/w generalized tonic/clonic movement of the extremities and neck contracture as well as unresponsiveness, and post-ictal n/v x1; resolved without intervention. She confirmed that pt has been compliant with his seizure medication since his brain tumor resection.    CURRENT MEDICATIONS  (STANDING):  levETIRAcetam   Injectable 1000 milliGRAM(s) IV Push every 12 hours  lidocaine   4% Patch 1 Patch Transdermal every 24 hours  polyethylene glycol 3350 17 Gram(s) Oral daily  senna 2 Tablet(s) Oral at bedtime  (PRN):  acetaminophen     Tablet .. 650 milliGRAM(s) Oral every 6 hours PRN Mild Pain (1 - 3), Moderate Pain (4 - 6)  melatonin 3 milliGRAM(s) Oral at bedtime PRN Insomnia  oxyCODONE    IR 5 milliGRAM(s) Oral every 6 hours PRN Severe Pain (7 - 10)    PHYSICAL EXAM  T(C): 36.7 (02-12-24 @ 10:25), Max: 36.8 (02-12-24 @ 04:06)  HR: 93 (02-12-24 @ 10:25) (60 - 104)  BP: 97/68 (02-12-24 @ 10:25) (89/58 - 110/81)  RR: 18 (02-12-24 @ 10:25) (16 - 18)  SpO2: 100% (02-12-24 @ 10:25) (98% - 100%)  Vdisfhapxyp-tsy-nxuibgdqb young Martiniquais/Creole speaking male, laying in bed, nad, answering questions appropriately and following commands  Anicteric, but injected sclera, no oral lesions/thrush  RRR, no m/r/g  CTAB, no w/c/r  Abd soft/nt/nd, normoactive bowel sounds  No peripheral edema; R thumb amputated  5/5 strength and normal ROM in all 4 extremities (L slightly weaker than R)  CN 2-12 grossly intact; no gross focal motor/sensory deficits    LABS                        14.1   4.73  )-----------( 189      ( 12 Feb 2024 06:45 )             42.2     02-12    142  |  101  |  11  ----------------------------<  97  4.2   |  29  |  1.10    Ca    9.4      12 Feb 2024 06:45  Phos  3.7     02-11  Mg     2.40     02-11    TPro  7.2  /  Alb  4.2  /  TBili  0.5  /  DBili  x   /  AST  23  /  ALT  16  /  AlkPhos  63  02-12    CAPILLARY BLOOD GLUCOSE  POCT Blood Glucose.: 121 mg/dL (11 Feb 2024 13:10)      MICROBIOLOGY  No recent cx data; pt not currently on abx.      PERTINENT RADIOLOGY  2/12 CTA h/n: Multiple enhancing brain metastases which have increased   since 2/2/2024. No midline shift. Normal CTA of the head and neck. No   large vessel occlusion.    2/2 CT a/p with IV contrast: New bilateral lobar hepatic metastatic disease. Pulmonary nodules better characterized on prior PET/CT, again concerning for metastases.   Postoperative changes of the right thumb with adjacent nonspecific soft   tissue thickening. Correlate with recent PET/CT findings. Slight gastric inflammatory changes along the greater curvature suggestive of gastritis. Correlate clinically.    1/29 MRI Total spine  1. CERVICAL SPINE:   Multiple metastases within the canal most   prominently at the C6 and C7 levels along the cord surface.  2. THORACIC SPINE:   Multiple metastases within the canal including cord   parenchymal lesions at T1 and T4 and multiple lesions along the cord   surface.  3. LUMBAR SPINE:   Multiple metastases within the cauda equina and distal   canal ependymal surfaces.

## 2024-02-12 NOTE — OCCUPATIONAL THERAPY INITIAL EVALUATION ADULT - ADDITIONAL COMMENTS
As per wife translating, patient independent with ADLs prior to admission, started to need assist as of Saturday when symptoms began

## 2024-02-12 NOTE — OCCUPATIONAL THERAPY INITIAL EVALUATION ADULT - PERTINENT HX OF CURRENT PROBLEM, REHAB EVAL
Patient is a 48 year old male, admitted for seizure episodes. 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

## 2024-02-13 LAB
ANION GAP SERPL CALC-SCNC: 8 MMOL/L — SIGNIFICANT CHANGE UP (ref 7–14)
BUN SERPL-MCNC: 17 MG/DL — SIGNIFICANT CHANGE UP (ref 7–23)
CALCIUM SERPL-MCNC: 9.1 MG/DL — SIGNIFICANT CHANGE UP (ref 8.4–10.5)
CHLORIDE SERPL-SCNC: 104 MMOL/L — SIGNIFICANT CHANGE UP (ref 98–107)
CO2 SERPL-SCNC: 30 MMOL/L — SIGNIFICANT CHANGE UP (ref 22–31)
CREAT SERPL-MCNC: 1.21 MG/DL — SIGNIFICANT CHANGE UP (ref 0.5–1.3)
EGFR: 74 ML/MIN/1.73M2 — SIGNIFICANT CHANGE UP
GLUCOSE SERPL-MCNC: 85 MG/DL — SIGNIFICANT CHANGE UP (ref 70–99)
HCT VFR BLD CALC: 39.7 % — SIGNIFICANT CHANGE UP (ref 39–50)
HGB BLD-MCNC: 13.5 G/DL — SIGNIFICANT CHANGE UP (ref 13–17)
MAGNESIUM SERPL-MCNC: 2.1 MG/DL — SIGNIFICANT CHANGE UP (ref 1.6–2.6)
MCHC RBC-ENTMCNC: 28.5 PG — SIGNIFICANT CHANGE UP (ref 27–34)
MCHC RBC-ENTMCNC: 34 GM/DL — SIGNIFICANT CHANGE UP (ref 32–36)
MCV RBC AUTO: 83.8 FL — SIGNIFICANT CHANGE UP (ref 80–100)
NRBC # BLD: 0 /100 WBCS — SIGNIFICANT CHANGE UP (ref 0–0)
NRBC # FLD: 0 K/UL — SIGNIFICANT CHANGE UP (ref 0–0)
PHOSPHATE SERPL-MCNC: 3.7 MG/DL — SIGNIFICANT CHANGE UP (ref 2.5–4.5)
PLATELET # BLD AUTO: 173 K/UL — SIGNIFICANT CHANGE UP (ref 150–400)
POTASSIUM SERPL-MCNC: 3.7 MMOL/L — SIGNIFICANT CHANGE UP (ref 3.5–5.3)
POTASSIUM SERPL-SCNC: 3.7 MMOL/L — SIGNIFICANT CHANGE UP (ref 3.5–5.3)
RBC # BLD: 4.74 M/UL — SIGNIFICANT CHANGE UP (ref 4.2–5.8)
RBC # FLD: 12.4 % — SIGNIFICANT CHANGE UP (ref 10.3–14.5)
SODIUM SERPL-SCNC: 142 MMOL/L — SIGNIFICANT CHANGE UP (ref 135–145)
WBC # BLD: 5.2 K/UL — SIGNIFICANT CHANGE UP (ref 3.8–10.5)
WBC # FLD AUTO: 5.2 K/UL — SIGNIFICANT CHANGE UP (ref 3.8–10.5)

## 2024-02-13 PROCEDURE — 99233 SBSQ HOSP IP/OBS HIGH 50: CPT

## 2024-02-13 PROCEDURE — 77280 THER RAD SIMULAJ FIELD SMPL: CPT | Mod: 26

## 2024-02-13 RX ORDER — SODIUM CHLORIDE 9 MG/ML
1000 INJECTION INTRAMUSCULAR; INTRAVENOUS; SUBCUTANEOUS
Refills: 0 | Status: DISCONTINUED | OUTPATIENT
Start: 2024-02-13 | End: 2024-02-17

## 2024-02-13 RX ORDER — DEXAMETHASONE 0.5 MG/5ML
4 ELIXIR ORAL
Refills: 0 | Status: DISCONTINUED | OUTPATIENT
Start: 2024-02-13 | End: 2024-02-20

## 2024-02-13 RX ADMIN — LIDOCAINE 1 PATCH: 4 CREAM TOPICAL at 07:44

## 2024-02-13 RX ADMIN — LIDOCAINE 1 PATCH: 4 CREAM TOPICAL at 05:34

## 2024-02-13 RX ADMIN — SODIUM CHLORIDE 150 MILLILITER(S): 9 INJECTION INTRAMUSCULAR; INTRAVENOUS; SUBCUTANEOUS at 09:13

## 2024-02-13 RX ADMIN — SODIUM CHLORIDE 150 MILLILITER(S): 9 INJECTION INTRAMUSCULAR; INTRAVENOUS; SUBCUTANEOUS at 19:50

## 2024-02-13 RX ADMIN — Medication 650 MILLIGRAM(S): at 21:45

## 2024-02-13 RX ADMIN — Medication 4 MILLIGRAM(S): at 17:29

## 2024-02-13 RX ADMIN — OXYCODONE HYDROCHLORIDE 5 MILLIGRAM(S): 5 TABLET ORAL at 16:43

## 2024-02-13 RX ADMIN — LEVETIRACETAM 1000 MILLIGRAM(S): 250 TABLET, FILM COATED ORAL at 09:10

## 2024-02-13 RX ADMIN — SENNA PLUS 2 TABLET(S): 8.6 TABLET ORAL at 20:50

## 2024-02-13 RX ADMIN — LEVETIRACETAM 1000 MILLIGRAM(S): 250 TABLET, FILM COATED ORAL at 21:00

## 2024-02-13 RX ADMIN — Medication 3 MILLIGRAM(S): at 20:50

## 2024-02-13 RX ADMIN — Medication 650 MILLIGRAM(S): at 20:50

## 2024-02-13 RX ADMIN — LIDOCAINE 1 PATCH: 4 CREAM TOPICAL at 17:05

## 2024-02-13 RX ADMIN — OXYCODONE HYDROCHLORIDE 5 MILLIGRAM(S): 5 TABLET ORAL at 17:32

## 2024-02-13 NOTE — DIETITIAN INITIAL EVALUATION ADULT - PHYSCIAL ASSESSMENT
[facility]: 80.5kg (1-18-24), 85kg (11-9-23), and 85.3kg (7-24-23)./overweight

## 2024-02-13 NOTE — DIETITIAN INITIAL EVALUATION ADULT - REASON FOR ADMISSION
Convulsions.  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 He also began outpt WBRT as well as RT to C5-T5 recently (unclear how many fxs are completed to date).  He was readmitted to [facility] on 2/10 s/p seizure at home- admission imaging confirms progression of CNS mets since 2/2/24 imaging.

## 2024-02-13 NOTE — DIETITIAN INITIAL EVALUATION ADULT - ADD RECOMMEND
1. Encourage PO intake and honor food preferences as able.   2. Continue present diet order as it remains appropriate at this time.   3. Nursing to document PO in RN flow sheets and monitor weekly weights.   4. Continue to monitor skin integrity, bowel regimen, and nutrition pertinent labs.

## 2024-02-13 NOTE — DIETITIAN INITIAL EVALUATION ADULT - PERTINENT LABORATORY DATA
02-13    142  |  104  |  17  ----------------------------<  85  3.7   |  30  |  1.21    Ca    9.1      13 Feb 2024 06:00  Phos  3.7     02-13  Mg     2.10     02-13    TPro  7.2  /  Alb  4.2  /  TBili  0.5  /  DBili  x   /  AST  23  /  ALT  16  /  AlkPhos  63  02-12  A1C with Estimated Average Glucose Result: 5.7 % (03-16-23 @ 09:36)

## 2024-02-13 NOTE — PROGRESS NOTE ADULT - SUBJECTIVE AND OBJECTIVE BOX
NEURO-ONCOLOGY    48m with acral melanoma  known brain and lepto mets  s/p prior surgery to resect left frontal met  SRS to multiple lesions   prior intolerance of ipi/nivo, more recently on Opdualag  MRI brain and spine in 1/24 had showed numerous ventricular deposits as well as cord and lepto lesions in lower C and mid T cord  plan was for WBRT and spine RT  Admitted with seizures at home off meds and steroids  here CT head with increased mets  main complaint is right sided arm pain and weakness, some facial asymmetry  No seizures on keppra    INTERVAL HISTORY:  Seizure free.  Right arm weakness.   Neck pain.  No headaches.     MEDICATIONS  (STANDING):  influenza   Vaccine 0.5 milliLiter(s) IntraMuscular once  levETIRAcetam   Injectable 1000 milliGRAM(s) IV Push every 12 hours  lidocaine   4% Patch 1 Patch Transdermal every 24 hours  polyethylene glycol 3350 17 Gram(s) Oral daily  senna 2 Tablet(s) Oral at bedtime  sodium chloride 0.9%. 1000 milliLiter(s) (150 mL/Hr) IV Continuous <Continuous>    Vital Signs Last 24 Hrs  T(C): 37.2 (13 Feb 2024 05:32), Max: 37.2 (13 Feb 2024 05:32)  T(F): 99 (13 Feb 2024 05:32), Max: 99 (13 Feb 2024 05:32)  HR: 67 (13 Feb 2024 05:32) (63 - 78)  BP: 103/69 (13 Feb 2024 05:32) (92/60 - 111/79)  BP(mean): --  RR: 17 (13 Feb 2024 05:32) (17 - 18)  SpO2: 96% (13 Feb 2024 05:32) (96% - 100%)    Parameters below as of 13 Feb 2024 05:32  Patient On (Oxygen Delivery Method): room air    (Exam as noted with exceptions in parentheses)    General:  Healthy appearing Male well groomed and without deformities. KPS is 60    Mental Status:  Awake, alert and attentive. Oriented to person, place and time. Recent and remote memory intact. Normal concentration. Fluent spontaneous speech with intact naming and repetition. Normal fund of knowledge.      Cranial Nerves: II: Full visual fields. III,IV,VI:Pupils round, reactive to light. Full extraocular movements. No nystagmus. V: Normal bilateral bite, facial sensation. VII: No facial weakness. VIII: Hearing intact. IX,X: Palate midline, intact gag. XI:  Sternocleidomastoids normal. XII: Tongue protrudes midline. No dysarthria.  (flat NLF on right)    Motor:  Normal tone, bulk and power throughout including arms and legs, proximal and distal. No pronator drift. No abnormal movements.  (RUE with weakness of triceps and WE)    Sensation: Normal in arms, legs and trunk to pin, proprioception and vibration. Negative Romberg.  (diminished on right side)    Coordination: No dysmetria or dysdiadochokinesis bilaterally. Normal heel-shin testing.     Gait: Normal including heel and toe walking. Normal station.  (walks on own)    Reflexes: Normoactive and symmetric throughout. Absent Babinski bilaterally. (brisk throughout)    Abd: NT  Vasc: no edema  LCTAB    NEURO-IMAGING:  reviewed personally ct head and recent MRIs as above.  vents are stable.  Not much more brain edema.

## 2024-02-13 NOTE — PROGRESS NOTE ADULT - ASSESSMENT
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 He also began outpt WBRT as well as RT to C5-T5 recently (unclear how many fxs are completed to date).  He was readmitted to [facility] on 2/10 s/p seizure at home- admission imaging confirms progression of CNS mets since 2/2/24 imaging.    ACTIVE PROBLEMS  Metastatic melanoma to multiple sites including brain/LMD  Goals of care counseling, discussion  Secondary seizures  Encounter for antineoplastic radiation therapy  Cancer related pain    - Metastatic melanoma  - Goals of care counseling, discussion  Pt with rapidly progressive disease despite receiving SOC systemic therapy  Pt does not meet HLA requirements for Tebentefusp  Hospice is appropriate; planning to address hospice placement at home with pt and his wife on this admission  Long-term prognosis remains poor    - Secondary seizures  - CNS mets, LMD  Appreciate Neuro Onc consult (Dr. 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 He also began outpt WBRT as well as RT to C5-T5 recently (unclear how many fxs are completed to date).  He was readmitted to [facility] on 2/10 s/p seizure at home- admission imaging confirms progression of CNS mets since 2/2/24 imaging.    ACTIVE PROBLEMS  Metastatic melanoma to multiple sites including brain/LMD  Goals of care counseling, discussion  Secondary seizures  Encounter for antineoplastic radiation therapy  Cancer related pain    - Metastatic melanoma  - Goals of care counseling, discussion  Pt with rapidly progressive disease despite receiving SOC systemic therapy  Pt does not meet HLA requirements for Tebentefusp  Hospice is appropriate; planning to address hospice placement at home with pt and his wife on this admission  Long-term prognosis remains poor    - Secondary seizures  - CNS mets, LMD  Pt with worsening R arm weakness, paresthesia today  Appreciate Neuro Onc consult (Dr. Lozano)  Appreciate Rad Onc consult  Starting Dex PO 4mg BID with ppi ppx  Pt to complete WBRT and pall RT to C5-T5 on this admission (tx schedule TBD)  Continuing Keppra IV 1000mg q12  No role for steroids at this time    - Cancer related pain: continuing Oxy 5mg q6/prn with bowel regimen to prevent OIC

## 2024-02-13 NOTE — PROGRESS NOTE ADULT - ASSESSMENT
Lepto from melanoma wiht cord and brain lesions.  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      Overall prognosis is quite poor.  He has not yet started RT, so dont think there is much role for new imaging of spine and brain.  Consider inpatient RT start, and possible dc if well tolerated.  Unless he improves considerably, not clear that he will be able to get more therapy after RT    WBRT, spine to C and T spine RT planning  keppra 1000 bid  Do not think he needs MRIs, nor VEEG as back to baseline  would start steroids 4 bid as symptomatic and also getting cord RT

## 2024-02-13 NOTE — DIETITIAN INITIAL EVALUATION ADULT - PERTINENT MEDS FT
MEDICATIONS  (STANDING):  dexAMETHasone  Injectable 4 milliGRAM(s) IV Push two times a day  influenza   Vaccine 0.5 milliLiter(s) IntraMuscular once  levETIRAcetam   Injectable 1000 milliGRAM(s) IV Push every 12 hours  lidocaine   4% Patch 1 Patch Transdermal every 24 hours  polyethylene glycol 3350 17 Gram(s) Oral daily  senna 2 Tablet(s) Oral at bedtime  sodium chloride 0.9%. 1000 milliLiter(s) (150 mL/Hr) IV Continuous <Continuous>    MEDICATIONS  (PRN):  acetaminophen     Tablet .. 650 milliGRAM(s) Oral every 6 hours PRN Mild Pain (1 - 3), Moderate Pain (4 - 6)  melatonin 3 milliGRAM(s) Oral at bedtime PRN Insomnia  oxyCODONE    IR 5 milliGRAM(s) Oral every 6 hours PRN Severe Pain (7 - 10)

## 2024-02-13 NOTE — PROGRESS NOTE ADULT - SUBJECTIVE AND OBJECTIVE BOX
SOLID TUMOR ONCOLOGY HOSPITALIST PROGRESS NOTE    S: No acute events overnight.  Pt reported feeling better and had no new complaints this am.    CURRENT MEDICATIONS  (STANDING):  influenza   Vaccine 0.5 milliLiter(s) IntraMuscular once  levETIRAcetam   Injectable 1000 milliGRAM(s) IV Push every 12 hours  lidocaine   4% Patch 1 Patch Transdermal every 24 hours  polyethylene glycol 3350 17 Gram(s) Oral daily  senna 2 Tablet(s) Oral at bedtime  sodium chloride 0.9%. 1000 milliLiter(s) (150 mL/Hr) IV Continuous <Continuous>  (PRN):  acetaminophen     Tablet .. 650 milliGRAM(s) Oral every 6 hours PRN Mild Pain (1 - 3), Moderate Pain (4 - 6)  melatonin 3 milliGRAM(s) Oral at bedtime PRN Insomnia  oxyCODONE    IR 5 milliGRAM(s) Oral every 6 hours PRN Severe Pain (7 - 10)      PHYSICAL EXAM  T(C): 37.2 (02-13-24 @ 05:32), Max: 37.2 (02-13-24 @ 05:32)  HR: 67 (02-13-24 @ 05:32) (63 - 78)  BP: 103/69 (02-13-24 @ 05:32) (92/60 - 111/79)  RR: 17 (02-13-24 @ 05:32) (17 - 18)  SpO2: 96% (02-13-24 @ 05:32) (96% - 100%)  Nnvfbzfkavh-ogj-dtuzjffwg young [place]ian/Creole speaking male, laying in bed, nad, answering questions appropriately and following commands  Anicteric, but injected sclera, no oral lesions/thrush  RRR, no m/r/g  CTAB, no w/c/r  Abd soft/nt/nd, normoactive bowel sounds  No peripheral edema; R thumb amputated  5/5 strength and normal ROM in all 4 extremities (L slightly weaker than R)  CN 2-12 grossly intact; no gross focal motor/sensory deficits    LABS                        13.5   5.20  )-----------( 173      ( 13 Feb 2024 06:00 )             39.7     02-13    142  |  104  |  17  ----------------------------<  85  3.7   |  30  |  1.21    Ca    9.1      13 Feb 2024 06:00  Phos  3.7     02-13  Mg     2.10     02-13    TPro  7.2  /  Alb  4.2  /  TBili  0.5  /  DBili  x   /  AST  23  /  ALT  16  /  AlkPhos  63  02-12    MICROBIOLOGY  No recent cx data; pt not currently on abx.      PERTINENT RADIOLOGY  2/12 CTA h/n: Multiple enhancing brain metastases which have increased   since 2/2/2024. No midline shift. Normal CTA of the head and neck. No   large vessel occlusion.    2/2 CT a/p with IV contrast: New bilateral lobar hepatic metastatic disease. Pulmonary nodules better characterized on prior PET/CT, again concerning for metastases.   Postoperative changes of the right thumb with adjacent nonspecific soft   tissue thickening. Correlate with recent PET/CT findings. Slight gastric inflammatory changes along the greater curvature suggestive of gastritis. Correlate clinically.    1/29 MRI Total spine  1. CERVICAL SPINE:   Multiple metastases within the canal most   prominently at the C6 and C7 levels along the cord surface.  2. THORACIC SPINE:   Multiple metastases within the canal including cord   parenchymal lesions at T1 and T4 and multiple lesions along the cord   surface.  3. LUMBAR SPINE:   Multiple metastases within the cauda equina and distal   canal ependymal surfaces.

## 2024-02-13 NOTE — DIETITIAN INITIAL EVALUATION ADULT - ORAL INTAKE PTA/DIET HISTORY
Patient has minimal engagement with writer during interview, likely related to language barrier.  Language [employer] not utilized for this nutrition session today due to family (Spouse) who can speak English at bedside to report diet and weight history.  Patient has been following a healthy eating diet, having smoothie daily, no pork, no beef, and no rice.  Spouse reported that pt has decreased appetite and weight loss for the past couple months.

## 2024-02-13 NOTE — DIETITIAN INITIAL EVALUATION ADULT - OTHER INFO
Patient is A&O x2-3 at baseline.  Patient is tolerating a diet order of regular without restriction.  Pt's spouse reported that Patient has decreased PO intake with fair appetite.  Estimated energy needs is likely </= 75% >/= 1 month.  Food and fluid preferences discussed.  Denied difficulty chewing or swallowing.  No GI distress such as nausea, vomit, diarrhea, constipation.  Pt is on bowel regimen, last BM reported 2/12 per RN flowsheets.  Skin noted intact without edema, no pressure injury per RN flowsheets.    CBW 77.6kg (2-12), height 175.3cm/69 inch, BMI 25.3.  UBW reported~ 178 lbs/80.9kg PTA.  Per [facility] weight history: 80.5kg (1-18-24), 85kg (11-9-23), and 85.3kg (7-24-23).  Noted a 2.9kg/6.38lbs/3.6% weight loss in 1 month and a 7.4kg/16.3 lbs/8.7% weight loss in 3 months.  Labs 2/13 reviewed all within normal limits.  Writer offered nutritional supplements, spouse declined at this time.  Will honor food and fluid preferences and enter in CBORD.  Patient prefers fresh fruits at meals, Might Shake (4 [id number], 6gm pro) at lunch and dinner.  Pt is meeting criteria for malnutrition (based on inadequate energy intake and weight loss) at this time.

## 2024-02-14 LAB
ALBUMIN SERPL ELPH-MCNC: 4 G/DL — SIGNIFICANT CHANGE UP (ref 3.3–5)
ALP SERPL-CCNC: 60 U/L — SIGNIFICANT CHANGE UP (ref 40–120)
ALT FLD-CCNC: 18 U/L — SIGNIFICANT CHANGE UP (ref 4–41)
ANION GAP SERPL CALC-SCNC: 12 MMOL/L — SIGNIFICANT CHANGE UP (ref 7–14)
AST SERPL-CCNC: 17 U/L — SIGNIFICANT CHANGE UP (ref 4–40)
BASOPHILS # BLD AUTO: 0.02 K/UL — SIGNIFICANT CHANGE UP (ref 0–0.2)
BASOPHILS NFR BLD AUTO: 0.4 % — SIGNIFICANT CHANGE UP (ref 0–2)
BILIRUB SERPL-MCNC: 0.4 MG/DL — SIGNIFICANT CHANGE UP (ref 0.2–1.2)
BUN SERPL-MCNC: 12 MG/DL — SIGNIFICANT CHANGE UP (ref 7–23)
CALCIUM SERPL-MCNC: 9.5 MG/DL — SIGNIFICANT CHANGE UP (ref 8.4–10.5)
CHLORIDE SERPL-SCNC: 104 MMOL/L — SIGNIFICANT CHANGE UP (ref 98–107)
CO2 SERPL-SCNC: 26 MMOL/L — SIGNIFICANT CHANGE UP (ref 22–31)
CREAT SERPL-MCNC: 1.11 MG/DL — SIGNIFICANT CHANGE UP (ref 0.5–1.3)
EGFR: 82 ML/MIN/1.73M2 — SIGNIFICANT CHANGE UP
EOSINOPHIL # BLD AUTO: 0.02 K/UL — SIGNIFICANT CHANGE UP (ref 0–0.5)
EOSINOPHIL NFR BLD AUTO: 0.4 % — SIGNIFICANT CHANGE UP (ref 0–6)
GLUCOSE SERPL-MCNC: 84 MG/DL — SIGNIFICANT CHANGE UP (ref 70–99)
HCT VFR BLD CALC: 40.3 % — SIGNIFICANT CHANGE UP (ref 39–50)
HGB BLD-MCNC: 13.6 G/DL — SIGNIFICANT CHANGE UP (ref 13–17)
IANC: 3.7 K/UL — SIGNIFICANT CHANGE UP (ref 1.8–7.4)
IMM GRANULOCYTES NFR BLD AUTO: 0.2 % — SIGNIFICANT CHANGE UP (ref 0–0.9)
LYMPHOCYTES # BLD AUTO: 1.49 K/UL — SIGNIFICANT CHANGE UP (ref 1–3.3)
LYMPHOCYTES # BLD AUTO: 27.2 % — SIGNIFICANT CHANGE UP (ref 13–44)
MAGNESIUM SERPL-MCNC: 2.2 MG/DL — SIGNIFICANT CHANGE UP (ref 1.6–2.6)
MCHC RBC-ENTMCNC: 27.9 PG — SIGNIFICANT CHANGE UP (ref 27–34)
MCHC RBC-ENTMCNC: 33.7 GM/DL — SIGNIFICANT CHANGE UP (ref 32–36)
MCV RBC AUTO: 82.8 FL — SIGNIFICANT CHANGE UP (ref 80–100)
MONOCYTES # BLD AUTO: 0.24 K/UL — SIGNIFICANT CHANGE UP (ref 0–0.9)
MONOCYTES NFR BLD AUTO: 4.4 % — SIGNIFICANT CHANGE UP (ref 2–14)
NEUTROPHILS # BLD AUTO: 3.7 K/UL — SIGNIFICANT CHANGE UP (ref 1.8–7.4)
NEUTROPHILS NFR BLD AUTO: 67.4 % — SIGNIFICANT CHANGE UP (ref 43–77)
NRBC # BLD: 0 /100 WBCS — SIGNIFICANT CHANGE UP (ref 0–0)
NRBC # FLD: 0 K/UL — SIGNIFICANT CHANGE UP (ref 0–0)
PLATELET # BLD AUTO: 179 K/UL — SIGNIFICANT CHANGE UP (ref 150–400)
POTASSIUM SERPL-MCNC: 3.9 MMOL/L — SIGNIFICANT CHANGE UP (ref 3.5–5.3)
POTASSIUM SERPL-SCNC: 3.9 MMOL/L — SIGNIFICANT CHANGE UP (ref 3.5–5.3)
PROT SERPL-MCNC: 7.3 G/DL — SIGNIFICANT CHANGE UP (ref 6–8.3)
RBC # BLD: 4.87 M/UL — SIGNIFICANT CHANGE UP (ref 4.2–5.8)
RBC # FLD: 12.6 % — SIGNIFICANT CHANGE UP (ref 10.3–14.5)
SODIUM SERPL-SCNC: 142 MMOL/L — SIGNIFICANT CHANGE UP (ref 135–145)
WBC # BLD: 5.48 K/UL — SIGNIFICANT CHANGE UP (ref 3.8–10.5)
WBC # FLD AUTO: 5.48 K/UL — SIGNIFICANT CHANGE UP (ref 3.8–10.5)

## 2024-02-14 PROCEDURE — 99233 SBSQ HOSP IP/OBS HIGH 50: CPT

## 2024-02-14 RX ORDER — SODIUM CHLORIDE 9 MG/ML
1000 INJECTION INTRAMUSCULAR; INTRAVENOUS; SUBCUTANEOUS
Refills: 0 | Status: DISCONTINUED | OUTPATIENT
Start: 2024-02-14 | End: 2024-02-17

## 2024-02-14 RX ADMIN — LEVETIRACETAM 1000 MILLIGRAM(S): 250 TABLET, FILM COATED ORAL at 22:09

## 2024-02-14 RX ADMIN — SENNA PLUS 2 TABLET(S): 8.6 TABLET ORAL at 22:08

## 2024-02-14 RX ADMIN — OXYCODONE HYDROCHLORIDE 5 MILLIGRAM(S): 5 TABLET ORAL at 20:27

## 2024-02-14 RX ADMIN — LIDOCAINE 1 PATCH: 4 CREAM TOPICAL at 05:40

## 2024-02-14 RX ADMIN — LEVETIRACETAM 1000 MILLIGRAM(S): 250 TABLET, FILM COATED ORAL at 09:06

## 2024-02-14 RX ADMIN — Medication 4 MILLIGRAM(S): at 05:40

## 2024-02-14 RX ADMIN — Medication 4 MILLIGRAM(S): at 17:17

## 2024-02-14 RX ADMIN — LIDOCAINE 1 PATCH: 4 CREAM TOPICAL at 07:58

## 2024-02-14 RX ADMIN — SODIUM CHLORIDE 100 MILLILITER(S): 9 INJECTION INTRAMUSCULAR; INTRAVENOUS; SUBCUTANEOUS at 09:06

## 2024-02-14 RX ADMIN — LIDOCAINE 1 PATCH: 4 CREAM TOPICAL at 17:14

## 2024-02-14 RX ADMIN — SODIUM CHLORIDE 100 MILLILITER(S): 9 INJECTION INTRAMUSCULAR; INTRAVENOUS; SUBCUTANEOUS at 05:53

## 2024-02-14 RX ADMIN — OXYCODONE HYDROCHLORIDE 5 MILLIGRAM(S): 5 TABLET ORAL at 21:10

## 2024-02-14 NOTE — PROGRESS NOTE ADULT - SUBJECTIVE AND OBJECTIVE BOX
SOLID TUMOR ONCOLOGY HOSPITALIST PROGRESS NOTE    S: No acute events overnight.  Pt had no new complaints this morning.    CURRENT MEDICATIONS  (STANDING):  dexAMETHasone  Injectable 4 milliGRAM(s) IV Push two times a day  influenza   Vaccine 0.5 milliLiter(s) IntraMuscular once  levETIRAcetam   Injectable 1000 milliGRAM(s) IV Push every 12 hours  lidocaine   4% Patch 1 Patch Transdermal every 24 hours  polyethylene glycol 3350 17 Gram(s) Oral daily  senna 2 Tablet(s) Oral at bedtime  sodium chloride 0.9%. 1000 milliLiter(s) (100 mL/Hr) IV Continuous <Continuous>  sodium chloride 0.9%. 1000 milliLiter(s) (150 mL/Hr) IV Continuous <Continuous>  (PRN):  acetaminophen     Tablet .. 650 milliGRAM(s) Oral every 6 hours PRN Mild Pain (1 - 3), Moderate Pain (4 - 6)  melatonin 3 milliGRAM(s) Oral at bedtime PRN Insomnia  oxyCODONE    IR 5 milliGRAM(s) Oral every 6 hours PRN Severe Pain (7 - 10)      PHYSICAL EXAM  T(C): 36.9 (02-14-24 @ 05:30), Max: 36.9 (02-13-24 @ 20:50)  HR: 82 (02-14-24 @ 05:45) (74 - 88)  BP: 109/67 (02-14-24 @ 05:45) (95/63 - 113/71)  RR: 18 (02-14-24 @ 05:45) (16 - 18)  SpO2: 100% (02-14-24 @ 05:45) (100% - 100%)    02-13-24 @ 07:01  -  02-14-24 @ 07:00  --------------------------------------------------------  IN: 3050 mL / OUT: 2260 mL / NET: 790 mL  Lynrrfjzpmz-rmr-vpjvfcdhf young Kyrgyz/Creole speaking male, laying in bed, nad, answering questions appropriately and following commands  Anicteric, but injected sclera, no oral lesions/thrush  RRR, no m/r/g  CTAB, no w/c/r  Abd soft/nt/nd, normoactive bowel sounds  No peripheral edema; R thumb amputated  5/5 strength and normal ROM in all 4 extremities (L slightly weaker than R)  CN 2-12 grossly intact; no gross focal motor/sensory deficits      LABS                        13.6   5.48  )-----------( 179      ( 14 Feb 2024 05:17 )             40.3     02-14    142  |  104  |  12  ----------------------------<  84  3.9   |  26  |  1.11    Ca    9.5      14 Feb 2024 05:17  Phos  3.7     02-13  Mg     2.20     02-14    TPro  7.3  /  Alb  4.0  /  TBili  0.4  /  DBili  x   /  AST  17  /  ALT  18  /  AlkPhos  60  02-14    MICROBIOLOGY  No recent cx data; pt not currently on abx.      PERTINENT RADIOLOGY  2/12 CTA h/n: Multiple enhancing brain metastases which have increased   since 2/2/2024. No midline shift. Normal CTA of the head and neck. No   large vessel occlusion.    2/2 CT a/p with IV contrast: New bilateral lobar hepatic metastatic disease. Pulmonary nodules better characterized on prior PET/CT, again concerning for metastases.   Postoperative changes of the right thumb with adjacent nonspecific soft   tissue thickening. Correlate with recent PET/CT findings. Slight gastric inflammatory changes along the greater curvature suggestive of gastritis. Correlate clinically.    1/29 MRI Total spine  1. CERVICAL SPINE:   Multiple metastases within the canal most   prominently at the C6 and C7 levels along the cord surface.  2. THORACIC SPINE:   Multiple metastases within the canal including cord   parenchymal lesions at T1 and T4 and multiple lesions along the cord   surface.  3. LUMBAR SPINE:   Multiple metastases within the cauda equina and distal   canal ependymal surfaces.

## 2024-02-14 NOTE — PROGRESS NOTE ADULT - ASSESSMENT
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 He also began outpt WBRT as well as RT to C5-T5 recently (unclear how many fxs are completed to date).  He was readmitted to [facility] on 2/10 s/p seizure at home- admission imaging confirms progression of CNS mets since 2/2/24 imaging.    ACTIVE PROBLEMS  Metastatic melanoma to multiple sites including brain/LMD  Goals of care counseling, discussion  Secondary seizures  Encounter for antineoplastic radiation therapy  Cancer related pain    - Metastatic melanoma  - Goals of care counseling, discussion  Pt with rapidly progressive disease despite receiving SOC systemic therapy  Pt does not meet HLA requirements for Tebentefusp  Hospice is appropriate; planning to address hospice placement at home with pt and his wife on this admission  Long-term prognosis remains poor    - Secondary seizures  - CNS mets, LMD  Pt with worsening R arm weakness, paresthesia today  Appreciate Neuro Onc consult (Dr. Lozano)  Appreciate Rad Onc consult  Continuing Dex PO 4mg BID with ppi ppx  Pt to complete WBRT and pall RT to C5-T5 on this admission (tx schedule TBD)  Continuing Keppra IV 1000mg q12  No role for steroids at this time    - Cancer related pain: continuing Oxy 5mg q6/prn with bowel regimen to prevent OIC

## 2024-02-15 ENCOUNTER — APPOINTMENT (OUTPATIENT)
Dept: INFUSION THERAPY | Facility: HOSPITAL | Age: 49
End: 2024-02-15

## 2024-02-15 ENCOUNTER — APPOINTMENT (OUTPATIENT)
Dept: HEMATOLOGY ONCOLOGY | Facility: CLINIC | Age: 49
End: 2024-02-15

## 2024-02-15 LAB
ANION GAP SERPL CALC-SCNC: 11 MMOL/L — SIGNIFICANT CHANGE UP (ref 7–14)
BUN SERPL-MCNC: 13 MG/DL — SIGNIFICANT CHANGE UP (ref 7–23)
CALCIUM SERPL-MCNC: 9.9 MG/DL — SIGNIFICANT CHANGE UP (ref 8.4–10.5)
CHLORIDE SERPL-SCNC: 102 MMOL/L — SIGNIFICANT CHANGE UP (ref 98–107)
CO2 SERPL-SCNC: 29 MMOL/L — SIGNIFICANT CHANGE UP (ref 22–31)
CREAT SERPL-MCNC: 1 MG/DL — SIGNIFICANT CHANGE UP (ref 0.5–1.3)
EGFR: 93 ML/MIN/1.73M2 — SIGNIFICANT CHANGE UP
GLUCOSE SERPL-MCNC: 111 MG/DL — HIGH (ref 70–99)
HCT VFR BLD CALC: 42.3 % — SIGNIFICANT CHANGE UP (ref 39–50)
HGB BLD-MCNC: 14.2 G/DL — SIGNIFICANT CHANGE UP (ref 13–17)
MAGNESIUM SERPL-MCNC: 2.3 MG/DL — SIGNIFICANT CHANGE UP (ref 1.6–2.6)
MCHC RBC-ENTMCNC: 28.3 PG — SIGNIFICANT CHANGE UP (ref 27–34)
MCHC RBC-ENTMCNC: 33.6 GM/DL — SIGNIFICANT CHANGE UP (ref 32–36)
MCV RBC AUTO: 84.4 FL — SIGNIFICANT CHANGE UP (ref 80–100)
NRBC # BLD: 0 /100 WBCS — SIGNIFICANT CHANGE UP (ref 0–0)
NRBC # FLD: 0 K/UL — SIGNIFICANT CHANGE UP (ref 0–0)
PHOSPHATE SERPL-MCNC: 3.9 MG/DL — SIGNIFICANT CHANGE UP (ref 2.5–4.5)
PLATELET # BLD AUTO: 180 K/UL — SIGNIFICANT CHANGE UP (ref 150–400)
POTASSIUM SERPL-MCNC: 4 MMOL/L — SIGNIFICANT CHANGE UP (ref 3.5–5.3)
POTASSIUM SERPL-SCNC: 4 MMOL/L — SIGNIFICANT CHANGE UP (ref 3.5–5.3)
RBC # BLD: 5.01 M/UL — SIGNIFICANT CHANGE UP (ref 4.2–5.8)
RBC # FLD: 12.4 % — SIGNIFICANT CHANGE UP (ref 10.3–14.5)
SODIUM SERPL-SCNC: 142 MMOL/L — SIGNIFICANT CHANGE UP (ref 135–145)
WBC # BLD: 6.29 K/UL — SIGNIFICANT CHANGE UP (ref 3.8–10.5)
WBC # FLD AUTO: 6.29 K/UL — SIGNIFICANT CHANGE UP (ref 3.8–10.5)

## 2024-02-15 PROCEDURE — 99233 SBSQ HOSP IP/OBS HIGH 50: CPT

## 2024-02-15 PROCEDURE — 99222 1ST HOSP IP/OBS MODERATE 55: CPT

## 2024-02-15 RX ORDER — OXYCODONE HYDROCHLORIDE 5 MG/1
5 TABLET ORAL EVERY 6 HOURS
Refills: 0 | Status: DISCONTINUED | OUTPATIENT
Start: 2024-02-15 | End: 2024-02-20

## 2024-02-15 RX ORDER — PANTOPRAZOLE SODIUM 20 MG/1
40 TABLET, DELAYED RELEASE ORAL
Refills: 0 | Status: DISCONTINUED | OUTPATIENT
Start: 2024-02-15 | End: 2024-02-22

## 2024-02-15 RX ADMIN — LEVETIRACETAM 1000 MILLIGRAM(S): 250 TABLET, FILM COATED ORAL at 10:13

## 2024-02-15 RX ADMIN — POLYETHYLENE GLYCOL 3350 17 GRAM(S): 17 POWDER, FOR SOLUTION ORAL at 10:12

## 2024-02-15 RX ADMIN — Medication 4 MILLIGRAM(S): at 18:02

## 2024-02-15 RX ADMIN — OXYCODONE HYDROCHLORIDE 5 MILLIGRAM(S): 5 TABLET ORAL at 06:10

## 2024-02-15 RX ADMIN — PANTOPRAZOLE SODIUM 40 MILLIGRAM(S): 20 TABLET, DELAYED RELEASE ORAL at 18:02

## 2024-02-15 RX ADMIN — Medication 4 MILLIGRAM(S): at 05:24

## 2024-02-15 RX ADMIN — Medication 3 MILLIGRAM(S): at 21:19

## 2024-02-15 RX ADMIN — Medication 10 MILLIGRAM(S): at 12:48

## 2024-02-15 RX ADMIN — LIDOCAINE 1 PATCH: 4 CREAM TOPICAL at 07:45

## 2024-02-15 RX ADMIN — Medication 30 MILLILITER(S): at 18:02

## 2024-02-15 RX ADMIN — LIDOCAINE 1 PATCH: 4 CREAM TOPICAL at 05:24

## 2024-02-15 RX ADMIN — SENNA PLUS 2 TABLET(S): 8.6 TABLET ORAL at 21:19

## 2024-02-15 RX ADMIN — OXYCODONE HYDROCHLORIDE 5 MILLIGRAM(S): 5 TABLET ORAL at 21:38

## 2024-02-15 RX ADMIN — OXYCODONE HYDROCHLORIDE 5 MILLIGRAM(S): 5 TABLET ORAL at 05:26

## 2024-02-15 RX ADMIN — OXYCODONE HYDROCHLORIDE 5 MILLIGRAM(S): 5 TABLET ORAL at 22:20

## 2024-02-15 RX ADMIN — OXYCODONE HYDROCHLORIDE 5 MILLIGRAM(S): 5 TABLET ORAL at 17:20

## 2024-02-15 RX ADMIN — LEVETIRACETAM 1000 MILLIGRAM(S): 250 TABLET, FILM COATED ORAL at 21:20

## 2024-02-15 RX ADMIN — OXYCODONE HYDROCHLORIDE 5 MILLIGRAM(S): 5 TABLET ORAL at 16:27

## 2024-02-15 NOTE — CHART NOTE - NSCHARTNOTEFT_GEN_A_CORE
Spoke with pt's wife Leonela at the bedside.  She understands that his disease is very bad and has progressed through multiple lines of therapy.  Dr. Marcos had spoken with her and told her that he does not have much options in regards to his cancer directed therapy, and likely not provide him with much benefits.  She asked about hospice care, explained to her the difference between inpatient vs home hospice.  She had shared that she just recently got let-go of her job and will not able to care for him full time at home, as she is actively looking for another job. She also stated that it would be cause her a lot of stress for him to be at home, cause she wouldn't know how to appropriately treat/manage his symptoms. Will discuss possible in-pt options with SW.      Law Luna PA-C [facility]AS  pg [phone]

## 2024-02-15 NOTE — PROGRESS NOTE ADULT - ASSESSMENT
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 He also began outpt WBRT as well as RT to C5-T5 recently (unclear how many fxs are completed to date).  He was readmitted to [facility] on 2/10 s/p seizure at home- admission imaging confirms progression of CNS mets since 2/2/24 imaging.    ACTIVE PROBLEMS  Metastatic melanoma to multiple sites including brain/LMD  Goals of care counseling, discussion  Secondary seizures  Encounter for antineoplastic radiation therapy  Cancer related pain    - Metastatic melanoma  - Goals of care counseling, discussion  Pt with rapidly progressive disease despite receiving SOC systemic therapy  Pt does not meet HLA requirements for Tebentefusp  Hospice is appropriate; planning to address hospice placement at home with pt and his wife on this admission  Long-term prognosis remains poor    - Secondary seizures  - CNS mets, LMD  Appreciate Neuro Onc consult (Dr. 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 He also began outpt WBRT as well as RT to C5-T5 recently (unclear how many fxs are completed to date).  He was readmitted to [facility] on 2/10 s/p seizure at home- admission imaging confirms progression of CNS mets since 2/2/24 imaging.    ACTIVE PROBLEMS  Metastatic melanoma to multiple sites including brain/LMD  Goals of care counseling, discussion  Secondary seizures  Encounter for antineoplastic radiation therapy  Cancer related pain  Constipation    - Metastatic melanoma  - Goals of care counseling, discussion  Pt with rapidly progressive disease despite receiving SOC systemic therapy  Pt does not meet HLA requirements for Tebentefusp  Hospice is appropriate; planning to address hospice placement at home with pt and his wife on this admission  Long-term prognosis remains poor    - Secondary seizures  - CNS mets, LMD  Appreciate Neuro Onc consult (Dr. Lozano)  Appreciate Rad Onc consult  Continuing Dex PO 4mg BID with ppi ppx  Pt to complete WBRT (10fxs) and pall RT to C5-T5 (5fxs) on this admission (tx schedule TBD)- tx duration 2/13-26  Continuing Keppra IV 1000mg q12  Continuing Dex IV 4mg BID with ppi ppx    - Cancer related pain: continuing Oxy 5mg q6/prn with bowel regimen to prevent OIC    - Constipation  Possibly due to opioids, immobility  Continuing Senna 2 tabs nightly  Continuing Miralax 1 packet daily  Dulcolax suppository ordered today

## 2024-02-15 NOTE — PROGRESS NOTE ADULT - SUBJECTIVE AND OBJECTIVE BOX
NEURO-ONCOLOGY    48m with acral melanoma  known brain and lepto mets  s/p prior surgery to resect left frontal met  SRS to multiple lesions   prior intolerance of ipi/nivo, more recently on Opdualag  MRI brain and spine in 1/24 had showed numerous ventricular deposits as well as cord and lepto lesions in lower C and mid T cord  plan was for WBRT and spine RT  Admitted with seizures at home off meds and steroids  here CT head with increased mets  main complaint is right sided arm pain and weakness, some facial asymmetry  No seizures on keppra    INTERVAL HISTORY:  Seizure free.  Right arm weakness.  complains of pain in RUQ radiating to back.  Also pain with eating. RT to brain, spine ongoing.     MEDICATIONS  (STANDING):  dexAMETHasone  Injectable 4 milliGRAM(s) IV Push two times a day  influenza   Vaccine 0.5 milliLiter(s) IntraMuscular once  levETIRAcetam   Injectable 1000 milliGRAM(s) IV Push every 12 hours  lidocaine   4% Patch 1 Patch Transdermal every 24 hours  pantoprazole    Tablet 40 milliGRAM(s) Oral two times a day  polyethylene glycol 3350 17 Gram(s) Oral daily  senna 2 Tablet(s) Oral at bedtime  sodium chloride 0.9%. 1000 milliLiter(s) (100 mL/Hr) IV Continuous <Continuous>  sodium chloride 0.9%. 1000 milliLiter(s) (150 mL/Hr) IV Continuous <Continuous>    Vital Signs Last 24 Hrs  T(C): 36.3 (15 Feb 2024 16:19), Max: 36.7 (15 Feb 2024 12:32)  T(F): 97.4 (15 Feb 2024 16:19), Max: 98 (15 Feb 2024 12:32)  HR: 80 (15 Feb 2024 16:19) (55 - 80)  BP: 99/67 (15 Feb 2024 16:19) (99/67 - 112/73)  BP(mean): --  RR: 17 (15 Feb 2024 16:19) (16 - 18)  SpO2: 99% (15 Feb 2024 16:19) (99% - 100%)    Parameters below as of 15 Feb 2024 16:19  Patient On (Oxygen Delivery Method): room air        (Exam as noted with exceptions in parentheses)    General:  Healthy appearing Male well groomed and without deformities. KPS is 60    Mental Status:  Awake, alert and attentive. Oriented to person, place and time. Recent and remote memory intact. Normal concentration. Fluent spontaneous speech with intact naming and repetition. Normal fund of knowledge.      Cranial Nerves: II: Full visual fields. III,IV,VI:Pupils round, reactive to light. Full extraocular movements. No nystagmus. V: Normal bilateral bite, facial sensation. VII: No facial weakness. VIII: Hearing intact. IX,X: Palate midline, intact gag. XI:  Sternocleidomastoids normal. XII: Tongue protrudes midline. No dysarthria.  (flat NLF on right)    Motor:  Normal tone, bulk and power throughout including arms and legs, proximal and distal. No pronator drift. No abnormal movements.  (RUE with weakness of triceps and WE)    Sensation: Normal in arms, legs and trunk to pin, proprioception and vibration. Negative Romberg.  (diminished on right side)    Coordination: No dysmetria or dysdiadochokinesis bilaterally. Normal heel-shin testing.     Gait: Normal including heel and toe walking. Normal station.  (walks on own)    Reflexes: Normoactive and symmetric throughout. Absent Babinski bilaterally. (brisk throughout)    Abd: NT  Vasc: no edema  LCTAB    NEURO-IMAGING:  reviewed personally ct head and recent MRIs as above.  vents are stable.  Not much more brain edema.

## 2024-02-15 NOTE — CHART NOTE - NSCHARTNOTEFT_GEN_A_CORE
48y.o. M with known metastatic melanoma, s/p prior RT to the brain (SRS/GK)   now undergoing WBRT and also C/T spine RT.  WBRT is a 10 fraction plan.   C/T spine RT is a 5 fraction plan.    We began RT on 2/13/24, and the final end date is 2/26/24.   This end date is dependent on no treatment interruptions.

## 2024-02-15 NOTE — CONSULT NOTE ADULT - SUBJECTIVE AND OBJECTIVE BOX
Patient is a 48y old  Male who presents with a chief complaint of Convulsions.  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 He also began outpt WBRT as well as RT to C5-T5 recently (unclear how many fxs are completed to date).  He was readmitted to [facility] on 2/10 s/p seizure at home- admission imaging confirms progression of CNS mets since 2/2/24 imaging.   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 He presents with seizure episodes 4x over the past 24h.     He was seen by neuro-oncology and rad/onc recently with intention for WBRT and likely some degree of spinal RT (C5-T5). He has been continued on nivolumab/relatlimab.   Patient’s wife at bedside, patient asked for her to ask as . She also provides corroborating history. He has had increased difficulty ambulating due to generalized weakness for the past few days. No focal numbness, loss of temperature sensation, urinary or fecal incontinence. No fevers, chills, cough, sore throat, chest pain, n/v/d, palpitations, dyspnea, dysuria. Last BM 3 days prior. Wife noticed generalized shaking events, loss of consciousness, and seizure-like activity lasting 20 minutes and followed by a period of confusion.     Vital signs significant for: afebrile, HR up to 104 now 60, BP initially 89/58 now 110/81, RR 16-17, SpO2 % on RA  Labs significant for: CBC unremarkable, CMP largely unremarkable (SCr 1.27 largely stable since 11/2023)  Imaging significant for: none this admission.  - CTAP 2/2: new b/l lobar hepatic metastatic disease; pulmonary nodules again concerning for metastases; postoperative changes of right thumb with adjacent nonspecific soft tissue thickening; slight gastric inflammatory changes along the greater curvature suggestive of gastritis.  - CT Head 2/2: relatively unchanged numerous intracranial metastatic lesions and areas of vasogenic edema   (11 Feb 2024 17:31)    plan for WBRT 10 fractions, CT spine RT 5 fractions. to complete 2/25/24.    REVIEW OF SYSTEMS  Constitutional - No fever, No weight loss, No fatigue  HEENT - No eye pain, No visual disturbances, No difficulty hearing, No tinnitus, No vertigo, No neck pain  Respiratory - No cough, No wheezing, No shortness of breath  Cardiovascular - No chest pain, No palpitations  Gastrointestinal - No abdominal pain, No nausea, No vomiting, No diarrhea, No constipation  Genitourinary - No dysuria, No frequency, No hematuria, No incontinence  Neurological - No headaches, No memory loss, No loss of strength, No numbness, No tremors  Skin - No itching, No rashes, No lesions   Endocrine - No temperature intolerance  Musculoskeletal - No joint pain, No joint swelling, No muscle pain  Psychiatric - No depression, No anxiety    PAST MEDICAL & SURGICAL HISTORY  No pertinent past medical history    Malignant melanoma    Metastatic malignant melanoma    GERD (gastroesophageal reflux disease)    No significant past surgical history    History of lymph node dissection of right axilla    Malignant melanoma of thumb        SOCIAL HISTORY  Smoking - Denied  EtOH - Denied   Drugs - Denied    FUNCTIONAL HISTORY  Lives   Independent    CURRENT FUNCTIONAL STATUS  2/14  Bed Mobility  Bed Mobility Training Rehab Potential: good, to achieve stated therapy goals  Bed Mobility Training Symptoms Noted During/After Treatment: none  Bed Mobility Training Rolling/Turning: supervsion  Bed Mobility Training Supine-to-Sit: supervsion  Bed Mobility Training Limitations: decreased strength    Sit-Stand Transfer Training  Sit-to-Stand Transfer Training Rehab Potential: fair, will monitor progress closely  Sit-to-Stand Transfer Training Symptoms Noted During/After Treatment: none  Transfer Training Sit-to-Stand Transfer: minimum assist (75% patient effort);  1 person assist;  full weight-bearing  Sit-to-Stand Transfer Training Transfer Safety Analysis: decreased strength    Gait Training  Gait Training Rehab Potential: fair, will monitor progress closely  Gait Training Symptoms Noted During/After Treatment: none  Gait Training: moderate assist (50% patient effort);  minimum assist (75% patient effort);  1 person assist;  full weight-bearing   BL UE support;  standing marches and forward steps.  Gait Number of Times:: x 2  Type of Rest Type of Rest: sitting    Therapeutic Exercise  Therapeutic Exercise Rehab Effort: fair  Therapeutic Exercise Symptoms Noted During/After Treatment: fatigue  Therapeutic Exercise Detail: Patient particiapted in standing exercises to improve strength and balance.       FAMILY HISTORY   No pertinent family history in first degree relatives        RECENT LABS/IMAGING  2/12 CTA h/n: Multiple enhancing brain metastases which have increased   since 2/2/2024. No midline shift. Normal CTA of the head and neck. No   large vessel occlusion.    2/2 CT a/p with IV contrast: New bilateral lobar hepatic metastatic disease. Pulmonary nodules better characterized on prior PET/CT, again concerning for metastases.   Postoperative changes of the right thumb with adjacent nonspecific soft   tissue thickening. Correlate with recent PET/CT findings. Slight gastric inflammatory changes along the greater curvature suggestive of gastritis. Correlate clinically.    1/29 MRI Total spine  1. CERVICAL SPINE:   Multiple metastases within the canal most   prominently at the C6 and C7 levels along the cord surface.  2. THORACIC SPINE:   Multiple metastases within the canal including cord   parenchymal lesions at T1 and T4 and multiple lesions along the cord   surface.  3. LUMBAR SPINE:   Multiple metastases within the cauda equina and distal   canal ependymal surfaces.          CBC Full  -  ( 15 Feb 2024 06:48 )  WBC Count : 6.29 K/uL  RBC Count : 5.01 M/uL  Hemoglobin : 14.2 g/dL  Hematocrit : 42.3 %  Platelet Count - Automated : 180 K/uL  Mean Cell Volume : 84.4 fL  Mean Cell Hemoglobin : 28.3 pg  Mean Cell Hemoglobin Concentration : 33.6 gm/dL  Auto Neutrophil # : x  Auto Lymphocyte # : x  Auto Monocyte # : x  Auto Eosinophil # : x  Auto Basophil # : x  Auto Neutrophil % : x  Auto Lymphocyte % : x  Auto Monocyte % : x  Auto Eosinophil % : x  Auto Basophil % : x    02-15    142  |  102  |  13  ----------------------------<  111<H>  4.0   |  29  |  1.00    Ca    9.9      15 Feb 2024 06:48  Phos  3.9     02-15  Mg     2.30     02-15    TPro  7.3  /  Alb  4.0  /  TBili  0.4  /  DBili  x   /  AST  17  /  ALT  18  /  AlkPhos  60  02-14    Urinalysis Basic - ( 15 Feb 2024 06:48 )    Color: x / Appearance: x / SG: x / pH: x  Gluc: 111 mg/dL / Ketone: x  / Bili: x / Urobili: x   Blood: x / Protein: x / Nitrite: x   Leuk Esterase: x / RBC: x / WBC x   Sq Epi: x / Non Sq Epi: x / Bacteria: x        VITALS  T(C): 36.3 (02-15-24 @ 05:20), Max: 36.6 (02-14-24 @ 17:20)  HR: 57 (02-15-24 @ 05:40) (55 - 79)  BP: 105/70 (02-15-24 @ 05:40) (105/70 - 115/83)  RR: 18 (02-15-24 @ 05:40) (16 - 18)  SpO2: 100% (02-15-24 @ 05:40) (100% - 100%)  Wt(kg): --    ALLERGIES  No Known Allergies      MEDICATIONS   aluminum hydroxide/magnesium hydroxide/simethicone Suspension 30 milliLiter(s) Oral every 6 hours PRN  bisacodyl Suppository 10 milliGRAM(s) Rectal daily PRN  dexAMETHasone  Injectable 4 milliGRAM(s) IV Push two times a day  influenza   Vaccine 0.5 milliLiter(s) IntraMuscular once  levETIRAcetam   Injectable 1000 milliGRAM(s) IV Push every 12 hours  lidocaine   4% Patch 1 Patch Transdermal every 24 hours  melatonin 3 milliGRAM(s) Oral at bedtime PRN  oxyCODONE    IR 5 milliGRAM(s) Oral every 6 hours PRN  pantoprazole    Tablet 40 milliGRAM(s) Oral two times a day  polyethylene glycol 3350 17 Gram(s) Oral daily  senna 2 Tablet(s) Oral at bedtime  sodium chloride 0.9%. 1000 milliLiter(s) IV Continuous <Continuous>  sodium chloride 0.9%. 1000 milliLiter(s) IV Continuous <Continuous>      ----------------------------------------------------------------------------------------  PHYSICAL EXAM  Constitutional - NAD, Comfortable  HEENT - NCAT, EOMI  Neck - Supple, No limited ROM  Chest - CTA bilaterally, No wheeze, No rhonchi, No crackles  Cardiovascular - RRR, S1S2, No murmurs  Abdomen - BS+, Soft, NTND  Extremities - No C/C/E, No calf tenderness   Neurologic Exam -                    Cognitive - Awake, Alert, AAO to self, place, date, year, situation     Communication - Fluent, No dysarthria     Cranial Nerves - CN 2-12 intact     Motor - No focal deficits                    LEFT    UE - ShAB 5/5, EF 5/5, EE 5/5, WE 5/5,  5/5                    RIGHT UE - ShAB 5/5, EF 5/5, EE 5/5, WE 5/5,  5/5                    LEFT    LE - HF 5/5, KE 5/5, DF 5/5, PF 5/5                    RIGHT LE - HF 5/5, KE 5/5, DF 5/5, PF 5/5        Sensory - Intact to LT     Reflexes - DTR Intact, No primitive reflexive     Coordination - FTN intact     OculoVestibular - No saccades, No nystagmus, VOR         Balance - WNL Static  Psychiatric - Mood stable, Affect WNL  ----------------------------------------------------------------------------------------  ASSESSMENT/PLAN    Pain -  DVT PPX -   Rehab - incomplete note Patient is a 48y old  Male who presents with a chief complaint of Convulsions.  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 He also began outpt WBRT as well as RT to C5-T5 recently (unclear how many fxs are completed to date).  He was readmitted to [facility] on 2/10 s/p seizure at home- admission imaging confirms progression of CNS mets since 2/2/24 imaging.   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 He presents with seizure episodes 4x over the past 24h.     He was seen by neuro-oncology and rad/onc recently with intention for WBRT and likely some degree of spinal RT (C5-T5). He has been continued on nivolumab/relatlimab.   Patient’s wife at bedside, patient asked for her to ask as . She also provides corroborating history. He has had increased difficulty ambulating due to generalized weakness for the past few days. No focal numbness, loss of temperature sensation, urinary or fecal incontinence. No fevers, chills, cough, sore throat, chest pain, n/v/d, palpitations, dyspnea, dysuria. Last BM 3 days prior. Wife noticed generalized shaking events, loss of consciousness, and seizure-like activity lasting 20 minutes and followed by a period of confusion.     Vital signs significant for: afebrile, HR up to 104 now 60, BP initially 89/58 now 110/81, RR 16-17, SpO2 % on RA  Labs significant for: CBC unremarkable, CMP largely unremarkable (SCr 1.27 largely stable since 11/2023)  Imaging significant for: none this admission.  - CTAP 2/2: new b/l lobar hepatic metastatic disease; pulmonary nodules again concerning for metastases; postoperative changes of right thumb with adjacent nonspecific soft tissue thickening; slight gastric inflammatory changes along the greater curvature suggestive of gastritis.  - CT Head 2/2: relatively unchanged numerous intracranial metastatic lesions and areas of vasogenic edema   (11 Feb 2024 17:31)    plan for WBRT 10 fractions, CT spine RT 5 fractions. to complete 2/25/24.    REVIEW OF SYSTEMS  Constitutional - No fever, No weight loss, No fatigue  HEENT - No eye pain, No visual disturbances, No difficulty hearing, No tinnitus, No vertigo, No neck pain  Respiratory - No cough, No wheezing, No shortness of breath  Cardiovascular - No chest pain, No palpitations  Gastrointestinal - No abdominal pain, No nausea, No vomiting, No diarrhea, No constipation  Genitourinary - No dysuria, No frequency, No hematuria, No incontinence  Neurological - No headaches, No memory loss, + loss of strength, + numbness, No tremors  Skin - No itching, No rashes, No lesions   Endocrine - No temperature intolerance  Musculoskeletal - No joint pain, No joint swelling, No muscle pain  Psychiatric - No depression, No anxiety    PAST MEDICAL & SURGICAL HISTORY  No pertinent past medical history    Malignant melanoma    Metastatic malignant melanoma    GERD (gastroesophageal reflux disease)    No significant past surgical history    History of lymph node dissection of right axilla    Malignant melanoma of thumb         FUNCTIONAL HISTORY  Lives with wife and mother in law in private house  Independent at baseline    CURRENT FUNCTIONAL STATUS  2/14  Bed Mobility  Bed Mobility Training Rehab Potential: good, to achieve stated therapy goals  Bed Mobility Training Symptoms Noted During/After Treatment: none  Bed Mobility Training Rolling/Turning: supervsion  Bed Mobility Training Supine-to-Sit: supervsion  Bed Mobility Training Limitations: decreased strength    Sit-Stand Transfer Training  Sit-to-Stand Transfer Training Rehab Potential: fair, will monitor progress closely  Sit-to-Stand Transfer Training Symptoms Noted During/After Treatment: none  Transfer Training Sit-to-Stand Transfer: minimum assist (75% patient effort);  1 person assist;  full weight-bearing  Sit-to-Stand Transfer Training Transfer Safety Analysis: decreased strength    Gait Training  Gait Training Rehab Potential: fair, will monitor progress closely  Gait Training Symptoms Noted During/After Treatment: none  Gait Training: moderate assist (50% patient effort);  minimum assist (75% patient effort);  1 person assist;  full weight-bearing   BL UE support;  standing marches and forward steps.  Gait Number of Times:: x 2  Type of Rest Type of Rest: sitting    Therapeutic Exercise  Therapeutic Exercise Rehab Effort: fair  Therapeutic Exercise Symptoms Noted During/After Treatment: fatigue  Therapeutic Exercise Detail: Patient particiapted in standing exercises to improve strength and balance.       FAMILY HISTORY   No pertinent family history in first degree relatives        RECENT LABS/IMAGING  2/12 CTA h/n: Multiple enhancing brain metastases which have increased   since 2/2/2024. No midline shift. Normal CTA of the head and neck. No   large vessel occlusion.    2/2 CT a/p with IV contrast: New bilateral lobar hepatic metastatic disease. Pulmonary nodules better characterized on prior PET/CT, again concerning for metastases.   Postoperative changes of the right thumb with adjacent nonspecific soft   tissue thickening. Correlate with recent PET/CT findings. Slight gastric inflammatory changes along the greater curvature suggestive of gastritis. Correlate clinically.    1/29 MRI Total spine  1. CERVICAL SPINE:   Multiple metastases within the canal most   prominently at the C6 and C7 levels along the cord surface.  2. THORACIC SPINE:   Multiple metastases within the canal including cord   parenchymal lesions at T1 and T4 and multiple lesions along the cord   surface.  3. LUMBAR SPINE:   Multiple metastases within the cauda equina and distal   canal ependymal surfaces.          CBC Full  -  ( 15 Feb 2024 06:48 )  WBC Count : 6.29 K/uL  RBC Count : 5.01 M/uL  Hemoglobin : 14.2 g/dL  Hematocrit : 42.3 %  Platelet Count - Automated : 180 K/uL  Mean Cell Volume : 84.4 fL  Mean Cell Hemoglobin : 28.3 pg  Mean Cell Hemoglobin Concentration : 33.6 gm/dL  Auto Neutrophil # : x  Auto Lymphocyte # : x  Auto Monocyte # : x  Auto Eosinophil # : x  Auto Basophil # : x  Auto Neutrophil % : x  Auto Lymphocyte % : x  Auto Monocyte % : x  Auto Eosinophil % : x  Auto Basophil % : x    02-15    142  |  102  |  13  ----------------------------<  111<H>  4.0   |  29  |  1.00    Ca    9.9      15 Feb 2024 06:48  Phos  3.9     02-15  Mg     2.30     02-15    TPro  7.3  /  Alb  4.0  /  TBili  0.4  /  DBili  x   /  AST  17  /  ALT  18  /  AlkPhos  60  02-14    Urinalysis Basic - ( 15 Feb 2024 06:48 )    Color: x / Appearance: x / SG: x / pH: x  Gluc: 111 mg/dL / Ketone: x  / Bili: x / Urobili: x   Blood: x / Protein: x / Nitrite: x   Leuk Esterase: x / RBC: x / WBC x   Sq Epi: x / Non Sq Epi: x / Bacteria: x        VITALS  T(C): 36.3 (02-15-24 @ 05:20), Max: 36.6 (02-14-24 @ 17:20)  HR: 57 (02-15-24 @ 05:40) (55 - 79)  BP: 105/70 (02-15-24 @ 05:40) (105/70 - 115/83)  RR: 18 (02-15-24 @ 05:40) (16 - 18)  SpO2: 100% (02-15-24 @ 05:40) (100% - 100%)  Wt(kg): --    ALLERGIES  No Known Allergies      MEDICATIONS   aluminum hydroxide/magnesium hydroxide/simethicone Suspension 30 milliLiter(s) Oral every 6 hours PRN  bisacodyl Suppository 10 milliGRAM(s) Rectal daily PRN  dexAMETHasone  Injectable 4 milliGRAM(s) IV Push two times a day  influenza   Vaccine 0.5 milliLiter(s) IntraMuscular once  levETIRAcetam   Injectable 1000 milliGRAM(s) IV Push every 12 hours  lidocaine   4% Patch 1 Patch Transdermal every 24 hours  melatonin 3 milliGRAM(s) Oral at bedtime PRN  oxyCODONE    IR 5 milliGRAM(s) Oral every 6 hours PRN  pantoprazole    Tablet 40 milliGRAM(s) Oral two times a day  polyethylene glycol 3350 17 Gram(s) Oral daily  senna 2 Tablet(s) Oral at bedtime  sodium chloride 0.9%. 1000 milliLiter(s) IV Continuous <Continuous>  sodium chloride 0.9%. 1000 milliLiter(s) IV Continuous <Continuous>      ----------------------------------------------------------------------------------------  PHYSICAL EXAM  Constitutional - NAD, Comfortable  HEENT - NCAT, EOMI  Neck - Supple, No limited ROM  Chest - no respiratory distress  Cardiovascular - RRR, S1S2  Abdomen - BS+, Soft, NTND  Extremities - partial R thumb amputation, No C/C/E, No calf tenderness   Neurologic Exam -                    Cognitive - Awake, Alert, AAO to self, place, date, year, situation     Communication - Fluent, No dysarthria     Cranial Nerves - CN 2-12 intact     Motor - No focal deficits                    LEFT    UE - ShAB 5/5, EF 5/5, EE 5/5, WE 5/5,  5/5                    RIGHT UE - ShAB 5/5, EF 5/5, EE 5/5, WE 5/5,  5/5                    LEFT    LE - HF 5/5, KE 5/5, DF 5/5, PF 5/5                    RIGHT LE - HF 5/5, KE 5/5, DF 5/5, PF 5/5        Sensory -R side impaired to light touch     Reflexes - DTR 2+ b/l UE, 3+ b/l LE      Balance - WNL Static  Psychiatric - Mood stable, Affect WNL  ----------------------------------------------------------------------------------------  ASSESSMENT/PLAN Patient is a 47yo M with PMH significant for 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 He presented with seizure episodes  keppra  Pain - oxy ir prn  DVT PPX - scd  Rehab - family states they are not home during the day and not comfortable caring for patient at home if he is not independent, state they are deciding about hospice  dizzy with standing during PT today  will monitor for tolerance with bedside therapy and medical course  anticipate subacute rehab if family prefers longer stay in rehab, patient currently limited by dizziness.  If function improving will continue to monitor, patient receiving rt until 2/25

## 2024-02-15 NOTE — PROGRESS NOTE ADULT - SUBJECTIVE AND OBJECTIVE BOX
SOLID TUMOR ONCOLOGY HOSPITALIST PROGRESS NOTE    S: No acute events overnight.  Pt complained of burning epigastric pain this morning which he noted was starting to subside since he took his pain medication.  He confirmed that he has not had a bm since Sunday, but denied n/v, abd cramping.    CURRENT MEDICATIONS  (STANDING):  dexAMETHasone  Injectable 4 milliGRAM(s) IV Push two times a day  influenza   Vaccine 0.5 milliLiter(s) IntraMuscular once  levETIRAcetam   Injectable 1000 milliGRAM(s) IV Push every 12 hours  lidocaine   4% Patch 1 Patch Transdermal every 24 hours  polyethylene glycol 3350 17 Gram(s) Oral daily  senna 2 Tablet(s) Oral at bedtime  sodium chloride 0.9%. 1000 milliLiter(s) (100 mL/Hr) IV Continuous <Continuous>  sodium chloride 0.9%. 1000 milliLiter(s) (150 mL/Hr) IV Continuous <Continuous>  (PRN):  acetaminophen     Tablet .. 650 milliGRAM(s) Oral every 6 hours PRN Mild Pain (1 - 3), Moderate Pain (4 - 6)  aluminum hydroxide/magnesium hydroxide/simethicone Suspension 30 milliLiter(s) Oral every 6 hours PRN Dyspepsia  bisacodyl Suppository 10 milliGRAM(s) Rectal daily PRN Constipation  melatonin 3 milliGRAM(s) Oral at bedtime PRN Insomnia  oxyCODONE    IR 5 milliGRAM(s) Oral every 6 hours PRN Severe Pain (7 - 10)      PHYSICAL EXAM  T(C): 36.3 (02-15-24 @ 05:20), Max: 36.6 (02-14-24 @ 17:20)  HR: 57 (02-15-24 @ 05:40) (55 - 79)  BP: 105/70 (02-15-24 @ 05:40) (105/70 - 115/83)  RR: 18 (02-15-24 @ 05:40) (16 - 18)  SpO2: 100% (02-15-24 @ 05:40) (100% - 100%)    02-14-24 @ 07:01  -  02-15-24 @ 07:00  --------------------------------------------------------  IN: 2170 mL / OUT: 1800 mL / NET: 370 mL  Hjlbzrhpvyh-pqq-bwfhzyixc young Polish/Creole speaking male, laying in bed, nad, answering questions appropriately and following commands  Anicteric, but injected sclera, no oral lesions/thrush  RRR, no m/r/g  CTAB, no w/c/r  Abd soft/nt/nd, normoactive bowel sounds  No peripheral edema; R thumb amputated  5/5 strength and normal ROM in all 4 extremities (L slightly weaker than R)  CN 2-12 grossly intact; no gross focal motor/sensory deficits    LABS                        14.2   6.29  )-----------( 180      ( 15 Feb 2024 06:48 )             42.3     02-15    142  |  102  |  13  ----------------------------<  111<H>  4.0   |  29  |  1.00    Ca    9.9      15 Feb 2024 06:48  Phos  3.9     02-15  Mg     2.30     02-15    TPro  7.3  /  Alb  4.0  /  TBili  0.4  /  DBili  x   /  AST  17  /  ALT  18  /  AlkPhos  60  02-14      PERTINENT RADIOLOGY  2/12 CTA h/n: Multiple enhancing brain metastases which have increased   since 2/2/2024. No midline shift. Normal CTA of the head and neck. No   large vessel occlusion.    2/2 CT a/p with IV contrast: New bilateral lobar hepatic metastatic disease. Pulmonary nodules better characterized on prior PET/CT, again concerning for metastases.   Postoperative changes of the right thumb with adjacent nonspecific soft   tissue thickening. Correlate with recent PET/CT findings. Slight gastric inflammatory changes along the greater curvature suggestive of gastritis. Correlate clinically.    1/29 MRI Total spine  1. CERVICAL SPINE:   Multiple metastases within the canal most   prominently at the C6 and C7 levels along the cord surface.  2. THORACIC SPINE:   Multiple metastases within the canal including cord   parenchymal lesions at T1 and T4 and multiple lesions along the cord   surface.  3. LUMBAR SPINE:   Multiple metastases within the cauda equina and distal   canal ependymal surfaces.

## 2024-02-15 NOTE — PROGRESS NOTE ADULT - ASSESSMENT
Lepto from melanoma with cord and brain lesions.  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      Overall prognosis is quite poor.  I suspect some of abdominal pain is radicular from lepto.      WBRT, spine to C and T spine RT ongoing  keppra 1000 bid can be PO  Do not think he needs MRIs, nor VEEG as back to baseline  continue dexamethasone 4 bid during RT  GOC discussions ongoing   met with wife at bedside

## 2024-02-16 LAB
ANION GAP SERPL CALC-SCNC: 12 MMOL/L — SIGNIFICANT CHANGE UP (ref 7–14)
BASOPHILS # BLD AUTO: 0.01 K/UL — SIGNIFICANT CHANGE UP (ref 0–0.2)
BASOPHILS NFR BLD AUTO: 0.1 % — SIGNIFICANT CHANGE UP (ref 0–2)
BUN SERPL-MCNC: 15 MG/DL — SIGNIFICANT CHANGE UP (ref 7–23)
CALCIUM SERPL-MCNC: 9.4 MG/DL — SIGNIFICANT CHANGE UP (ref 8.4–10.5)
CHLORIDE SERPL-SCNC: 103 MMOL/L — SIGNIFICANT CHANGE UP (ref 98–107)
CO2 SERPL-SCNC: 26 MMOL/L — SIGNIFICANT CHANGE UP (ref 22–31)
CREAT SERPL-MCNC: 0.98 MG/DL — SIGNIFICANT CHANGE UP (ref 0.5–1.3)
EGFR: 95 ML/MIN/1.73M2 — SIGNIFICANT CHANGE UP
EOSINOPHIL # BLD AUTO: 0 K/UL — SIGNIFICANT CHANGE UP (ref 0–0.5)
EOSINOPHIL NFR BLD AUTO: 0 % — SIGNIFICANT CHANGE UP (ref 0–6)
GLUCOSE SERPL-MCNC: 78 MG/DL — SIGNIFICANT CHANGE UP (ref 70–99)
HCT VFR BLD CALC: 41 % — SIGNIFICANT CHANGE UP (ref 39–50)
HGB BLD-MCNC: 13.6 G/DL — SIGNIFICANT CHANGE UP (ref 13–17)
IANC: 5.65 K/UL — SIGNIFICANT CHANGE UP (ref 1.8–7.4)
IMM GRANULOCYTES NFR BLD AUTO: 0.4 % — SIGNIFICANT CHANGE UP (ref 0–0.9)
LYMPHOCYTES # BLD AUTO: 1.29 K/UL — SIGNIFICANT CHANGE UP (ref 1–3.3)
LYMPHOCYTES # BLD AUTO: 17.5 % — SIGNIFICANT CHANGE UP (ref 13–44)
MCHC RBC-ENTMCNC: 28.2 PG — SIGNIFICANT CHANGE UP (ref 27–34)
MCHC RBC-ENTMCNC: 33.2 GM/DL — SIGNIFICANT CHANGE UP (ref 32–36)
MCV RBC AUTO: 85.1 FL — SIGNIFICANT CHANGE UP (ref 80–100)
MONOCYTES # BLD AUTO: 0.39 K/UL — SIGNIFICANT CHANGE UP (ref 0–0.9)
MONOCYTES NFR BLD AUTO: 5.3 % — SIGNIFICANT CHANGE UP (ref 2–14)
NEUTROPHILS # BLD AUTO: 5.65 K/UL — SIGNIFICANT CHANGE UP (ref 1.8–7.4)
NEUTROPHILS NFR BLD AUTO: 76.7 % — SIGNIFICANT CHANGE UP (ref 43–77)
NRBC # BLD: 0 /100 WBCS — SIGNIFICANT CHANGE UP (ref 0–0)
NRBC # FLD: 0 K/UL — SIGNIFICANT CHANGE UP (ref 0–0)
PLATELET # BLD AUTO: 167 K/UL — SIGNIFICANT CHANGE UP (ref 150–400)
POTASSIUM SERPL-MCNC: 4 MMOL/L — SIGNIFICANT CHANGE UP (ref 3.5–5.3)
POTASSIUM SERPL-SCNC: 4 MMOL/L — SIGNIFICANT CHANGE UP (ref 3.5–5.3)
RBC # BLD: 4.82 M/UL — SIGNIFICANT CHANGE UP (ref 4.2–5.8)
RBC # FLD: 12.8 % — SIGNIFICANT CHANGE UP (ref 10.3–14.5)
SODIUM SERPL-SCNC: 141 MMOL/L — SIGNIFICANT CHANGE UP (ref 135–145)
WBC # BLD: 7.37 K/UL — SIGNIFICANT CHANGE UP (ref 3.8–10.5)
WBC # FLD AUTO: 7.37 K/UL — SIGNIFICANT CHANGE UP (ref 3.8–10.5)

## 2024-02-16 PROCEDURE — 99233 SBSQ HOSP IP/OBS HIGH 50: CPT

## 2024-02-16 RX ADMIN — LIDOCAINE 1 PATCH: 4 CREAM TOPICAL at 04:45

## 2024-02-16 RX ADMIN — POLYETHYLENE GLYCOL 3350 17 GRAM(S): 17 POWDER, FOR SOLUTION ORAL at 13:19

## 2024-02-16 RX ADMIN — LEVETIRACETAM 1000 MILLIGRAM(S): 250 TABLET, FILM COATED ORAL at 09:41

## 2024-02-16 RX ADMIN — Medication 4 MILLIGRAM(S): at 04:45

## 2024-02-16 RX ADMIN — Medication 4 MILLIGRAM(S): at 17:17

## 2024-02-16 RX ADMIN — LEVETIRACETAM 1000 MILLIGRAM(S): 250 TABLET, FILM COATED ORAL at 22:25

## 2024-02-16 RX ADMIN — PANTOPRAZOLE SODIUM 40 MILLIGRAM(S): 20 TABLET, DELAYED RELEASE ORAL at 17:29

## 2024-02-16 RX ADMIN — SENNA PLUS 2 TABLET(S): 8.6 TABLET ORAL at 22:25

## 2024-02-16 RX ADMIN — PANTOPRAZOLE SODIUM 40 MILLIGRAM(S): 20 TABLET, DELAYED RELEASE ORAL at 04:45

## 2024-02-16 NOTE — PROGRESS NOTE ADULT - ASSESSMENT
Lepto from melanoma with cord and brain lesions.  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      Overall prognosis is quite poor.  I suspect some of abdominal pain is radicular from lepto.      WBRT, spine to C and T spine RT ongoing  keppra 1000 bid can be PO  continue dexamethasone 4 bid during RT  GOC discussions ongoing

## 2024-02-16 NOTE — PROGRESS NOTE ADULT - SUBJECTIVE AND OBJECTIVE BOX
NEURO-ONCOLOGY    48m with acral melanoma  known brain and lepto mets  s/p prior surgery to resect left frontal met  SRS to multiple lesions   prior intolerance of ipi/nivo, more recently on Opdualag  MRI brain and spine in 1/24 had showed numerous ventricular deposits as well as cord and lepto lesions in lower C and mid T cord  plan was for WBRT and spine RT  Admitted with seizures at home off meds and steroids  here CT head with increased mets  main complaint is right sided arm pain and weakness, some facial asymmetry  No seizures on keppra    INTERVAL HISTORY:  Seizure free.  Right arm weakness persists.  RT ongoing     MEDICATIONS  (STANDING):  dexAMETHasone  Injectable 4 milliGRAM(s) IV Push two times a day  influenza   Vaccine 0.5 milliLiter(s) IntraMuscular once  levETIRAcetam   Injectable 1000 milliGRAM(s) IV Push every 12 hours  lidocaine   4% Patch 1 Patch Transdermal every 24 hours  pantoprazole    Tablet 40 milliGRAM(s) Oral two times a day  polyethylene glycol 3350 17 Gram(s) Oral daily  senna 2 Tablet(s) Oral at bedtime  sodium chloride 0.9%. 1000 milliLiter(s) (150 mL/Hr) IV Continuous <Continuous>  sodium chloride 0.9%. 1000 milliLiter(s) (100 mL/Hr) IV Continuous <Continuous>    Vital Signs Last 24 Hrs  T(C): 37 (16 Feb 2024 08:40), Max: 37.1 (16 Feb 2024 04:42)  T(F): 98.6 (16 Feb 2024 08:40), Max: 98.8 (16 Feb 2024 04:42)  HR: 66 (16 Feb 2024 08:40) (66 - 82)  BP: 112/75 (16 Feb 2024 08:40) (99/67 - 115/80)  BP(mean): --  RR: 16 (16 Feb 2024 08:40) (16 - 17)  SpO2: 100% (16 Feb 2024 08:40) (86% - 100%)    Parameters below as of 16 Feb 2024 08:40  Patient On (Oxygen Delivery Method): room air          (Exam as noted with exceptions in parentheses)    General:  Healthy appearing Male well groomed and without deformities. KPS is 60    Mental Status:  Awake, alert and attentive. Oriented to person, place and time. Recent and remote memory intact. Normal concentration. Fluent spontaneous speech with intact naming and repetition. Normal fund of knowledge.      Cranial Nerves: II: Full visual fields. III,IV,VI:Pupils round, reactive to light. Full extraocular movements. No nystagmus. V: Normal bilateral bite, facial sensation. VII: No facial weakness. VIII: Hearing intact. IX,X: Palate midline, intact gag. XI:  Sternocleidomastoids normal. XII: Tongue protrudes midline. No dysarthria.  (flat NLF on right)    Motor:  Normal tone, bulk and power throughout including arms and legs, proximal and distal. No pronator drift. No abnormal movements.  (RUE with weakness of triceps and WE)    Sensation: Normal in arms, legs and trunk to pin, proprioception and vibration. Negative Romberg.  (diminished on right side)    Coordination: No dysmetria or dysdiadochokinesis bilaterally. Normal heel-shin testing.     Gait: Normal including heel and toe walking. Normal station.  (walks on own)    Reflexes: Normoactive and symmetric throughout. Absent Babinski bilaterally. (brisk throughout)    Abd: NT  Vasc: no edema  LCTAB    NEURO-IMAGING:  reviewed personally ct head and recent MRIs as above.  vents are stable.  Not much more brain edema.

## 2024-02-16 NOTE — PROGRESS NOTE ADULT - ASSESSMENT
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 He also began outpt WBRT as well as RT to C5-T5 recently (unclear how many fxs are completed to date).  He was readmitted to [facility] on 2/10 s/p seizure at home- admission imaging confirms progression of CNS mets since 2/2/24 imaging.    ACTIVE PROBLEMS  Metastatic melanoma to multiple sites including brain/LMD  Goals of care counseling, discussion  Secondary seizures  Encounter for antineoplastic radiation therapy  Cancer related pain  Constipation    - Metastatic melanoma  - Goals of care counseling, discussion  Pt with rapidly progressive disease despite receiving SOC systemic therapy  Pt does not meet HLA requirements for Tebentefusp  Hospice is appropriate; s/p GOC discussion with pt's wife yesterday who agrees with hospice placement, and who also indicated that she is unable to care for him at home   > dispo for hospice placement will be complex as pt is uninsured with insufficient greencard status; [facility] Disability application initiated in hopes that pt will be approved and can then potentially be placed at a [facility] with hospice capability  Long-term prognosis remains poor    - Secondary seizures  - CNS mets, LMD  Appreciate Neuro Onc consult (Dr. Lozano)  Appreciate Rad Onc consult  Continuing Dex PO 4mg BID with ppi ppx  Pt to complete WBRT (10fxs) and pall RT to C5-T5 (5fxs) on this admission (tx schedule TBD)- tx duration 2/13-26  Continuing Keppra IV 1000mg q12  Continuing Dex IV 4mg BID with ppi ppx, with plan to taper off after completion of RT    - Cancer related pain: continuing Oxy 5mg q6/prn with bowel regimen to prevent OIC    - Constipation  Possibly due to opioids, immobility  Continuing Senna 2 tabs nightly  Continuing Miralax 1 packet daily  Dulcolax suppository ordered today

## 2024-02-16 NOTE — PROGRESS NOTE ADULT - SUBJECTIVE AND OBJECTIVE BOX
SOLID TUMOR ONCOLOGY HOSPITALIST PROGRESS NOTE    S: No acute events overnight.  Pt reported that his epigastric pain was improved this morning.  However, he reported that he still hasn't had a bm since Sunday.  He denied n/v.    CURRENT MEDICATIONS  (STANDING):  dexAMETHasone  Injectable 4 milliGRAM(s) IV Push two times a day  influenza   Vaccine 0.5 milliLiter(s) IntraMuscular once  levETIRAcetam   Injectable 1000 milliGRAM(s) IV Push every 12 hours  lidocaine   4% Patch 1 Patch Transdermal every 24 hours  pantoprazole    Tablet 40 milliGRAM(s) Oral two times a day  polyethylene glycol 3350 17 Gram(s) Oral daily  senna 2 Tablet(s) Oral at bedtime  sodium chloride 0.9%. 1000 milliLiter(s) (100 mL/Hr) IV Continuous <Continuous>  sodium chloride 0.9%. 1000 milliLiter(s) (150 mL/Hr) IV Continuous <Continuous>  (PRN):  aluminum hydroxide/magnesium hydroxide/simethicone Suspension 30 milliLiter(s) Oral every 6 hours PRN Dyspepsia  bisacodyl Suppository 10 milliGRAM(s) Rectal daily PRN Constipation  melatonin 3 milliGRAM(s) Oral at bedtime PRN Insomnia  oxyCODONE    IR 5 milliGRAM(s) Oral every 6 hours PRN Severe Pain (7 - 10)      PHYSICAL EXAM  T(C): 37 (02-16-24 @ 08:40), Max: 37.1 (02-16-24 @ 04:42)  HR: 66 (02-16-24 @ 08:40) (66 - 82)  BP: 112/75 (02-16-24 @ 08:40) (99/67 - 115/80)  RR: 16 (02-16-24 @ 08:40) (16 - 18)  SpO2: 100% (02-16-24 @ 08:40) (86% - 100%)    02-15-24 @ 07:01  -  02-16-24 @ 07:00  --------------------------------------------------------  IN: 150 mL / OUT: 950 mL / NET: -800 mL  Bksnvukmqvj-xos-qkbfjoqll young Azeri/Creole speaking male, laying in bed, nad, answering questions appropriately and following commands  Anicteric, but injected sclera, no oral lesions/thrush  RRR, no m/r/g  CTAB, no w/c/r  Abd soft/nt/nd, normoactive bowel sounds  No peripheral edema; R thumb amputated  5/5 strength and normal ROM in all 4 extremities (L slightly weaker than R)  CN 2-12 grossly intact; no gross focal motor/sensory deficits      LABS                        13.6   7.37  )-----------( 167      ( 16 Feb 2024 04:57 )             41.0     02-16    141  |  103  |  15  ----------------------------<  78  4.0   |  26  |  0.98    Ca    9.4      16 Feb 2024 04:57  Phos  3.9     02-15  Mg     2.30     02-15      PERTINENT RADIOLOGY  2/12 CTA h/n: Multiple enhancing brain metastases which have increased   since 2/2/2024. No midline shift. Normal CTA of the head and neck. No   large vessel occlusion.    2/2 CT a/p with IV contrast: New bilateral lobar hepatic metastatic disease. Pulmonary nodules better characterized on prior PET/CT, again concerning for metastases.   Postoperative changes of the right thumb with adjacent nonspecific soft   tissue thickening. Correlate with recent PET/CT findings. Slight gastric inflammatory changes along the greater curvature suggestive of gastritis. Correlate clinically.    1/29 MRI Total spine  1. CERVICAL SPINE:   Multiple metastases within the canal most   prominently at the C6 and C7 levels along the cord surface.  2. THORACIC SPINE:   Multiple metastases within the canal including cord   parenchymal lesions at T1 and T4 and multiple lesions along the cord   surface.  3. LUMBAR SPINE:   Multiple metastases within the cauda equina and distal   canal ependymal surfaces.

## 2024-02-17 LAB
GLUCOSE BLDC GLUCOMTR-MCNC: 116 MG/DL — HIGH (ref 70–99)
GLUCOSE BLDC GLUCOMTR-MCNC: 130 MG/DL — HIGH (ref 70–99)

## 2024-02-17 PROCEDURE — 99232 SBSQ HOSP IP/OBS MODERATE 35: CPT

## 2024-02-17 RX ADMIN — PANTOPRAZOLE SODIUM 40 MILLIGRAM(S): 20 TABLET, DELAYED RELEASE ORAL at 06:20

## 2024-02-17 RX ADMIN — LIDOCAINE 1 PATCH: 4 CREAM TOPICAL at 07:50

## 2024-02-17 RX ADMIN — SENNA PLUS 2 TABLET(S): 8.6 TABLET ORAL at 21:22

## 2024-02-17 RX ADMIN — POLYETHYLENE GLYCOL 3350 17 GRAM(S): 17 POWDER, FOR SOLUTION ORAL at 12:03

## 2024-02-17 RX ADMIN — LEVETIRACETAM 1000 MILLIGRAM(S): 250 TABLET, FILM COATED ORAL at 21:26

## 2024-02-17 RX ADMIN — PANTOPRAZOLE SODIUM 40 MILLIGRAM(S): 20 TABLET, DELAYED RELEASE ORAL at 17:18

## 2024-02-17 RX ADMIN — Medication 4 MILLIGRAM(S): at 17:19

## 2024-02-17 RX ADMIN — LIDOCAINE 1 PATCH: 4 CREAM TOPICAL at 04:00

## 2024-02-17 RX ADMIN — Medication 4 MILLIGRAM(S): at 06:20

## 2024-02-17 RX ADMIN — LEVETIRACETAM 1000 MILLIGRAM(S): 250 TABLET, FILM COATED ORAL at 09:55

## 2024-02-17 NOTE — PROGRESS NOTE ADULT - SUBJECTIVE AND OBJECTIVE BOX
Patient is a 48y old  Male who presents with a chief complaint of seizure (16 Feb 2024 13:15)    SUBJECTIVE / OVERNIGHT EVENTS: No acute events. Ambulated with PT earlier today. No discomfort reported.     MEDICATIONS  (STANDING):  dexAMETHasone  Injectable 4 milliGRAM(s) IV Push two times a day  influenza   Vaccine 0.5 milliLiter(s) IntraMuscular once  levETIRAcetam   Injectable 1000 milliGRAM(s) IV Push every 12 hours  lidocaine   4% Patch 1 Patch Transdermal every 24 hours  pantoprazole    Tablet 40 milliGRAM(s) Oral two times a day  polyethylene glycol 3350 17 Gram(s) Oral daily  senna 2 Tablet(s) Oral at bedtime  sodium chloride 0.9%. 1000 milliLiter(s) (150 mL/Hr) IV Continuous <Continuous>  sodium chloride 0.9%. 1000 milliLiter(s) (100 mL/Hr) IV Continuous <Continuous>    MEDICATIONS  (PRN):  aluminum hydroxide/magnesium hydroxide/simethicone Suspension 30 milliLiter(s) Oral every 6 hours PRN Dyspepsia  bisacodyl Suppository 10 milliGRAM(s) Rectal daily PRN Constipation  melatonin 3 milliGRAM(s) Oral at bedtime PRN Insomnia  oxyCODONE    IR 5 milliGRAM(s) Oral every 6 hours PRN Severe Pain (7 - 10)      CAPILLARY BLOOD GLUCOSE      POCT Blood Glucose.: 116 mg/dL (17 Feb 2024 12:57)  POCT Blood Glucose.: 130 mg/dL (17 Feb 2024 08:54)    I&O's Summary    17 Feb 2024 07:01  -  17 Feb 2024 14:10  --------------------------------------------------------  IN: 0 mL / OUT: 400 mL / NET: -400 mL    PHYSICAL EXAM:  Vital Signs Last 24 Hrs  T(C): 36.8 (17 Feb 2024 11:58), Max: 37 (17 Feb 2024 08:30)  T(F): 98.2 (17 Feb 2024 11:58), Max: 98.6 (17 Feb 2024 08:30)  HR: 78 (17 Feb 2024 11:58) (70 - 84)  BP: 120/65 (17 Feb 2024 11:58) (100/64 - 120/65)  BP(mean): --  RR: 17 (17 Feb 2024 11:58) (17 - 18)  SpO2: 100% (17 Feb 2024 11:58) (97% - 100%)    Parameters below as of 17 Feb 2024 11:58  Patient On (Oxygen Delivery Method): room air    CONSTITUTIONAL: NAD, inclined in bed  EYES: conjunctiva and sclera clear  ENMT: Moist oral mucosa  NECK: Supple  RESPIRATORY: Normal respiratory effort; lungs are clear to auscultation bilaterally  CARDIOVASCULAR: Regular rate and rhythm, normal S1 and S2, No lower extremity edema  ABDOMEN: Nontender to palpation, normoactive bowel sounds, no rebound/guarding  PSYCH: calm  SKIN: No rashes    LABS:                        13.6   7.37  )-----------( 167      ( 16 Feb 2024 04:57 )             41.0     02-16    141  |  103  |  15  ----------------------------<  78  4.0   |  26  |  0.98    Ca    9.4      16 Feb 2024 04:57    Urinalysis Basic - ( 16 Feb 2024 04:57 )    Color: x / Appearance: x / SG: x / pH: x  Gluc: 78 mg/dL / Ketone: x  / Bili: x / Urobili: x   Blood: x / Protein: x / Nitrite: x   Leuk Esterase: x / RBC: x / WBC x   Sq Epi: x / Non Sq Epi: x / Bacteria: x    RADIOLOGY & ADDITIONAL TESTS: Reviewed    COORDINATION OF CARE:  Care Discussed with Consultants/Other Providers [Y- Medicine ACP]

## 2024-02-17 NOTE — PROGRESS NOTE ADULT - ASSESSMENT
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 He also began outpt WBRT as well as RT to C5-T5 recently (unclear how many fxs are completed to date).  He was readmitted to [facility] on 2/10 s/p seizure at home- admission imaging confirms progression of CNS mets since 2/2/24 imaging.    ACTIVE PROBLEMS  Metastatic melanoma to multiple sites including brain/LMD  Goals of care counseling, discussion  Secondary seizures  Encounter for antineoplastic radiation therapy  Cancer related pain  Constipation    - Metastatic melanoma  - Goals of care counseling, discussion  Pt with rapidly progressive disease despite receiving SOC systemic therapy  Pt does not meet HLA requirements for Tebentefusp  Hospice is appropriate; s/p GOC discussion with pt's wife yesterday who agrees with hospice placement, and who also indicated that she is unable to care for him at home   > dispo for hospice placement will be complex as pt is uninsured with insufficient greencard status; [facility] Disability application initiated in hopes that pt will be approved and can then potentially be placed at a [facility] with hospice capability  Long-term prognosis remains poor    - Secondary seizures  - CNS mets, LMD  Appreciate Neuro Onc consult (Dr. 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 He also began outpt WBRT as well as RT to C5-T5 recently (unclear how many fxs are completed to date).  He was readmitted to [facility] on 2/10 s/p seizure at home- admission imaging confirms progression of CNS mets since 2/2/24 imaging.    ACTIVE PROBLEMS  Metastatic melanoma to multiple sites including brain/LMD  Goals of care counseling, discussion  Secondary seizures  Encounter for antineoplastic radiation therapy  Cancer related pain  Constipation    - Metastatic melanoma  - Goals of care counseling, discussion  Pt with rapidly progressive disease despite receiving SOC systemic therapy  Pt does not meet HLA requirements for Tebentefusp  Hospice is appropriate; s/p GOC discussion with pt's wife 2/15 who agrees with hospice placement, and who also indicated that she is unable to care for him at home   > dispo for hospice placement will be complex as pt is uninsured with insufficient greencard status; [facility] Disability application initiated in hopes that pt will be approved and can then potentially be placed at a [facility] with hospice capability  Long-term prognosis remains poor    - Secondary seizures  - CNS mets, LMD  Appreciate Neuro Onc consult (Dr. Lozano)  Appreciate Rad Onc consult  Continuing Dex PO 4mg BID with ppi ppx  Pt to complete WBRT (10fxs) and pall RT to C5-T5 (5fxs) on this admission (tx schedule TBD)- tx duration 2/13-26  Continuing Keppra IV 1000mg q12  Continuing Dex IV 4mg BID with ppi ppx, with plan to taper off after completion of RT    - Cancer related pain: continuing Oxy 5mg q6/prn with bowel regimen to prevent OIC    - Constipation  Possibly due to opioids, immobility  Continuing Senna 2 tabs nightly  Continuing Miralax 1 packet daily  Consider additional Dulcolax suppository

## 2024-02-18 LAB
ANION GAP SERPL CALC-SCNC: 12 MMOL/L — SIGNIFICANT CHANGE UP (ref 7–14)
BUN SERPL-MCNC: 23 MG/DL — SIGNIFICANT CHANGE UP (ref 7–23)
CALCIUM SERPL-MCNC: 9.3 MG/DL — SIGNIFICANT CHANGE UP (ref 8.4–10.5)
CHLORIDE SERPL-SCNC: 101 MMOL/L — SIGNIFICANT CHANGE UP (ref 98–107)
CO2 SERPL-SCNC: 29 MMOL/L — SIGNIFICANT CHANGE UP (ref 22–31)
CREAT SERPL-MCNC: 1.07 MG/DL — SIGNIFICANT CHANGE UP (ref 0.5–1.3)
EGFR: 86 ML/MIN/1.73M2 — SIGNIFICANT CHANGE UP
GLUCOSE SERPL-MCNC: 86 MG/DL — SIGNIFICANT CHANGE UP (ref 70–99)
HCT VFR BLD CALC: 39.2 % — SIGNIFICANT CHANGE UP (ref 39–50)
HGB BLD-MCNC: 13.3 G/DL — SIGNIFICANT CHANGE UP (ref 13–17)
MAGNESIUM SERPL-MCNC: 2.3 MG/DL — SIGNIFICANT CHANGE UP (ref 1.6–2.6)
MCHC RBC-ENTMCNC: 28.6 PG — SIGNIFICANT CHANGE UP (ref 27–34)
MCHC RBC-ENTMCNC: 33.9 GM/DL — SIGNIFICANT CHANGE UP (ref 32–36)
MCV RBC AUTO: 84.3 FL — SIGNIFICANT CHANGE UP (ref 80–100)
NRBC # BLD: 0 /100 WBCS — SIGNIFICANT CHANGE UP (ref 0–0)
NRBC # FLD: 0 K/UL — SIGNIFICANT CHANGE UP (ref 0–0)
PHOSPHATE SERPL-MCNC: 4.3 MG/DL — SIGNIFICANT CHANGE UP (ref 2.5–4.5)
PLATELET # BLD AUTO: 145 K/UL — LOW (ref 150–400)
POTASSIUM SERPL-MCNC: 3.9 MMOL/L — SIGNIFICANT CHANGE UP (ref 3.5–5.3)
POTASSIUM SERPL-SCNC: 3.9 MMOL/L — SIGNIFICANT CHANGE UP (ref 3.5–5.3)
RBC # BLD: 4.65 M/UL — SIGNIFICANT CHANGE UP (ref 4.2–5.8)
RBC # FLD: 12.8 % — SIGNIFICANT CHANGE UP (ref 10.3–14.5)
SODIUM SERPL-SCNC: 142 MMOL/L — SIGNIFICANT CHANGE UP (ref 135–145)
WBC # BLD: 6.33 K/UL — SIGNIFICANT CHANGE UP (ref 3.8–10.5)
WBC # FLD AUTO: 6.33 K/UL — SIGNIFICANT CHANGE UP (ref 3.8–10.5)

## 2024-02-18 PROCEDURE — 99232 SBSQ HOSP IP/OBS MODERATE 35: CPT

## 2024-02-18 RX ORDER — SODIUM CHLORIDE 9 MG/ML
250 INJECTION INTRAMUSCULAR; INTRAVENOUS; SUBCUTANEOUS ONCE
Refills: 0 | Status: COMPLETED | OUTPATIENT
Start: 2024-02-18 | End: 2024-02-18

## 2024-02-18 RX ADMIN — LEVETIRACETAM 1000 MILLIGRAM(S): 250 TABLET, FILM COATED ORAL at 21:46

## 2024-02-18 RX ADMIN — SENNA PLUS 2 TABLET(S): 8.6 TABLET ORAL at 21:46

## 2024-02-18 RX ADMIN — SODIUM CHLORIDE 125 MILLILITER(S): 9 INJECTION INTRAMUSCULAR; INTRAVENOUS; SUBCUTANEOUS at 22:22

## 2024-02-18 RX ADMIN — PANTOPRAZOLE SODIUM 40 MILLIGRAM(S): 20 TABLET, DELAYED RELEASE ORAL at 05:45

## 2024-02-18 RX ADMIN — LIDOCAINE 1 PATCH: 4 CREAM TOPICAL at 17:32

## 2024-02-18 RX ADMIN — Medication 4 MILLIGRAM(S): at 17:40

## 2024-02-18 RX ADMIN — PANTOPRAZOLE SODIUM 40 MILLIGRAM(S): 20 TABLET, DELAYED RELEASE ORAL at 17:42

## 2024-02-18 RX ADMIN — LEVETIRACETAM 1000 MILLIGRAM(S): 250 TABLET, FILM COATED ORAL at 10:51

## 2024-02-18 RX ADMIN — Medication 4 MILLIGRAM(S): at 05:45

## 2024-02-18 RX ADMIN — LIDOCAINE 1 PATCH: 4 CREAM TOPICAL at 07:50

## 2024-02-18 RX ADMIN — LIDOCAINE 1 PATCH: 4 CREAM TOPICAL at 05:44

## 2024-02-18 NOTE — PROGRESS NOTE ADULT - ASSESSMENT
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 He also began outpt WBRT as well as RT to C5-T5 recently (unclear how many fxs are completed to date).  He was readmitted to [facility] on 2/10 s/p seizure at home- admission imaging confirms progression of CNS mets since 2/2/24 imaging.    ACTIVE PROBLEMS  Metastatic melanoma to multiple sites including brain/LMD  Goals of care counseling, discussion  Secondary seizures  Encounter for antineoplastic radiation therapy  Cancer related pain  Constipation    - Metastatic melanoma  - Goals of care counseling, discussion  Pt with rapidly progressive disease despite receiving SOC systemic therapy  Pt does not meet HLA requirements for Tebentefusp  Hospice is appropriate; s/p GOC discussion with pt's wife 2/15 who agrees with hospice placement, and who also indicated that she is unable to care for him at home   > dispo for hospice placement will be complex as pt is uninsured with insufficient greencard status; [facility] Disability application initiated in hopes that pt will be approved and can then potentially be placed at a [facility] with hospice capability  Long-term prognosis remains poor    - Secondary seizures  - CNS mets, LMD  Appreciate Neuro Onc consult (Dr. Lozano)  Appreciate Rad Onc consult  Continuing Dex PO 4mg BID with ppi ppx  Pt to complete WBRT (10fxs) and pall RT to C5-T5 (5fxs) on this admission (tx schedule TBD)- tx duration 2/13-26  Continuing Keppra IV 1000mg q12  Continuing Dex IV 4mg BID with ppi ppx, with plan to taper off after completion of RT    - Cancer related pain: continuing Oxy 5mg q6/prn with bowel regimen to prevent OIC    - Constipation  Possibly due to opioids, immobility  Continuing Senna 2 tabs nightly  Continuing Miralax 1 packet daily  Consider Dulcolax suppository if needed

## 2024-02-18 NOTE — PROGRESS NOTE ADULT - SUBJECTIVE AND OBJECTIVE BOX
Patient is a 48y old  Male who presents with a chief complaint of seizure (17 Feb 2024 14:05)    SUBJECTIVE / OVERNIGHT EVENTS: No acute events. [facility]  #[id number]. Comfortable, no pain, no current dizziness, headache.     MEDICATIONS  (STANDING):  dexAMETHasone  Injectable 4 milliGRAM(s) IV Push two times a day  influenza   Vaccine 0.5 milliLiter(s) IntraMuscular once  levETIRAcetam   Injectable 1000 milliGRAM(s) IV Push every 12 hours  lidocaine   4% Patch 1 Patch Transdermal every 24 hours  pantoprazole    Tablet 40 milliGRAM(s) Oral two times a day  polyethylene glycol 3350 17 Gram(s) Oral daily  senna 2 Tablet(s) Oral at bedtime    MEDICATIONS  (PRN):  aluminum hydroxide/magnesium hydroxide/simethicone Suspension 30 milliLiter(s) Oral every 6 hours PRN Dyspepsia  bisacodyl Suppository 10 milliGRAM(s) Rectal daily PRN Constipation  melatonin 3 milliGRAM(s) Oral at bedtime PRN Insomnia  oxyCODONE    IR 5 milliGRAM(s) Oral every 6 hours PRN Severe Pain (7 - 10)    CAPILLARY BLOOD GLUCOSE    I&O's Summary    17 Feb 2024 07:01  -  18 Feb 2024 07:00  --------------------------------------------------------  IN: 0 mL / OUT: 750 mL / NET: -750 mL    18 Feb 2024 07:01  -  18 Feb 2024 13:20  --------------------------------------------------------  IN: 300 mL / OUT: 400 mL / NET: -100 mL    PHYSICAL EXAM:  Vital Signs Last 24 Hrs  T(C): 37 (18 Feb 2024 12:30), Max: 37 (17 Feb 2024 16:32)  T(F): 98.6 (18 Feb 2024 12:30), Max: 98.6 (17 Feb 2024 16:32)  HR: 65 (18 Feb 2024 12:30) (60 - 94)  BP: 91/60 (18 Feb 2024 12:30) (91/60 - 109/76)  BP(mean): --  RR: 18 (18 Feb 2024 12:30) (16 - 18)  SpO2: 100% (18 Feb 2024 12:30) (98% - 100%)    Parameters below as of 18 Feb 2024 12:30  Patient On (Oxygen Delivery Method): room air    CONSTITUTIONAL: NAD, laying in bed  EYES: conjunctiva and sclera clear  ENMT: Moist oral mucosa  RESPIRATORY: Normal respiratory effort; lungs are clear to auscultation bilaterally  CARDIOVASCULAR: Regular rate and rhythm, normal S1 and S2, No lower extremity edema  ABDOMEN: Nontender to palpation, normoactive bowel sounds, no rebound/guarding  PSYCH: calm, affect appropriate  SKIN: No rashes    LABS:                        13.3   6.33  )-----------( 145      ( 18 Feb 2024 06:54 )             39.2     02-18    142  |  101  |  23  ----------------------------<  86  3.9   |  29  |  1.07    Ca    9.3      18 Feb 2024 06:54  Phos  4.3     02-18  Mg     2.30     02-18    Urinalysis Basic - ( 18 Feb 2024 06:54 )    Color: x / Appearance: x / SG: x / pH: x  Gluc: 86 mg/dL / Ketone: x  / Bili: x / Urobili: x   Blood: x / Protein: x / Nitrite: x   Leuk Esterase: x / RBC: x / WBC x   Sq Epi: x / Non Sq Epi: x / Bacteria: x    RADIOLOGY & ADDITIONAL TESTS: Reviewed    COORDINATION OF CARE:  Care Discussed with Consultants/Other Providers [Y-Medicine ACP]

## 2024-02-19 LAB
ALBUMIN SERPL ELPH-MCNC: 3.7 G/DL — SIGNIFICANT CHANGE UP (ref 3.3–5)
ALP SERPL-CCNC: 55 U/L — SIGNIFICANT CHANGE UP (ref 40–120)
ALT FLD-CCNC: 33 U/L — SIGNIFICANT CHANGE UP (ref 4–41)
ANION GAP SERPL CALC-SCNC: 13 MMOL/L — SIGNIFICANT CHANGE UP (ref 7–14)
AST SERPL-CCNC: 20 U/L — SIGNIFICANT CHANGE UP (ref 4–40)
BILIRUB SERPL-MCNC: 0.4 MG/DL — SIGNIFICANT CHANGE UP (ref 0.2–1.2)
BUN SERPL-MCNC: 25 MG/DL — HIGH (ref 7–23)
CALCIUM SERPL-MCNC: 9.1 MG/DL — SIGNIFICANT CHANGE UP (ref 8.4–10.5)
CHLORIDE SERPL-SCNC: 103 MMOL/L — SIGNIFICANT CHANGE UP (ref 98–107)
CO2 SERPL-SCNC: 22 MMOL/L — SIGNIFICANT CHANGE UP (ref 22–31)
CREAT SERPL-MCNC: 1.07 MG/DL — SIGNIFICANT CHANGE UP (ref 0.5–1.3)
EGFR: 86 ML/MIN/1.73M2 — SIGNIFICANT CHANGE UP
GLUCOSE SERPL-MCNC: 93 MG/DL — SIGNIFICANT CHANGE UP (ref 70–99)
MAGNESIUM SERPL-MCNC: 2.3 MG/DL — SIGNIFICANT CHANGE UP (ref 1.6–2.6)
PHOSPHATE SERPL-MCNC: 3.9 MG/DL — SIGNIFICANT CHANGE UP (ref 2.5–4.5)
POTASSIUM SERPL-MCNC: 4.5 MMOL/L — SIGNIFICANT CHANGE UP (ref 3.5–5.3)
POTASSIUM SERPL-SCNC: 4.5 MMOL/L — SIGNIFICANT CHANGE UP (ref 3.5–5.3)
PROT SERPL-MCNC: 6.8 G/DL — SIGNIFICANT CHANGE UP (ref 6–8.3)
SODIUM SERPL-SCNC: 138 MMOL/L — SIGNIFICANT CHANGE UP (ref 135–145)

## 2024-02-19 PROCEDURE — 99232 SBSQ HOSP IP/OBS MODERATE 35: CPT

## 2024-02-19 PROCEDURE — 74018 RADEX ABDOMEN 1 VIEW: CPT | Mod: 26

## 2024-02-19 RX ORDER — POLYETHYLENE GLYCOL 3350 17 G/17G
17 POWDER, FOR SOLUTION ORAL DAILY
Refills: 0 | Status: DISCONTINUED | OUTPATIENT
Start: 2024-02-19 | End: 2024-02-25

## 2024-02-19 RX ADMIN — SENNA PLUS 2 TABLET(S): 8.6 TABLET ORAL at 21:15

## 2024-02-19 RX ADMIN — Medication 1 ENEMA: at 19:50

## 2024-02-19 RX ADMIN — Medication 5 MILLIGRAM(S): at 15:49

## 2024-02-19 RX ADMIN — POLYETHYLENE GLYCOL 3350 17 GRAM(S): 17 POWDER, FOR SOLUTION ORAL at 18:07

## 2024-02-19 RX ADMIN — POLYETHYLENE GLYCOL 3350 17 GRAM(S): 17 POWDER, FOR SOLUTION ORAL at 09:50

## 2024-02-19 RX ADMIN — LEVETIRACETAM 1000 MILLIGRAM(S): 250 TABLET, FILM COATED ORAL at 21:14

## 2024-02-19 RX ADMIN — PANTOPRAZOLE SODIUM 40 MILLIGRAM(S): 20 TABLET, DELAYED RELEASE ORAL at 06:14

## 2024-02-19 RX ADMIN — Medication 4 MILLIGRAM(S): at 06:13

## 2024-02-19 RX ADMIN — LIDOCAINE 1 PATCH: 4 CREAM TOPICAL at 06:11

## 2024-02-19 RX ADMIN — PANTOPRAZOLE SODIUM 40 MILLIGRAM(S): 20 TABLET, DELAYED RELEASE ORAL at 18:07

## 2024-02-19 RX ADMIN — OXYCODONE HYDROCHLORIDE 5 MILLIGRAM(S): 5 TABLET ORAL at 08:13

## 2024-02-19 RX ADMIN — Medication 4 MILLIGRAM(S): at 18:07

## 2024-02-19 RX ADMIN — OXYCODONE HYDROCHLORIDE 5 MILLIGRAM(S): 5 TABLET ORAL at 09:13

## 2024-02-19 RX ADMIN — LEVETIRACETAM 1000 MILLIGRAM(S): 250 TABLET, FILM COATED ORAL at 09:50

## 2024-02-19 NOTE — PROVIDER CONTACT NOTE (OTHER) - ACTION/TREATMENT ORDERED:
Lying and sitting orthostatic BP and HR recorded as ordered. IV fluids given as ordered. Patient repositioned for comfort. Orthostatic BP and HR recorded as ordered. IV fluids given as ordered. Provider said pain related to metastatic cancer, patient doesn't want pain meds at this time. Patient repositioned for comfort.

## 2024-02-19 NOTE — PROGRESS NOTE ADULT - ASSESSMENT
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 He also began outpt WBRT as well as RT to C5-T5 recently (unclear how many fxs are completed to date).  He was readmitted to [facility] on 2/10 s/p seizure at home- admission imaging confirms progression of CNS mets since 2/2/24 imaging.    ACTIVE PROBLEMS  Metastatic melanoma to multiple sites including brain/LMD  Goals of care counseling, discussion  Secondary seizures  Encounter for antineoplastic radiation therapy  Cancer related pain  Constipation    - Metastatic melanoma  - Goals of care counseling, discussion  Pt with rapidly progressive disease despite receiving SOC systemic therapy  Pt does not meet HLA requirements for Tebentefusp  Hospice is appropriate; s/p GOC discussion with pt's wife 2/15 who agrees with hospice placement, and who also indicated that she is unable to care for him at home   > dispo for hospice placement will be complex as pt is uninsured with insufficient greencard status; [facility] Disability application initiated in hopes that pt will be approved and can then potentially be placed at a [facility] with hospice capability  Long-term prognosis remains poor    - Secondary seizures  - CNS mets, LMD  Appreciate Neuro Onc consult (Dr. Lozano)  Appreciate Rad Onc consult  Continuing Dex PO 4mg BID with ppi ppx  Pt to complete WBRT (10fxs) and pall RT to C5-T5 (5fxs) on this admission (tx schedule TBD)- tx duration 2/13-26  Continuing Keppra IV 1000mg q12  Continuing Dex IV 4mg BID with ppi ppx, with plan to taper off after completion of RT    - Cancer related pain: continuing Oxy 5mg q6/prn with bowel regimen to prevent OIC    - Constipation  Possibly due to opioids, immobility  Reports no BM in about 1 week with some associated abdominal discomfort  - Check abdominal XR  - If no obstruction, plan to increase bowel regimen

## 2024-02-19 NOTE — PROGRESS NOTE ADULT - SUBJECTIVE AND OBJECTIVE BOX
Patient is a 48y old  Male who presents with a chief complaint of seizure (18 Feb 2024 13:20)    SUBJECTIVE / OVERNIGHT EVENTS: No acute events. [employer]  [id number] used. Reports intermittent abdominal discomfort, states he has not had a bowel movement in about a week. He continues to pass gas, denies nausea or vomiting.     MEDICATIONS  (STANDING):  dexAMETHasone  Injectable 4 milliGRAM(s) IV Push two times a day  influenza   Vaccine 0.5 milliLiter(s) IntraMuscular once  levETIRAcetam   Injectable 1000 milliGRAM(s) IV Push every 12 hours  lidocaine   4% Patch 1 Patch Transdermal every 24 hours  pantoprazole    Tablet 40 milliGRAM(s) Oral two times a day  polyethylene glycol 3350 17 Gram(s) Oral daily  senna 2 Tablet(s) Oral at bedtime    MEDICATIONS  (PRN):  aluminum hydroxide/magnesium hydroxide/simethicone Suspension 30 milliLiter(s) Oral every 6 hours PRN Dyspepsia  bisacodyl Suppository 10 milliGRAM(s) Rectal daily PRN Constipation  melatonin 3 milliGRAM(s) Oral at bedtime PRN Insomnia  oxyCODONE    IR 5 milliGRAM(s) Oral every 6 hours PRN Severe Pain (7 - 10)    CAPILLARY BLOOD GLUCOSE    I&O's Summary    18 Feb 2024 07:01  -  19 Feb 2024 07:00  --------------------------------------------------------  IN: 300 mL / OUT: 400 mL / NET: -100 mL    PHYSICAL EXAM:  Vital Signs Last 24 Hrs  T(C): 36.4 (19 Feb 2024 12:35), Max: 36.8 (19 Feb 2024 02:00)  T(F): 97.6 (19 Feb 2024 12:35), Max: 98.3 (19 Feb 2024 02:00)  HR: 67 (19 Feb 2024 12:35) (63 - 75)  BP: 103/62 (19 Feb 2024 12:35) (94/66 - 107/74)  BP(mean): --  RR: 18 (19 Feb 2024 12:35) (16 - 18)  SpO2: 98% (19 Feb 2024 12:35) (98% - 100%)    Parameters below as of 19 Feb 2024 12:35  Patient On (Oxygen Delivery Method): room air    CONSTITUTIONAL: NAD, laying in bed  EYES: conjunctiva and sclera clear  ENMT: Moist oral mucosa  NECK: Supple  RESPIRATORY: Normal respiratory effort; lungs are clear to auscultation bilaterally  CARDIOVASCULAR: Regular rate and rhythm, normal S1 and S2, No lower extremity edema  ABDOMEN: Soft, nondistended, mild tenderness to deep palpation throughout, normoactive bowel sounds, no rebound/guarding  PSYCH: calm, affect appropriate    LABS:                        13.3   6.33  )-----------( 145      ( 18 Feb 2024 06:54 )             39.2     02-19    138  |  103  |  25<H>  ----------------------------<  93  4.5   |  22  |  1.07    Ca    9.1      19 Feb 2024 06:00  Phos  3.9     02-19  Mg     2.30     02-19    TPro  6.8  /  Alb  3.7  /  TBili  0.4  /  DBili  x   /  AST  20  /  ALT  33  /  AlkPhos  55  02-19    Urinalysis Basic - ( 19 Feb 2024 06:00 )    Color: x / Appearance: x / SG: x / pH: x  Gluc: 93 mg/dL / Ketone: x  / Bili: x / Urobili: x   Blood: x / Protein: x / Nitrite: x   Leuk Esterase: x / RBC: x / WBC x   Sq Epi: x / Non Sq Epi: x / Bacteria: x    RADIOLOGY & ADDITIONAL TESTS: Reviewed    COORDINATION OF CARE:  Care Discussed with Consultants/Other Providers [Y- Medicine ACP]

## 2024-02-20 ENCOUNTER — APPOINTMENT (OUTPATIENT)
Dept: NEUROLOGY | Facility: CLINIC | Age: 49
End: 2024-02-20

## 2024-02-20 LAB
ALBUMIN SERPL ELPH-MCNC: 3.8 G/DL — SIGNIFICANT CHANGE UP (ref 3.3–5)
ALP SERPL-CCNC: 55 U/L — SIGNIFICANT CHANGE UP (ref 40–120)
ALT FLD-CCNC: 42 U/L — HIGH (ref 4–41)
ANION GAP SERPL CALC-SCNC: 10 MMOL/L — SIGNIFICANT CHANGE UP (ref 7–14)
AST SERPL-CCNC: 22 U/L — SIGNIFICANT CHANGE UP (ref 4–40)
BASOPHILS # BLD AUTO: 0 K/UL — SIGNIFICANT CHANGE UP (ref 0–0.2)
BASOPHILS NFR BLD AUTO: 0 % — SIGNIFICANT CHANGE UP (ref 0–2)
BILIRUB SERPL-MCNC: 0.3 MG/DL — SIGNIFICANT CHANGE UP (ref 0.2–1.2)
BUN SERPL-MCNC: 22 MG/DL — SIGNIFICANT CHANGE UP (ref 7–23)
CALCIUM SERPL-MCNC: 9 MG/DL — SIGNIFICANT CHANGE UP (ref 8.4–10.5)
CHLORIDE SERPL-SCNC: 101 MMOL/L — SIGNIFICANT CHANGE UP (ref 98–107)
CO2 SERPL-SCNC: 29 MMOL/L — SIGNIFICANT CHANGE UP (ref 22–31)
CREAT SERPL-MCNC: 1.02 MG/DL — SIGNIFICANT CHANGE UP (ref 0.5–1.3)
EGFR: 91 ML/MIN/1.73M2 — SIGNIFICANT CHANGE UP
EOSINOPHIL # BLD AUTO: 0 K/UL — SIGNIFICANT CHANGE UP (ref 0–0.5)
EOSINOPHIL NFR BLD AUTO: 0 % — SIGNIFICANT CHANGE UP (ref 0–6)
GLUCOSE SERPL-MCNC: 97 MG/DL — SIGNIFICANT CHANGE UP (ref 70–99)
HCT VFR BLD CALC: 41.8 % — SIGNIFICANT CHANGE UP (ref 39–50)
HGB BLD-MCNC: 14.4 G/DL — SIGNIFICANT CHANGE UP (ref 13–17)
IANC: 4.24 K/UL — SIGNIFICANT CHANGE UP (ref 1.8–7.4)
IMM GRANULOCYTES NFR BLD AUTO: 1.1 % — HIGH (ref 0–0.9)
LYMPHOCYTES # BLD AUTO: 0.92 K/UL — LOW (ref 1–3.3)
LYMPHOCYTES # BLD AUTO: 16.2 % — SIGNIFICANT CHANGE UP (ref 13–44)
MAGNESIUM SERPL-MCNC: 2.2 MG/DL — SIGNIFICANT CHANGE UP (ref 1.6–2.6)
MCHC RBC-ENTMCNC: 28.9 PG — SIGNIFICANT CHANGE UP (ref 27–34)
MCHC RBC-ENTMCNC: 34.4 GM/DL — SIGNIFICANT CHANGE UP (ref 32–36)
MCV RBC AUTO: 83.9 FL — SIGNIFICANT CHANGE UP (ref 80–100)
MONOCYTES # BLD AUTO: 0.45 K/UL — SIGNIFICANT CHANGE UP (ref 0–0.9)
MONOCYTES NFR BLD AUTO: 7.9 % — SIGNIFICANT CHANGE UP (ref 2–14)
NEUTROPHILS # BLD AUTO: 4.24 K/UL — SIGNIFICANT CHANGE UP (ref 1.8–7.4)
NEUTROPHILS NFR BLD AUTO: 74.8 % — SIGNIFICANT CHANGE UP (ref 43–77)
NRBC # BLD: 0 /100 WBCS — SIGNIFICANT CHANGE UP (ref 0–0)
NRBC # FLD: 0 K/UL — SIGNIFICANT CHANGE UP (ref 0–0)
PHOSPHATE SERPL-MCNC: 4.5 MG/DL — SIGNIFICANT CHANGE UP (ref 2.5–4.5)
PLATELET # BLD AUTO: 136 K/UL — LOW (ref 150–400)
POTASSIUM SERPL-MCNC: 4 MMOL/L — SIGNIFICANT CHANGE UP (ref 3.5–5.3)
POTASSIUM SERPL-SCNC: 4 MMOL/L — SIGNIFICANT CHANGE UP (ref 3.5–5.3)
PROT SERPL-MCNC: 6.6 G/DL — SIGNIFICANT CHANGE UP (ref 6–8.3)
RBC # BLD: 4.98 M/UL — SIGNIFICANT CHANGE UP (ref 4.2–5.8)
RBC # FLD: 12.9 % — SIGNIFICANT CHANGE UP (ref 10.3–14.5)
SODIUM SERPL-SCNC: 140 MMOL/L — SIGNIFICANT CHANGE UP (ref 135–145)
WBC # BLD: 5.67 K/UL — SIGNIFICANT CHANGE UP (ref 3.8–10.5)
WBC # FLD AUTO: 5.67 K/UL — SIGNIFICANT CHANGE UP (ref 3.8–10.5)

## 2024-02-20 PROCEDURE — 77427 RADIATION TX MANAGEMENT X5: CPT

## 2024-02-20 PROCEDURE — 99233 SBSQ HOSP IP/OBS HIGH 50: CPT

## 2024-02-20 RX ORDER — SODIUM CHLORIDE 9 MG/ML
1000 INJECTION INTRAMUSCULAR; INTRAVENOUS; SUBCUTANEOUS
Refills: 0 | Status: DISCONTINUED | OUTPATIENT
Start: 2024-02-20 | End: 2024-02-23

## 2024-02-20 RX ORDER — OXYCODONE HYDROCHLORIDE 5 MG/1
5 TABLET ORAL EVERY 6 HOURS
Refills: 0 | Status: DISCONTINUED | OUTPATIENT
Start: 2024-02-20 | End: 2024-02-20

## 2024-02-20 RX ORDER — OXYCODONE HYDROCHLORIDE 5 MG/1
5 TABLET ORAL EVERY 4 HOURS
Refills: 0 | Status: DISCONTINUED | OUTPATIENT
Start: 2024-02-20 | End: 2024-02-22

## 2024-02-20 RX ORDER — OXYCODONE HYDROCHLORIDE 5 MG/1
2.5 TABLET ORAL EVERY 4 HOURS
Refills: 0 | Status: DISCONTINUED | OUTPATIENT
Start: 2024-02-20 | End: 2024-02-22

## 2024-02-20 RX ORDER — SODIUM CHLORIDE 9 MG/ML
1000 INJECTION INTRAMUSCULAR; INTRAVENOUS; SUBCUTANEOUS ONCE
Refills: 0 | Status: COMPLETED | OUTPATIENT
Start: 2024-02-20 | End: 2024-02-20

## 2024-02-20 RX ORDER — SENNA PLUS 8.6 MG/1
2 TABLET ORAL
Refills: 0 | Status: DISCONTINUED | OUTPATIENT
Start: 2024-02-20 | End: 2024-03-06

## 2024-02-20 RX ORDER — DEXAMETHASONE 0.5 MG/5ML
4 ELIXIR ORAL
Refills: 0 | Status: DISCONTINUED | OUTPATIENT
Start: 2024-02-20 | End: 2024-02-27

## 2024-02-20 RX ADMIN — Medication 4 MILLIGRAM(S): at 18:00

## 2024-02-20 RX ADMIN — SENNA PLUS 2 TABLET(S): 8.6 TABLET ORAL at 18:00

## 2024-02-20 RX ADMIN — SODIUM CHLORIDE 100 MILLILITER(S): 9 INJECTION INTRAMUSCULAR; INTRAVENOUS; SUBCUTANEOUS at 17:55

## 2024-02-20 RX ADMIN — OXYCODONE HYDROCHLORIDE 2.5 MILLIGRAM(S): 5 TABLET ORAL at 09:24

## 2024-02-20 RX ADMIN — SODIUM CHLORIDE 1000 MILLILITER(S): 9 INJECTION INTRAMUSCULAR; INTRAVENOUS; SUBCUTANEOUS at 21:52

## 2024-02-20 RX ADMIN — Medication 4 MILLIGRAM(S): at 05:48

## 2024-02-20 RX ADMIN — LIDOCAINE 1 PATCH: 4 CREAM TOPICAL at 05:47

## 2024-02-20 RX ADMIN — OXYCODONE HYDROCHLORIDE 2.5 MILLIGRAM(S): 5 TABLET ORAL at 08:24

## 2024-02-20 RX ADMIN — PANTOPRAZOLE SODIUM 40 MILLIGRAM(S): 20 TABLET, DELAYED RELEASE ORAL at 18:00

## 2024-02-20 RX ADMIN — PANTOPRAZOLE SODIUM 40 MILLIGRAM(S): 20 TABLET, DELAYED RELEASE ORAL at 05:50

## 2024-02-20 RX ADMIN — POLYETHYLENE GLYCOL 3350 17 GRAM(S): 17 POWDER, FOR SOLUTION ORAL at 11:32

## 2024-02-20 RX ADMIN — LEVETIRACETAM 1000 MILLIGRAM(S): 250 TABLET, FILM COATED ORAL at 11:32

## 2024-02-20 RX ADMIN — LEVETIRACETAM 1000 MILLIGRAM(S): 250 TABLET, FILM COATED ORAL at 21:52

## 2024-02-20 NOTE — PROGRESS NOTE ADULT - ASSESSMENT
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 He also began outpt WBRT as well as RT to C5-T5 recently (unclear how many fxs are completed to date).  He was readmitted to [facility] on 2/10 s/p seizure at home- admission imaging confirms progression of CNS mets since 2/2/24 imaging.    ACTIVE PROBLEMS  Metastatic melanoma to multiple sites including brain/LMD  Goals of care counseling, discussion  Secondary seizures  Encounter for antineoplastic radiation therapy  Cancer related pain  Constipation    - Metastatic melanoma  - Goals of care counseling, discussion  Pt with rapidly progressive disease despite receiving SOC systemic therapy  Pt does not meet HLA requirements for Tebentefusp  Hospice is appropriate; s/p GOC discussion with pt's wife yesterday who agrees with hospice placement, and who also indicated that she is unable to care for him at home   > dispo for hospice placement will be complex as pt is uninsured with insufficient greencard status; [facility] Disability application initiated in hopes that pt will be approved and can then potentially be placed at a [facility] with hospice capability  Long-term prognosis remains poor    - Secondary seizures  - CNS mets, LMD  Appreciate Neuro Onc consult (Dr. Lozano)  Appreciate Rad Onc consult  Continuing Dex PO 4mg BID with ppi ppx  Pt to complete WBRT (10fxs) and pall RT to C5-T5 (5fxs) on this admission (tx schedule TBD)- tx duration 2/13-26  Continuing Keppra IV 1000mg q12  Continuing Dex IV 4mg BID with ppi ppx, with plan to taper off after completion of RT    - Cancer related pain: continuing Oxy 2.5/5mg q4/prn with bowel regimen to prevent OIC    - Constipation  Possibly due to opioids, immobility  2/19 AXR negative for ileus/obstruction  Continuing Senna 2 tabs nightly  Continuing Miralax 1 packet daily  Dulcolax suppository ordered today

## 2024-02-20 NOTE — PROGRESS NOTE ADULT - SUBJECTIVE AND OBJECTIVE BOX
SOLID TUMOR ONCOLOGY HOSPITALIST PROGRESS NOTE    S: No acute events overnight.    CURRENT MEDICATIONS  (STANDING):  dexAMETHasone  Injectable 4 milliGRAM(s) IV Push two times a day  influenza   Vaccine 0.5 milliLiter(s) IntraMuscular once  levETIRAcetam   Injectable 1000 milliGRAM(s) IV Push every 12 hours  lidocaine   4% Patch 1 Patch Transdermal every 24 hours  pantoprazole    Tablet 40 milliGRAM(s) Oral two times a day  polyethylene glycol 3350 17 Gram(s) Oral daily  senna 2 Tablet(s) Oral at bedtime  (PRN):  aluminum hydroxide/magnesium hydroxide/simethicone Suspension 30 milliLiter(s) Oral every 6 hours PRN Dyspepsia  melatonin 3 milliGRAM(s) Oral at bedtime PRN Insomnia  oxyCODONE    IR 5 milliGRAM(s) Oral every 4 hours PRN Severe Pain (7 - 10)  oxyCODONE    IR 2.5 milliGRAM(s) Oral every 4 hours PRN Moderate Pain (4 - 6)      PHYSICAL EXAM  T(C): 36.9 (02-20-24 @ 11:21), Max: 36.9 (02-20-24 @ 11:21)  HR: 72 (02-20-24 @ 11:21) (61 - 81)  BP: 105/70 (02-20-24 @ 11:21) (96/65 - 109/76)  RR: 17 (02-20-24 @ 11:21) (17 - 18)  SpO2: 100% (02-20-24 @ 11:21) (98% - 100%)  [id number]-[id number] young Australian/Creole speaking male, laying in bed, nad, answering questions appropriately and following commands  Anicteric, but injected sclera, no oral lesions/thrush  RRR, no m/r/g  CTAB, no w/c/r  Abd soft/nt/nd, normoactive bowel sounds  No peripheral edema; R thumb amputated  5/5 strength and normal ROM in all 4 extremities (L slightly weaker than R)  CN 2-12 grossly intact; no gross focal motor/sensory deficits      LABS                        14.4   5.67  )-----------( 136      ( 20 Feb 2024 05:33 )             41.8     02-20    140  |  101  |  22  ----------------------------<  97  4.0   |  29  |  1.02    Ca    9.0      20 Feb 2024 05:33  Phos  4.5     02-20  Mg     2.20     02-20    TPro  6.6  /  Alb  3.8  /  TBili  0.3  /  DBili  x   /  AST  22  /  ALT  42<H>  /  AlkPhos  55  02-20      PERTINENT RADIOLOGY  2/19 AXR: Nonspecific bowel gas pattern. Mild distention of stomach and small bowel  If symptoms persist recommend CT.    2/12 CTA h/n: Multiple enhancing brain metastases which have increased   since 2/2/2024. No midline shift. Normal CTA of the head and neck. No   large vessel occlusion.    2/2 CT a/p with IV contrast: New bilateral lobar hepatic metastatic disease. Pulmonary nodules better characterized on prior PET/CT, again concerning for metastases.   Postoperative changes of the right thumb with adjacent nonspecific soft   tissue thickening. Correlate with recent PET/CT findings. Slight gastric inflammatory changes along the greater curvature suggestive of gastritis. Correlate clinically.    1/29 MRI Total spine  1. CERVICAL SPINE:   Multiple metastases within the canal most   prominently at the C6 and C7 levels along the cord surface.  2. THORACIC SPINE:   Multiple metastases within the canal including cord   parenchymal lesions at T1 and T4 and multiple lesions along the cord   surface.  3. LUMBAR SPINE:   Multiple metastases within the cauda equina and distal   canal ependymal surfaces.

## 2024-02-20 NOTE — CHART NOTE - NSCHARTNOTEFT_GEN_A_CORE
Receiving palliative RT to the c/t spine and whole brain.  Has received 1200 cGy to whole brain and 1600 cgy to c/t spine.  No complaints, continue RT as planned.

## 2024-02-21 PROCEDURE — 99233 SBSQ HOSP IP/OBS HIGH 50: CPT

## 2024-02-21 RX ORDER — MECLIZINE HCL 12.5 MG
25 TABLET ORAL
Refills: 0 | Status: DISCONTINUED | OUTPATIENT
Start: 2024-02-21 | End: 2024-03-05

## 2024-02-21 RX ADMIN — LEVETIRACETAM 1000 MILLIGRAM(S): 250 TABLET, FILM COATED ORAL at 09:36

## 2024-02-21 RX ADMIN — LEVETIRACETAM 1000 MILLIGRAM(S): 250 TABLET, FILM COATED ORAL at 21:43

## 2024-02-21 RX ADMIN — SODIUM CHLORIDE 100 MILLILITER(S): 9 INJECTION INTRAMUSCULAR; INTRAVENOUS; SUBCUTANEOUS at 04:27

## 2024-02-21 RX ADMIN — LIDOCAINE 1 PATCH: 4 CREAM TOPICAL at 07:39

## 2024-02-21 RX ADMIN — SENNA PLUS 2 TABLET(S): 8.6 TABLET ORAL at 18:01

## 2024-02-21 RX ADMIN — PANTOPRAZOLE SODIUM 40 MILLIGRAM(S): 20 TABLET, DELAYED RELEASE ORAL at 05:31

## 2024-02-21 RX ADMIN — POLYETHYLENE GLYCOL 3350 17 GRAM(S): 17 POWDER, FOR SOLUTION ORAL at 09:35

## 2024-02-21 RX ADMIN — Medication 4 MILLIGRAM(S): at 05:30

## 2024-02-21 RX ADMIN — PANTOPRAZOLE SODIUM 40 MILLIGRAM(S): 20 TABLET, DELAYED RELEASE ORAL at 18:00

## 2024-02-21 RX ADMIN — LIDOCAINE 1 PATCH: 4 CREAM TOPICAL at 04:21

## 2024-02-21 RX ADMIN — LIDOCAINE 1 PATCH: 4 CREAM TOPICAL at 16:49

## 2024-02-21 RX ADMIN — Medication 4 MILLIGRAM(S): at 18:00

## 2024-02-21 RX ADMIN — SENNA PLUS 2 TABLET(S): 8.6 TABLET ORAL at 05:31

## 2024-02-21 NOTE — PROGRESS NOTE ADULT - ASSESSMENT
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 He also began outpt WBRT as well as RT to C5-T5 recently (unclear how many fxs are completed to date).  He was readmitted to [facility] on 2/10 s/p seizure at home- admission imaging confirms progression of CNS mets since 2/2/24 imaging.    ACTIVE PROBLEMS  Metastatic melanoma to multiple sites including brain/LMD  Goals of care counseling, discussion  Secondary seizures  Encounter for antineoplastic radiation therapy  Cancer related pain  Constipation    - Metastatic melanoma  - Goals of care counseling, discussion  Pt with rapidly progressive disease despite receiving SOC systemic therapy  Pt does not meet HLA requirements for Tebentefusp  Hospice is appropriate; s/p GOC discussion with pt's wife yesterday who agrees with hospice placement, and who also indicated that she is unable to care for him at home   > dispo for hospice placement will be complex as pt is uninsured with insufficient greencard status; [facility] Disability application initiated in hopes that pt will be approved and can then potentially be placed at a [facility] with hospice capability  Long-term prognosis remains poor    - Secondary seizures  - CNS mets, LMD  Appreciate Neuro Onc consult (Dr. Lozano)  Appreciate Rad Onc consult  Continuing Dex PO 4mg BID with ppi ppx  Pt to complete WBRT (10fxs) and pall RT to C5-T5 (5fxs) on this admission (tx schedule TBD)- tx duration 2/13-26  Continuing Keppra IV 1000mg q12  Continuing Dex IV 4mg BID with ppi ppx, with plan to taper off after completion of RT    - Cancer related pain: continuing Oxy 2.5/5mg q4/prn with bowel regimen to prevent OIC    - Constipation  Possibly due to opioids, immobility  2/19 AXR negative for ileus/obstruction  Continuing Senna 2 tabs nightly  Continuing Miralax 1 packet daily  Dulcolax suppository ordered today 47 yo man with h/o 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 He also began outpt WBRT as well as RT to C5-T5 recently (unclear how many fxs are completed to date).  He was readmitted to [facility] on 2/10 s/p seizure at home- admission imaging confirms progression of CNS mets since 2/2/24 imaging.    ACTIVE PROBLEMS  Metastatic melanoma to multiple sites including brain/LMD  Goals of care counseling, discussion  Secondary seizures  Encounter for antineoplastic radiation therapy  Cancer related pain  Constipation    - Metastatic melanoma  - Goals of care counseling, discussion  Pt with rapidly progressive disease despite receiving SOC systemic therapy  Pt does not meet HLA requirements for Tebentefusp  Hospice is appropriate; s/p GOC discussion with pt's wife yesterday who agrees with hospice placement, and who also indicated that she is unable to care for him at home   > dispo for hospice placement will be complex as pt is uninsured with insufficient greencard status; [facility] Disability application initiated in hopes that pt will be approved and can then potentially be placed at a [facility] with hospice capability  Long-term prognosis remains poor    - Secondary seizures  - CNS mets, LMD  Pt with increased dizziness- 1L NS ordered  Appreciate Neuro Onc consult (Dr. Lozano)  Appreciate Rad Onc consult  Continuing Dex PO 4mg BID with ppi ppx  Pt to complete WBRT (10fxs) and pall RT to C5-T5 (5fxs) on this admission (tx schedule TBD)- tx duration 2/13-26  Continuing Keppra IV 1000mg q12  Continuing Dex IV 4mg BID with ppi ppx, with plan to taper off after completion of RT  Starting Meclizine 25mg BID/prn    - Cancer related pain: continuing Oxy 2.5/5mg q4/prn with bowel regimen to prevent OIC    - Constipation  Possibly due to opioids, immobility  2/19 AXR negative for ileus/obstruction  Continuing Senna 2 tabs nightly  Continuing Miralax 1 packet daily  Dulcolax suppository ordered today

## 2024-02-21 NOTE — PROGRESS NOTE ADULT - SUBJECTIVE AND OBJECTIVE BOX
SOLID TUMOR ONCOLOGY HOSPITALIST PROGRESS NOTE    S: No acute events overnight    CURRENT MEDICATIONS  MEDICATIONS  (STANDING):  dexAMETHasone  Injectable 4 milliGRAM(s) IV Push two times a day  influenza   Vaccine 0.5 milliLiter(s) IntraMuscular once  levETIRAcetam   Injectable 1000 milliGRAM(s) IV Push every 12 hours  lidocaine   4% Patch 1 Patch Transdermal every 24 hours  pantoprazole    Tablet 40 milliGRAM(s) Oral two times a day  polyethylene glycol 3350 17 Gram(s) Oral daily  senna 2 Tablet(s) Oral two times a day  sodium chloride 0.9%. 1000 milliLiter(s) (100 mL/Hr) IV Continuous <Continuous>    MEDICATIONS  (PRN):  aluminum hydroxide/magnesium hydroxide/simethicone Suspension 30 milliLiter(s) Oral every 6 hours PRN Dyspepsia  melatonin 3 milliGRAM(s) Oral at bedtime PRN Insomnia  oxyCODONE    IR 5 milliGRAM(s) Oral every 4 hours PRN Severe Pain (7 - 10)  oxyCODONE    IR 2.5 milliGRAM(s) Oral every 4 hours PRN Moderate Pain (4 - 6)      PHYSICAL EXAM  T(C): 36.7 (02-21-24 @ 05:24), Max: 37.3 (02-20-24 @ 18:05)  HR: 74 (02-21-24 @ 05:24) (61 - 100)  BP: 104/73 (02-21-24 @ 05:24) (85/53 - 105/75)  RR: 18 (02-21-24 @ 05:24) (17 - 18)  SpO2: 100% (02-21-24 @ 05:24) (97% - 100%)    02-20-24 @ 07:01  -  02-21-24 @ 07:00  --------------------------------------------------------  IN: 2500 mL / OUT: 2100 mL / NET: 400 mL        LABS                        14.4   5.67  )-----------( 136      ( 20 Feb 2024 05:33 )             41.8     02-20    140  |  101  |  22  ----------------------------<  97  4.0   |  29  |  1.02    Ca    9.0      20 Feb 2024 05:33  Phos  4.5     02-20  Mg     2.20     02-20    TPro  6.6  /  Alb  3.8  /  TBili  0.3  /  DBili  x   /  AST  22  /  ALT  42<H>  /  AlkPhos  55  02-20      CAPILLARY BLOOD GLUCOSE            MICROBIOLOGY  Urinalysis Basic - ( 20 Feb 2024 05:33 )    Color: x / Appearance: x / SG: x / pH: x  Gluc: 97 mg/dL / Ketone: x  / Bili: x / Urobili: x   Blood: x / Protein: x / Nitrite: x   Leuk Esterase: x / RBC: x / WBC x   Sq Epi: x / Non Sq Epi: x / Bacteria: x            PERTINENT RADIOLOGY         SOLID TUMOR ONCOLOGY HOSPITALIST PROGRESS NOTE    S: No acute events overnight.  Pt complained of dizziness yesterday evening.    CURRENT MEDICATIONS  (STANDING):  dexAMETHasone  Injectable 4 milliGRAM(s) IV Push two times a day  influenza   Vaccine 0.5 milliLiter(s) IntraMuscular once  levETIRAcetam   Injectable 1000 milliGRAM(s) IV Push every 12 hours  lidocaine   4% Patch 1 Patch Transdermal every 24 hours  pantoprazole    Tablet 40 milliGRAM(s) Oral two times a day  polyethylene glycol 3350 17 Gram(s) Oral daily  senna 2 Tablet(s) Oral two times a day  sodium chloride 0.9%. 1000 milliLiter(s) (100 mL/Hr) IV Continuous <Continuous>  (PRN):  aluminum hydroxide/magnesium hydroxide/simethicone Suspension 30 milliLiter(s) Oral every 6 hours PRN Dyspepsia  melatonin 3 milliGRAM(s) Oral at bedtime PRN Insomnia  oxyCODONE    IR 5 milliGRAM(s) Oral every 4 hours PRN Severe Pain (7 - 10)  oxyCODONE    IR 2.5 milliGRAM(s) Oral every 4 hours PRN Moderate Pain (4 - 6)      PHYSICAL EXAM  T(C): 36.7 (02-21-24 @ 05:24), Max: 37.3 (02-20-24 @ 18:05)  HR: 74 (02-21-24 @ 05:24) (61 - 100)  BP: 104/73 (02-21-24 @ 05:24) (85/53 - 105/75)  RR: 18 (02-21-24 @ 05:24) (17 - 18)  SpO2: 100% (02-21-24 @ 05:24) (97% - 100%)    02-20-24 @ 07:01  -  02-21-24 @ 07:00  --------------------------------------------------------  IN: 2500 mL / OUT: 2100 mL / NET: 400 mL  Vzsiodccfvc-fjd-xrpfhxsoe young Kuwaiti/Creole speaking male, laying in bed, nad, answering questions appropriately and following commands  Anicteric, but injected sclera, no oral lesions/thrush  RRR, no m/r/g  CTAB, no w/c/r  Abd soft/nt/nd, normoactive bowel sounds  No peripheral edema; R thumb amputated  5/5 strength and normal ROM in all 4 extremities (L slightly weaker than R)  CN 2-12 grossly intact; no gross focal motor/sensory deficits      LABS                        14.4   5.67  )-----------( 136      ( 20 Feb 2024 05:33 )             41.8     02-20    140  |  101  |  22  ----------------------------<  97  4.0   |  29  |  1.02    Ca    9.0      20 Feb 2024 05:33  Phos  4.5     02-20  Mg     2.20     02-20    TPro  6.6  /  Alb  3.8  /  TBili  0.3  /  DBili  x   /  AST  22  /  ALT  42<H>  /  AlkPhos  55  02-20      PERTINENT RADIOLOGY  2/19 AXR: Nonspecific bowel gas pattern. Mild distention of stomach and small bowel  If symptoms persist recommend CT.    2/12 CTA h/n: Multiple enhancing brain metastases which have increased   since 2/2/2024. No midline shift. Normal CTA of the head and neck. No   large vessel occlusion.    2/2 CT a/p with IV contrast: New bilateral lobar hepatic metastatic disease. Pulmonary nodules better characterized on prior PET/CT, again concerning for metastases.   Postoperative changes of the right thumb with adjacent nonspecific soft   tissue thickening. Correlate with recent PET/CT findings. Slight gastric inflammatory changes along the greater curvature suggestive of gastritis. Correlate clinically.    1/29 MRI Total spine  1. CERVICAL SPINE:   Multiple metastases within the canal most   prominently at the C6 and C7 levels along the cord surface.  2. THORACIC SPINE:   Multiple metastases within the canal including cord   parenchymal lesions at T1 and T4 and multiple lesions along the cord   surface.  3. LUMBAR SPINE:   Multiple metastases within the cauda equina and distal   canal ependymal surfaces.

## 2024-02-21 NOTE — PROVIDER CONTACT NOTE (OTHER) - ACTION/TREATMENT ORDERED:
Orthostatic done, unable to completed standing due to dizziness which increases while standing. IV Fluid Sodium chloride bolus ordered and given.

## 2024-02-22 PROCEDURE — 99233 SBSQ HOSP IP/OBS HIGH 50: CPT

## 2024-02-22 RX ORDER — OXYCODONE HYDROCHLORIDE 5 MG/1
2.5 TABLET ORAL EVERY 4 HOURS
Refills: 0 | Status: DISCONTINUED | OUTPATIENT
Start: 2024-02-22 | End: 2024-02-25

## 2024-02-22 RX ORDER — OXYCODONE HYDROCHLORIDE 5 MG/1
5 TABLET ORAL EVERY 4 HOURS
Refills: 0 | Status: DISCONTINUED | OUTPATIENT
Start: 2024-02-22 | End: 2024-02-25

## 2024-02-22 RX ORDER — PANTOPRAZOLE SODIUM 20 MG/1
40 TABLET, DELAYED RELEASE ORAL EVERY 12 HOURS
Refills: 0 | Status: DISCONTINUED | OUTPATIENT
Start: 2024-02-22 | End: 2024-02-26

## 2024-02-22 RX ADMIN — Medication 25 MILLIGRAM(S): at 12:24

## 2024-02-22 RX ADMIN — Medication 4 MILLIGRAM(S): at 17:19

## 2024-02-22 RX ADMIN — SENNA PLUS 2 TABLET(S): 8.6 TABLET ORAL at 06:03

## 2024-02-22 RX ADMIN — LEVETIRACETAM 1000 MILLIGRAM(S): 250 TABLET, FILM COATED ORAL at 10:25

## 2024-02-22 RX ADMIN — SENNA PLUS 2 TABLET(S): 8.6 TABLET ORAL at 17:19

## 2024-02-22 RX ADMIN — LEVETIRACETAM 1000 MILLIGRAM(S): 250 TABLET, FILM COATED ORAL at 22:00

## 2024-02-22 RX ADMIN — PANTOPRAZOLE SODIUM 40 MILLIGRAM(S): 20 TABLET, DELAYED RELEASE ORAL at 06:04

## 2024-02-22 RX ADMIN — Medication 30 MILLILITER(S): at 17:19

## 2024-02-22 RX ADMIN — Medication 4 MILLIGRAM(S): at 06:03

## 2024-02-22 RX ADMIN — PANTOPRAZOLE SODIUM 40 MILLIGRAM(S): 20 TABLET, DELAYED RELEASE ORAL at 22:32

## 2024-02-22 NOTE — PROGRESS NOTE ADULT - ASSESSMENT
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 He also began outpt WBRT as well as RT to C5-T5 recently (unclear how many fxs are completed to date).  He was readmitted to [facility] on 2/10 s/p seizure at home- admission imaging confirms progression of CNS mets since 2/2/24 imaging.    ACTIVE PROBLEMS  Metastatic melanoma to multiple sites including brain/LMD  Goals of care counseling, discussion  Secondary seizures  Encounter for antineoplastic radiation therapy  Cancer related pain  Constipation    - Metastatic melanoma  - Goals of care counseling, discussion  Pt with rapidly progressive disease despite receiving SOC systemic therapy  Pt does not meet HLA requirements for Tebentefusp  Hospice is appropriate; s/p GOC discussion with pt's wife yesterday who agrees with hospice placement, and who also indicated that she is unable to care for him at home   > dispo for hospice placement will be complex as pt is uninsured with insufficient greencard status; [facility] Disability application initiated in hopes that pt will be approved and can then potentially be placed at a [facility] with hospice capability  Long-term prognosis remains poor    - Secondary seizures  - CNS mets, LMD  Pt with increased dizziness- 1L NS ordered  Appreciate Neuro Onc consult (Dr. Lozano)  Appreciate Rad Onc consult  Continuing Dex PO 4mg BID with ppi ppx  Pt to complete WBRT (10fxs) and pall RT to C5-T5 (5fxs) on this admission (tx schedule TBD)- tx duration 2/13-26  Continuing Keppra IV 1000mg q12  Continuing Dex IV 4mg BID with ppi ppx, with plan to taper off after completion of RT  Starting Meclizine 25mg BID/prn    - Cancer related pain: continuing Oxy 2.5/5mg q4/prn with bowel regimen to prevent OIC    - Constipation  Possibly due to opioids, immobility  2/19 AXR negative for ileus/obstruction  Continuing Senna 2 tabs nightly  Continuing Miralax 1 packet daily  Dulcolax suppository ordered today 49 yo man with h/o 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 He also began outpt WBRT as well as RT to C5-T5 recently (unclear how many fxs are completed to date).  He was readmitted to [facility] on 2/10 s/p seizure at home- admission imaging confirms progression of CNS mets since 2/2/24 imaging.    ACTIVE PROBLEMS  Metastatic melanoma to multiple sites including brain/LMD  Goals of care counseling, discussion  Secondary seizures  Encounter for antineoplastic radiation therapy  Cancer related pain  Constipation    - Metastatic melanoma  - Goals of care counseling, discussion  Pt with rapidly progressive disease despite receiving SOC systemic therapy  Pt does not meet HLA requirements for Tebentefusp  Hospice is appropriate; s/p GOC discussion with pt's wife yesterday who agrees with hospice placement, and who also indicated that she is unable to care for him at home   > dispo for hospice placement will be complex as pt is uninsured with insufficient greencard status; [facility] Disability application initiated in hopes that pt will be approved and can then potentially be placed at a [facility] with hospice capability  Long-term prognosis remains poor    - Secondary seizures  - CNS mets, LMD  Appreciate Neuro Onc consult (Dr. Lozano)  Appreciate Rad Onc consult  Continuing Dex PO 4mg BID with ppi ppx  Pt to complete WBRT (10fxs) and pall RT to C5-T5 (5fxs) on this admission (tx schedule TBD)- tx duration 2/13-26  Continuing Keppra IV 1000mg q12  Continuing Dex IV 4mg BID with ppi ppx (protonix bid, standing maalox), with plan to taper steroids off after completion of RT  Continuing Meclizine 25mg BID/prn    - Cancer related pain: continuing Oxy 2.5/5mg q4/prn with bowel regimen to prevent OIC    - Constipation  Improved, likely due to opioids, immobility  2/19 AXR negative for ileus/obstruction  Continuing Senna 2 tabs nightly  Continuing Miralax 1 packet daily

## 2024-02-22 NOTE — PROGRESS NOTE ADULT - SUBJECTIVE AND OBJECTIVE BOX
SOLID TUMOR ONCOLOGY HOSPITALIST PROGRESS NOTE    S: No acute events overnight.    CURRENT MEDICATIONS  (STANDING):  dexAMETHasone  Injectable 4 milliGRAM(s) IV Push two times a day  influenza   Vaccine 0.5 milliLiter(s) IntraMuscular once  levETIRAcetam   Injectable 1000 milliGRAM(s) IV Push every 12 hours  lidocaine   4% Patch 1 Patch Transdermal every 24 hours  pantoprazole    Tablet 40 milliGRAM(s) Oral two times a day  polyethylene glycol 3350 17 Gram(s) Oral daily  senna 2 Tablet(s) Oral two times a day  sodium chloride 0.9%. 1000 milliLiter(s) (100 mL/Hr) IV Continuous <Continuous>  (PRN):  aluminum hydroxide/magnesium hydroxide/simethicone Suspension 30 milliLiter(s) Oral every 6 hours PRN Dyspepsia  meclizine 25 milliGRAM(s) Oral two times a day PRN Dizziness  melatonin 3 milliGRAM(s) Oral at bedtime PRN Insomnia  oxyCODONE    IR 5 milliGRAM(s) Oral every 4 hours PRN Severe Pain (7 - 10)  oxyCODONE    IR 2.5 milliGRAM(s) Oral every 4 hours PRN Moderate Pain (4 - 6)      PHYSICAL EXAM  T(C): 36.2 (02-22-24 @ 06:02), Max: 36.9 (02-21-24 @ 20:54)  HR: 65 (02-22-24 @ 06:02) (65 - 94)  BP: 113/79 (02-22-24 @ 06:02) (94/63 - 113/79)  RR: 18 (02-22-24 @ 06:02) (17 - 18)  SpO2: 100% (02-22-24 @ 06:02) (98% - 100%)  Gmcydfphvsx-rna-tfiqbwsmd young Italian/Creole speaking male, laying in bed, nad, answering questions appropriately and following commands  Anicteric, but injected sclera, no oral lesions/thrush  RRR, no m/r/g  CTAB, no w/c/r  Abd soft/nt/nd, normoactive bowel sounds  No peripheral edema; R thumb amputated  5/5 strength and normal ROM in all 4 extremities (L slightly weaker than R)  CN 2-12 grossly intact; no gross focal motor/sensory deficits      Monitoring WEEKLY labs only (next due 2/27).      PERTINENT RADIOLOGY  2/19 AXR: Nonspecific bowel gas pattern. Mild distention of stomach and small bowel  If symptoms persist recommend CT.    2/12 CTA h/n: Multiple enhancing brain metastases which have increased   since 2/2/2024. No midline shift. Normal CTA of the head and neck. No   large vessel occlusion.    2/2 CT a/p with IV contrast: New bilateral lobar hepatic metastatic disease. Pulmonary nodules better characterized on prior PET/CT, again concerning for metastases.   Postoperative changes of the right thumb with adjacent nonspecific soft   tissue thickening. Correlate with recent PET/CT findings. Slight gastric inflammatory changes along the greater curvature suggestive of gastritis. Correlate clinically.    1/29 MRI Total spine  1. CERVICAL SPINE:   Multiple metastases within the canal most   prominently at the C6 and C7 levels along the cord surface.  2. THORACIC SPINE:   Multiple metastases within the canal including cord   parenchymal lesions at T1 and T4 and multiple lesions along the cord   surface.  3. LUMBAR SPINE:   Multiple metastases within the cauda equina and distal   canal ependymal surfaces.   SOLID TUMOR ONCOLOGY HOSPITALIST PROGRESS NOTE    S: No acute events overnight.  Pt complained of burning abd pain that's exacerbated when he eats. He confirmed that the pain subsides when he takes Oxy; he also reported that he had a bm yesterday.    CURRENT MEDICATIONS  (STANDING):  dexAMETHasone  Injectable 4 milliGRAM(s) IV Push two times a day  influenza   Vaccine 0.5 milliLiter(s) IntraMuscular once  levETIRAcetam   Injectable 1000 milliGRAM(s) IV Push every 12 hours  lidocaine   4% Patch 1 Patch Transdermal every 24 hours  pantoprazole    Tablet 40 milliGRAM(s) Oral two times a day  polyethylene glycol 3350 17 Gram(s) Oral daily  senna 2 Tablet(s) Oral two times a day  sodium chloride 0.9%. 1000 milliLiter(s) (100 mL/Hr) IV Continuous <Continuous>  (PRN):  aluminum hydroxide/magnesium hydroxide/simethicone Suspension 30 milliLiter(s) Oral every 6 hours PRN Dyspepsia  meclizine 25 milliGRAM(s) Oral two times a day PRN Dizziness  melatonin 3 milliGRAM(s) Oral at bedtime PRN Insomnia  oxyCODONE    IR 5 milliGRAM(s) Oral every 4 hours PRN Severe Pain (7 - 10)  oxyCODONE    IR 2.5 milliGRAM(s) Oral every 4 hours PRN Moderate Pain (4 - 6)      PHYSICAL EXAM  T(C): 36.2 (02-22-24 @ 06:02), Max: 36.9 (02-21-24 @ 20:54)  HR: 65 (02-22-24 @ 06:02) (65 - 94)  BP: 113/79 (02-22-24 @ 06:02) (94/63 - 113/79)  RR: 18 (02-22-24 @ 06:02) (17 - 18)  SpO2: 100% (02-22-24 @ 06:02) (98% - 100%)  Sdvsaszlbde-sep-tsysbkoad young Bengali/Creole speaking male, laying in bed, nad, answering questions appropriately and following commands  Anicteric, but injected sclera, no oral lesions/thrush  RRR, no m/r/g  CTAB, no w/c/r  Abd soft/nt/nd, normoactive bowel sounds  No peripheral edema; R thumb amputated  5/5 strength and normal ROM in all 4 extremities (L slightly weaker than R)  CN 2-12 grossly intact; no gross focal motor/sensory deficits      Monitoring WEEKLY labs only (next due 2/27).      PERTINENT RADIOLOGY  2/19 AXR: Nonspecific bowel gas pattern. Mild distention of stomach and small bowel  If symptoms persist recommend CT.    2/12 CTA h/n: Multiple enhancing brain metastases which have increased   since 2/2/2024. No midline shift. Normal CTA of the head and neck. No   large vessel occlusion.    2/2 CT a/p with IV contrast: New bilateral lobar hepatic metastatic disease. Pulmonary nodules better characterized on prior PET/CT, again concerning for metastases.   Postoperative changes of the right thumb with adjacent nonspecific soft   tissue thickening. Correlate with recent PET/CT findings. Slight gastric inflammatory changes along the greater curvature suggestive of gastritis. Correlate clinically.    1/29 MRI Total spine  1. CERVICAL SPINE:   Multiple metastases within the canal most   prominently at the C6 and C7 levels along the cord surface.  2. THORACIC SPINE:   Multiple metastases within the canal including cord   parenchymal lesions at T1 and T4 and multiple lesions along the cord   surface.  3. LUMBAR SPINE:   Multiple metastases within the cauda equina and distal   canal ependymal surfaces.

## 2024-02-23 PROCEDURE — 99232 SBSQ HOSP IP/OBS MODERATE 35: CPT

## 2024-02-23 PROCEDURE — 99233 SBSQ HOSP IP/OBS HIGH 50: CPT

## 2024-02-23 RX ADMIN — PANTOPRAZOLE SODIUM 40 MILLIGRAM(S): 20 TABLET, DELAYED RELEASE ORAL at 10:49

## 2024-02-23 RX ADMIN — LEVETIRACETAM 1000 MILLIGRAM(S): 250 TABLET, FILM COATED ORAL at 22:21

## 2024-02-23 RX ADMIN — Medication 4 MILLIGRAM(S): at 17:26

## 2024-02-23 RX ADMIN — PANTOPRAZOLE SODIUM 40 MILLIGRAM(S): 20 TABLET, DELAYED RELEASE ORAL at 22:21

## 2024-02-23 RX ADMIN — Medication 30 MILLILITER(S): at 06:06

## 2024-02-23 RX ADMIN — LEVETIRACETAM 1000 MILLIGRAM(S): 250 TABLET, FILM COATED ORAL at 11:12

## 2024-02-23 RX ADMIN — Medication 4 MILLIGRAM(S): at 06:06

## 2024-02-23 RX ADMIN — POLYETHYLENE GLYCOL 3350 17 GRAM(S): 17 POWDER, FOR SOLUTION ORAL at 11:22

## 2024-02-23 RX ADMIN — Medication 30 MILLILITER(S): at 17:28

## 2024-02-23 RX ADMIN — SENNA PLUS 2 TABLET(S): 8.6 TABLET ORAL at 06:06

## 2024-02-23 RX ADMIN — SENNA PLUS 2 TABLET(S): 8.6 TABLET ORAL at 17:26

## 2024-02-23 RX ADMIN — Medication 30 MILLILITER(S): at 01:41

## 2024-02-23 RX ADMIN — Medication 30 MILLILITER(S): at 11:22

## 2024-02-23 NOTE — PROGRESS NOTE ADULT - SUBJECTIVE AND OBJECTIVE BOX
SOLID TUMOR ONCOLOGY HOSPITALIST PROGRESS NOTE    S: No acute events overnight.  Pt reported feeling better; he confirmed that his abd pain is improved and relieved when he takes Oxy, but that he still has not had a bm in the past 3 days.    CURRENT MEDICATIONS  (STANDING):  aluminum hydroxide/magnesium hydroxide/simethicone Suspension 30 milliLiter(s) Oral four times a day  dexAMETHasone  Injectable 4 milliGRAM(s) IV Push two times a day  influenza   Vaccine 0.5 milliLiter(s) IntraMuscular once  levETIRAcetam   Injectable 1000 milliGRAM(s) IV Push every 12 hours  lidocaine   4% Patch 1 Patch Transdermal every 24 hours  pantoprazole  Injectable 40 milliGRAM(s) IV Push every 12 hours  polyethylene glycol 3350 17 Gram(s) Oral daily  senna 2 Tablet(s) Oral two times a day  sodium chloride 0.9%. 1000 milliLiter(s) (100 mL/Hr) IV Continuous <Continuous>  (PRN):  meclizine 25 milliGRAM(s) Oral two times a day PRN Dizziness  melatonin 3 milliGRAM(s) Oral at bedtime PRN Insomnia  oxyCODONE    IR 2.5 milliGRAM(s) Oral every 4 hours PRN Moderate Pain (4 - 6)  oxyCODONE    IR 5 milliGRAM(s) Oral every 4 hours PRN Severe Pain (7 - 10)      PHYSICAL EXAM  T(C): 36.6 (02-23-24 @ 11:15), Max: 36.9 (02-22-24 @ 17:08)  HR: 86 (02-23-24 @ 11:15) (64 - 86)  BP: 97/50 (02-23-24 @ 11:15) (95/58 - 115/74)  RR: 18 (02-23-24 @ 11:15) (17 - 18)  SpO2: 100% (02-23-24 @ 11:15) (99% - 100%)    02-22-24 @ 07:01  -  02-23-24 @ 07:00  --------------------------------------------------------  IN: 950 mL / OUT: 2750 mL / NET: -1800 mL        LABS            CAPILLARY BLOOD GLUCOSE            MICROBIOLOGY          PERTINENT RADIOLOGY         SOLID TUMOR ONCOLOGY HOSPITALIST PROGRESS NOTE    S: No acute events overnight.  Pt reported feeling better; he confirmed that his abd pain is improved and relieved when he takes Oxy, but that he still has not had a bm in the past 3 days.    CURRENT MEDICATIONS  (STANDING):  aluminum hydroxide/magnesium hydroxide/simethicone Suspension 30 milliLiter(s) Oral four times a day  dexAMETHasone  Injectable 4 milliGRAM(s) IV Push two times a day  influenza   Vaccine 0.5 milliLiter(s) IntraMuscular once  levETIRAcetam   Injectable 1000 milliGRAM(s) IV Push every 12 hours  lidocaine   4% Patch 1 Patch Transdermal every 24 hours  pantoprazole  Injectable 40 milliGRAM(s) IV Push every 12 hours  polyethylene glycol 3350 17 Gram(s) Oral daily  senna 2 Tablet(s) Oral two times a day  sodium chloride 0.9%. 1000 milliLiter(s) (100 mL/Hr) IV Continuous <Continuous>  (PRN):  meclizine 25 milliGRAM(s) Oral two times a day PRN Dizziness  melatonin 3 milliGRAM(s) Oral at bedtime PRN Insomnia  oxyCODONE    IR 2.5 milliGRAM(s) Oral every 4 hours PRN Moderate Pain (4 - 6)  oxyCODONE    IR 5 milliGRAM(s) Oral every 4 hours PRN Severe Pain (7 - 10)      PHYSICAL EXAM  T(C): 36.6 (02-23-24 @ 11:15), Max: 36.9 (02-22-24 @ 17:08)  HR: 86 (02-23-24 @ 11:15) (64 - 86)  BP: 97/50 (02-23-24 @ 11:15) (95/58 - 115/74)  RR: 18 (02-23-24 @ 11:15) (17 - 18)  SpO2: 100% (02-23-24 @ 11:15) (99% - 100%)    02-22-24 @ 07:01  -  02-23-24 @ 07:00  --------------------------------------------------------  IN: 950 mL / OUT: 2750 mL / NET: -1800 mL      Monitoring WEEKLY labs only (next due 2/27).      PERTINENT RADIOLOGY  2/19 AXR: Nonspecific bowel gas pattern. Mild distention of stomach and small bowel  If symptoms persist recommend CT.    2/12 CTA h/n: Multiple enhancing brain metastases which have increased   since 2/2/2024. No midline shift. Normal CTA of the head and neck. No   large vessel occlusion.    2/2 CT a/p with IV contrast: New bilateral lobar hepatic metastatic disease. Pulmonary nodules better characterized on prior PET/CT, again concerning for metastases.   Postoperative changes of the right thumb with adjacent nonspecific soft   tissue thickening. Correlate with recent PET/CT findings. Slight gastric inflammatory changes along the greater curvature suggestive of gastritis. Correlate clinically.    1/29 MRI Total spine  1. CERVICAL SPINE:   Multiple metastases within the canal most   prominently at the C6 and C7 levels along the cord surface.  2. THORACIC SPINE:   Multiple metastases within the canal including cord   parenchymal lesions at T1 and T4 and multiple lesions along the cord   surface.  3. LUMBAR SPINE:   Multiple metastases within the cauda equina and distal   canal ependymal surfaces.

## 2024-02-23 NOTE — PROGRESS NOTE ADULT - SUBJECTIVE AND OBJECTIVE BOX
SOLID TUMOR ONCOLOGY HOSPITALIST PROGRESS NOTE    S: No acute events overnight    CURRENT MEDICATIONS  MEDICATIONS  (STANDING):  aluminum hydroxide/magnesium hydroxide/simethicone Suspension 30 milliLiter(s) Oral four times a day  dexAMETHasone  Injectable 4 milliGRAM(s) IV Push two times a day  influenza   Vaccine 0.5 milliLiter(s) IntraMuscular once  levETIRAcetam   Injectable 1000 milliGRAM(s) IV Push every 12 hours  lidocaine   4% Patch 1 Patch Transdermal every 24 hours  pantoprazole  Injectable 40 milliGRAM(s) IV Push every 12 hours  polyethylene glycol 3350 17 Gram(s) Oral daily  senna 2 Tablet(s) Oral two times a day  sodium chloride 0.9%. 1000 milliLiter(s) (100 mL/Hr) IV Continuous <Continuous>    MEDICATIONS  (PRN):  meclizine 25 milliGRAM(s) Oral two times a day PRN Dizziness  melatonin 3 milliGRAM(s) Oral at bedtime PRN Insomnia  oxyCODONE    IR 5 milliGRAM(s) Oral every 4 hours PRN Severe Pain (7 - 10)  oxyCODONE    IR 2.5 milliGRAM(s) Oral every 4 hours PRN Moderate Pain (4 - 6)      PHYSICAL EXAM  T(C): 36.9 (02-23-24 @ 04:10), Max: 36.9 (02-22-24 @ 11:18)  HR: 69 (02-23-24 @ 04:10) (64 - 86)  BP: 112/66 (02-23-24 @ 04:10) (90/59 - 115/74)  RR: 18 (02-23-24 @ 04:10) (17 - 18)  SpO2: 99% (02-23-24 @ 04:10) (99% - 100%)    02-22-24 @ 07:01  -  02-23-24 @ 07:00  --------------------------------------------------------  IN: 950 mL / OUT: 2750 mL / NET: -1800 mL        LABS            CAPILLARY BLOOD GLUCOSE            MICROBIOLOGY          PERTINENT RADIOLOGY

## 2024-02-23 NOTE — PROGRESS NOTE ADULT - SUBJECTIVE AND OBJECTIVE BOX
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  completing RT.  GOC conversations ongoing per chart    REVIEW OF SYSTEM  dizziness  sensory impairment    PAST MEDICAL & SURGICAL HISTORY  No pertinent past medical history    Malignant melanoma    Metastatic malignant melanoma    GERD (gastroesophageal reflux disease)    No significant past surgical history    History of lymph node dissection of right axilla    Malignant melanoma of thumb     CURRENT FUNCTIONAL STATUS  2/21  Bed Mobility  Bed Mobility Training Rehab Potential: good, to achieve stated therapy goals  Bed Mobility Training Symptoms Noted During/After Treatment: none  Bed Mobility Training Sit-to-Supine: contact guard;  verbal cues;  nonverbal cues (demo/gestures);  1 person assist;  bed rails  Bed Mobility Training Supine-to-Sit: contact guard;  verbal cues;  nonverbal cues (demo/gestures);  1 person assist;  bed rails  Bed Mobility Training Limitations: decreased ability to use arms for pushing/pulling;  decreased ability to use legs for bridging/pushing;  impaired ability to control trunk for mobility;  impaired balance;  decreased strength;  impaired postural control    Sit-Stand Transfer Training  Sit-to-Stand Transfer Training Rehab Potential: good, to achieve stated therapy goals  Sit-to-Stand Transfer Training Symptoms Noted During/After Treatment: none  Transfer Training Sit-to-Stand Transfer: contact guard;  verbal cues;  nonverbal cues (demo/gestures);  1 person assist;  full weight-bearing   rolling walker  Transfer Training Stand-to-Sit Transfer: contact guard;  verbal cues;  nonverbal cues (demo/gestures);  1 person assist;  full weight-bearing   rolling walker  Sit-to-Stand Transfer Training Transfer Safety Analysis: decreased balance;  decreased strength;  impaired balance;  impaired postural control;  rolling walker    Gait Training  Gait Training Rehab Potential: good, to achieve stated therapy goals  Gait Training Symptoms Noted During/After Treatment: none  Gait Training: contact guard;  verbal cues;  nonverbal cues (demo/gestures);  1 person assist;  full weight-bearing   rolling walker;  40 feet  Gait Analysis: 2-point gait   decreased angelica;  increased time in double stance;  decreased step length;  decreased stride length;  decreased weight-shifting ability;  buckling;  decreased strength;  impaired balance;  impaired postural control;  40 feet;  rolling walker          FAMILY HISTORY   No pertinent family history in first degree relatives       VITALS  T(C): 36.6 (02-23-24 @ 11:15), Max: 36.9 (02-22-24 @ 17:08)  HR: 86 (02-23-24 @ 11:15) (64 - 86)  BP: 97/50 (02-23-24 @ 11:15) (95/58 - 115/74)  RR: 18 (02-23-24 @ 11:15) (17 - 18)  SpO2: 100% (02-23-24 @ 11:15) (99% - 100%)  Wt(kg): --    ALLERGIES  No Known Allergies      MEDICATIONS   aluminum hydroxide/magnesium hydroxide/simethicone Suspension 30 milliLiter(s) Oral four times a day  bisacodyl Suppository 10 milliGRAM(s) Rectal daily PRN  dexAMETHasone  Injectable 4 milliGRAM(s) IV Push two times a day  influenza   Vaccine 0.5 milliLiter(s) IntraMuscular once  levETIRAcetam   Injectable 1000 milliGRAM(s) IV Push every 12 hours  lidocaine   4% Patch 1 Patch Transdermal every 24 hours  meclizine 25 milliGRAM(s) Oral two times a day PRN  melatonin 3 milliGRAM(s) Oral at bedtime PRN  oxyCODONE    IR 5 milliGRAM(s) Oral every 4 hours PRN  oxyCODONE    IR 2.5 milliGRAM(s) Oral every 4 hours PRN  pantoprazole  Injectable 40 milliGRAM(s) IV Push every 12 hours  polyethylene glycol 3350 17 Gram(s) Oral daily  senna 2 Tablet(s) Oral two times a day  trimethoprim  160 mG/sulfamethoxazole 800 mG 1 Tablet(s) Oral <User Schedule>      ----------------------------------------------------------------------------------------  PHYSICAL EXAM  Constitutional - NAD, Comfortable  HEENT - NCAT, EOMI  Neck - Supple, No limited ROM  Chest - no respiratory distress  Cardiovascular - RRR, S1S2  Abdomen - BS+, Soft, NTND  Extremities - partial R thumb amputation, No C/C/E, No calf tenderness   Neurologic Exam -                    Cognitive - Awake, Alert, AAO to self, place, date, year, situation     Communication - Fluent, No dysarthria     Cranial Nerves - CN 2-12 intact     Motor - No focal deficits                    LEFT    UE - ShAB 5/5, EF 5/5, EE 5/5, WE 5/5,  5/5                    RIGHT UE - ShAB 5/5, EF 5/5, EE 5/5, WE 5/5,  5/5                    LEFT    LE - HF 5/5, KE 5/5, DF 5/5, PF 5/5                    RIGHT LE - HF 5/5, KE 5/5, DF 5/5, PF 5/5        Sensory -R side impaired to light touch      Balance - WNL Static  Psychiatric - Mood stable, Affect WNL  ----------------------------------------------------------------------------------------  ASSESSMENT/PLAN Patient is a 49yo M with PMH significant for 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 presented with seizure episodes  keppra per neurology  Pain - oxy ir prn with bowel regimen  DVT PPX - scd  Rehab -    will monitor for tolerance with bedside therapy and medical course  anticipate subacute rehab when medically cleared, patient improving functionally with treatment and bedside therapy however still requires assistance, limited by dizziness and sensory loss

## 2024-02-23 NOTE — PROGRESS NOTE ADULT - ASSESSMENT
49 yo man with h/o metastatic ungual melanoma of the R thumb and axillary LN (s/p LN resection 12/2021; R thumb amputated and L lung wedge resection was performed in 11/2022- [id number]; completed adjuvant Dacarbazine until 3/2022) with mets to the lung, liver, LN, brain with lepto of brain/spine- s/p craniotomy/resection of L brain tumor in 3/2023 followed by post-op RT (30Gy/5fxs) to surgical bed and SRS to R temporal lesion (20Gy/1fx) in 4/2023, as well as GKR to 4 dural based lesions in 10/2023; on Dex/Keppra); PD-L1 TPS 30%; s/p Ipi/Nivo x3 c/b pneumonitis (Ipi held) > last received C2 Nivo monotherapy on 1/18/24. He also began outpt WBRT as well as RT to C5-T5 recently (unclear how many fxs are completed to date).  He was readmitted to [facility] on 2/10 s/p seizure at home- admission imaging confirms progression of CNS mets since 2/2/24 imaging.    ACTIVE PROBLEMS  Metastatic melanoma to multiple sites including brain/LMD  Goals of care counseling, discussion  Secondary seizures  Encounter for antineoplastic radiation therapy  Cancer related pain  Constipation    - Metastatic melanoma  - Goals of care counseling, discussion  Pt with rapidly progressive disease despite receiving SOC systemic therapy  Pt does not meet HLA requirements for Tebentefusp  Hospice is appropriate; s/p GOC discussion with pt's wife yesterday who agrees with hospice placement, and who also indicated that she is unable to care for him at home   > dispo for hospice placement will be complex as pt is uninsured with insufficient greencard status; [facility] Disability application initiated in hopes that pt will be approved and can then potentially be placed at a [facility] with hospice capability  Long-term prognosis remains poor    - Secondary seizures  - CNS mets, LMD  Appreciate Neuro Onc consult (Dr. Lozano)  Appreciate Rad Onc consult  Continuing Dex PO 4mg BID with ppi ppx  Pt to complete WBRT (10fxs) and pall RT to C5-T5 (5fxs) on this admission (tx schedule TBD)- tx duration 2/13-26  Continuing Keppra IV 1000mg q12  Continuing Dex IV 4mg BID with ppi ppx (protonix bid, standing maalox), with plan to taper steroids off after completion of RT  Continuing Meclizine 25mg BID/prn    - Cancer related pain: continuing Oxy 2.5/5mg q4/prn with bowel regimen to prevent OIC    - Constipation  Improved, likely due to opioids, immobility  2/19 AXR negative for ileus/obstruction  Continuing Senna 2 tabs nightly  Continuing Miralax 1 packet daily 49 yo man with h/o metastatic ungual melanoma of the R thumb and axillary LN (s/p LN resection 12/2021; R thumb amputated and L lung wedge resection was performed in 11/2022- [id number]; completed adjuvant Dacarbazine until 3/2022) with mets to the lung, liver, LN, brain with lepto of brain/spine- s/p craniotomy/resection of L brain tumor in 3/2023 followed by post-op RT (30Gy/5fxs) to surgical bed and SRS to R temporal lesion (20Gy/1fx) in 4/2023, as well as GKR to 4 dural based lesions in 10/2023; on Dex/Keppra); PD-L1 TPS 30%; s/p Ipi/Nivo x3 c/b pneumonitis (Ipi held) > last received C2 Nivo monotherapy on 1/18/24. He also began outpt WBRT as well as RT to C5-T5 recently (unclear how many fxs are completed to date).  He was readmitted to [facility] on 2/10 s/p seizure at home- admission imaging confirms progression of CNS mets since 2/2/24 imaging.    ACTIVE PROBLEMS  Metastatic melanoma to multiple sites including brain/LMD  Goals of care counseling, discussion  Secondary seizures  Encounter for antineoplastic radiation therapy  Cancer related pain  Constipation    - Metastatic melanoma  - Goals of care counseling, discussion  Pt with rapidly progressive disease despite receiving SOC systemic therapy  Pt does not meet HLA requirements for Tebentefusp  Hospice is appropriate; s/p GOC discussion with pt's wife yesterday who agrees with hospice placement, and who also indicated that she is unable to care for him at home   > dispo for hospice placement will be complex as pt is uninsured with insufficient greencard status; [facility] Disability application initiated in hopes that pt will be approved and can then potentially be placed at a [facility] with hospice capability  Long-term prognosis remains poor    - Secondary seizures  - CNS mets, LMD  Appreciate Neuro Onc consult (Dr. Lozano)  Appreciate Rad Onc consult  Pt to complete WBRT (10fxs) and pall RT to C5-T5 (5fxs) on this admission (tx schedule TBD)- tx duration 2/13-26  Continuing Keppra IV 1000mg q12  Continuing Dex IV 4mg BID with ppi ppx (protonix bid, standing maalox), with plan to taper steroids off (as per Neuro Onc recs) after completion of RT  Continuing Meclizine 25mg BID/prn    - Cancer related pain: continuing Oxy 2.5/5mg q4/prn with bowel regimen to prevent OIC    - Constipation  Improved, likely due to opioids, immobility  2/19 AXR negative for ileus/obstruction  Continuing Senna 2 tabs nightly  Continuing Miralax 1 packet daily

## 2024-02-23 NOTE — PROGRESS NOTE ADULT - ASSESSMENT
Lepto from melanoma with cord and brain lesions.  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    Completing RT  Overall prognosis is poor, but if he is stable post RT can consider dispo home and possible outpatient f/u given that he seems to have improved some  I suspect some of abdominal pain is radicular from lepto.      WBRT, spine to C and T spine RT ongoing  keppra 1000 bid can be PO  continue dexamethasone 4 bid during RT, taper to 4 daily after RT complete and by 1 mg every 5 days to off therafter  consider PJP prophylaxis   GOC discussions ongoing

## 2024-02-23 NOTE — PROGRESS NOTE ADULT - SUBJECTIVE AND OBJECTIVE BOX
NEURO-ONCOLOGY    48m with acral melanoma  known brain and lepto mets  s/p prior surgery to resect left frontal met  SRS to multiple lesions   prior intolerance of ipi/nivo, more recently on Opdualag  MRI brain and spine in 1/24 had showed numerous ventricular deposits as well as cord and lepto lesions in lower C and mid T cord  plan was for WBRT and spine RT  Admitted with seizures at home off meds and steroids  here CT head with increased mets  main complaint is right sided arm pain and weakness, some facial asymmetry  No seizures on keppra    INTERVAL HISTORY:  Seizure free.  Right arm improved.  Main complaint is RUQ pain.  RT ongoing, no HA.     MEDICATIONS  (STANDING):  aluminum hydroxide/magnesium hydroxide/simethicone Suspension 30 milliLiter(s) Oral four times a day  dexAMETHasone  Injectable 4 milliGRAM(s) IV Push two times a day  influenza   Vaccine 0.5 milliLiter(s) IntraMuscular once  levETIRAcetam   Injectable 1000 milliGRAM(s) IV Push every 12 hours  lidocaine   4% Patch 1 Patch Transdermal every 24 hours  pantoprazole  Injectable 40 milliGRAM(s) IV Push every 12 hours  polyethylene glycol 3350 17 Gram(s) Oral daily  senna 2 Tablet(s) Oral two times a day  sodium chloride 0.9%. 1000 milliLiter(s) (100 mL/Hr) IV Continuous <Continuous>    Vital Signs Last 24 Hrs  T(C): 36.9 (23 Feb 2024 04:10), Max: 36.9 (22 Feb 2024 17:08)  T(F): 98.4 (23 Feb 2024 04:10), Max: 98.5 (22 Feb 2024 17:08)  HR: 69 (23 Feb 2024 04:10) (64 - 78)  BP: 112/66 (23 Feb 2024 04:10) (95/58 - 115/74)  BP(mean): --  RR: 18 (23 Feb 2024 04:10) (17 - 18)  SpO2: 99% (23 Feb 2024 04:10) (99% - 100%)    Parameters below as of 23 Feb 2024 04:10  Patient On (Oxygen Delivery Method): room air          (Exam as noted with exceptions in parentheses)    General:  Healthy appearing Male well groomed and without deformities. KPS is 60    Mental Status:  Awake, alert and attentive. Oriented to person, place and time. Recent and remote memory intact. Normal concentration. Fluent spontaneous speech with intact naming and repetition. Normal fund of knowledge.      Cranial Nerves: II: Full visual fields. III,IV,VI:Pupils round, reactive to light. Full extraocular movements. No nystagmus. V: Normal bilateral bite, facial sensation. VII: No facial weakness. VIII: Hearing intact. IX,X: Palate midline, intact gag. XI:  Sternocleidomastoids normal. XII: Tongue protrudes midline. No dysarthria.  (flat NLF on right improved)    Motor:  Normal tone, bulk and power throughout including arms and legs, proximal and distal. No pronator drift. No abnormal movements.  (RUE with weakness of triceps and WE - improved)    Sensation: Normal in arms, legs and trunk to pin, proprioception and vibration. Negative Romberg.  (diminished on right side)    Coordination: No dysmetria or dysdiadochokinesis bilaterally. Normal heel-shin testing.     Gait: Normal including heel and toe walking. Normal station.  (walks on own)    Reflexes: Normoactive and symmetric throughout. Absent Babinski bilaterally. (brisk throughout)    Abd: NT  Vasc: no edema  LCTAB    NEURO-IMAGING:  reviewed personally ct head and recent MRIs as above.  vents are stable.  Not much more brain edema.

## 2024-02-23 NOTE — PROGRESS NOTE ADULT - ASSESSMENT
49 yo man with h/o metastatic ungual melanoma of the R thumb and axillary LN (s/p LN resection 12/2021; R thumb amputated and L lung wedge resection was performed in 11/2022- [id number]; completed adjuvant Dacarbazine until 3/2022) with mets to the lung, liver, LN, brain with lepto of brain/spine- s/p craniotomy/resection of L brain tumor in 3/2023 followed by post-op RT (30Gy/5fxs) to surgical bed and SRS to R temporal lesion (20Gy/1fx) in 4/2023, as well as GKR to 4 dural based lesions in 10/2023; on Dex/Keppra); PD-L1 TPS 30%; s/p Ipi/Nivo x3 c/b pneumonitis (Ipi held) > last received C2 Nivo monotherapy on 1/18/24. He also began outpt WBRT as well as RT to C5-T5 recently (unclear how many fxs are completed to date).  He was readmitted to [facility] on 2/10 s/p seizure at home- admission imaging confirms progression of CNS mets since 2/2/24 imaging.    ACTIVE PROBLEMS  Metastatic melanoma to multiple sites including brain/LMD  Goals of care counseling, discussion  Secondary seizures  Encounter for antineoplastic radiation therapy  Cancer related pain  Constipation    - Metastatic melanoma  - Goals of care counseling, discussion  Pt with rapidly progressive disease despite receiving SOC systemic therapy  Pt does not meet HLA requirements for Tebentefusp  Hospice is appropriate; s/p GOC discussion with pt's wife yesterday who agrees with hospice placement, and who also indicated that she is unable to care for him at home   > dispo for hospice placement will be complex as pt is uninsured with insufficient greencard status; [facility] Disability application initiated in hopes that pt will be approved and can then potentially be placed at a [facility] with hospice capability  Long-term prognosis remains poor    - Secondary seizures  - CNS mets, LMD  Appreciate Neuro Onc consult (Dr. Lozano)  Appreciate Rad Onc consult  Continuing Dex PO 4mg BID with ppi ppx  Pt to complete WBRT (10fxs) and pall RT to C5-T5 (5fxs) on this admission (tx schedule TBD)- tx duration 2/13-26  Continuing Keppra IV 1000mg q12  Continuing Dex IV 4mg BID with ppi ppx (protonix bid, standing maalox), with plan to taper steroids off after completion of RT  Continuing Meclizine 25mg BID/prn    - Cancer related pain: continuing Oxy 2.5/5mg q4/prn with bowel regimen to prevent OIC    - Constipation  Improved, likely due to opioids, immobility  2/19 AXR negative for ileus/obstruction  Continuing Senna 2 tabs nightly  Continuing Miralax 1 packet daily

## 2024-02-23 NOTE — CHART NOTE - NSCHARTNOTEFT_GEN_A_CORE
Source: Patient [x]       other [x] medical chart,  phone service (ID# [phone])  Diet rx: Pureed: Mildly Thick Liquids (MILDTHICKLIQS) (02-20-24 @ 13:39) [Active]  kitchen provides Mighty Shake, mildly thickened (4 oz/220 kcal, 6 gm protein) - 3x daily         Pt 49 yo male with PMHx of metastatic ungual melanoma of the R thumb and axillary LN (s/p LN resection 12/2021; R thumb amputated and L lung wedge resection was performed in 11/2022- [id number]; completed adjuvant Dacarbazine until 3/2022) with mets to the lung, liver, LN; Pt s/p craniotomy/resection of L brain tumor in 3/2023 followed by post-op RT - per chart review.     At time of visit, Pt awake, alert but weak; Pt speaks [place] Creole;  phone service used (ID# [id number]). Pt eating better reported, but not that good, reported. Pt eating ~50% of most meals. Food preferences discussed with Pt. Rec to add PO Supplement: Ensure Plus HP - 2x daily to diet. Pt likes to drink Mighty Shake reported. No report of chewing or swallowing difficulty; no report of vomiting or diarrhea @ this time. Pt c/o constipation; +last bm (2/21) per flow sheet. Pt c/o abdominal pain as well. RD answered concerns related to diet. Case discussed with nurse. RD remains available, Pt made aware.       Pt's height: 69" (2/12)     IBW: 160#+/-10%     Pt's weight: 171#/77.6 kg - 2/12     Pertinent Medications: Protonix, Miralax, Maalox, Senna,   Pertinent Labs: (2/20)  L, ALT 42 H, Albumin 3.8    Skin: no pressure injury at present     Estimated Needs: [x] no change since previous assessment  Previous Nutrition Diagnosis: [x] Malnutrition, severe    Nutrition Diagnosis is [x] ongoing      Nutrition Interventions/Recommendations:   1. Encourage & assist Pt with meals; Monitor PO diet tolerance;   2. Add Ensure Plus HP (237 ml/350 kcal, 20 gm Protein) 2x daily;      3. Bowel regimen per MD discretion;   4. Add Multivitamins 1 tab daily for micronutrient coverage;  5. Monitor labs, weekly weights, hydration status; Source: Patient [x]       other [x] medical chart,  phone service (ID# [id number])  Diet rx: Regular   kitchen provides Mighty Shake, mildly thickened (4 oz/220 kcal, 6 gm protein) - 3x daily         Pt 49 yo male with PMHx of metastatic ungual melanoma of the R thumb; R thumb amputated and L lung wedge resection in 11/2022; Pt with mets to the lung, liver, LN; Pt s/p craniotomy/resection of L brain tumor in 3/2023 followed by post-op RT - per chart review.     At time of visit, Pt awake, alert but weak; Pt speaks [place] Creole;  phone service used (ID# [id number]). Pt eating better reported, but not that good, reported. Pt eating ~50% of most meals. Food preferences discussed with Pt. Rec to add PO Supplement: Ensure Plus HP - 2x daily to diet. Pt likes to drink Mighty Shake reported. No report of chewing or swallowing difficulty; no report of vomiting or diarrhea @ this time. Pt c/o constipation; +last bm (2/21) per flow sheet. Pt c/o abdominal pain as well. RD answered concerns related to diet. Case discussed with nurse. RD remains available, Pt made aware.       Pt's height: 69" (2/12)     IBW: 160#+/-10%     Pt's weight: 171#/77.6 kg - 2/12     Pertinent Medications: Protonix, Miralax, Maalox, Senna,   Pertinent Labs: (2/20)  L, ALT 42 H, Albumin 3.8    Skin: no pressure injury at present     Estimated Needs: [x] no change since previous assessment  Previous Nutrition Diagnosis: [x] Malnutrition, severe    Nutrition Diagnosis is [x] ongoing      Nutrition Interventions/Recommendations:   1. Encourage & assist Pt with meals; Monitor PO diet tolerance;   2. Add PO supplement: Ensure Plus HP (237 ml/350 kcal, 20 gm Protein) 2x daily;      3. Bowel regimen per MD discretion;   4. Add Multivitamins 1 tab daily for micronutrient coverage;  5. Monitor labs, weekly weights, hydration status;

## 2024-02-24 PROCEDURE — 99233 SBSQ HOSP IP/OBS HIGH 50: CPT

## 2024-02-24 RX ADMIN — Medication 30 MILLILITER(S): at 11:07

## 2024-02-24 RX ADMIN — SENNA PLUS 2 TABLET(S): 8.6 TABLET ORAL at 05:00

## 2024-02-24 RX ADMIN — POLYETHYLENE GLYCOL 3350 17 GRAM(S): 17 POWDER, FOR SOLUTION ORAL at 11:07

## 2024-02-24 RX ADMIN — PANTOPRAZOLE SODIUM 40 MILLIGRAM(S): 20 TABLET, DELAYED RELEASE ORAL at 10:50

## 2024-02-24 RX ADMIN — LIDOCAINE 1 PATCH: 4 CREAM TOPICAL at 07:28

## 2024-02-24 RX ADMIN — LEVETIRACETAM 1000 MILLIGRAM(S): 250 TABLET, FILM COATED ORAL at 21:44

## 2024-02-24 RX ADMIN — LIDOCAINE 1 PATCH: 4 CREAM TOPICAL at 05:00

## 2024-02-24 RX ADMIN — Medication 4 MILLIGRAM(S): at 05:01

## 2024-02-24 RX ADMIN — LEVETIRACETAM 1000 MILLIGRAM(S): 250 TABLET, FILM COATED ORAL at 10:48

## 2024-02-24 RX ADMIN — Medication 4 MILLIGRAM(S): at 17:07

## 2024-02-24 RX ADMIN — PANTOPRAZOLE SODIUM 40 MILLIGRAM(S): 20 TABLET, DELAYED RELEASE ORAL at 21:35

## 2024-02-24 RX ADMIN — Medication 30 MILLILITER(S): at 05:01

## 2024-02-24 RX ADMIN — Medication 25 MILLIGRAM(S): at 16:42

## 2024-02-24 RX ADMIN — LIDOCAINE 1 PATCH: 4 CREAM TOPICAL at 17:00

## 2024-02-24 RX ADMIN — SENNA PLUS 2 TABLET(S): 8.6 TABLET ORAL at 17:07

## 2024-02-24 NOTE — PROGRESS NOTE ADULT - SUBJECTIVE AND OBJECTIVE BOX
SOLID TUMOR ONCOLOGY HOSPITALIST PROGRESS NOTE    S: No acute events overnight.  Pt had no new complaints this am.    CURRENT MEDICATIONS  (STANDING):  dexAMETHasone  Injectable 4 milliGRAM(s) IV Push two times a day  influenza   Vaccine 0.5 milliLiter(s) IntraMuscular once  levETIRAcetam   Injectable 1000 milliGRAM(s) IV Push every 12 hours  lidocaine   4% Patch 1 Patch Transdermal every 24 hours  pantoprazole  Injectable 40 milliGRAM(s) IV Push every 12 hours  polyethylene glycol 3350 17 Gram(s) Oral daily  senna 2 Tablet(s) Oral two times a day  trimethoprim  160 mG/sulfamethoxazole 800 mG 1 Tablet(s) Oral <User Schedule>  (PRN):  bisacodyl Suppository 10 milliGRAM(s) Rectal daily PRN Constipation  meclizine 25 milliGRAM(s) Oral two times a day PRN Dizziness  melatonin 3 milliGRAM(s) Oral at bedtime PRN Insomnia  oxyCODONE    IR 2.5 milliGRAM(s) Oral every 4 hours PRN Moderate Pain (4 - 6)  oxyCODONE    IR 5 milliGRAM(s) Oral every 4 hours PRN Severe Pain (7 - 10)      PHYSICAL EXAM  T(C): 36.4 (02-24-24 @ 08:52), Max: 37.1 (02-24-24 @ 04:53)  HR: 77 (02-24-24 @ 08:52) (77 - 86)  BP: 98/66 (02-24-24 @ 08:52) (94/50 - 99/66)  RR: 16 (02-24-24 @ 08:52) (16 - 18)  SpO2: 100% (02-24-24 @ 08:52) (98% - 100%)  [id number] young Cypriot/Creole speaking male, laying in bed, nad, answering questions appropriately and following commands  Anicteric, but injected sclera, no oral lesions/thrush  RRR, no m/r/g  CTAB, no w/c/r  Abd soft/nt/nd, normoactive bowel sounds  No peripheral edema; R thumb amputated  5/5 strength and normal ROM in all 4 extremities (L slightly weaker than R)  CN 2-12 grossly intact; no gross focal motor/sensory deficits      Monitoring WEEKLY labs only (next due 2/27).      PERTINENT RADIOLOGY  2/19 AXR: Nonspecific bowel gas pattern. Mild distention of stomach and small bowel  If symptoms persist recommend CT.    2/12 CTA h/n: Multiple enhancing brain metastases which have increased   since 2/2/2024. No midline shift. Normal CTA of the head and neck. No   large vessel occlusion.    2/2 CT a/p with IV contrast: New bilateral lobar hepatic metastatic disease. Pulmonary nodules better characterized on prior PET/CT, again concerning for metastases.   Postoperative changes of the right thumb with adjacent nonspecific soft   tissue thickening. Correlate with recent PET/CT findings. Slight gastric inflammatory changes along the greater curvature suggestive of gastritis. Correlate clinically.    1/29 MRI Total spine  1. CERVICAL SPINE:   Multiple metastases within the canal most   prominently at the C6 and C7 levels along the cord surface.  2. THORACIC SPINE:   Multiple metastases within the canal including cord   parenchymal lesions at T1 and T4 and multiple lesions along the cord   surface.  3. LUMBAR SPINE:   Multiple metastases within the cauda equina and distal   canal ependymal surfaces.

## 2024-02-24 NOTE — PROGRESS NOTE ADULT - ASSESSMENT
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 He also began outpt WBRT as well as RT to C5-T5 recently (unclear how many fxs are completed to date).  He was readmitted to [facility] on 2/10 s/p seizure at home- admission imaging confirms progression of CNS mets since 2/2/24 imaging.    ACTIVE PROBLEMS  Metastatic melanoma to multiple sites including brain/LMD  Goals of care counseling, discussion  Secondary seizures  Dizziness  Encounter for antineoplastic radiation therapy  Cancer related pain  Constipation    - Metastatic melanoma  - Goals of care counseling, discussion  Pt with rapidly progressive disease despite receiving SOC systemic therapy  Pt does not meet HLA requirements for Tebentefusp  Hospice is appropriate; s/p GOC discussion with pt's wife yesterday who agrees with hospice placement, and who also indicated that she is unable to care for him at home   > dispo for hospice placement will be complex as pt is uninsured with insufficient greencard status; [facility] Disability application initiated in hopes that pt will be approved and can then potentially be placed at a [facility] with hospice capability  Long-term prognosis remains poor    - Secondary seizures  - CNS mets, LMD  - Dizzness  Appreciate Neuro Onc consult (Dr. Lozano)  Appreciate Rad Onc consult  Pt to complete WBRT (10fxs) and pall RT to C5-T5 (5fxs) on this admission (tx schedule TBD)- tx duration 2/13-26  Continuing Keppra IV 1000mg q12  Continuing Dex IV 4mg BID with ppi ppx (protonix bid, standing maalox), with plan to taper steroids off (as per Neuro Onc recs) after completion of RT  Continuing Meclizine 25mg BID/prn    - Cancer related pain: continuing Oxy 2.5/5mg q4/prn with bowel regimen to prevent OIC    - Constipation  Improved, likely due to opioids, immobility  2/19 AXR negative for ileus/obstruction  Continuing Senna 2 tabs nightly  Continuing Miralax 1 packet daily

## 2024-02-25 PROCEDURE — 99233 SBSQ HOSP IP/OBS HIGH 50: CPT

## 2024-02-25 RX ORDER — OXYCODONE HYDROCHLORIDE 5 MG/1
5 TABLET ORAL EVERY 4 HOURS
Refills: 0 | Status: DISCONTINUED | OUTPATIENT
Start: 2024-02-25 | End: 2024-02-29

## 2024-02-25 RX ORDER — LACTULOSE 10 G/15ML
10 SOLUTION ORAL EVERY 6 HOURS
Refills: 0 | Status: COMPLETED | OUTPATIENT
Start: 2024-02-25 | End: 2024-02-26

## 2024-02-25 RX ORDER — OXYCODONE HYDROCHLORIDE 5 MG/1
2.5 TABLET ORAL EVERY 6 HOURS
Refills: 0 | Status: DISCONTINUED | OUTPATIENT
Start: 2024-02-25 | End: 2024-02-29

## 2024-02-25 RX ORDER — POLYETHYLENE GLYCOL 3350 17 G/17G
17 POWDER, FOR SOLUTION ORAL
Refills: 0 | Status: DISCONTINUED | OUTPATIENT
Start: 2024-02-25 | End: 2024-03-06

## 2024-02-25 RX ADMIN — Medication 25 MILLIGRAM(S): at 08:39

## 2024-02-25 RX ADMIN — LACTULOSE 10 GRAM(S): 10 SOLUTION ORAL at 19:24

## 2024-02-25 RX ADMIN — POLYETHYLENE GLYCOL 3350 17 GRAM(S): 17 POWDER, FOR SOLUTION ORAL at 18:35

## 2024-02-25 RX ADMIN — Medication 4 MILLIGRAM(S): at 05:33

## 2024-02-25 RX ADMIN — POLYETHYLENE GLYCOL 3350 17 GRAM(S): 17 POWDER, FOR SOLUTION ORAL at 12:26

## 2024-02-25 RX ADMIN — Medication 4 MILLIGRAM(S): at 17:15

## 2024-02-25 RX ADMIN — SENNA PLUS 2 TABLET(S): 8.6 TABLET ORAL at 17:15

## 2024-02-25 RX ADMIN — SENNA PLUS 2 TABLET(S): 8.6 TABLET ORAL at 05:33

## 2024-02-25 RX ADMIN — LEVETIRACETAM 1000 MILLIGRAM(S): 250 TABLET, FILM COATED ORAL at 21:27

## 2024-02-25 RX ADMIN — PANTOPRAZOLE SODIUM 40 MILLIGRAM(S): 20 TABLET, DELAYED RELEASE ORAL at 10:21

## 2024-02-25 RX ADMIN — LEVETIRACETAM 1000 MILLIGRAM(S): 250 TABLET, FILM COATED ORAL at 11:21

## 2024-02-25 RX ADMIN — PANTOPRAZOLE SODIUM 40 MILLIGRAM(S): 20 TABLET, DELAYED RELEASE ORAL at 21:24

## 2024-02-25 NOTE — PROGRESS NOTE ADULT - ASSESSMENT
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 He also began outpt WBRT as well as RT to C5-T5 recently (unclear how many fxs are completed to date).  He was readmitted to [facility] on 2/10 s/p seizure at home- admission imaging confirms progression of CNS mets since 2/2/24 imaging.    ACTIVE PROBLEMS  Metastatic melanoma to multiple sites including brain/LMD  Goals of care counseling, discussion  Secondary seizures  Dizziness  Encounter for antineoplastic radiation therapy  Cancer related pain  Constipation    - Metastatic melanoma  - Goals of care counseling, discussion  Pt with rapidly progressive disease despite receiving SOC systemic therapy  Pt does not meet HLA requirements for Tebentefusp  Hospice is appropriate; s/p GOC discussion with pt's wife yesterday who agrees with hospice placement, and who also indicated that she is unable to care for him at home   > dispo for hospice placement will be complex as pt is uninsured with insufficient greencard status; [facility] Disability application initiated in hopes that pt will be approved and can then potentially be placed at a [facility] with hospice capability  Long-term prognosis remains poor    - Secondary seizures  - CNS mets, LMD  - Dizzness  Appreciate Neuro Onc consult (Dr. Lozano)  Appreciate Rad Onc consult  Pt to complete WBRT (10fxs) and pall RT to C5-T5 (5fxs) on this admission (tx schedule TBD)- tx duration 2/13-26  Continuing Keppra IV 1000mg q12  Continuing Dex IV 4mg BID with ppi ppx (protonix bid, standing maalox), with plan to taper steroids off (as per Neuro Onc recs) after completion of RT  Continuing Meclizine 25mg BID/prn    - Cancer related pain: starting Oxy 2.5 q4 standing and continuing Oxy 5mg q4/prn    - Constipation  Likely due to opioids, immobility  2/19 AXR negative for ileus/obstruction  Continuing Senna 2 tabs nightly  Continuing Miralax 1 packet daily 49 yo man with h/o 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 He also began outpt WBRT as well as RT to C5-T5 recently (unclear how many fxs are completed to date).  He was readmitted to [facility] on 2/10 s/p seizure at home- admission imaging confirms progression of CNS mets since 2/2/24 imaging.    ACTIVE PROBLEMS  Metastatic melanoma to multiple sites including brain/LMD  Goals of care counseling, discussion  Secondary seizures  Dizziness  Encounter for antineoplastic radiation therapy  Cancer related pain  Constipation    - Metastatic melanoma  - Goals of care counseling, discussion  Pt with rapidly progressive disease despite receiving SOC systemic therapy  Pt does not meet HLA requirements for Tebentefusp  Hospice is appropriate; s/p GOC discussion with pt's wife yesterday who agrees with hospice placement, and who also indicated that she is unable to care for him at home   > dispo for hospice placement will be complex as pt is uninsured with insufficient greencard status; [facility] Disability application initiated in hopes that pt will be approved and can then potentially be placed at a [facility] with hospice capability  Long-term prognosis remains poor    - Secondary seizures  - CNS mets, LMD  - Dizzness  Appreciate Neuro Onc consult (Dr. Lozano)  Appreciate Rad Onc consult  Pt to complete WBRT (10fxs) and pall RT to C5-T5 (5fxs) on this admission (tx schedule TBD)- tx duration 2/13-26  Continuing Keppra IV 1000mg q12  Continuing Dex IV 4mg BID with ppi ppx (protonix bid, standing maalox), with plan to taper steroids off (as per Neuro Onc recs) after completion of RT  Continuing Meclizine 25mg BID/prn    - Cancer related pain: starting Oxy 2.5 q6 standing and continuing Oxy 5mg q4/prn    - Constipation  Likely due to opioids, immobility  2/19 AXR negative for ileus/obstruction  Continuing Senna 2 tabs nightly  Continuing Miralax 1 packet daily

## 2024-02-25 NOTE — PROGRESS NOTE ADULT - SUBJECTIVE AND OBJECTIVE BOX
SOLID TUMOR ONCOLOGY HOSPITALIST PROGRESS NOTE    S: No acute events overnight.  Pt complained of epigastric stomach pain this morning; RN endorsed that pt has not taken any Oxy since 2/20.  Pt also reported that he still has not had a bm, and that he was willing to try a suppository today.    CURRENT MEDICATIONS  (STANDING):  dexAMETHasone  Injectable 4 milliGRAM(s) IV Push two times a day  influenza   Vaccine 0.5 milliLiter(s) IntraMuscular once  lactulose Syrup 10 Gram(s) Oral every 6 hours  levETIRAcetam   Injectable 1000 milliGRAM(s) IV Push every 12 hours  lidocaine   4% Patch 1 Patch Transdermal every 24 hours  oxyCODONE    IR 2.5 milliGRAM(s) Oral every 6 hours  pantoprazole  Injectable 40 milliGRAM(s) IV Push every 12 hours  polyethylene glycol 3350 17 Gram(s) Oral two times a day  senna 2 Tablet(s) Oral two times a day  trimethoprim  160 mG/sulfamethoxazole 800 mG 1 Tablet(s) Oral <User Schedule>  (PRN):  bisacodyl Suppository 10 milliGRAM(s) Rectal daily PRN Constipation  meclizine 25 milliGRAM(s) Oral two times a day PRN Dizziness  melatonin 3 milliGRAM(s) Oral at bedtime PRN Insomnia  oxyCODONE    IR 5 milliGRAM(s) Oral every 4 hours PRN breakthrough pain      PHYSICAL EXAM  T(C): 37.3 (02-25-24 @ 20:17), Max: 37.3 (02-25-24 @ 20:17)  HR: 96 (02-25-24 @ 20:17) (62 - 96)  BP: 101/67 (02-25-24 @ 20:17) (95/61 - 103/66)  RR: 16 (02-25-24 @ 20:17) (16 - 18)  SpO2: 100% (02-25-24 @ 20:17) (100% - 100%)    02-24-24 @ 07:01  -  02-25-24 @ 07:00  --------------------------------------------------------  IN: 0 mL / OUT: 800 mL / NET: -800 mL  Ewxevrayais-cdf-vxpiuptwc young Serbian/Creole speaking male, laying in bed, nad, answering questions appropriately and following commands  Anicteric, but injected sclera, no oral lesions/thrush  RRR, no m/r/g  CTAB, no w/c/r  Abd soft/nt/nd, normoactive bowel sounds  No peripheral edema; R thumb amputated  5/5 strength and normal ROM in all 4 extremities (L slightly weaker than R)  CN 2-12 grossly intact; no gross focal motor/sensory deficits    Monitoring WEEKLY labs only (next due 2/27).      PERTINENT RADIOLOGY  2/19 AXR: Nonspecific bowel gas pattern. Mild distention of stomach and small bowel  If symptoms persist recommend CT.    2/12 CTA h/n: Multiple enhancing brain metastases which have increased   since 2/2/2024. No midline shift. Normal CTA of the head and neck. No   large vessel occlusion.    2/2 CT a/p with IV contrast: New bilateral lobar hepatic metastatic disease. Pulmonary nodules better characterized on prior PET/CT, again concerning for metastases.   Postoperative changes of the right thumb with adjacent nonspecific soft   tissue thickening. Correlate with recent PET/CT findings. Slight gastric inflammatory changes along the greater curvature suggestive of gastritis. Correlate clinically.    1/29 MRI Total spine  1. CERVICAL SPINE:   Multiple metastases within the canal most   prominently at the C6 and C7 levels along the cord surface.  2. THORACIC SPINE:   Multiple metastases within the canal including cord   parenchymal lesions at T1 and T4 and multiple lesions along the cord   surface.  3. LUMBAR SPINE:   Multiple metastases within the cauda equina and distal   canal ependymal surfaces.

## 2024-02-26 PROCEDURE — 99232 SBSQ HOSP IP/OBS MODERATE 35: CPT

## 2024-02-26 PROCEDURE — 77427 RADIATION TX MANAGEMENT X5: CPT

## 2024-02-26 RX ORDER — LACTULOSE 10 G/15ML
10 SOLUTION ORAL
Refills: 0 | Status: DISCONTINUED | OUTPATIENT
Start: 2024-02-26 | End: 2024-02-27

## 2024-02-26 RX ORDER — PANTOPRAZOLE SODIUM 20 MG/1
40 TABLET, DELAYED RELEASE ORAL EVERY 12 HOURS
Refills: 0 | Status: DISCONTINUED | OUTPATIENT
Start: 2024-02-26 | End: 2024-03-05

## 2024-02-26 RX ADMIN — LACTULOSE 10 GRAM(S): 10 SOLUTION ORAL at 15:29

## 2024-02-26 RX ADMIN — OXYCODONE HYDROCHLORIDE 2.5 MILLIGRAM(S): 5 TABLET ORAL at 18:45

## 2024-02-26 RX ADMIN — POLYETHYLENE GLYCOL 3350 17 GRAM(S): 17 POWDER, FOR SOLUTION ORAL at 05:15

## 2024-02-26 RX ADMIN — PANTOPRAZOLE SODIUM 40 MILLIGRAM(S): 20 TABLET, DELAYED RELEASE ORAL at 17:59

## 2024-02-26 RX ADMIN — LEVETIRACETAM 1000 MILLIGRAM(S): 250 TABLET, FILM COATED ORAL at 23:04

## 2024-02-26 RX ADMIN — LACTULOSE 10 GRAM(S): 10 SOLUTION ORAL at 01:00

## 2024-02-26 RX ADMIN — PANTOPRAZOLE SODIUM 40 MILLIGRAM(S): 20 TABLET, DELAYED RELEASE ORAL at 07:50

## 2024-02-26 RX ADMIN — Medication 4 MILLIGRAM(S): at 17:59

## 2024-02-26 RX ADMIN — LACTULOSE 10 GRAM(S): 10 SOLUTION ORAL at 05:15

## 2024-02-26 RX ADMIN — LEVETIRACETAM 1000 MILLIGRAM(S): 250 TABLET, FILM COATED ORAL at 09:59

## 2024-02-26 RX ADMIN — OXYCODONE HYDROCHLORIDE 2.5 MILLIGRAM(S): 5 TABLET ORAL at 17:59

## 2024-02-26 RX ADMIN — Medication 1 TABLET(S): at 07:50

## 2024-02-26 RX ADMIN — POLYETHYLENE GLYCOL 3350 17 GRAM(S): 17 POWDER, FOR SOLUTION ORAL at 17:58

## 2024-02-26 RX ADMIN — SENNA PLUS 2 TABLET(S): 8.6 TABLET ORAL at 05:14

## 2024-02-26 RX ADMIN — Medication 4 MILLIGRAM(S): at 05:15

## 2024-02-26 RX ADMIN — OXYCODONE HYDROCHLORIDE 2.5 MILLIGRAM(S): 5 TABLET ORAL at 07:50

## 2024-02-26 RX ADMIN — OXYCODONE HYDROCHLORIDE 2.5 MILLIGRAM(S): 5 TABLET ORAL at 08:50

## 2024-02-26 RX ADMIN — SENNA PLUS 2 TABLET(S): 8.6 TABLET ORAL at 17:59

## 2024-02-26 NOTE — PROGRESS NOTE ADULT - SUBJECTIVE AND OBJECTIVE BOX
Earle Perez MD  Academic Hospitalist  Pager [phone]  Email: [email or web address]  Available on [employer] Teams        PROGRESS NOTE:     Patient is a 48y old  Male who presents with a chief complaint of seizure (23 Feb 2024 15:07)      SUBJECTIVE / OVERNIGHT EVENTS:  Patient seen and examined this morning. Feels constipated.   ADDITIONAL REVIEW OF SYSTEMS:  No f/c/n/v    MEDICATIONS  (STANDING):  dexAMETHasone  Injectable 4 milliGRAM(s) IV Push two times a day  influenza   Vaccine 0.5 milliLiter(s) IntraMuscular once  lactulose Syrup 10 Gram(s) Oral two times a day  levETIRAcetam   Injectable 1000 milliGRAM(s) IV Push every 12 hours  lidocaine   4% Patch 1 Patch Transdermal every 24 hours  oxyCODONE    IR 2.5 milliGRAM(s) Oral every 6 hours  pantoprazole    Tablet 40 milliGRAM(s) Oral every 12 hours  polyethylene glycol 3350 17 Gram(s) Oral two times a day  senna 2 Tablet(s) Oral two times a day  trimethoprim  160 mG/sulfamethoxazole 800 mG 1 Tablet(s) Oral <User Schedule>    MEDICATIONS  (PRN):  bisacodyl Suppository 10 milliGRAM(s) Rectal daily PRN Constipation  meclizine 25 milliGRAM(s) Oral two times a day PRN Dizziness  melatonin 3 milliGRAM(s) Oral at bedtime PRN Insomnia  oxyCODONE    IR 5 milliGRAM(s) Oral every 4 hours PRN breakthrough pain      CAPILLARY BLOOD GLUCOSE        I&O's Summary    25 Feb 2024 07:01  -  26 Feb 2024 07:00  --------------------------------------------------------  IN: 0 mL / OUT: 600 mL / NET: -600 mL        PHYSICAL EXAM:  Vital Signs Last 24 Hrs  T(C): 36.4 (26 Feb 2024 12:06), Max: 37.3 (25 Feb 2024 20:17)  T(F): 97.6 (26 Feb 2024 12:06), Max: 99.1 (25 Feb 2024 20:17)  HR: 98 (26 Feb 2024 12:06) (69 - 98)  BP: 92/60 (26 Feb 2024 12:06) (92/60 - 101/67)  BP(mean): --  RR: 17 (26 Feb 2024 12:06) (16 - 18)  SpO2: 100% (26 Feb 2024 12:06) (100% - 100%)    Parameters below as of 26 Feb 2024 12:06  Patient On (Oxygen Delivery Method): room air        Wkytrbhoyaa-wpj-whgmsomhd young Malawian/Creole speaking male, laying in bed, nad, answering questions appropriately and following commands  Anicteric, but injected sclera, no oral lesions/thrush  RRR, no m/r/g  CTAB, no w/c/r  Abd soft/nt/nd, normoactive bowel sounds  No peripheral edema; R thumb amputated  5/5 strength and normal ROM in all 4 extremities (L slightly weaker than R)  CN 2-12 grossly intact; no gross focal motor/sensory deficits    LABS:                      RADIOLOGY & ADDITIONAL TESTS:  Results Reviewed:   Imaging Personally Reviewed:  Electrocardiogram Personally Reviewed:    COORDINATION OF CARE:  Care Discussed with Consultants/Other Providers [Y/N]: Case discussed during interdisciplinary rounds with social work and case management  Prior or Outpatient Records Reviewed [Y/N]:

## 2024-02-26 NOTE — PROGRESS NOTE ADULT - ASSESSMENT
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 He also began outpt WBRT as well as RT to C5-T5 recently (unclear how many fxs are completed to date).  He was readmitted to [facility] on 2/10 s/p seizure at home- admission imaging confirms progression of CNS mets since 2/2/24 imaging.    ACTIVE PROBLEMS  Metastatic melanoma to multiple sites including brain/LMD  Goals of care counseling, discussion  Secondary seizures  Dizziness  Encounter for antineoplastic radiation therapy  Cancer related pain  Constipation    - Metastatic melanoma  - Goals of care counseling, discussion  Pt with rapidly progressive disease despite receiving SOC systemic therapy  Pt does not meet HLA requirements for Tebentefusp  Hospice is appropriate; s/p GOC discussion with pt's wife yesterday who agrees with hospice placement, and who also indicated that she is unable to care for him at home   > dispo for hospice placement will be complex as pt is uninsured with insufficient greencard status; [facility] Disability application initiated in hopes that pt will be approved and can then potentially be placed at a [facility] with hospice capability  Long-term prognosis remains poor    - Secondary seizures  - CNS mets, LMD  - Dizzness  Appreciate Neuro Onc consult (Dr. Lozano)  Appreciate Rad Onc consult  Pt to complete WBRT (10fxs) and pall RT to C5-T5 (5fxs)   Continuing Keppra IV 1000mg q12  Continuing Dex IV 4mg BID with ppi ppx (protonix bid, standing maalox), with plan to taper steroids off (as per Neuro Onc recs) after completion of RT  Continuing Meclizine 25mg BID/prn    - Cancer related pain: starting Oxy 2.5 q6 standing and continuing Oxy 5mg q4/prn    - Constipation  Likely due to opioids, immobility  2/19 AXR negative for ileus/obstruction  Continuing Senna 2 tabs nightly  Continuing Miralax 1 packet daily

## 2024-02-27 LAB
ALBUMIN SERPL ELPH-MCNC: 3.4 G/DL — SIGNIFICANT CHANGE UP (ref 3.3–5)
ALP SERPL-CCNC: 46 U/L — SIGNIFICANT CHANGE UP (ref 40–120)
ALT FLD-CCNC: 56 U/L — HIGH (ref 4–41)
ANION GAP SERPL CALC-SCNC: 11 MMOL/L — SIGNIFICANT CHANGE UP (ref 7–14)
AST SERPL-CCNC: 27 U/L — SIGNIFICANT CHANGE UP (ref 4–40)
BASOPHILS # BLD AUTO: 0.02 K/UL — SIGNIFICANT CHANGE UP (ref 0–0.2)
BASOPHILS NFR BLD AUTO: 0.2 % — SIGNIFICANT CHANGE UP (ref 0–2)
BILIRUB SERPL-MCNC: 0.2 MG/DL — SIGNIFICANT CHANGE UP (ref 0.2–1.2)
BUN SERPL-MCNC: 27 MG/DL — HIGH (ref 7–23)
CALCIUM SERPL-MCNC: 8.5 MG/DL — SIGNIFICANT CHANGE UP (ref 8.4–10.5)
CHLORIDE SERPL-SCNC: 101 MMOL/L — SIGNIFICANT CHANGE UP (ref 98–107)
CO2 SERPL-SCNC: 27 MMOL/L — SIGNIFICANT CHANGE UP (ref 22–31)
CREAT SERPL-MCNC: 0.98 MG/DL — SIGNIFICANT CHANGE UP (ref 0.5–1.3)
EGFR: 95 ML/MIN/1.73M2 — SIGNIFICANT CHANGE UP
EOSINOPHIL # BLD AUTO: 0 K/UL — SIGNIFICANT CHANGE UP (ref 0–0.5)
EOSINOPHIL NFR BLD AUTO: 0 % — SIGNIFICANT CHANGE UP (ref 0–6)
GLUCOSE SERPL-MCNC: 174 MG/DL — HIGH (ref 70–99)
HCT VFR BLD CALC: 38.9 % — LOW (ref 39–50)
HGB BLD-MCNC: 13 G/DL — SIGNIFICANT CHANGE UP (ref 13–17)
IANC: 8.15 K/UL — HIGH (ref 1.8–7.4)
IMM GRANULOCYTES NFR BLD AUTO: 2 % — HIGH (ref 0–0.9)
LYMPHOCYTES # BLD AUTO: 0.64 K/UL — LOW (ref 1–3.3)
LYMPHOCYTES # BLD AUTO: 6.6 % — LOW (ref 13–44)
MAGNESIUM SERPL-MCNC: 2.4 MG/DL — SIGNIFICANT CHANGE UP (ref 1.6–2.6)
MCHC RBC-ENTMCNC: 28.3 PG — SIGNIFICANT CHANGE UP (ref 27–34)
MCHC RBC-ENTMCNC: 33.4 GM/DL — SIGNIFICANT CHANGE UP (ref 32–36)
MCV RBC AUTO: 84.6 FL — SIGNIFICANT CHANGE UP (ref 80–100)
MONOCYTES # BLD AUTO: 0.64 K/UL — SIGNIFICANT CHANGE UP (ref 0–0.9)
MONOCYTES NFR BLD AUTO: 6.6 % — SIGNIFICANT CHANGE UP (ref 2–14)
NEUTROPHILS # BLD AUTO: 8.15 K/UL — HIGH (ref 1.8–7.4)
NEUTROPHILS NFR BLD AUTO: 84.6 % — HIGH (ref 43–77)
NRBC # BLD: 0 /100 WBCS — SIGNIFICANT CHANGE UP (ref 0–0)
NRBC # FLD: 0 K/UL — SIGNIFICANT CHANGE UP (ref 0–0)
PHOSPHATE SERPL-MCNC: 3.4 MG/DL — SIGNIFICANT CHANGE UP (ref 2.5–4.5)
PLATELET # BLD AUTO: 101 K/UL — LOW (ref 150–400)
POTASSIUM SERPL-MCNC: 4.5 MMOL/L — SIGNIFICANT CHANGE UP (ref 3.5–5.3)
POTASSIUM SERPL-SCNC: 4.5 MMOL/L — SIGNIFICANT CHANGE UP (ref 3.5–5.3)
PROT SERPL-MCNC: 6.2 G/DL — SIGNIFICANT CHANGE UP (ref 6–8.3)
RBC # BLD: 4.6 M/UL — SIGNIFICANT CHANGE UP (ref 4.2–5.8)
RBC # FLD: 13.9 % — SIGNIFICANT CHANGE UP (ref 10.3–14.5)
SODIUM SERPL-SCNC: 139 MMOL/L — SIGNIFICANT CHANGE UP (ref 135–145)
WBC # BLD: 9.64 K/UL — SIGNIFICANT CHANGE UP (ref 3.8–10.5)
WBC # FLD AUTO: 9.64 K/UL — SIGNIFICANT CHANGE UP (ref 3.8–10.5)

## 2024-02-27 PROCEDURE — 99232 SBSQ HOSP IP/OBS MODERATE 35: CPT

## 2024-02-27 RX ORDER — DEXAMETHASONE 0.5 MG/5ML
3 ELIXIR ORAL DAILY
Refills: 0 | Status: DISCONTINUED | OUTPATIENT
Start: 2024-03-04 | End: 2024-03-06

## 2024-02-27 RX ORDER — DEXAMETHASONE 0.5 MG/5ML
2 ELIXIR ORAL DAILY
Refills: 0 | Status: CANCELLED | OUTPATIENT
Start: 2024-03-09 | End: 2024-03-06

## 2024-02-27 RX ORDER — LACTULOSE 10 G/15ML
20 SOLUTION ORAL
Refills: 0 | Status: DISCONTINUED | OUTPATIENT
Start: 2024-02-27 | End: 2024-03-06

## 2024-02-27 RX ORDER — DEXAMETHASONE 0.5 MG/5ML
4 ELIXIR ORAL ONCE
Refills: 0 | Status: COMPLETED | OUTPATIENT
Start: 2024-02-27 | End: 2024-02-27

## 2024-02-27 RX ORDER — DEXAMETHASONE 0.5 MG/5ML
1 ELIXIR ORAL DAILY
Refills: 0 | Status: CANCELLED | OUTPATIENT
Start: 2024-03-13 | End: 2024-03-06

## 2024-02-27 RX ORDER — DEXAMETHASONE 0.5 MG/5ML
4 ELIXIR ORAL DAILY
Refills: 0 | Status: COMPLETED | OUTPATIENT
Start: 2024-02-28 | End: 2024-03-03

## 2024-02-27 RX ORDER — CHLORHEXIDINE GLUCONATE 213 G/1000ML
1 SOLUTION TOPICAL
Refills: 0 | Status: DISCONTINUED | OUTPATIENT
Start: 2024-02-27 | End: 2024-03-06

## 2024-02-27 RX ADMIN — LACTULOSE 20 GRAM(S): 10 SOLUTION ORAL at 18:14

## 2024-02-27 RX ADMIN — OXYCODONE HYDROCHLORIDE 2.5 MILLIGRAM(S): 5 TABLET ORAL at 05:34

## 2024-02-27 RX ADMIN — OXYCODONE HYDROCHLORIDE 2.5 MILLIGRAM(S): 5 TABLET ORAL at 00:11

## 2024-02-27 RX ADMIN — OXYCODONE HYDROCHLORIDE 2.5 MILLIGRAM(S): 5 TABLET ORAL at 18:13

## 2024-02-27 RX ADMIN — LEVETIRACETAM 1000 MILLIGRAM(S): 250 TABLET, FILM COATED ORAL at 21:33

## 2024-02-27 RX ADMIN — OXYCODONE HYDROCHLORIDE 2.5 MILLIGRAM(S): 5 TABLET ORAL at 12:55

## 2024-02-27 RX ADMIN — LACTULOSE 10 GRAM(S): 10 SOLUTION ORAL at 05:37

## 2024-02-27 RX ADMIN — OXYCODONE HYDROCHLORIDE 2.5 MILLIGRAM(S): 5 TABLET ORAL at 13:55

## 2024-02-27 RX ADMIN — POLYETHYLENE GLYCOL 3350 17 GRAM(S): 17 POWDER, FOR SOLUTION ORAL at 05:37

## 2024-02-27 RX ADMIN — PANTOPRAZOLE SODIUM 40 MILLIGRAM(S): 20 TABLET, DELAYED RELEASE ORAL at 18:13

## 2024-02-27 RX ADMIN — SENNA PLUS 2 TABLET(S): 8.6 TABLET ORAL at 18:14

## 2024-02-27 RX ADMIN — CHLORHEXIDINE GLUCONATE 1 APPLICATION(S): 213 SOLUTION TOPICAL at 12:55

## 2024-02-27 RX ADMIN — Medication 4 MILLIGRAM(S): at 18:14

## 2024-02-27 RX ADMIN — PANTOPRAZOLE SODIUM 40 MILLIGRAM(S): 20 TABLET, DELAYED RELEASE ORAL at 05:35

## 2024-02-27 RX ADMIN — LEVETIRACETAM 1000 MILLIGRAM(S): 250 TABLET, FILM COATED ORAL at 10:09

## 2024-02-27 RX ADMIN — SENNA PLUS 2 TABLET(S): 8.6 TABLET ORAL at 05:35

## 2024-02-27 RX ADMIN — Medication 4 MILLIGRAM(S): at 05:38

## 2024-02-27 RX ADMIN — POLYETHYLENE GLYCOL 3350 17 GRAM(S): 17 POWDER, FOR SOLUTION ORAL at 18:14

## 2024-02-27 NOTE — PROGRESS NOTE ADULT - SUBJECTIVE AND OBJECTIVE BOX
Earle Perez MD  Academic Hospitalist  Pager [phone]  Email: [email or web address]  Available on [employer] Teams        PROGRESS NOTE:     Patient is a 48y old  Male who presents with a chief complaint of seizure (26 Feb 2024 14:33)      SUBJECTIVE / OVERNIGHT EVENTS:  Patient seen and examined this morning. No acute events overnight.  ADDITIONAL REVIEW OF SYSTEMS:  No f/c/n/v    MEDICATIONS  (STANDING):  chlorhexidine 2% Cloths 1 Application(s) Topical <User Schedule>  dexAMETHasone  Injectable 4 milliGRAM(s) IV Push two times a day  influenza   Vaccine 0.5 milliLiter(s) IntraMuscular once  lactulose Syrup 10 Gram(s) Oral two times a day  levETIRAcetam   Injectable 1000 milliGRAM(s) IV Push every 12 hours  lidocaine   4% Patch 1 Patch Transdermal every 24 hours  oxyCODONE    IR 2.5 milliGRAM(s) Oral every 6 hours  pantoprazole    Tablet 40 milliGRAM(s) Oral every 12 hours  polyethylene glycol 3350 17 Gram(s) Oral two times a day  senna 2 Tablet(s) Oral two times a day  trimethoprim  160 mG/sulfamethoxazole 800 mG 1 Tablet(s) Oral <User Schedule>    MEDICATIONS  (PRN):  bisacodyl Suppository 10 milliGRAM(s) Rectal daily PRN Constipation  meclizine 25 milliGRAM(s) Oral two times a day PRN Dizziness  melatonin 3 milliGRAM(s) Oral at bedtime PRN Insomnia  oxyCODONE    IR 5 milliGRAM(s) Oral every 4 hours PRN breakthrough pain      CAPILLARY BLOOD GLUCOSE        I&O's Summary    26 Feb 2024 07:01  -  27 Feb 2024 07:00  --------------------------------------------------------  IN: 450 mL / OUT: 600 mL / NET: -150 mL        PHYSICAL EXAM:  Vital Signs Last 24 Hrs  T(C): 36.4 (27 Feb 2024 11:24), Max: 37.1 (27 Feb 2024 02:30)  T(F): 97.6 (27 Feb 2024 11:24), Max: 98.7 (27 Feb 2024 02:30)  HR: 68 (27 Feb 2024 11:24) (68 - 87)  BP: 97/57 (27 Feb 2024 11:24) (97/57 - 106/66)  BP(mean): --  RR: 17 (27 Feb 2024 11:24) (17 - 18)  SpO2: 99% (27 Feb 2024 11:24) (98% - 100%)    Parameters below as of 27 Feb 2024 11:24  Patient On (Oxygen Delivery Method): room air        Wrjgxniscyf-kvq-lfazcjgxh young Indonesian/Creole speaking male, laying in bed, nad, answering questions appropriately and following commands  Anicteric, but injected sclera, no oral lesions/thrush  RRR, no m/r/g  CTAB, no w/c/r  Abd soft/nt/nd, normoactive bowel sounds  No peripheral edema; R thumb amputated  5/5 strength and normal ROM in all 4 extremities (L slightly weaker than R)  CN 2-12 grossly intact; no gross focal motor/sensory deficits    LABS:                        13.0   9.64  )-----------( 101      ( 27 Feb 2024 06:00 )             38.9     02-27    139  |  101  |  27<H>  ----------------------------<  174<H>  4.5   |  27  |  0.98    Ca    8.5      27 Feb 2024 06:00  Phos  3.4     02-27  Mg     2.40     02-27    TPro  6.2  /  Alb  3.4  /  TBili  0.2  /  DBili  x   /  AST  27  /  ALT  56<H>  /  AlkPhos  46  02-27          Urinalysis Basic - ( 27 Feb 2024 06:00 )    Color: x / Appearance: x / SG: x / pH: x  Gluc: 174 mg/dL / Ketone: x  / Bili: x / Urobili: x   Blood: x / Protein: x / Nitrite: x   Leuk Esterase: x / RBC: x / WBC x   Sq Epi: x / Non Sq Epi: x / Bacteria: x          RADIOLOGY & ADDITIONAL TESTS:  Results Reviewed:   Imaging Personally Reviewed:  Electrocardiogram Personally Reviewed:    COORDINATION OF CARE:  Care Discussed with Consultants/Other Providers [Y/N]: Case discussed during interdisciplinary rounds with social work and case management  Prior or Outpatient Records Reviewed [Y/N]:

## 2024-02-27 NOTE — PROGRESS NOTE ADULT - ASSESSMENT
47 yo man with h/o metastatic ungual melanoma of the R thumb and axillary LN (s/p LN resection 12/2021; R thumb amputated and L lung wedge resection was performed in 11/2022- [id number]; completed adjuvant Dacarbazine until 3/2022) with mets to the lung, liver, LN, brain with lepto of brain/spine- s/p craniotomy/resection of L brain tumor in 3/2023 followed by post-op RT (30Gy/5fxs) to surgical bed and SRS to R temporal lesion (20Gy/1fx) in 4/2023, as well as GKR to 4 dural based lesions in 10/2023; on Dex/Keppra); PD-L1 TPS 30%; s/p Ipi/Nivo x3 c/b pneumonitis (Ipi held) > last received C2 Nivo monotherapy on 1/18/24. He also began outpt WBRT as well as RT to C5-T5 recently (unclear how many fxs are completed to date).  He was readmitted to [facility] on 2/10 s/p seizure at home- admission imaging confirms progression of CNS mets since 2/2/24 imaging.    ACTIVE PROBLEMS  Metastatic melanoma to multiple sites including brain/LMD  Goals of care counseling, discussion  Secondary seizures  Dizziness  Encounter for antineoplastic radiation therapy  Cancer related pain  Constipation    - Metastatic melanoma  - Goals of care counseling, discussion  Pt with rapidly progressive disease despite receiving SOC systemic therapy  Pt does not meet HLA requirements for Tebentefusp  Hospice is appropriate; s/p GOC discussion with pt's wife yesterday who agrees with hospice placement, and who also indicated that she is unable to care for him at home   > dispo for hospice placement will be complex as pt is uninsured with insufficient greencard status; [facility] Disability application initiated in hopes that pt will be approved and can then potentially be placed at a [facility] with hospice capability  Long-term prognosis remains poor    - Secondary seizures  - CNS mets, LMD  - Dizzness  Appreciate Neuro Onc consult (Dr. Lozano)  Appreciate Rad Onc consult  Pt to complete WBRT (10fxs) and pall RT to C5-T5 (5fxs)   Continuing Keppra IV 1000mg q12  Continuing Dex IV 4mg BID with ppi ppx (protonix bid, standing maalox), with plan to taper steroids off (as per Neuro Onc recs) after completion of RT  Continuing Meclizine 25mg BID/prn    - Cancer related pain: starting Oxy 2.5 q6 standing and continuing Oxy 5mg q4/prn    - Constipation  Likely due to opioids, immobility  2/19 AXR negative for ileus/obstruction  Continuing Senna 2 tabs nightly  Continuing Miralax 1 packet daily

## 2024-02-28 LAB
MRSA PCR RESULT.: SIGNIFICANT CHANGE UP
S AUREUS DNA NOSE QL NAA+PROBE: SIGNIFICANT CHANGE UP

## 2024-02-28 PROCEDURE — 99232 SBSQ HOSP IP/OBS MODERATE 35: CPT

## 2024-02-28 RX ADMIN — PANTOPRAZOLE SODIUM 40 MILLIGRAM(S): 20 TABLET, DELAYED RELEASE ORAL at 18:47

## 2024-02-28 RX ADMIN — POLYETHYLENE GLYCOL 3350 17 GRAM(S): 17 POWDER, FOR SOLUTION ORAL at 18:46

## 2024-02-28 RX ADMIN — LEVETIRACETAM 1000 MILLIGRAM(S): 250 TABLET, FILM COATED ORAL at 09:37

## 2024-02-28 RX ADMIN — SENNA PLUS 2 TABLET(S): 8.6 TABLET ORAL at 05:56

## 2024-02-28 RX ADMIN — CHLORHEXIDINE GLUCONATE 1 APPLICATION(S): 213 SOLUTION TOPICAL at 06:03

## 2024-02-28 RX ADMIN — Medication 4 MILLIGRAM(S): at 06:00

## 2024-02-28 RX ADMIN — LIDOCAINE 1 PATCH: 4 CREAM TOPICAL at 07:15

## 2024-02-28 RX ADMIN — OXYCODONE HYDROCHLORIDE 2.5 MILLIGRAM(S): 5 TABLET ORAL at 05:55

## 2024-02-28 RX ADMIN — OXYCODONE HYDROCHLORIDE 5 MILLIGRAM(S): 5 TABLET ORAL at 15:29

## 2024-02-28 RX ADMIN — POLYETHYLENE GLYCOL 3350 17 GRAM(S): 17 POWDER, FOR SOLUTION ORAL at 05:58

## 2024-02-28 RX ADMIN — Medication 1 TABLET(S): at 09:36

## 2024-02-28 RX ADMIN — LACTULOSE 20 GRAM(S): 10 SOLUTION ORAL at 18:47

## 2024-02-28 RX ADMIN — LIDOCAINE 1 PATCH: 4 CREAM TOPICAL at 05:57

## 2024-02-28 RX ADMIN — SENNA PLUS 2 TABLET(S): 8.6 TABLET ORAL at 18:47

## 2024-02-28 RX ADMIN — LACTULOSE 20 GRAM(S): 10 SOLUTION ORAL at 05:59

## 2024-02-28 RX ADMIN — LIDOCAINE 1 PATCH: 4 CREAM TOPICAL at 17:51

## 2024-02-28 RX ADMIN — OXYCODONE HYDROCHLORIDE 2.5 MILLIGRAM(S): 5 TABLET ORAL at 00:08

## 2024-02-28 RX ADMIN — LEVETIRACETAM 1000 MILLIGRAM(S): 250 TABLET, FILM COATED ORAL at 22:45

## 2024-02-28 RX ADMIN — OXYCODONE HYDROCHLORIDE 5 MILLIGRAM(S): 5 TABLET ORAL at 16:30

## 2024-02-28 RX ADMIN — PANTOPRAZOLE SODIUM 40 MILLIGRAM(S): 20 TABLET, DELAYED RELEASE ORAL at 05:58

## 2024-02-28 NOTE — PROGRESS NOTE ADULT - SUBJECTIVE AND OBJECTIVE BOX
Earle Perez MD  Academic Hospitalist  Pager [phone]  Email: [email or web address]  Available on [employer] Teams        PROGRESS NOTE:     Patient is a 48y old  Male who presents with a chief complaint of seizure (27 Feb 2024 16:04)    SUBJECTIVE / OVERNIGHT EVENTS:  Patient seen and examined this morning. No acute events overnight.  ADDITIONAL REVIEW OF SYSTEMS:  No f/c/n/v    MEDICATIONS  (STANDING):  chlorhexidine 2% Cloths 1 Application(s) Topical <User Schedule>  dexAMETHasone  Injectable 4 milliGRAM(s) IV Push daily  influenza   Vaccine 0.5 milliLiter(s) IntraMuscular once  lactulose Syrup 20 Gram(s) Oral two times a day  levETIRAcetam   Injectable 1000 milliGRAM(s) IV Push every 12 hours  lidocaine   4% Patch 1 Patch Transdermal every 24 hours  oxyCODONE    IR 2.5 milliGRAM(s) Oral every 6 hours  pantoprazole    Tablet 40 milliGRAM(s) Oral every 12 hours  polyethylene glycol 3350 17 Gram(s) Oral two times a day  senna 2 Tablet(s) Oral two times a day  trimethoprim  160 mG/sulfamethoxazole 800 mG 1 Tablet(s) Oral <User Schedule>    MEDICATIONS  (PRN):  bisacodyl Suppository 10 milliGRAM(s) Rectal daily PRN Constipation  meclizine 25 milliGRAM(s) Oral two times a day PRN Dizziness  melatonin 3 milliGRAM(s) Oral at bedtime PRN Insomnia  oxyCODONE    IR 5 milliGRAM(s) Oral every 4 hours PRN breakthrough pain      CAPILLARY BLOOD GLUCOSE        I&O's Summary      PHYSICAL EXAM:  Vital Signs Last 24 Hrs  T(C): 36.9 (28 Feb 2024 11:20), Max: 36.9 (28 Feb 2024 11:20)  T(F): 98.4 (28 Feb 2024 11:20), Max: 98.4 (28 Feb 2024 11:20)  HR: 81 (28 Feb 2024 11:20) (62 - 98)  BP: 100/65 (28 Feb 2024 11:20) (92/54 - 104/58)  BP(mean): --  RR: 18 (28 Feb 2024 11:20) (18 - 18)  SpO2: 100% (28 Feb 2024 11:20) (98% - 100%)    Parameters below as of 28 Feb 2024 11:20  Patient On (Oxygen Delivery Method): room air        Obghmrwaaxw-auo-kqwgrqzgv young Upper sorbian/Creole speaking male, laying in bed, nad, answering questions appropriately and following commands  Anicteric, but injected sclera, no oral lesions/thrush  RRR, no m/r/g  CTAB, no w/c/r  Abd soft/nt/nd, normoactive bowel sounds  No peripheral edema; R thumb amputated  5/5 strength and normal ROM in all 4 extremities (L slightly weaker than R)  CN 2-12 grossly intact; no gross focal motor/sensory deficits      LABS:                        13.0   9.64  )-----------( 101      ( 27 Feb 2024 06:00 )             38.9     02-27    139  |  101  |  27<H>  ----------------------------<  174<H>  4.5   |  27  |  0.98    Ca    8.5      27 Feb 2024 06:00  Phos  3.4     02-27  Mg     2.40     02-27    TPro  6.2  /  Alb  3.4  /  TBili  0.2  /  DBili  x   /  AST  27  /  ALT  56<H>  /  AlkPhos  46  02-27          Urinalysis Basic - ( 27 Feb 2024 06:00 )    Color: x / Appearance: x / SG: x / pH: x  Gluc: 174 mg/dL / Ketone: x  / Bili: x / Urobili: x   Blood: x / Protein: x / Nitrite: x   Leuk Esterase: x / RBC: x / WBC x   Sq Epi: x / Non Sq Epi: x / Bacteria: x          RADIOLOGY & ADDITIONAL TESTS:  Results Reviewed:   Imaging Personally Reviewed:  Electrocardiogram Personally Reviewed:    COORDINATION OF CARE:  Care Discussed with Consultants/Other Providers [Y/N]: Case discussed during interdisciplinary rounds with social work and case management  Prior or Outpatient Records Reviewed [Y/N]:

## 2024-02-28 NOTE — PROGRESS NOTE ADULT - ASSESSMENT
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 He also began outpt WBRT as well as RT to C5-T5 recently (unclear how many fxs are completed to date).  He was readmitted to [facility] on 2/10 s/p seizure at home- admission imaging confirms progression of CNS mets since 2/2/24 imaging.    ACTIVE PROBLEMS  Metastatic melanoma to multiple sites including brain/LMD  Goals of care counseling, discussion  Secondary seizures  Dizziness  Encounter for antineoplastic radiation therapy  Cancer related pain  Constipation    - Metastatic melanoma  - Goals of care counseling, discussion  Pt with rapidly progressive disease despite receiving SOC systemic therapy  Pt does not meet HLA requirements for Tebentefusp  Hospice is appropriate; s/p GOC discussion with pt's wife yesterday who agrees with hospice placement, and who also indicated that she is unable to care for him at home   > dispo for hospice placement will be complex as pt is uninsured with insufficient greencard status; [facility] Disability application initiated in hopes that pt will be approved and can then potentially be placed at a [facility] with hospice capability  Long-term prognosis remains poor    - Secondary seizures  - CNS mets, LMD  - Dizzness  Appreciate Neuro Onc consult (Dr. Lozano)  Appreciate Rad Onc consult  Pt to complete WBRT (10fxs) and pall RT to C5-T5 (5fxs)   Continuing Keppra IV 1000mg q12  Continuing Dex taper (as per Neuro Onc recs)   Continuing Meclizine 25mg BID/prn    - Cancer related pain: starting Oxy 2.5 q6 standing and continuing Oxy 5mg q4/prn    - Constipation  Likely due to opioids, immobility  2/19 AXR negative for ileus/obstruction  Continuing Senna 2 tabs nightly  Continuing Miralax 1 packet daily

## 2024-02-29 PROCEDURE — 99232 SBSQ HOSP IP/OBS MODERATE 35: CPT

## 2024-02-29 RX ORDER — SODIUM CHLORIDE 9 MG/ML
1000 INJECTION INTRAMUSCULAR; INTRAVENOUS; SUBCUTANEOUS ONCE
Refills: 0 | Status: COMPLETED | OUTPATIENT
Start: 2024-02-29 | End: 2024-02-29

## 2024-02-29 RX ORDER — OXYCODONE HYDROCHLORIDE 5 MG/1
5 TABLET ORAL EVERY 4 HOURS
Refills: 0 | Status: DISCONTINUED | OUTPATIENT
Start: 2024-02-29 | End: 2024-03-04

## 2024-02-29 RX ORDER — OXYCODONE HYDROCHLORIDE 5 MG/1
2.5 TABLET ORAL EVERY 6 HOURS
Refills: 0 | Status: DISCONTINUED | OUTPATIENT
Start: 2024-02-29 | End: 2024-03-03

## 2024-02-29 RX ADMIN — SENNA PLUS 2 TABLET(S): 8.6 TABLET ORAL at 05:14

## 2024-02-29 RX ADMIN — LEVETIRACETAM 1000 MILLIGRAM(S): 250 TABLET, FILM COATED ORAL at 21:41

## 2024-02-29 RX ADMIN — CHLORHEXIDINE GLUCONATE 1 APPLICATION(S): 213 SOLUTION TOPICAL at 05:23

## 2024-02-29 RX ADMIN — Medication 4 MILLIGRAM(S): at 05:22

## 2024-02-29 RX ADMIN — PANTOPRAZOLE SODIUM 40 MILLIGRAM(S): 20 TABLET, DELAYED RELEASE ORAL at 17:48

## 2024-02-29 RX ADMIN — POLYETHYLENE GLYCOL 3350 17 GRAM(S): 17 POWDER, FOR SOLUTION ORAL at 17:48

## 2024-02-29 RX ADMIN — OXYCODONE HYDROCHLORIDE 2.5 MILLIGRAM(S): 5 TABLET ORAL at 12:10

## 2024-02-29 RX ADMIN — LACTULOSE 20 GRAM(S): 10 SOLUTION ORAL at 05:21

## 2024-02-29 RX ADMIN — SODIUM CHLORIDE 83.33 MILLILITER(S): 9 INJECTION INTRAMUSCULAR; INTRAVENOUS; SUBCUTANEOUS at 09:43

## 2024-02-29 RX ADMIN — SENNA PLUS 2 TABLET(S): 8.6 TABLET ORAL at 17:49

## 2024-02-29 RX ADMIN — POLYETHYLENE GLYCOL 3350 17 GRAM(S): 17 POWDER, FOR SOLUTION ORAL at 05:14

## 2024-02-29 RX ADMIN — LACTULOSE 20 GRAM(S): 10 SOLUTION ORAL at 17:48

## 2024-02-29 RX ADMIN — LIDOCAINE 1 PATCH: 4 CREAM TOPICAL at 05:14

## 2024-02-29 RX ADMIN — PANTOPRAZOLE SODIUM 40 MILLIGRAM(S): 20 TABLET, DELAYED RELEASE ORAL at 05:20

## 2024-02-29 RX ADMIN — LEVETIRACETAM 1000 MILLIGRAM(S): 250 TABLET, FILM COATED ORAL at 10:41

## 2024-02-29 RX ADMIN — OXYCODONE HYDROCHLORIDE 2.5 MILLIGRAM(S): 5 TABLET ORAL at 17:53

## 2024-02-29 NOTE — PROGRESS NOTE ADULT - ASSESSMENT
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 He also began outpt WBRT as well as RT to C5-T5 recently (unclear how many fxs are completed to date).  He was readmitted to [facility] on 2/10 s/p seizure at home- admission imaging confirms progression of CNS mets since 2/2/24 imaging.    ACTIVE PROBLEMS  Metastatic melanoma to multiple sites including brain/LMD  Goals of care counseling, discussion  Secondary seizures  Dizziness  Encounter for antineoplastic radiation therapy  Cancer related pain  Constipation    - Metastatic melanoma  - Goals of care counseling, discussion  Pt with rapidly progressive disease despite receiving SOC systemic therapy  Pt does not meet HLA requirements for Tebentefusp  Hospice is appropriate; s/p GOC discussion with pt's wife yesterday who agrees with hospice placement, and who also indicated that she is unable to care for him at home   > dispo for hospice placement will be complex as pt is uninsured with insufficient greencard status; [facility] Disability application initiated in hopes that pt will be approved and can then potentially be placed at a [facility] with hospice capability  Long-term prognosis remains poor    - Secondary seizures  - CNS mets, LMD  - Dizzness  Appreciate Neuro Onc consult (Dr. Lozano)  Appreciate Rad Onc consult  Pt to complete WBRT (10fxs) and pall RT to C5-T5 (5fxs)   Continuing Keppra IV 1000mg q12  Continuing Dex taper (as per Neuro Onc recs)   Continuing Meclizine 25mg BID/prn    - Cancer related pain: starting Oxy 2.5 q6 standing and continuing Oxy 5mg q4/prn    - Constipation  Likely due to opioids, immobility  2/19 AXR negative for ileus/obstruction  Continuing Senna 2 tabs nightly  Continuing Miralax 1 packet daily

## 2024-02-29 NOTE — PROGRESS NOTE ADULT - SUBJECTIVE AND OBJECTIVE BOX
Earle Perez MD  Academic Hospitalist  Pager [phone]  Email: [email or web address]  Available on [employer] Teams        PROGRESS NOTE:     Patient is a 48y old  Male who presents with a chief complaint of seizure (28 Feb 2024 16:31)      SUBJECTIVE / OVERNIGHT EVENTS:  Patient seen and examined this morning. No acute events overnight.  ADDITIONAL REVIEW OF SYSTEMS:  No f/c/n/v      MEDICATIONS  (STANDING):  chlorhexidine 2% Cloths 1 Application(s) Topical <User Schedule>  dexAMETHasone  Injectable 4 milliGRAM(s) IV Push daily  influenza   Vaccine 0.5 milliLiter(s) IntraMuscular once  lactulose Syrup 20 Gram(s) Oral two times a day  levETIRAcetam   Injectable 1000 milliGRAM(s) IV Push every 12 hours  lidocaine   4% Patch 1 Patch Transdermal every 24 hours  oxyCODONE    IR 2.5 milliGRAM(s) Oral every 6 hours  pantoprazole    Tablet 40 milliGRAM(s) Oral every 12 hours  polyethylene glycol 3350 17 Gram(s) Oral two times a day  senna 2 Tablet(s) Oral two times a day  trimethoprim  160 mG/sulfamethoxazole 800 mG 1 Tablet(s) Oral <User Schedule>    MEDICATIONS  (PRN):  bisacodyl Suppository 10 milliGRAM(s) Rectal daily PRN Constipation  meclizine 25 milliGRAM(s) Oral two times a day PRN Dizziness  melatonin 3 milliGRAM(s) Oral at bedtime PRN Insomnia  oxyCODONE    IR 5 milliGRAM(s) Oral every 4 hours PRN breakthrough pain      CAPILLARY BLOOD GLUCOSE        I&O's Summary    28 Feb 2024 07:01  -  29 Feb 2024 07:00  --------------------------------------------------------  IN: 0 mL / OUT: 725 mL / NET: -725 mL        PHYSICAL EXAM:  Vital Signs Last 24 Hrs  T(C): 36.6 (29 Feb 2024 12:10), Max: 37.3 (28 Feb 2024 21:39)  T(F): 97.9 (29 Feb 2024 12:10), Max: 99.1 (28 Feb 2024 21:39)  HR: 77 (29 Feb 2024 12:10) (70 - 88)  BP: 97/64 (29 Feb 2024 12:10) (93/60 - 101/63)  BP(mean): --  RR: 18 (29 Feb 2024 12:10) (15 - 18)  SpO2: 96% (29 Feb 2024 12:10) (96% - 100%)    Parameters below as of 29 Feb 2024 12:10  Patient On (Oxygen Delivery Method): room air      Miwaqvcbsje-aob-vevzfrkml young Indonesian/Creole speaking male, laying in bed, nad, answering questions appropriately and following commands  Anicteric, but injected sclera, no oral lesions/thrush  RRR, no m/r/g  CTAB, no w/c/r  Abd soft/nt/nd, normoactive bowel sounds  No peripheral edema; R thumb amputated  5/5 strength and normal ROM in all 4 extremities (L slightly weaker than R)  CN 2-12 grossly intact; no gross focal motor/sensory deficits    LABS:                      RADIOLOGY & ADDITIONAL TESTS:  Results Reviewed:   Imaging Personally Reviewed:  Electrocardiogram Personally Reviewed:    COORDINATION OF CARE:  Care Discussed with Consultants/Other Providers [Y/N]:  Prior or Outpatient Records Reviewed [Y/N]:

## 2024-03-01 PROCEDURE — 99232 SBSQ HOSP IP/OBS MODERATE 35: CPT

## 2024-03-01 RX ORDER — SODIUM CHLORIDE 9 MG/ML
500 INJECTION INTRAMUSCULAR; INTRAVENOUS; SUBCUTANEOUS ONCE
Refills: 0 | Status: COMPLETED | OUTPATIENT
Start: 2024-03-01 | End: 2024-03-01

## 2024-03-01 RX ADMIN — LIDOCAINE 1 PATCH: 4 CREAM TOPICAL at 17:38

## 2024-03-01 RX ADMIN — PANTOPRAZOLE SODIUM 40 MILLIGRAM(S): 20 TABLET, DELAYED RELEASE ORAL at 05:06

## 2024-03-01 RX ADMIN — LACTULOSE 20 GRAM(S): 10 SOLUTION ORAL at 17:31

## 2024-03-01 RX ADMIN — OXYCODONE HYDROCHLORIDE 2.5 MILLIGRAM(S): 5 TABLET ORAL at 01:04

## 2024-03-01 RX ADMIN — SODIUM CHLORIDE 500 MILLILITER(S): 9 INJECTION INTRAMUSCULAR; INTRAVENOUS; SUBCUTANEOUS at 15:27

## 2024-03-01 RX ADMIN — CHLORHEXIDINE GLUCONATE 1 APPLICATION(S): 213 SOLUTION TOPICAL at 05:13

## 2024-03-01 RX ADMIN — LACTULOSE 20 GRAM(S): 10 SOLUTION ORAL at 05:06

## 2024-03-01 RX ADMIN — Medication 4 MILLIGRAM(S): at 05:06

## 2024-03-01 RX ADMIN — OXYCODONE HYDROCHLORIDE 2.5 MILLIGRAM(S): 5 TABLET ORAL at 18:03

## 2024-03-01 RX ADMIN — POLYETHYLENE GLYCOL 3350 17 GRAM(S): 17 POWDER, FOR SOLUTION ORAL at 17:30

## 2024-03-01 RX ADMIN — LEVETIRACETAM 1000 MILLIGRAM(S): 250 TABLET, FILM COATED ORAL at 09:00

## 2024-03-01 RX ADMIN — LEVETIRACETAM 1000 MILLIGRAM(S): 250 TABLET, FILM COATED ORAL at 23:05

## 2024-03-01 RX ADMIN — OXYCODONE HYDROCHLORIDE 2.5 MILLIGRAM(S): 5 TABLET ORAL at 11:40

## 2024-03-01 RX ADMIN — Medication 1 TABLET(S): at 09:00

## 2024-03-01 RX ADMIN — OXYCODONE HYDROCHLORIDE 2.5 MILLIGRAM(S): 5 TABLET ORAL at 17:34

## 2024-03-01 RX ADMIN — PANTOPRAZOLE SODIUM 40 MILLIGRAM(S): 20 TABLET, DELAYED RELEASE ORAL at 17:30

## 2024-03-01 RX ADMIN — LIDOCAINE 1 PATCH: 4 CREAM TOPICAL at 07:51

## 2024-03-01 RX ADMIN — LIDOCAINE 1 PATCH: 4 CREAM TOPICAL at 05:04

## 2024-03-01 RX ADMIN — OXYCODONE HYDROCHLORIDE 2.5 MILLIGRAM(S): 5 TABLET ORAL at 11:12

## 2024-03-01 RX ADMIN — SENNA PLUS 2 TABLET(S): 8.6 TABLET ORAL at 17:31

## 2024-03-01 RX ADMIN — SENNA PLUS 2 TABLET(S): 8.6 TABLET ORAL at 05:05

## 2024-03-01 RX ADMIN — POLYETHYLENE GLYCOL 3350 17 GRAM(S): 17 POWDER, FOR SOLUTION ORAL at 05:05

## 2024-03-01 NOTE — CHART NOTE - NSCHARTNOTEFT_GEN_A_CORE
Notified by RN patients BP low - 86/57 - patient asymptomatic.  Of note, his BP runs low at baseline in the 90s.   Given 500cc NS bolus with improvement to BP of 102/63.   Patient seen at bedside and found to be resting comfortably.   He denied having any new complaints at this time.   Patient currently stable, will continue to monitor.    Above events and plan discussed with Dr. Perez.

## 2024-03-01 NOTE — PROGRESS NOTE ADULT - REASON FOR ADMISSION
CAT 1  SROM at 2:40am   IUPC inserted   Pitocin started  NST discussed with dr Xavier  d/w dr Xavier.  seen with TYLER flores 
seizure

## 2024-03-01 NOTE — PROGRESS NOTE ADULT - ASSESSMENT
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 He also began outpt WBRT as well as RT to C5-T5 recently (unclear how many fxs are completed to date).  He was readmitted to [facility] on 2/10 s/p seizure at home- admission imaging confirms progression of CNS mets since 2/2/24 imaging.    ACTIVE PROBLEMS  Metastatic melanoma to multiple sites including brain/LMD  Goals of care counseling, discussion  Secondary seizures  Dizziness  Encounter for antineoplastic radiation therapy  Cancer related pain  Constipation    - Metastatic melanoma  - Goals of care counseling, discussion  Pt with rapidly progressive disease despite receiving SOC systemic therapy  Pt does not meet HLA requirements for Tebentefusp  Hospice is appropriate; s/p GOC discussion with pt's wife yesterday who agrees with hospice placement, and who also indicated that she is unable to care for him at home   > dispo for hospice placement will be complex as pt is uninsured with insufficient greencard status; [facility] Disability application initiated in hopes that pt will be approved and can then potentially be placed at a [facility] with hospice capability  Long-term prognosis remains poor    - Secondary seizures  - CNS mets, LMD  - Dizzness  Appreciate Neuro Onc consult (Dr. Lozano)  Appreciate Rad Onc consult  Pt completed WBRT (10fxs) and pall RT to C5-T5 (5fxs)   Continuing Keppra IV 1000mg q12  Continuing Dex taper (as per Neuro Onc recs)   Continuing Meclizine 25mg BID/prn    - Cancer related pain: starting Oxy 2.5 q6 standing and continuing Oxy 5mg q4/prn    - Constipation  Likely due to opioids, immobility  2/19 AXR negative for ileus/obstruction  Continuing Senna 2 tabs nightly  Continuing Miralax 1 packet daily

## 2024-03-01 NOTE — PROGRESS NOTE ADULT - SUBJECTIVE AND OBJECTIVE BOX
Earle Perez MD  Academic Hospitalist  Pager [phone]  Email: [email or web address]  Available on [employer] Teams        PROGRESS NOTE:     Patient is a 48y old  Male who presents with a chief complaint of seizure (29 Feb 2024 15:24)      SUBJECTIVE / OVERNIGHT EVENTS:  Patient seen and examined this morning. No acute events overnight.  ADDITIONAL REVIEW OF SYSTEMS:  No f/c/n/v      MEDICATIONS  (STANDING):  chlorhexidine 2% Cloths 1 Application(s) Topical <User Schedule>  dexAMETHasone  Injectable 4 milliGRAM(s) IV Push daily  influenza   Vaccine 0.5 milliLiter(s) IntraMuscular once  lactulose Syrup 20 Gram(s) Oral two times a day  levETIRAcetam   Injectable 1000 milliGRAM(s) IV Push every 12 hours  lidocaine   4% Patch 1 Patch Transdermal every 24 hours  multivitamin 1 Tablet(s) Oral daily  oxyCODONE    IR 2.5 milliGRAM(s) Oral every 6 hours  pantoprazole    Tablet 40 milliGRAM(s) Oral every 12 hours  polyethylene glycol 3350 17 Gram(s) Oral two times a day  senna 2 Tablet(s) Oral two times a day  trimethoprim  160 mG/sulfamethoxazole 800 mG 1 Tablet(s) Oral <User Schedule>    MEDICATIONS  (PRN):  bisacodyl Suppository 10 milliGRAM(s) Rectal daily PRN Constipation  meclizine 25 milliGRAM(s) Oral two times a day PRN Dizziness  melatonin 3 milliGRAM(s) Oral at bedtime PRN Insomnia  oxyCODONE    IR 5 milliGRAM(s) Oral every 4 hours PRN breakthrough pain      CAPILLARY BLOOD GLUCOSE        I&O's Summary      PHYSICAL EXAM:  Vital Signs Last 24 Hrs  T(C): 37 (01 Mar 2024 11:27), Max: 37.4 (29 Feb 2024 21:00)  T(F): 98.6 (01 Mar 2024 11:27), Max: 99.3 (29 Feb 2024 21:00)  HR: 82 (01 Mar 2024 11:27) (60 - 82)  BP: 90/52 (01 Mar 2024 11:27) (90/52 - 104/73)  BP(mean): --  RR: 18 (01 Mar 2024 11:27) (16 - 18)  SpO2: 98% (01 Mar 2024 11:27) (98% - 99%)    Parameters below as of 01 Mar 2024 11:27  Patient On (Oxygen Delivery Method): room air      Batjnewvkmi-hdn-gmkqwovph young Moldovan/Creole speaking male, laying in bed, nad, answering questions appropriately   RRR, no m/r/g  CTAB, no w/c/r  Abd soft/nt/nd, normoactive bowel sounds  No peripheral edema; R thumb amputated  5/5 strength and normal ROM in all 4 extremities (L slightly weaker than R)  CN 2-12 grossly intact; no gross focal motor/sensory deficits    LABS:                      RADIOLOGY & ADDITIONAL TESTS:  Results Reviewed:   Imaging Personally Reviewed:  Electrocardiogram Personally Reviewed:    COORDINATION OF CARE:  Care Discussed with Consultants/Other Providers [Y/N]: Case discussed during interdisciplinary rounds with social work and case management  Prior or Outpatient Records Reviewed [Y/N]:

## 2024-03-01 NOTE — CHART NOTE - NSCHARTNOTEFT_GEN_A_CORE
Source: Patient [x]       other [x] medical chart, Nurse,  phone service (ID# [phone])  Diet rx: Diet, Regular (02-11-24 @ 17:31) [Active]  kitchen provides Mighty Shake, mildly thickened (4 oz/220 kcal, 6 gm protein) - 3x daily         Pt 47 yo male with PMHx of metastatic ungual melanoma of the R thumb; R thumb amputated and L lung wedge resection in 11/2022. Of note, Pt with mets to the lung, liver, LN; Pt s/p craniotomy/resection of L brain tumor in 3/2023 followed by post-op RT - per chart review.     At time of visit, Pt awake, alert but weak; Pt speaks Samoan Creole;  phone service used (ID# [id number]). Pt with fair appetite/PO intake but Pt C/O chewing difficulty. RD offered altered consistency food/food options, Pt agreed to try -> discussed with nurse. Food preferences discussed; Rec to add PO Supplement: Ensure Plus HP - 2x daily to diet rx to help optimizing nutrition. No report of swallowing difficulty; no report of vomiting or diarrhea @ this time. +Last BM (2/29) per flow sheet. Pt c/o abdominal pain, nurse made aware. RD answered Pt's concerns related to diet. RD remains available, Pt made aware.       Pt's height: 69" (2/12)     IBW: 160#+/-10%     Pt's weight: 171#/77.6 kg - 2/12 --> rec to obtain Pt's new weights     Pertinent Medications: Protonix, Miralax, Senna, Lactulose Syrup; Dulcolax suppository (PRN),   Pertinent Labs: (2/27) Hct 38.9 L,  L, Albumin 3.4, ALT 56 H, BUN 27 H, Glu 174 H   Skin: no pressure injury at present     Estimated Needs: [x] no change since previous assessment  Previous Nutrition Diagnosis: [x] Malnutrition, severe    Nutrition Diagnosis is [x] ongoing      Nutrition Interventions/Recommendations:   1. PO diet: Minced & moist; PO supplement: Ensure Plus HP (237 ml/350 kcal, 20 gm Protein) 2x daily;  2. Encourage & assist Pt with meals; Monitor PO diet tolerance;       3. Bowel regimen per MD discretion;   4. Add Multivitamins 1 tab daily for micronutrient coverage;  5. Monitor labs, weekly weights, hydration status; Source: Patient [x]       other [x] medical chart, Nurse,  phone service (ID# [id number])  Diet rx: Diet, Regular (02-11-24 @ 17:31) [Active]  kitchen provides Mighty Shake, mildly thickened (4 oz/220 kcal, 6 gm protein) - 3x daily         Pt 47 yo male with PMHx of metastatic ungual melanoma of the R thumb; R thumb amputated and L lung wedge resection in 11/2022. Of note, Pt with mets to the lung, liver, LN; Pt s/p craniotomy/resection of L brain tumor in 3/2023 followed by post-op RT - per chart review.     At time of visit, Pt awake, alert but weak; Pt speaks Armenian Creole;  phone service used (ID# [id number]). Pt with fair appetite/PO intake but Pt C/O chewing difficulty. RD offered altered consistency food/food options, Pt agreed to try -> discussed with nurse. Food preferences discussed; Rec to add PO Supplement: Ensure Plus HP - 2x daily to diet rx to help optimizing nutrition. Pt drinks Mighty Shake reported. No report of swallowing difficulty; no report of vomiting or diarrhea @ this time. +Last BM (2/29) per flow sheet. Pt c/o abdominal pain, nurse made aware. RD answered Pt's concerns related to diet. RD remains available, Pt made aware.       Pt's height: 69" (2/12)     IBW: 160#+/-10%     Pt's weight: 171#/77.6 kg - 2/12 --> rec to obtain Pt's new weights     Pertinent Medications: Protonix, Miralax, Senna, Lactulose Syrup; Dulcolax suppository (PRN),   Pertinent Labs: (2/27) Hct 38.9 L,  L, Albumin 3.4, ALT 56 H, BUN 27 H, Glu 174 H   Skin: no pressure injury at present     Estimated Needs: [x] no change since previous assessment  Previous Nutrition Diagnosis: [x] Malnutrition, severe    Nutrition Diagnosis is [x] ongoing      Nutrition Interventions/Recommendations:   1. PO diet: Minced & moist; PO supplement: Ensure Plus HP (237 ml/350 kcal, 20 gm Protein) 2x daily;  2. Encourage & assist Pt with meals; Monitor PO diet tolerance;       3. Bowel regimen per MD discretion;   4. Add Multivitamins 1 tab daily for micronutrient coverage;  5. Monitor labs, weekly weights, hydration status;

## 2024-03-02 PROCEDURE — 99233 SBSQ HOSP IP/OBS HIGH 50: CPT

## 2024-03-02 RX ORDER — MIDODRINE HYDROCHLORIDE 2.5 MG/1
5 TABLET ORAL THREE TIMES A DAY
Refills: 0 | Status: DISCONTINUED | OUTPATIENT
Start: 2024-03-02 | End: 2024-03-05

## 2024-03-02 RX ADMIN — MIDODRINE HYDROCHLORIDE 5 MILLIGRAM(S): 2.5 TABLET ORAL at 12:21

## 2024-03-02 RX ADMIN — POLYETHYLENE GLYCOL 3350 17 GRAM(S): 17 POWDER, FOR SOLUTION ORAL at 06:04

## 2024-03-02 RX ADMIN — Medication 1 TABLET(S): at 12:22

## 2024-03-02 RX ADMIN — POLYETHYLENE GLYCOL 3350 17 GRAM(S): 17 POWDER, FOR SOLUTION ORAL at 17:16

## 2024-03-02 RX ADMIN — Medication 4 MILLIGRAM(S): at 06:03

## 2024-03-02 RX ADMIN — LACTULOSE 20 GRAM(S): 10 SOLUTION ORAL at 06:17

## 2024-03-02 RX ADMIN — LEVETIRACETAM 1000 MILLIGRAM(S): 250 TABLET, FILM COATED ORAL at 09:17

## 2024-03-02 RX ADMIN — SENNA PLUS 2 TABLET(S): 8.6 TABLET ORAL at 06:04

## 2024-03-02 RX ADMIN — PANTOPRAZOLE SODIUM 40 MILLIGRAM(S): 20 TABLET, DELAYED RELEASE ORAL at 17:16

## 2024-03-02 RX ADMIN — SENNA PLUS 2 TABLET(S): 8.6 TABLET ORAL at 17:16

## 2024-03-02 RX ADMIN — LACTULOSE 20 GRAM(S): 10 SOLUTION ORAL at 17:15

## 2024-03-02 RX ADMIN — OXYCODONE HYDROCHLORIDE 2.5 MILLIGRAM(S): 5 TABLET ORAL at 06:03

## 2024-03-02 RX ADMIN — LEVETIRACETAM 1000 MILLIGRAM(S): 250 TABLET, FILM COATED ORAL at 21:44

## 2024-03-02 RX ADMIN — MIDODRINE HYDROCHLORIDE 5 MILLIGRAM(S): 2.5 TABLET ORAL at 19:58

## 2024-03-02 RX ADMIN — PANTOPRAZOLE SODIUM 40 MILLIGRAM(S): 20 TABLET, DELAYED RELEASE ORAL at 06:04

## 2024-03-02 RX ADMIN — CHLORHEXIDINE GLUCONATE 1 APPLICATION(S): 213 SOLUTION TOPICAL at 06:16

## 2024-03-02 RX ADMIN — OXYCODONE HYDROCHLORIDE 2.5 MILLIGRAM(S): 5 TABLET ORAL at 01:17

## 2024-03-02 NOTE — PROGRESS NOTE ADULT - ASSESSMENT
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 He also began outpt WBRT as well as RT to C5-T5 recently (unclear how many fxs are completed to date).  He was [facility] s/p seizure at home- admission imaging confirms progression of CNS mets since 2/2/24 imaging.    ACTIVE PROBLEMS  Metastatic melanoma to multiple sites including brain/LMD  Goals of care counseling, discussion  Secondary seizures  Dizziness  Hypotension  Cancer related pain  Constipation  Severe prot-calorie malnutrition    - Metastatic melanoma  - Goals of care counseling, discussion  Pt with rapidly progressive disease despite receiving SOC systemic therapy  Pt does not meet HLA requirements for Tebentefusp  Hospice is appropriate; s/p GOC discussion with pt's wife who agrees with hospice placement, and who also indicated that she is unable to care for him at home   > dispo for hospice placement will be complex as pt is uninsured with insufficient greencard status; [facility] Disability application initiated in hopes that pt will be approved and can then potentially be placed at a [facility] with hospice capability  Long-term prognosis remains poor    - Secondary seizures  - CNS mets, LMD  - Dizziness  Appreciate Neuro Onc consult (Dr. Lozano)  Appreciate Rad Onc consult  Pt completed WBRT (10fxs) and pall RT to C5-T5 (5fxs) on this admission (tx schedule TBD)- tx duration 2/13-26  Continuing Keppra IV 1000mg q12  Completing steroid taper on 3/17  Continuing Meclizine 25mg BID/prn    - Hypotension: starting Midodrine 5mg q8    - Cancer related pain: continuing Oxy 2.5/5 q6 standing and continuing Oxy 5mg q4/prn    - Constipation  Likely due to opioids, immobility  2/19 AXR negative for ileus/obstruction  Continuing Senna 2 tabs nightly  Continuing Miralax 1 packet daily

## 2024-03-02 NOTE — PROGRESS NOTE ADULT - SUBJECTIVE AND OBJECTIVE BOX
SOLID TUMOR ONCOLOGY HOSPITALIST PROGRESS NOTE    S: No acute events overnight.  Pt had no new complaints.    CURRENT MEDICATIONS  MEDICATIONS  (STANDING):  chlorhexidine 2% Cloths 1 Application(s) Topical <User Schedule>  dexAMETHasone  Injectable 4 milliGRAM(s) IV Push daily  influenza   Vaccine 0.5 milliLiter(s) IntraMuscular once  lactulose Syrup 20 Gram(s) Oral two times a day  levETIRAcetam   Injectable 1000 milliGRAM(s) IV Push every 12 hours  lidocaine   4% Patch 1 Patch Transdermal every 24 hours  midodrine. 5 milliGRAM(s) Oral three times a day  multivitamin 1 Tablet(s) Oral daily  oxyCODONE    IR 2.5 milliGRAM(s) Oral every 6 hours  pantoprazole    Tablet 40 milliGRAM(s) Oral every 12 hours  polyethylene glycol 3350 17 Gram(s) Oral two times a day  senna 2 Tablet(s) Oral two times a day  trimethoprim  160 mG/sulfamethoxazole 800 mG 1 Tablet(s) Oral <User Schedule>  MEDICATIONS  (PRN):  bisacodyl Suppository 10 milliGRAM(s) Rectal daily PRN Constipation  meclizine 25 milliGRAM(s) Oral two times a day PRN Dizziness  melatonin 3 milliGRAM(s) Oral at bedtime PRN Insomnia  oxyCODONE    IR 5 milliGRAM(s) Oral every 4 hours PRN breakthrough pain      PHYSICAL EXAM  T(C): 37.5 (03-02-24 @ 17:23), Max: 37.5 (03-02-24 @ 17:23)  HR: 69 (03-02-24 @ 17:23) (69 - 90)  BP: 105/67 (03-02-24 @ 17:23) (90/61 - 105/67)  RR: 18 (03-02-24 @ 17:23) (16 - 18)  SpO2: 97% (03-02-24 @ 17:23) (96% - 100%)  Vxosnotwest-caz-agwaotgmq young Thai/Creole speaking male, laying in bed, nad, answering questions appropriately and following commands  Anicteric, but injected sclera, no oral lesions/thrush  RRR, no m/r/g  CTAB, no w/c/r  Abd soft/nt/nd, normoactive bowel sounds  No peripheral edema; R thumb amputated  5/5 strength and normal ROM in all 4 extremities (L slightly weaker than R)  CN 2-12 grossly intact; no gross focal motor/sensory deficits    PERTINENT RADIOLOGY  2/19 AXR: Nonspecific bowel gas pattern. Mild distention of stomach and small bowel  If symptoms persist recommend CT.    2/12 CTA h/n: Multiple enhancing brain metastases which have increased   since 2/2/2024. No midline shift. Normal CTA of the head and neck. No   large vessel occlusion.    2/2 CT a/p with IV contrast: New bilateral lobar hepatic metastatic disease. Pulmonary nodules better characterized on prior PET/CT, again concerning for metastases.   Postoperative changes of the right thumb with adjacent nonspecific soft   tissue thickening. Correlate with recent PET/CT findings. Slight gastric inflammatory changes along the greater curvature suggestive of gastritis. Correlate clinically.    1/29 MRI Total spine  1. CERVICAL SPINE:   Multiple metastases within the canal most   prominently at the C6 and C7 levels along the cord surface.  2. THORACIC SPINE:   Multiple metastases within the canal including cord   parenchymal lesions at T1 and T4 and multiple lesions along the cord   surface.  3. LUMBAR SPINE:   Multiple metastases within the cauda equina and distal   canal ependymal surfaces.

## 2024-03-03 LAB
ADD ON TEST-SPECIMEN IN LAB: SIGNIFICANT CHANGE UP
ANION GAP SERPL CALC-SCNC: 10 MMOL/L — SIGNIFICANT CHANGE UP (ref 7–14)
BLOOD GAS ARTERIAL COMPREHENSIVE RESULT: SIGNIFICANT CHANGE UP
BUN SERPL-MCNC: 26 MG/DL — HIGH (ref 7–23)
CALCIUM SERPL-MCNC: 8.7 MG/DL — SIGNIFICANT CHANGE UP (ref 8.4–10.5)
CHLORIDE SERPL-SCNC: 99 MMOL/L — SIGNIFICANT CHANGE UP (ref 98–107)
CO2 SERPL-SCNC: 31 MMOL/L — SIGNIFICANT CHANGE UP (ref 22–31)
CREAT SERPL-MCNC: 1.19 MG/DL — SIGNIFICANT CHANGE UP (ref 0.5–1.3)
EGFR: 75 ML/MIN/1.73M2 — SIGNIFICANT CHANGE UP
GLUCOSE SERPL-MCNC: 92 MG/DL — SIGNIFICANT CHANGE UP (ref 70–99)
HCT VFR BLD CALC: 44.7 % — SIGNIFICANT CHANGE UP (ref 39–50)
HGB BLD-MCNC: 14.5 G/DL — SIGNIFICANT CHANGE UP (ref 13–17)
MAGNESIUM SERPL-MCNC: 2.5 MG/DL — SIGNIFICANT CHANGE UP (ref 1.6–2.6)
MCHC RBC-ENTMCNC: 28.2 PG — SIGNIFICANT CHANGE UP (ref 27–34)
MCHC RBC-ENTMCNC: 32.4 GM/DL — SIGNIFICANT CHANGE UP (ref 32–36)
MCV RBC AUTO: 86.8 FL — SIGNIFICANT CHANGE UP (ref 80–100)
NRBC # BLD: 0 /100 WBCS — SIGNIFICANT CHANGE UP (ref 0–0)
NRBC # FLD: 0 K/UL — SIGNIFICANT CHANGE UP (ref 0–0)
PHOSPHATE SERPL-MCNC: 3.9 MG/DL — SIGNIFICANT CHANGE UP (ref 2.5–4.5)
PLATELET # BLD AUTO: 111 K/UL — LOW (ref 150–400)
POTASSIUM SERPL-MCNC: 4.2 MMOL/L — SIGNIFICANT CHANGE UP (ref 3.5–5.3)
POTASSIUM SERPL-SCNC: 4.2 MMOL/L — SIGNIFICANT CHANGE UP (ref 3.5–5.3)
PROCALCITONIN SERPL-MCNC: 0.1 NG/ML — SIGNIFICANT CHANGE UP (ref 0.02–0.1)
RBC # BLD: 5.15 M/UL — SIGNIFICANT CHANGE UP (ref 4.2–5.8)
RBC # FLD: 14.7 % — HIGH (ref 10.3–14.5)
SODIUM SERPL-SCNC: 140 MMOL/L — SIGNIFICANT CHANGE UP (ref 135–145)
WBC # BLD: 7.25 K/UL — SIGNIFICANT CHANGE UP (ref 3.8–10.5)
WBC # FLD AUTO: 7.25 K/UL — SIGNIFICANT CHANGE UP (ref 3.8–10.5)

## 2024-03-03 PROCEDURE — 71045 X-RAY EXAM CHEST 1 VIEW: CPT | Mod: 26

## 2024-03-03 PROCEDURE — 99233 SBSQ HOSP IP/OBS HIGH 50: CPT

## 2024-03-03 RX ORDER — PIPERACILLIN AND TAZOBACTAM 4; .5 G/20ML; G/20ML
3.38 INJECTION, POWDER, LYOPHILIZED, FOR SOLUTION INTRAVENOUS EVERY 8 HOURS
Refills: 0 | Status: DISCONTINUED | OUTPATIENT
Start: 2024-03-03 | End: 2024-03-06

## 2024-03-03 RX ORDER — ALBUTEROL 90 UG/1
1 AEROSOL, METERED ORAL EVERY 4 HOURS
Refills: 0 | Status: DISCONTINUED | OUTPATIENT
Start: 2024-03-03 | End: 2024-03-06

## 2024-03-03 RX ORDER — ALBUTEROL 90 UG/1
2.5 AEROSOL, METERED ORAL EVERY 8 HOURS
Refills: 0 | Status: DISCONTINUED | OUTPATIENT
Start: 2024-03-03 | End: 2024-03-06

## 2024-03-03 RX ORDER — PIPERACILLIN AND TAZOBACTAM 4; .5 G/20ML; G/20ML
3.38 INJECTION, POWDER, LYOPHILIZED, FOR SOLUTION INTRAVENOUS ONCE
Refills: 0 | Status: COMPLETED | OUTPATIENT
Start: 2024-03-03 | End: 2024-03-03

## 2024-03-03 RX ORDER — OXYCODONE HYDROCHLORIDE 5 MG/1
5 TABLET ORAL EVERY 6 HOURS
Refills: 0 | Status: DISCONTINUED | OUTPATIENT
Start: 2024-03-03 | End: 2024-03-04

## 2024-03-03 RX ADMIN — PIPERACILLIN AND TAZOBACTAM 25 GRAM(S): 4; .5 INJECTION, POWDER, LYOPHILIZED, FOR SOLUTION INTRAVENOUS at 14:16

## 2024-03-03 RX ADMIN — MIDODRINE HYDROCHLORIDE 5 MILLIGRAM(S): 2.5 TABLET ORAL at 20:09

## 2024-03-03 RX ADMIN — OXYCODONE HYDROCHLORIDE 5 MILLIGRAM(S): 5 TABLET ORAL at 16:45

## 2024-03-03 RX ADMIN — OXYCODONE HYDROCHLORIDE 5 MILLIGRAM(S): 5 TABLET ORAL at 10:54

## 2024-03-03 RX ADMIN — OXYCODONE HYDROCHLORIDE 2.5 MILLIGRAM(S): 5 TABLET ORAL at 00:34

## 2024-03-03 RX ADMIN — PIPERACILLIN AND TAZOBACTAM 200 GRAM(S): 4; .5 INJECTION, POWDER, LYOPHILIZED, FOR SOLUTION INTRAVENOUS at 10:36

## 2024-03-03 RX ADMIN — LACTULOSE 20 GRAM(S): 10 SOLUTION ORAL at 16:45

## 2024-03-03 RX ADMIN — LEVETIRACETAM 1000 MILLIGRAM(S): 250 TABLET, FILM COATED ORAL at 22:09

## 2024-03-03 RX ADMIN — OXYCODONE HYDROCHLORIDE 5 MILLIGRAM(S): 5 TABLET ORAL at 11:54

## 2024-03-03 RX ADMIN — LACTULOSE 20 GRAM(S): 10 SOLUTION ORAL at 05:19

## 2024-03-03 RX ADMIN — LEVETIRACETAM 1000 MILLIGRAM(S): 250 TABLET, FILM COATED ORAL at 10:36

## 2024-03-03 RX ADMIN — CHLORHEXIDINE GLUCONATE 1 APPLICATION(S): 213 SOLUTION TOPICAL at 05:22

## 2024-03-03 RX ADMIN — PIPERACILLIN AND TAZOBACTAM 25 GRAM(S): 4; .5 INJECTION, POWDER, LYOPHILIZED, FOR SOLUTION INTRAVENOUS at 22:08

## 2024-03-03 RX ADMIN — SENNA PLUS 2 TABLET(S): 8.6 TABLET ORAL at 16:46

## 2024-03-03 RX ADMIN — OXYCODONE HYDROCHLORIDE 2.5 MILLIGRAM(S): 5 TABLET ORAL at 05:18

## 2024-03-03 RX ADMIN — MIDODRINE HYDROCHLORIDE 5 MILLIGRAM(S): 2.5 TABLET ORAL at 12:14

## 2024-03-03 RX ADMIN — POLYETHYLENE GLYCOL 3350 17 GRAM(S): 17 POWDER, FOR SOLUTION ORAL at 16:45

## 2024-03-03 RX ADMIN — Medication 1 TABLET(S): at 12:14

## 2024-03-03 RX ADMIN — POLYETHYLENE GLYCOL 3350 17 GRAM(S): 17 POWDER, FOR SOLUTION ORAL at 05:18

## 2024-03-03 RX ADMIN — ALBUTEROL 2.5 MILLIGRAM(S): 90 AEROSOL, METERED ORAL at 05:06

## 2024-03-03 RX ADMIN — SENNA PLUS 2 TABLET(S): 8.6 TABLET ORAL at 05:19

## 2024-03-03 RX ADMIN — Medication 4 MILLIGRAM(S): at 05:20

## 2024-03-03 RX ADMIN — OXYCODONE HYDROCHLORIDE 5 MILLIGRAM(S): 5 TABLET ORAL at 17:45

## 2024-03-03 RX ADMIN — MIDODRINE HYDROCHLORIDE 5 MILLIGRAM(S): 2.5 TABLET ORAL at 05:19

## 2024-03-03 RX ADMIN — PANTOPRAZOLE SODIUM 40 MILLIGRAM(S): 20 TABLET, DELAYED RELEASE ORAL at 16:46

## 2024-03-03 RX ADMIN — PANTOPRAZOLE SODIUM 40 MILLIGRAM(S): 20 TABLET, DELAYED RELEASE ORAL at 05:19

## 2024-03-03 NOTE — PROVIDER CONTACT NOTE (OTHER) - RECOMMENDATIONS
MIMI Luna made aware
MIMI Luna made aware
MIMI Dean made aware
Orthostatic vitals,
ACP Carla Delaney made aware
Assess pt ad bedside  O2
Follow up with provider order
MIMI Luna made aware

## 2024-03-03 NOTE — PROGRESS NOTE ADULT - ASSESSMENT
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 He also began outpt WBRT as well as RT to C5-T5 recently (unclear how many fxs are completed to date).  He was readmitted to [facility] on 2/10 s/p seizure at home- admission imaging confirms progression of CNS mets since 2/2/24 imaging. He completed WBRT on this admission, 2/13-26. His hospital course is currently c/b progressive debility/weakness, hypoxia, and thrombocytopenia.    ACTIVE PROBLEMS  Metastatic melanoma to multiple sites including brain/LMD  Goals of care counseling, discussion  Acute hypoxic respiratory distress  Secondary seizures  Dizziness  Hypotension  Cancer related pain  Constipation  Severe prot-calorie malnutrition    - Metastatic melanoma  - Goals of care counseling, discussion  Pt with rapidly progressive disease despite receiving SOC systemic therapy  Pt does not meet HLA requirements for Tebentefusp  Hospice is appropriate; s/p GOC discussion with pt's spouse Leonela who agrees with hospice placement, and who also indicated that she is unable to care for him at home  Code status/MOLST form to be addressed today with Leonela    > dispo for hospice placement will be complex as pt is uninsured with insufficient greencard status; [facility] Disability application initiated in hopes that pt will be approved and can then potentially be placed at a [facility] with hospice capability  Long-term prognosis remains poor    - Acute hypoxic resp distress  Pt with mild hypoxia (O2 sat 89-91%) overnight on 3/3, improved to > 92% on 2L NC  3/3 CXR with clear lungs  Lactate 2.2; procal pending  Starting empiric Zosyn for projected 5d course  Starting albuterol nebs q8/prn and MDI q6  Continuing supplemental O2 via NC with weaning as tolerated  Using diuretics conservatively given pt's soft BPs    - Thrombocytopenia  Likely iatrogenic from medications (ie Bactrim, PPI; pt not currently on heparin)  Coags pending  Pt without clinical s/s of active bleeding  Will continue to monitor    - Secondary seizures  - CNS mets, LMD  - Dizziness  Appreciate Neuro Onc consult (Dr. 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 He also began outpt WBRT as well as RT to C5-T5 recently (unclear how many fxs are completed to date).  He was readmitted to [facility] on 2/10 s/p seizure at home- admission imaging confirms progression of CNS mets since 2/2/24 imaging. He completed WBRT on this admission, 2/13-26. His hospital course is currently c/b progressive debility/weakness, hypoxia, and thrombocytopenia.    ACTIVE PROBLEMS  Metastatic melanoma to multiple sites including brain/LMD  Goals of care counseling, discussion  Acute hypoxic respiratory distress  Secondary seizures  Dizziness  Hypotension  Cancer related pain  Constipation  Severe prot-calorie malnutrition    - Metastatic melanoma  - Goals of care counseling, discussion  Pt with rapidly progressive disease despite receiving SOC systemic therapy  Pt does not meet HLA requirements for Tebentefusp  Hospice is appropriate; s/p GOC discussion with pt's spouse Leonela who agrees with hospice placement, and who also indicated that she is unable to care for him at home  Code status/MOLST form to be addressed today with Leonela    > dispo for hospice placement will be complex as pt is uninsured with insufficient greencard status; [facility] Disability application initiated in hopes that pt will be approved and can then potentially be placed at a [facility] with hospice capability  Long-term prognosis remains poor    - Acute hypoxic resp distress  Pt with mild hypoxia (O2 sat 89-91%) overnight on 3/3, improved to > 92% on 2L NC  3/3 CXR with clear lungs  Lactate 2.2; procal pending  Starting empiric Zosyn for projected 5d course  Starting albuterol nebs q8/prn and MDI q6  Continuing supplemental O2 via NC with weaning as tolerated  Using diuretics conservatively given pt's soft BPs    - Thrombocytopenia  Likely iatrogenic from medications (ie Bactrim, PPI; pt not currently on heparin)  Coags pending  Pt without clinical s/s of active bleeding  Will continue to monitor    - Secondary seizures  - CNS mets, LMD  - Dizziness  Appreciate Neuro Onc consult (Dr. Lozano)  Appreciate Rad Onc consult  Pt completed WBRT (10fxs) and pall RT to C5-T5 (5fxs) on this admission, 2/13-26  Continuing Keppra IV 1000mg q12  Completing steroid taper on 3/17  Continuing Meclizine 25mg BID/prn    - Hypotension: SBP in 90s at baseline; continuing Midodrine 5mg q8    - Cancer related pain:  Suboptimally controlled  Oxy 2.5mg dced  Starting Oxy 5mg q6 standing and q4/prn    - Constipation  Improved, likely due to opioids, immobility  2/19 AXR negative for ileus/obstruction  Continuing Senna 2 tabs nightly  Continuing Miralax 1 packet daily

## 2024-03-03 NOTE — PROVIDER CONTACT NOTE (OTHER) - SITUATION
Pt on RA desating to mid 80% and C/O SOB
pt BP 91/58
pt still c/o RUQ pain s/p oxycodone administration
Complain of dizziness
Patient complains of dizziness BP 94/66 HR 75  Patient complains of abdominal pain
pt BP 92/58
pt BP 98/68
pt c/o 5/10 pain on RUQ abd

## 2024-03-03 NOTE — PROVIDER CONTACT NOTE (OTHER) - BACKGROUND
PT admit dx of convulsions. PMHx of GERD, metastatic malignant melanoma...
pt admit dx of convulsions. PMHx of GERD, metastatic malignant melanoma, malignant melanoma.
47 yo M w/ PMHx Met ungual Melanoma of R thumb and axillary LN ([id number]; PDL1 TPS 30%) s/p LN resection 12/2021, R thumb amputation and L lung wedge resection 11/2022 completed adjuvant Dacarbazine
PMH melanoma or R thumb and axillary LN, mets to lung/liver/brain   patient presented with new onset seizures
Pt admit dx of convulsions. PMHx of GERD, metastatic malignant melanoma...
47 yo M w/ PMHx Met ungual Melanoma of R thumb and axillary LN ([id number]; PDL1 TPS 30%) s/p LN resection 12/2021, R thumb amputation.
Pt admit dx of convulsions. PMHx of GERD, metastatic malignant melanoma...
pt admit dx of convulsions. PMHx of GERD, metastatic malignant melanoma, malignant melanoma.

## 2024-03-03 NOTE — PROGRESS NOTE ADULT - SUBJECTIVE AND OBJECTIVE BOX
SOLID TUMOR ONCOLOGY HOSPITALIST PROGRESS NOTE    S: Overnight events documented by GEM Morris have been reviewed.  This morning, pt reported feeling unwell and complained of generalized weakness as well as inability to stand and occasional numbness/tingling in his legs.  He also complained of RUQ abd pain.    CURRENT MEDICATIONS  (STANDING):  albuterol    90 MICROgram(s) HFA Inhaler 1 Puff(s) Inhalation every 4 hours  chlorhexidine 2% Cloths 1 Application(s) Topical <User Schedule>  influenza   Vaccine 0.5 milliLiter(s) IntraMuscular once  lactulose Syrup 20 Gram(s) Oral two times a day  levETIRAcetam   Injectable 1000 milliGRAM(s) IV Push every 12 hours  lidocaine   4% Patch 1 Patch Transdermal every 24 hours  midodrine. 5 milliGRAM(s) Oral three times a day  multivitamin 1 Tablet(s) Oral daily  oxyCODONE    IR 5 milliGRAM(s) Oral every 6 hours  pantoprazole    Tablet 40 milliGRAM(s) Oral every 12 hours  piperacillin/tazobactam IVPB.. 3.375 Gram(s) IV Intermittent every 8 hours  polyethylene glycol 3350 17 Gram(s) Oral two times a day  senna 2 Tablet(s) Oral two times a day  trimethoprim  160 mG/sulfamethoxazole 800 mG 1 Tablet(s) Oral <User Schedule>  (PRN):  albuterol    0.083% 2.5 milliGRAM(s) Nebulizer every 8 hours PRN Shortness of Breath and/or Wheezing  bisacodyl Suppository 10 milliGRAM(s) Rectal daily PRN Constipation  meclizine 25 milliGRAM(s) Oral two times a day PRN Dizziness  melatonin 3 milliGRAM(s) Oral at bedtime PRN Insomnia  oxyCODONE    IR 5 milliGRAM(s) Oral every 4 hours PRN breakthrough pain      PHYSICAL EXAM  T(C): 37.3 (03-03-24 @ 11:25), Max: 37.5 (03-02-24 @ 17:23)  HR: 87 (03-03-24 @ 11:25) (69 - 94)  BP: 91/55 (03-03-24 @ 11:25) (90/63 - 105/67)  RR: 20 (03-03-24 @ 11:25) (18 - 22)  SpO2: 98% (03-03-24 @ 11:25) (89% - 98%)  Illl-appearing young [place]/Creole speaking male, laying in bed, nad, answering questions appropriately and following commands, but generally lethargic  Anicteric, but injected sclera, no oral lesions/thrush  RRR, no m/r/g  CTAB, no w/c/r  Abd soft/nt/nd, normoactive bowel sounds  No peripheral edema; R thumb amputated  moves extremities spontaneously, but currently unable to lift legs straight off of the bed (bends knees)  CN 2-12 grossly intact; no gross focal motor/sensory deficits    LABS                        14.5   7.25  )-----------( 111      ( 03 Mar 2024 04:40 )             44.7     03-03    140  |  99  |  26<H>  ----------------------------<  92  4.2   |  31  |  1.19    Ca    8.7      03 Mar 2024 04:40  Phos  3.9     03-03  Mg     2.50     03-03    3/3 Lactate 2.2    PERTINENT RADIOLOGY  3/3 CXR: Clear lungs.    2/19 AXR: Nonspecific bowel gas pattern. Mild distention of stomach and small bowel  If symptoms persist recommend CT.    2/12 CTA h/n: Multiple enhancing brain metastases which have increased   since 2/2/2024. No midline shift. Normal CTA of the head and neck. No   large vessel occlusion.    2/2 CT a/p with IV contrast: New bilateral lobar hepatic metastatic disease. Pulmonary nodules better characterized on prior PET/CT, again concerning for metastases.   Postoperative changes of the right thumb with adjacent nonspecific soft   tissue thickening. Correlate with recent PET/CT findings. Slight gastric inflammatory changes along the greater curvature suggestive of gastritis. Correlate clinically.    1/29 MRI Total spine  1. CERVICAL SPINE:   Multiple metastases within the canal most   prominently at the C6 and C7 levels along the cord surface.  2. THORACIC SPINE:   Multiple metastases within the canal including cord   parenchymal lesions at T1 and T4 and multiple lesions along the cord   surface.  3. LUMBAR SPINE:   Multiple metastases within the cauda equina and distal   canal ependymal surfaces.

## 2024-03-03 NOTE — GOALS OF CARE CONVERSATION - ADVANCED CARE PLANNING - CONVERSATION DETAILS
I spoke with Leonela in presence of ACP, Denisse Dean.  I informed her of pt's anticipated prognosis: days to short weeks.  She agreed that she would want him to be comfortable at the end of life and consented to DNR/DNI code status. In addition to dnr/dni code status, she indicated that she would not want pt to go to the ICU, or be placed on HD/artificial nutrition, or receive IVF as she stated, "They will not work." She agreed to trial of abx if it was thought they could contribute to pt's comfort.  She asked that she be notified when pt passes, but also stated that she did not want to be with him at the time of his passing as it would be do hard for her to deal with it.

## 2024-03-03 NOTE — PROVIDER CONTACT NOTE (OTHER) - DATE AND TIME:
19-Feb-2024 10:16
03-Mar-2024 00:05
20-Feb-2024 17:01
18-Feb-2024 21:30
20-Feb-2024 07:51
20-Feb-2024 20:45
02-Mar-2024 09:07
29-Feb-2024 08:41

## 2024-03-03 NOTE — PROVIDER CONTACT NOTE (OTHER) - REASON
Patient complains of dizziness and Right upper abdominal pain
pt BP 98/68
complain of dizziness
pt BP 91/58
pt still c/o RUQ pain s/p oxycodone administration
hypoxia
pt c/o 5/10 pain on RUQ abd
pt BP 92/58

## 2024-03-03 NOTE — CHART NOTE - NSCHARTNOTEFT_GEN_A_CORE
Contacted by RN due to patient experiencing shortness. O2 sat 89-91% on telemetry monitor. Writer went to bedside to evaluate patient who stated he felt better.  language line #[id number] utilized.  Patient was placed on 2 L NC satting 95-96%. Vital signs taken at bedside and WNL. Patient states he was experiencing abdominal pain which has been present during admission. No change to distribution or characteristic of pain. Denies chest pain, continued SOB, dizziness, headache, weakness or any other acute symptoms at this time. Vital dock differed from outside pulse ox so RN changed the box. Patient due for 2.5 mg dose of oxycodone. RN advised to wean oxygen down and monitor patient's saturation. If experiences desaturation advised to follow up with writer. Contacted by RN due to patient experiencing shortness. O2 sat 89-91% on telemetry monitor. Writer went to bedside to evaluate patient who stated he felt better.  language line #[id number] utilized.  Patient was placed on 2 L NC satting 95-96%. Vital signs taken at bedside and WNL except BP: SBP 91 which is around patient's baseline. Patient states he was experiencing abdominal pain which has been present during admission. No change to distribution or characteristic of pain. Denies chest pain, continued SOB, dizziness, headache, weakness or any other acute symptoms at this time. Vital dock differed from outside pulse ox so RN changed the box. Patient due for 2.5 mg dose of oxycodone. RN advised to wean oxygen down and monitor patient's saturation. If experiences desaturation advised to follow up with writer.    PHYSICAL EXAM:  GENERAL: NAD, well-developed  HEAD:  Atraumatic, Normocephalic  EYES: EOMI, PERRLA, conjunctiva and sclera clear  NECK: Supple, No JVD  CHEST/LUNG: Clear to auscultation bilaterally; No wheeze/rhonchi/rale  HEART: Regular rate and rhythm; No murmurs, rubs, or gallops  ABDOMEN: Soft, Nontender, Nondistended; Bowel sounds present  EXTREMITIES:  2+ Peripheral Pulses, No clubbing, cyanosis, or edema  PSYCH: AAOx3  NEUROLOGY: non-focal  SKIN: No rashes or lesions Contacted by RN due to patient experiencing shortness. O2 sat 89-91% on telemetry monitor. Writer went to bedside to evaluate patient who stated he felt better.  language line #[id number] utilized.  Patient was placed on 2 L NC satting 95-96%. Vital signs taken at bedside and WNL except BP: SBP 91 which is around patient's baseline. Patient states he was experiencing abdominal pain which has been present during admission. No change to distribution or characteristic of pain. Denies chest pain, continued SOB, dizziness, headache, weakness or any other acute symptoms at this time. Vital dock differed from outside pulse ox so RN changed the box. Patient due for 2.5 mg dose of oxycodone. RN advised to wean oxygen down and monitor patient's saturation. If experiences desaturation advised to follow up with writer.    PHYSICAL EXAM:  GENERAL: NAD, well-developed  HEAD:  Atraumatic, Normocephalic  EYES: EOMI, PERRLA, conjunctiva and sclera clear  NECK: Supple, No JVD  CHEST/LUNG: Clear to auscultation bilaterally; No wheeze/rhonchi/rale  HEART: Regular rate and rhythm; No murmurs, rubs, or gallops  ABDOMEN: Soft, Nontender, Nondistended; Bowel sounds present  EXTREMITIES:  2+ Peripheral Pulses, No clubbing, cyanosis, or edema  PSYCH: AAOx3  NEUROLOGY: non-focal  SKIN: No rashes or lesions    Patient re-evaluated at 4:30 AM as patient desatted to the high 80s. Upon auscultation noted patient have moderate wheezing. Placed patient on venturi mask and ordered albuterol nebulizer. Patient admits to persistent RUQ abdominal pain unchanged during admission. Labs drawn: including ABG, chest xray also ordered. Day team to be made aware of overnight events to ensure continuity of care.

## 2024-03-03 NOTE — PROVIDER CONTACT NOTE (OTHER) - ASSESSMENT
Pt c/o 5/10 pain on RUQ abd upon palpation and movement
VS: 98/68, 98.2F, 72bpm, 98% spo2.   pt asymptomatic. no s/s of distress noted.
pt asymptomatic. no s/s of distress noted. no c/o dizziness.
Patient A&Ox4. Denies chest pain / SOB. Says abdominal pain 7/10, does not want pain medication at this time. Complains of dizziness.
A+O 4 C/O shortness of breath, VS documented on flowsheet
pt asymptomatic no s/s of distress noted. no dizziness noted.  VS: 91/58 BP, 78 bpm, 100% spo2, 98.1F
Alert and oriented x 4, denies chest pain, no SOB noted. Complain of dizziness, denies chest pain, no nausea reported. Vitals done. On IV fluid Sodium chloride 100ml/hr.
pt c/o 7/10 pain

## 2024-03-03 NOTE — CHART NOTE - NSCHARTNOTESELECT_GEN_ALL_CORE
Event Note
Event Note
Follow-up/Nutrition Services
Follow-up/Nutrition Services
Event Note
Event Note
SOB/Event Note
rad onc/Event Note
rad onc/Event Note

## 2024-03-04 PROCEDURE — 99233 SBSQ HOSP IP/OBS HIGH 50: CPT

## 2024-03-04 RX ORDER — ACETAMINOPHEN 500 MG
1000 TABLET ORAL ONCE
Refills: 0 | Status: COMPLETED | OUTPATIENT
Start: 2024-03-04 | End: 2024-03-04

## 2024-03-04 RX ORDER — BENZOCAINE AND MENTHOL 5; 1 G/100ML; G/100ML
1 LIQUID ORAL
Refills: 0 | Status: DISCONTINUED | OUTPATIENT
Start: 2024-03-04 | End: 2024-03-06

## 2024-03-04 RX ORDER — HYDROMORPHONE HYDROCHLORIDE 2 MG/ML
0.2 INJECTION INTRAMUSCULAR; INTRAVENOUS; SUBCUTANEOUS EVERY 6 HOURS
Refills: 0 | Status: DISCONTINUED | OUTPATIENT
Start: 2024-03-04 | End: 2024-03-05

## 2024-03-04 RX ORDER — ACETAMINOPHEN 500 MG
650 TABLET ORAL EVERY 6 HOURS
Refills: 0 | Status: DISCONTINUED | OUTPATIENT
Start: 2024-03-04 | End: 2024-03-06

## 2024-03-04 RX ADMIN — OXYCODONE HYDROCHLORIDE 5 MILLIGRAM(S): 5 TABLET ORAL at 10:35

## 2024-03-04 RX ADMIN — MIDODRINE HYDROCHLORIDE 5 MILLIGRAM(S): 2.5 TABLET ORAL at 21:48

## 2024-03-04 RX ADMIN — Medication 400 MILLIGRAM(S): at 17:45

## 2024-03-04 RX ADMIN — BENZOCAINE AND MENTHOL 1 LOZENGE: 5; 1 LIQUID ORAL at 17:45

## 2024-03-04 RX ADMIN — Medication 650 MILLIGRAM(S): at 15:59

## 2024-03-04 RX ADMIN — LEVETIRACETAM 1000 MILLIGRAM(S): 250 TABLET, FILM COATED ORAL at 21:49

## 2024-03-04 RX ADMIN — OXYCODONE HYDROCHLORIDE 5 MILLIGRAM(S): 5 TABLET ORAL at 09:35

## 2024-03-04 RX ADMIN — PANTOPRAZOLE SODIUM 40 MILLIGRAM(S): 20 TABLET, DELAYED RELEASE ORAL at 16:41

## 2024-03-04 RX ADMIN — PIPERACILLIN AND TAZOBACTAM 25 GRAM(S): 4; .5 INJECTION, POWDER, LYOPHILIZED, FOR SOLUTION INTRAVENOUS at 06:27

## 2024-03-04 RX ADMIN — HYDROMORPHONE HYDROCHLORIDE 0.2 MILLIGRAM(S): 2 INJECTION INTRAMUSCULAR; INTRAVENOUS; SUBCUTANEOUS at 15:00

## 2024-03-04 RX ADMIN — PIPERACILLIN AND TAZOBACTAM 25 GRAM(S): 4; .5 INJECTION, POWDER, LYOPHILIZED, FOR SOLUTION INTRAVENOUS at 21:48

## 2024-03-04 RX ADMIN — HYDROMORPHONE HYDROCHLORIDE 0.2 MILLIGRAM(S): 2 INJECTION INTRAMUSCULAR; INTRAVENOUS; SUBCUTANEOUS at 21:49

## 2024-03-04 RX ADMIN — MIDODRINE HYDROCHLORIDE 5 MILLIGRAM(S): 2.5 TABLET ORAL at 06:27

## 2024-03-04 RX ADMIN — Medication 650 MILLIGRAM(S): at 14:48

## 2024-03-04 RX ADMIN — POLYETHYLENE GLYCOL 3350 17 GRAM(S): 17 POWDER, FOR SOLUTION ORAL at 16:41

## 2024-03-04 RX ADMIN — PANTOPRAZOLE SODIUM 40 MILLIGRAM(S): 20 TABLET, DELAYED RELEASE ORAL at 06:27

## 2024-03-04 RX ADMIN — SENNA PLUS 2 TABLET(S): 8.6 TABLET ORAL at 06:26

## 2024-03-04 RX ADMIN — HYDROMORPHONE HYDROCHLORIDE 0.2 MILLIGRAM(S): 2 INJECTION INTRAMUSCULAR; INTRAVENOUS; SUBCUTANEOUS at 14:49

## 2024-03-04 RX ADMIN — PIPERACILLIN AND TAZOBACTAM 25 GRAM(S): 4; .5 INJECTION, POWDER, LYOPHILIZED, FOR SOLUTION INTRAVENOUS at 13:19

## 2024-03-04 RX ADMIN — OXYCODONE HYDROCHLORIDE 5 MILLIGRAM(S): 5 TABLET ORAL at 06:27

## 2024-03-04 RX ADMIN — OXYCODONE HYDROCHLORIDE 5 MILLIGRAM(S): 5 TABLET ORAL at 07:20

## 2024-03-04 RX ADMIN — LACTULOSE 20 GRAM(S): 10 SOLUTION ORAL at 16:41

## 2024-03-04 RX ADMIN — Medication 3 MILLIGRAM(S): at 06:28

## 2024-03-04 RX ADMIN — OXYCODONE HYDROCHLORIDE 5 MILLIGRAM(S): 5 TABLET ORAL at 01:29

## 2024-03-04 RX ADMIN — MIDODRINE HYDROCHLORIDE 5 MILLIGRAM(S): 2.5 TABLET ORAL at 13:19

## 2024-03-04 RX ADMIN — Medication 1 TABLET(S): at 09:35

## 2024-03-04 RX ADMIN — LACTULOSE 20 GRAM(S): 10 SOLUTION ORAL at 06:27

## 2024-03-04 RX ADMIN — OXYCODONE HYDROCHLORIDE 5 MILLIGRAM(S): 5 TABLET ORAL at 14:05

## 2024-03-04 RX ADMIN — OXYCODONE HYDROCHLORIDE 5 MILLIGRAM(S): 5 TABLET ORAL at 00:46

## 2024-03-04 RX ADMIN — LEVETIRACETAM 1000 MILLIGRAM(S): 250 TABLET, FILM COATED ORAL at 09:36

## 2024-03-04 RX ADMIN — Medication 1 TABLET(S): at 13:19

## 2024-03-04 RX ADMIN — CHLORHEXIDINE GLUCONATE 1 APPLICATION(S): 213 SOLUTION TOPICAL at 06:29

## 2024-03-04 RX ADMIN — POLYETHYLENE GLYCOL 3350 17 GRAM(S): 17 POWDER, FOR SOLUTION ORAL at 06:26

## 2024-03-04 RX ADMIN — OXYCODONE HYDROCHLORIDE 5 MILLIGRAM(S): 5 TABLET ORAL at 13:18

## 2024-03-04 RX ADMIN — SENNA PLUS 2 TABLET(S): 8.6 TABLET ORAL at 16:41

## 2024-03-04 RX ADMIN — Medication 1000 MILLIGRAM(S): at 18:14

## 2024-03-04 NOTE — PROGRESS NOTE ADULT - ASSESSMENT
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 He also began outpt WBRT as well as RT to C5-T5 recently (unclear how many fxs are completed to date).  He was readmitted to [facility] on 2/10 s/p seizure at home- admission imaging confirms progression of CNS mets since 2/2/24 imaging. He completed WBRT on this admission, 2/13-26. His hospital course is currently c/b progressive debility/weakness, hypoxia, and thrombocytopenia. Upon GOC discussion with pt's HCP Livier on 3/3, pt was transitioned to comfort measures, DNR/DNI code status.    ACTIVE PROBLEMS  Metastatic melanoma to multiple sites including brain/LMD  Goals of care counseling, discussion  Acute hypoxic respiratory distress  Secondary seizures  Dizziness  Hypotension  Cancer related pain  Constipation  Severe prot-calorie malnutrition    - Metastatic melanoma  - Goals of care counseling, discussion  Upon GOC discussion with pt's HCP Livier, pt was transitioned to comfort measures on 3/3  All current and future tx interventions are directed towards pt's comfort: labs, VS, FS, and non-essential meds are dced > dispo for inpt hospice placement will be complex as pt is uninsured with insufficient greencard status  Long-term prognosis remains poor, his death is progressively imminent: days  Pt is DNR/DNI    - Acute hypoxic resp distress  Pt with mild hypoxia (O2 sat 89-91%) overnight on 3/3, improved to > 92% on 2L NC  3/3 CXR with clear lungs  Lactate 2.2; procal pending  Continuing empiric Zosyn for projected 7d course  Continuing supportive resp care prn      - Secondary seizures  - CNS mets, LMD  - Dizziness  Appreciate Neuro Onc consult (Dr. 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 He also began outpt WBRT as well as RT to C5-T5 recently (unclear how many fxs are completed to date).  He was readmitted to [facility] on 2/10 s/p seizure at home- admission imaging confirms progression of CNS mets since 2/2/24 imaging. He completed WBRT on this admission, 2/13-26. His hospital course is currently c/b progressive debility/weakness, hypoxia, and thrombocytopenia. Upon GOC discussion with pt's HCP Livier on 3/3, pt was transitioned to comfort measures, DNR/DNI code status.    ACTIVE PROBLEMS  Metastatic melanoma to multiple sites including brain/LMD  Comfort measures only  Acute hypoxic respiratory distress  Secondary seizures  Cancer related pain    - Metastatic melanoma  - Comfort measures only  Upon GOC discussion with pt's HCP Livier, pt was transitioned to comfort measures on 3/3  All current and future tx interventions are directed towards pt's comfort: labs, VS, FS, and non-essential meds are dced > dispo for inpt hospice placement will be complex as pt is uninsured with insufficient greencard status  Long-term prognosis remains poor, his death is progressively imminent: days  Pt is DNR/DNI    - Acute hypoxic resp distress  3/3 CXR with clear lungs  Continuing empiric Zosyn for projected 7d course  Starting Glycopyrrolate 0.2mg q6/prn for secretions  Continuing supportive resp care prn with opiods as below for terminal resp distress    - Secondary seizures  - CNS mets, LMD  - Terminal delirium  Pt completed WBRT (10fxs) and pall RT to C5-T5 (5fxs) on this admission, 2/13-26  Continuing Keppra IV 1000mg q12  Completing steroid taper on 3/17  Starting Ativan IV 0.5mg q8/prn    - Cancer related pain  Oxy dced  Starting Dilaudid IV 0.2mg q6 standing with low threshold for Dilaudid gtt if pt develops worsening resp distress or outward s/s of discomfort    - Constipation  Continuing Senna 2 tabs nightly  Continuing Miralax 1 packet daily

## 2024-03-04 NOTE — PROGRESS NOTE ADULT - SUBJECTIVE AND OBJECTIVE BOX
SOLID TUMOR ONCOLOGY HOSPITALIST PROGRESS NOTE    S: No acute events overnight.  Pt was resting comfortably this morning.  However, in the afternoon RN endorsed rectal temp 101.6 and increased O2 requirement to 50% ventimask.  He was given Tylenol IV x1 and Dilaudid IV 0.2mg x1.    CURRENT MEDICATIONS  MEDICATIONS  (STANDING):  albuterol    90 MICROgram(s) HFA Inhaler 1 Puff(s) Inhalation every 4 hours  chlorhexidine 2% Cloths 1 Application(s) Topical <User Schedule>  dexAMETHasone  Injectable 3 milliGRAM(s) IV Push daily  influenza   Vaccine 0.5 milliLiter(s) IntraMuscular once  lactulose Syrup 20 Gram(s) Oral two times a day  levETIRAcetam   Injectable 1000 milliGRAM(s) IV Push every 12 hours  lidocaine   4% Patch 1 Patch Transdermal every 24 hours  midodrine. 5 milliGRAM(s) Oral three times a day  multivitamin 1 Tablet(s) Oral daily  oxyCODONE    IR 5 milliGRAM(s) Oral every 6 hours  pantoprazole    Tablet 40 milliGRAM(s) Oral every 12 hours  piperacillin/tazobactam IVPB.. 3.375 Gram(s) IV Intermittent every 8 hours  polyethylene glycol 3350 17 Gram(s) Oral two times a day  senna 2 Tablet(s) Oral two times a day  trimethoprim  160 mG/sulfamethoxazole 800 mG 1 Tablet(s) Oral <User Schedule>  MEDICATIONS  (PRN):  albuterol    0.083% 2.5 milliGRAM(s) Nebulizer every 8 hours PRN Shortness of Breath and/or Wheezing  bisacodyl Suppository 10 milliGRAM(s) Rectal daily PRN Constipation  meclizine 25 milliGRAM(s) Oral two times a day PRN Dizziness  melatonin 3 milliGRAM(s) Oral at bedtime PRN Insomnia  oxyCODONE    IR 5 milliGRAM(s) Oral every 4 hours PRN breakthrough pain      PHYSICAL EXAM  T(C): 38.7 (03-04-24 @ 12:07), Max: 38.7 (03-04-24 @ 12:07)  HR: 89 (03-04-24 @ 12:07) (85 - 89)  BP: 100/67 (03-04-24 @ 12:07) (87/55 - 100/67)  RR: 20 (03-04-24 @ 12:09) (19 - 20)  SpO2: 92% (03-04-24 @ 12:09) (86% - 95%)    03-03-24 @ 07:01  -  03-04-24 @ 07:00  --------------------------------------------------------  IN: 350 mL / OUT: 500 mL / NET: -150 mL  Pt sleeping comfortably this morning, he was not disturbed  Breaths non-labored      LABS discontinued.    PERTINENT RADIOLOGY  3/3 CXR: Clear lungs.    2/19 AXR: Nonspecific bowel gas pattern. Mild distention of stomach and small bowel  If symptoms persist recommend CT.    2/12 CTA h/n: Multiple enhancing brain metastases which have increased   since 2/2/2024. No midline shift. Normal CTA of the head and neck. No   large vessel occlusion.    2/2 CT a/p with IV contrast: New bilateral lobar hepatic metastatic disease. Pulmonary nodules better characterized on prior PET/CT, again concerning for metastases.   Postoperative changes of the right thumb with adjacent nonspecific soft   tissue thickening. Correlate with recent PET/CT findings. Slight gastric inflammatory changes along the greater curvature suggestive of gastritis. Correlate clinically.    1/29 MRI Total spine  1. CERVICAL SPINE:   Multiple metastases within the canal most   prominently at the C6 and C7 levels along the cord surface.  2. THORACIC SPINE:   Multiple metastases within the canal including cord   parenchymal lesions at T1 and T4 and multiple lesions along the cord   surface.  3. LUMBAR SPINE:   Multiple metastases within the cauda equina and distal   canal ependymal surfaces.

## 2024-03-05 PROCEDURE — 99233 SBSQ HOSP IP/OBS HIGH 50: CPT

## 2024-03-05 RX ORDER — ROBINUL 0.2 MG/ML
0.2 INJECTION INTRAMUSCULAR; INTRAVENOUS EVERY 6 HOURS
Refills: 0 | Status: DISCONTINUED | OUTPATIENT
Start: 2024-03-05 | End: 2024-03-06

## 2024-03-05 RX ORDER — HYDROMORPHONE HYDROCHLORIDE 2 MG/ML
0.4 INJECTION INTRAMUSCULAR; INTRAVENOUS; SUBCUTANEOUS EVERY 6 HOURS
Refills: 0 | Status: DISCONTINUED | OUTPATIENT
Start: 2024-03-05 | End: 2024-03-06

## 2024-03-05 RX ORDER — PANTOPRAZOLE SODIUM 20 MG/1
40 TABLET, DELAYED RELEASE ORAL EVERY 12 HOURS
Refills: 0 | Status: DISCONTINUED | OUTPATIENT
Start: 2024-03-05 | End: 2024-03-06

## 2024-03-05 RX ADMIN — BENZOCAINE AND MENTHOL 1 LOZENGE: 5; 1 LIQUID ORAL at 06:06

## 2024-03-05 RX ADMIN — LACTULOSE 20 GRAM(S): 10 SOLUTION ORAL at 06:04

## 2024-03-05 RX ADMIN — BENZOCAINE AND MENTHOL 1 LOZENGE: 5; 1 LIQUID ORAL at 00:17

## 2024-03-05 RX ADMIN — Medication 3 MILLIGRAM(S): at 06:05

## 2024-03-05 RX ADMIN — PANTOPRAZOLE SODIUM 40 MILLIGRAM(S): 20 TABLET, DELAYED RELEASE ORAL at 19:03

## 2024-03-05 RX ADMIN — PANTOPRAZOLE SODIUM 40 MILLIGRAM(S): 20 TABLET, DELAYED RELEASE ORAL at 06:05

## 2024-03-05 RX ADMIN — HYDROMORPHONE HYDROCHLORIDE 0.2 MILLIGRAM(S): 2 INJECTION INTRAMUSCULAR; INTRAVENOUS; SUBCUTANEOUS at 03:48

## 2024-03-05 RX ADMIN — HYDROMORPHONE HYDROCHLORIDE 0.4 MILLIGRAM(S): 2 INJECTION INTRAMUSCULAR; INTRAVENOUS; SUBCUTANEOUS at 18:53

## 2024-03-05 RX ADMIN — SENNA PLUS 2 TABLET(S): 8.6 TABLET ORAL at 18:54

## 2024-03-05 RX ADMIN — LEVETIRACETAM 1000 MILLIGRAM(S): 250 TABLET, FILM COATED ORAL at 11:02

## 2024-03-05 RX ADMIN — POLYETHYLENE GLYCOL 3350 17 GRAM(S): 17 POWDER, FOR SOLUTION ORAL at 06:06

## 2024-03-05 RX ADMIN — PIPERACILLIN AND TAZOBACTAM 25 GRAM(S): 4; .5 INJECTION, POWDER, LYOPHILIZED, FOR SOLUTION INTRAVENOUS at 21:17

## 2024-03-05 RX ADMIN — BENZOCAINE AND MENTHOL 1 LOZENGE: 5; 1 LIQUID ORAL at 18:56

## 2024-03-05 RX ADMIN — SENNA PLUS 2 TABLET(S): 8.6 TABLET ORAL at 06:05

## 2024-03-05 RX ADMIN — LEVETIRACETAM 1000 MILLIGRAM(S): 250 TABLET, FILM COATED ORAL at 21:17

## 2024-03-05 RX ADMIN — PIPERACILLIN AND TAZOBACTAM 25 GRAM(S): 4; .5 INJECTION, POWDER, LYOPHILIZED, FOR SOLUTION INTRAVENOUS at 14:52

## 2024-03-05 RX ADMIN — PIPERACILLIN AND TAZOBACTAM 25 GRAM(S): 4; .5 INJECTION, POWDER, LYOPHILIZED, FOR SOLUTION INTRAVENOUS at 06:05

## 2024-03-05 RX ADMIN — MIDODRINE HYDROCHLORIDE 5 MILLIGRAM(S): 2.5 TABLET ORAL at 11:06

## 2024-03-05 RX ADMIN — Medication 1 TABLET(S): at 11:06

## 2024-03-05 RX ADMIN — CHLORHEXIDINE GLUCONATE 1 APPLICATION(S): 213 SOLUTION TOPICAL at 06:17

## 2024-03-05 RX ADMIN — MIDODRINE HYDROCHLORIDE 5 MILLIGRAM(S): 2.5 TABLET ORAL at 06:05

## 2024-03-05 RX ADMIN — HYDROMORPHONE HYDROCHLORIDE 0.2 MILLIGRAM(S): 2 INJECTION INTRAMUSCULAR; INTRAVENOUS; SUBCUTANEOUS at 11:05

## 2024-03-05 RX ADMIN — LACTULOSE 20 GRAM(S): 10 SOLUTION ORAL at 18:54

## 2024-03-05 RX ADMIN — BENZOCAINE AND MENTHOL 1 LOZENGE: 5; 1 LIQUID ORAL at 11:07

## 2024-03-05 NOTE — PROGRESS NOTE ADULT - SUBJECTIVE AND OBJECTIVE BOX
SOLID TUMOR ONCOLOGY HOSPITALIST PROGRESS NOTE    S: No acute events overnight.  Pt complained of abd pain on first ask, but when asked again he said that he didn't have pain or want any pain medication.    CURRENT MEDICATIONS  MEDICATIONS  (STANDING):  albuterol    90 MICROgram(s) HFA Inhaler 1 Puff(s) Inhalation every 4 hours  benzocaine/menthol Lozenge 1 Lozenge Oral four times a day  chlorhexidine 2% Cloths 1 Application(s) Topical <User Schedule>  dexAMETHasone  Injectable 3 milliGRAM(s) IV Push daily  HYDROmorphone  Injectable 0.2 milliGRAM(s) IV Push every 6 hours  influenza   Vaccine 0.5 milliLiter(s) IntraMuscular once  lactulose Syrup 20 Gram(s) Oral two times a day  levETIRAcetam   Injectable 1000 milliGRAM(s) IV Push every 12 hours  lidocaine   4% Patch 1 Patch Transdermal every 24 hours  pantoprazole    Tablet 40 milliGRAM(s) Oral every 12 hours  piperacillin/tazobactam IVPB.. 3.375 Gram(s) IV Intermittent every 8 hours  polyethylene glycol 3350 17 Gram(s) Oral two times a day  senna 2 Tablet(s) Oral two times a day  trimethoprim  160 mG/sulfamethoxazole 800 mG 1 Tablet(s) Oral <User Schedule>  MEDICATIONS  (PRN):  acetaminophen     Tablet .. 650 milliGRAM(s) Oral every 6 hours PRN Temp greater or equal to 38C (100.4F)  albuterol    0.083% 2.5 milliGRAM(s) Nebulizer every 8 hours PRN Shortness of Breath and/or Wheezing  bisacodyl Suppository 10 milliGRAM(s) Rectal daily PRN Constipation  melatonin 3 milliGRAM(s) Oral at bedtime PRN Insomnia      PHYSICAL EXAM  T(C): 37 (03-05-24 @ 11:10), Max: 38.9 (03-04-24 @ 17:29)  HR: 77 (03-05-24 @ 11:10) (72 - 92)  BP: 90/50 (03-05-24 @ 11:10) (90/50 - 112/64)  RR: 20 (03-05-24 @ 11:10) (20 - 20)  SpO2: 97% (03-05-24 @ 11:10) (93% - 97%)    03-04-24 @ 07:01  -  03-05-24 @ 07:00  --------------------------------------------------------  IN: 0 mL / OUT: 600 mL / NET: -600 mL        LABS discontinued.

## 2024-03-05 NOTE — PROGRESS NOTE ADULT - PROVIDER SPECIALTY LIST ADULT
Heme/Onc
Hospitalist
Rehab Medicine
Heme/Onc
Neurology
Heme/Onc
Heme/Onc
Neurology
Hospitalist
Neurology
Neurology
Hospitalist
Hospitalist
Heme/Onc

## 2024-03-05 NOTE — PROGRESS NOTE ADULT - NUTRITIONAL ASSESSMENT
This patient has been assessed with a concern for Malnutrition and has been determined to have a diagnosis/diagnoses of Severe protein-calorie malnutrition.    This patient is being managed with:   Diet Regular-  Entered: Feb 11 2024  5:30PM  
This patient has been assessed with a concern for Malnutrition and has been determined to have a diagnosis/diagnoses of Severe protein-calorie malnutrition.    This patient is being managed with:   Diet Regular-  Minced and Moist (MINCEDMOIST)  Supplement Feeding Modality:  Oral  Ensure Plus High Protein Cans or Servings Per Day:  1       Frequency:  Two Times a day  Entered: Mar  1 2024  1:44PM  
This patient has been assessed with a concern for Malnutrition and has been determined to have a diagnosis/diagnoses of Severe protein-calorie malnutrition.    This patient is being managed with:   Diet Regular-  Entered: Feb 11 2024  5:30PM  
This patient has been assessed with a concern for Malnutrition and has been determined to have a diagnosis/diagnoses of Severe protein-calorie malnutrition.    This patient is being managed with:   Diet Regular-  Minced and Moist (MINCEDMOIST)  Supplement Feeding Modality:  Oral  Ensure Plus High Protein Cans or Servings Per Day:  1       Frequency:  Two Times a day  Entered: Mar  1 2024  1:44PM  
This patient has been assessed with a concern for Malnutrition and has been determined to have a diagnosis/diagnoses of Severe protein-calorie malnutrition.    This patient is being managed with:   Diet Regular-  Minced and Moist (MINCEDMOIST)  Supplement Feeding Modality:  Oral  Ensure Plus High Protein Cans or Servings Per Day:  1       Frequency:  Two Times a day  Entered: Mar  1 2024  1:44PM  
This patient has been assessed with a concern for Malnutrition and has been determined to have a diagnosis/diagnoses of Severe protein-calorie malnutrition.    This patient is being managed with:   Diet Regular-  Entered: Feb 11 2024  5:30PM  
This patient has been assessed with a concern for Malnutrition and has been determined to have a diagnosis/diagnoses of Severe protein-calorie malnutrition.    This patient is being managed with:   Diet Regular-  Minced and Moist (MINCEDMOIST)  Supplement Feeding Modality:  Oral  Ensure Plus High Protein Cans or Servings Per Day:  1       Frequency:  Two Times a day  Entered: Mar  1 2024  1:44PM  
This patient has been assessed with a concern for Malnutrition and has been determined to have a diagnosis/diagnoses of Severe protein-calorie malnutrition.    This patient is being managed with:   Diet Regular-  Entered: Feb 11 2024  5:30PM  

## 2024-03-05 NOTE — PROGRESS NOTE ADULT - NS ATTEND OPT1 GEN_ALL_CORE
I independently performed the documented:

## 2024-03-05 NOTE — PROGRESS NOTE ADULT - NS ATTEND OPT1A GEN_ALL_CORE
History/Exam/Medical decision making

## 2024-03-05 NOTE — PROGRESS NOTE ADULT - TIME BILLING
20 mins-Vitals, meds, new results/radiology, interdisciplinary charting reviewed   20 mins-Patient seen and examined and plan discussed, all questions were answered to the best of my ability  20 mins-Charting/documentation and orders.

## 2024-03-05 NOTE — PROGRESS NOTE ADULT - ASSESSMENT
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 He also began outpt WBRT as well as RT to C5-T5 recently (unclear how many fxs are completed to date).  He was readmitted to [facility] on 2/10 s/p seizure at home- admission imaging confirms progression of CNS mets since 2/2/24 imaging. He completed WBRT on this admission, 2/13-26. His hospital course is currently c/b progressive debility/weakness, hypoxia, and thrombocytopenia. Upon GOC discussion with pt's HCP Livier on 3/3, pt was transitioned to comfort measures, DNR/DNI code status.    ACTIVE PROBLEMS  Metastatic melanoma to multiple sites including brain/LMD  Comfort measures only  Acute hypoxic respiratory distress  Secondary seizures  Cancer related pain    - Metastatic melanoma  - Comfort measures only  Upon GOC discussion with pt's HCP Livier, pt was transitioned to comfort measures on 3/3  All current and future tx interventions are directed towards pt's comfort: labs, VS, FS, and non-essential meds are dced > dispo for inpt hospice placement will be complex as pt is uninsured with insufficient greencard status  Long-term prognosis remains poor, his death is progressively imminent: days  Pt is DNR/DNI    - Acute hypoxic resp distress  3/3 CXR with clear lungs  Continuing empiric Zosyn for projected 7d course  Starting Glycopyrrolate 0.2mg q6/prn for secretions  Continuing supportive resp care prn with opiods as below for terminal resp distress    - Secondary seizures  - CNS mets, LMD  - Terminal delirium  Pt completed WBRT (10fxs) and pall RT to C5-T5 (5fxs) on this admission, 2/13-26  Continuing Keppra IV 1000mg q12  Completing steroid taper on 3/17  Starting Ativan IV 0.5mg q8/prn    - Cancer related pain  Oxy dced  Increasing Dilaudid IV to 0.4mg q6 standing with low threshold for Dilaudid gtt if pt develops worsening resp distress or outward s/s of discomfort    - Constipation  Continuing Senna 2 tabs nightly  Continuing Miralax 1 packet daily

## 2024-03-06 ENCOUNTER — TRANSCRIPTION ENCOUNTER (OUTPATIENT)
Age: 49
End: 2024-03-06

## 2024-03-06 VITALS
DIASTOLIC BLOOD PRESSURE: 59 MMHG | OXYGEN SATURATION: 95 % | TEMPERATURE: 98 F | HEART RATE: 77 BPM | SYSTOLIC BLOOD PRESSURE: 97 MMHG | RESPIRATION RATE: 18 BRPM

## 2024-03-06 PROCEDURE — 99239 HOSP IP/OBS DSCHRG MGMT >30: CPT

## 2024-03-06 RX ORDER — ROBINUL 0.2 MG/ML
0.2 INJECTION INTRAMUSCULAR; INTRAVENOUS
Qty: 1 | Refills: 0
Start: 2024-03-06

## 2024-03-06 RX ORDER — ALBUTEROL 90 UG/1
1 AEROSOL, METERED ORAL
Qty: 1 | Refills: 0
Start: 2024-03-06

## 2024-03-06 RX ORDER — LACTULOSE 10 G/15ML
30 SOLUTION ORAL
Qty: 1800 | Refills: 0
Start: 2024-03-06 | End: 2024-04-04

## 2024-03-06 RX ORDER — LEVETIRACETAM 250 MG/1
1000 TABLET, FILM COATED ORAL
Qty: 1 | Refills: 0
Start: 2024-03-06

## 2024-03-06 RX ORDER — HYDROMORPHONE HYDROCHLORIDE 2 MG/ML
0.4 INJECTION INTRAMUSCULAR; INTRAVENOUS; SUBCUTANEOUS
Qty: 1 | Refills: 0
Start: 2024-03-06 | End: 2024-04-04

## 2024-03-06 RX ORDER — PANTOPRAZOLE SODIUM 20 MG/1
40 TABLET, DELAYED RELEASE ORAL
Qty: 30 | Refills: 0
Start: 2024-03-06 | End: 2024-04-04

## 2024-03-06 RX ORDER — POLYETHYLENE GLYCOL 3350 17 G/17G
17 POWDER, FOR SOLUTION ORAL
Qty: 0 | Refills: 0 | DISCHARGE
Start: 2024-03-06

## 2024-03-06 RX ORDER — LANOLIN ALCOHOL/MO/W.PET/CERES
1 CREAM (GRAM) TOPICAL
Qty: 0 | Refills: 0 | DISCHARGE
Start: 2024-03-06

## 2024-03-06 RX ORDER — DEXAMETHASONE 0.5 MG/5ML
8.33 ELIXIR ORAL
Qty: 249.9 | Refills: 0
Start: 2024-03-06 | End: 2024-04-04

## 2024-03-06 RX ORDER — SENNA PLUS 8.6 MG/1
2 TABLET ORAL
Qty: 0 | Refills: 0 | DISCHARGE
Start: 2024-03-06

## 2024-03-06 RX ORDER — BENZOCAINE AND MENTHOL 5; 1 G/100ML; G/100ML
1 LIQUID ORAL
Qty: 1 | Refills: 0
Start: 2024-03-06

## 2024-03-06 RX ORDER — DEXAMETHASONE 0.5 MG/5ML
12.5 ELIXIR ORAL
Qty: 0 | Refills: 0 | DISCHARGE
Start: 2024-03-06

## 2024-03-06 RX ORDER — ALBUTEROL 90 UG/1
2 AEROSOL, METERED ORAL
Qty: 1 | Refills: 0
Start: 2024-03-06

## 2024-03-06 RX ORDER — LIDOCAINE 4 G/100G
1 CREAM TOPICAL
Qty: 1 | Refills: 0
Start: 2024-03-06 | End: 2024-04-04

## 2024-03-06 RX ORDER — DEXAMETHASONE 0.5 MG/5ML
3 ELIXIR ORAL
Qty: 1 | Refills: 0
Start: 2024-03-06

## 2024-03-06 RX ORDER — ACETAMINOPHEN 500 MG
2 TABLET ORAL
Qty: 0 | Refills: 0 | DISCHARGE
Start: 2024-03-06

## 2024-03-06 RX ADMIN — HYDROMORPHONE HYDROCHLORIDE 0.4 MILLIGRAM(S): 2 INJECTION INTRAMUSCULAR; INTRAVENOUS; SUBCUTANEOUS at 14:33

## 2024-03-06 RX ADMIN — PIPERACILLIN AND TAZOBACTAM 25 GRAM(S): 4; .5 INJECTION, POWDER, LYOPHILIZED, FOR SOLUTION INTRAVENOUS at 05:14

## 2024-03-06 RX ADMIN — PIPERACILLIN AND TAZOBACTAM 25 GRAM(S): 4; .5 INJECTION, POWDER, LYOPHILIZED, FOR SOLUTION INTRAVENOUS at 14:34

## 2024-03-06 RX ADMIN — LIDOCAINE 1 PATCH: 4 CREAM TOPICAL at 08:26

## 2024-03-06 RX ADMIN — LACTULOSE 20 GRAM(S): 10 SOLUTION ORAL at 05:18

## 2024-03-06 RX ADMIN — SENNA PLUS 2 TABLET(S): 8.6 TABLET ORAL at 05:20

## 2024-03-06 RX ADMIN — BENZOCAINE AND MENTHOL 1 LOZENGE: 5; 1 LIQUID ORAL at 05:13

## 2024-03-06 RX ADMIN — Medication 1 TABLET(S): at 09:35

## 2024-03-06 RX ADMIN — BENZOCAINE AND MENTHOL 1 LOZENGE: 5; 1 LIQUID ORAL at 14:33

## 2024-03-06 RX ADMIN — LEVETIRACETAM 1000 MILLIGRAM(S): 250 TABLET, FILM COATED ORAL at 09:34

## 2024-03-06 RX ADMIN — PANTOPRAZOLE SODIUM 40 MILLIGRAM(S): 20 TABLET, DELAYED RELEASE ORAL at 05:13

## 2024-03-06 RX ADMIN — CHLORHEXIDINE GLUCONATE 1 APPLICATION(S): 213 SOLUTION TOPICAL at 05:18

## 2024-03-06 RX ADMIN — HYDROMORPHONE HYDROCHLORIDE 0.4 MILLIGRAM(S): 2 INJECTION INTRAMUSCULAR; INTRAVENOUS; SUBCUTANEOUS at 07:31

## 2024-03-06 RX ADMIN — POLYETHYLENE GLYCOL 3350 17 GRAM(S): 17 POWDER, FOR SOLUTION ORAL at 05:18

## 2024-03-06 RX ADMIN — Medication 3 MILLIGRAM(S): at 05:13

## 2024-03-06 RX ADMIN — HYDROMORPHONE HYDROCHLORIDE 0.4 MILLIGRAM(S): 2 INJECTION INTRAMUSCULAR; INTRAVENOUS; SUBCUTANEOUS at 01:00

## 2024-03-06 RX ADMIN — BENZOCAINE AND MENTHOL 1 LOZENGE: 5; 1 LIQUID ORAL at 01:00

## 2024-03-06 RX ADMIN — LIDOCAINE 1 PATCH: 4 CREAM TOPICAL at 05:19

## 2024-03-06 NOTE — DISCHARGE NOTE NURSING/CASE MANAGEMENT/SOCIAL WORK - PATIENT PORTAL LINK FT
You can access the FollowMyHealth Patient Portal offered by [facility] by registering at the following website: [email or web address]. By joining [facility]’s FollowMyHealth portal, you will also be able to view your health information using other applications (apps) compatible with our system.

## 2024-03-06 NOTE — DISCHARGE NOTE PROVIDER - ATTENDING DISCHARGE PHYSICAL EXAMINATION:
Vital Signs Last 24 Hrs  T(C): 36.8 (06 Mar 2024 11:20), Max: 37.2 (05 Mar 2024 21:00)  T(F): 98.2 (06 Mar 2024 11:20), Max: 99 (05 Mar 2024 21:00)  HR: 77 (06 Mar 2024 11:20) (62 - 87)  BP: 97/59 (06 Mar 2024 11:20) (90/57 - 104/69)  RR: 18 (06 Mar 2024 11:20) (18 - 20)  SpO2: 95% (06 Mar 2024 11:20) (95% - 98%)  Parameters below as of 06 Mar 2024 05:05  Patient On (Oxygen Delivery Method): mask, Venturi  Pt sleeping comfortably this morning, he was not disturbed  Breaths non-labored

## 2024-03-06 NOTE — DISCHARGE NOTE PROVIDER - NSDCCPCAREPLAN_GEN_ALL_CORE_FT
PRINCIPAL DISCHARGE DIAGNOSIS  Diagnosis: Seizure  Assessment and Plan of Treatment: Your admission imaging confirms progression of CNS mets since 2/2/24 imaging. You completed WBRT on this admission, 2/13-26. Your hospital course is currently c/b progressive debility/weakness, hypoxia, and thrombocytopenia. Upon [facility] discussion with HCP Livier on 3/3, you were transitioned to comfort measures, DNR/DNI code status.        SECONDARY DISCHARGE DIAGNOSES  Diagnosis: H/O brain tumor  Assessment and Plan of Treatment: You were transitioned to comfort measures.  Continue tapering steriods every 5 days by 1 mg. You finished 3 out of the 5 days of 3 mg daily. Continue taking 3 mg of decadron for the next two days, afterwords continue the taper by decreasing the dose to 2 mg daily of decadron for the next 5 days.     PRINCIPAL DISCHARGE DIAGNOSIS  Diagnosis: Seizure  Assessment and Plan of Treatment: Your admission imaging confirms progression of CNS mets since 2/2/24 imaging. You completed WBRT on this admission, 2/13-26. Your hospital course is currently c/b progressive debility/weakness, hypoxia, and thrombocytopenia. Upon [facility] discussion with HCP Livier on 3/3, you were transitioned to comfort measures, DNR/DNI code status.        SECONDARY DISCHARGE DIAGNOSES  Diagnosis: H/O brain tumor  Assessment and Plan of Treatment: You were transitioned to comfort measures.  Continue tapering steriods every 5 days by 1 mg. You finished 3 out of the 5 days of 3 mg daily. Continue taking 3 mg of decadron for the next two days, afterwords continue the taper by decreasing the dose to 2 mg daily of decadron for the next 5 days.    Diagnosis: Comfort measures only status  Assessment and Plan of Treatment:     Diagnosis: Delirium  Assessment and Plan of Treatment:     Diagnosis: Cancer related pain  Assessment and Plan of Treatment:     Diagnosis: Sepsis with hypotension  Assessment and Plan of Treatment:

## 2024-03-06 NOTE — DISCHARGE NOTE PROVIDER - NSDCFUSCHEDAPPT_GEN_ALL_CORE_FT
Neo Marcos  [facility]  [facility]  Scheduled Appointment: 03/14/2024    [facility]  [facility]  Scheduled Appointment: 03/14/2024    [facility]  [facility]  Scheduled Appointment: 04/11/2024

## 2024-03-06 NOTE — DISCHARGE NOTE PROVIDER - NSDCMRMEDTOKEN_GEN_ALL_CORE_FT
acetaminophen 325 mg oral tablet: 2 tab(s) orally every 6 hours As needed Temp greater or equal to 38C (100.4F)  dexAMETHasone 6 mg/25 mL-NaCl 0.9% intravenous solution: 12.5 milliliter(s) intravenous once a day  melatonin 3 mg oral tablet: 1 tab(s) orally once a day (at bedtime) As needed Insomnia  polyethylene glycol 3350 oral powder for reconstitution: 17 gram(s) orally 2 times a day  senna leaf extract oral tablet: 2 tab(s) orally 2 times a day

## 2024-03-06 NOTE — DISCHARGE NOTE PROVIDER - DETAILS OF MALNUTRITION DIAGNOSIS/DIAGNOSES
This patient has been assessed with a concern for Malnutrition and was treated during this hospitalization for the following Nutrition diagnosis/diagnoses:     -  02/13/2024: Severe protein-calorie malnutrition

## 2024-03-06 NOTE — DISCHARGE NOTE PROVIDER - HOSPITAL COURSE
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 He also began outpt WBRT as well as RT to C5-T5 recently (unclear how many fxs are completed to date).  He was readmitted to [facility] on 2/10 s/p seizure at home- admission imaging confirms progression of CNS mets since 2/2/24 imaging. He completed WBRT on this admission, 2/13-26. His hospital course is currently c/b progressive debility/weakness, hypoxia, and thrombocytopenia. Upon GOC discussion with pt's HCP Livier on 3/3, pt was transitioned to comfort measures, DNR/DNI code status.    ACTIVE PROBLEMS  Metastatic melanoma to multiple sites including brain/LMD  Comfort measures only  Acute hypoxic respiratory distress  Secondary seizures  Cancer related pain    - Metastatic melanoma  - Comfort measures only  Upon GOC discussion with pt's HCP Livier, pt was transitioned to comfort measures on 3/3  All current and future tx interventions are directed towards pt's comfort: labs, VS, FS, and non-essential meds are dced > dispo for inpt hospice placement will be complex as pt is uninsured with insufficient greencard status  Long-term prognosis remains poor, his death is progressively imminent: days  Pt is DNR/DNI    - Acute hypoxic resp distress  3/3 CXR with clear lungs  Continuing empiric Zosyn for projected 7d course  Starting Glycopyrrolate 0.2mg q6/prn for secretions  Continuing supportive resp care prn with opiods as below for terminal resp distress    - Secondary seizures  - CNS mets, LMD  - Terminal delirium  Pt completed WBRT (10fxs) and pall RT to C5-T5 (5fxs) on this admission, 2/13-26  Continuing Keppra IV 1000mg q12  Completing steroid taper on 3/17  Starting Ativan IV 0.5mg q8/prn    - Cancer related pain  Oxy dced  Starting Dilaudid IV 0.2mg q6 standing with low threshold for Dilaudid gtt if pt develops worsening resp distress or outward s/s of discomfort    - Constipation  Continuing Senna 2 tabs nightly  Continuing Miralax 1 packet daily    On 3/6/2024 this case was reviewed with Dr. Hunter, the patient is medically stable and optimized for discharge. All medications were reviewed and prescriptions were sent to mutually agreed upon pharmacy.     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 He also began outpt WBRT as well as RT to C5-T5 recently (unclear how many fxs are completed to date).  He was readmitted to [facility] on 2/10 s/p seizure at home- admission imaging confirms progression of CNS mets since 2/2/24 imaging. He completed WBRT on this admission, 2/13-26. His hospital course is currently c/b progressive debility/weakness, hypoxia, and thrombocytopenia. Upon GOC discussion with pt's HCP Livier on 3/3, pt was transitioned to comfort measures, DNR/DNI code status.      - Metastatic melanoma  - Comfort measures only  Upon GOC discussion with pt's HCP Livier, pt was transitioned to comfort measures on 3/3  All current and future tx interventions are directed towards pt's comfort: labs, VS, FS, and non-essential meds are dced > dispo for inpt hospice placement will be complex as pt is uninsured with insufficient greencard status  Long-term prognosis remains poor, his death is progressively imminent: days  Pt is DNR/DNI    - Acute hypoxic resp distress  3/3 CXR with clear lungs  Completed Zosyn on 3/6  Cont Glycopyrrolate 0.2mg q6/prn for secretions  Cont supportive resp care prn with opiods as below for terminal resp distress    - Secondary seizures  - CNS mets, LMD  - Terminal delirium  Pt completed WBRT (10fxs) and pall RT to C5-T5 (5fxs) on this admission, 2/13-26  Cont Keppra 1000mg q12  Completing steroid taper on 3/17  Cont Ativan 0.5mg q8/prn    - Cancer related pain  Oxy dced  Cont Dilaudid 0.2mg q6 standing with low threshold for Dilaudid gtt if pt develops worsening resp distress or outward s/s of discomfort    - Constipation  Cont Senna 2 tabs nightly  Cont Miralax 1 packet daily    The patient is medically stable and optimized for discharge. All medications were reviewed and prescriptions were sent to mutually agreed upon pharmacy.

## 2024-03-06 NOTE — DISCHARGE NOTE NURSING/CASE MANAGEMENT/SOCIAL WORK - NSDCCRNAME_GEN_ALL_CORE_FT
[facility] address: [address], [place] [address], 1:30pmpick up via [facility] BLS

## 2024-03-06 NOTE — DISCHARGE NOTE PROVIDER - CARE PROVIDER_API CALL
Follow up with your Pallitive care physician.,   Phone: (   )    -  Fax: (   )    -  Follow Up Time:

## 2024-03-07 ENCOUNTER — OUTPATIENT (OUTPATIENT)
Dept: OUTPATIENT SERVICES | Facility: HOSPITAL | Age: 49
LOS: 1 days | Discharge: HOME | End: 2024-03-07

## 2024-03-07 DIAGNOSIS — C43.9 MALIGNANT MELANOMA OF SKIN, UNSPECIFIED: ICD-10-CM

## 2024-03-07 DIAGNOSIS — Z98.890 OTHER SPECIFIED POSTPROCEDURAL STATES: Chronic | ICD-10-CM

## 2024-03-07 DIAGNOSIS — C79.9 SECONDARY MALIGNANT NEOPLASM OF UNSPECIFIED SITE: ICD-10-CM

## 2024-03-07 DIAGNOSIS — C43.60 MALIGNANT MELANOMA OF UNSPECIFIED UPPER LIMB, INCLUDING SHOULDER: Chronic | ICD-10-CM

## 2024-03-07 DIAGNOSIS — C79.31 SECONDARY MALIGNANT NEOPLASM OF BRAIN: ICD-10-CM

## 2024-03-14 ENCOUNTER — APPOINTMENT (OUTPATIENT)
Dept: HEMATOLOGY ONCOLOGY | Facility: CLINIC | Age: 49
End: 2024-03-14

## 2024-03-14 ENCOUNTER — APPOINTMENT (OUTPATIENT)
Dept: INFUSION THERAPY | Facility: HOSPITAL | Age: 49
End: 2024-03-14

## 2024-04-11 ENCOUNTER — APPOINTMENT (OUTPATIENT)
Dept: INFUSION THERAPY | Facility: HOSPITAL | Age: 49
End: 2024-04-11

## 2024-05-07 NOTE — PATIENT PROFILE ADULT - HEALTH LITERACY
Patient tolerated Darzalex/Zometa well today; no adverse reaction noted.  POC reviewed with pt.  NAD noted upon discharge.   Has f/u appt(s) scheduled per MD request.    Problem: Adult Inpatient Plan of Care  Goal: Plan of Care Review  Outcome: Progressing  Flowsheets (Taken 5/7/2024 1528)  Plan of Care Reviewed With: patient  Goal: Patient-Specific Goal (Individualized)  Outcome: Progressing  Flowsheets (Taken 5/7/2024 1528)  Individualized Care Needs: legs elevated  Anxieties, Fears or Concerns: pt c/o itching  Goal: Optimal Comfort and Wellbeing  Outcome: Progressing  Intervention: Provide Person-Centered Care  Flowsheets (Taken 5/7/2024 1528)  Trust Relationship/Rapport:   care explained   questions answered   choices provided   questions encouraged   emotional support provided   reassurance provided   empathic listening provided   thoughts/feelings acknowledged      no

## 2024-12-05 NOTE — ED PROVIDER NOTE - PROGRESS NOTE DETAILS
supervision/verbal cues
Inessa Gold DO PGY-3  Pt signed out to me earlier pending CT results.     Patient re-assessed. Vitals stable. Pain has improved after interventions. Test results explained to patient including findings of metastatic disease, incidentals.   Patient feels well and comfortable going home.  Tolerated p.o. in the ER.    Discussed the results of the ER work up and plan for discharge home with the patient. Advised return precautions for any alarming or worsening symptoms, or any other concerns. Recommended that the patient call their primary care doctor today, inform them of their visit to the ER, and to obtain repeat evaluation from their PCP in the next 1-3 days. Patient expresses understanding and agrees with the plan for follow up. At the time of discharge the patient remained stable, in no acute distress.

## 2024-12-19 NOTE — H&P PST ADULT - NSANTHGENDERRD_ENT_A_CORE
Caller: Wilner Jacobson    Relationship: Self    Best call back number: [phone]     Requested Prescriptions:   Requested Prescriptions     Pending Prescriptions Disp Refills    atorvastatin (LIPITOR) 40 MG tablet 90 tablet 3     Sig: Take 1 tablet by mouth Daily.        Pharmacy where request should be sent: [facility] #[id number] - [place], [place] - [address] [facility] [place] [facility] - [phone] [facility] [phone]     Last office visit with prescribing clinician: 5/29/2024   Last telemedicine visit with prescribing clinician: Visit date not found   Next office visit with prescribing clinician: Visit date not found       Does the patient have less than a 3 day supply:  [] Yes  [x] No      PATIENT WOULD LIKE FOR Abrahan Rick DO TO CALL HER.        Janet Cheney Rep   12/19/24 14:52 EST        Yes

## 2025-02-21 NOTE — ED ADULT NURSE NOTE - BEFAST BALANCE
Pt calling about the  Rybucarleeus ? For diabetes med it is $3,000 for this he would like to talk with you   
No

## (undated) DEVICE — ELCTR BIPOLAR CORD AESCULAP 12FT DISP

## (undated) DEVICE — ELCTR BOVIE PENCIL HANDPIECE

## (undated) DEVICE — ELCTR SUBDERMAL CORKSCREW NDL 1.2MM

## (undated) DEVICE — DRSG CURITY GAUZE SPONGE 4 X 4" 12-PLY

## (undated) DEVICE — SUT SOFSILK 0 30" V-20

## (undated) DEVICE — DRSG TEGADERM 6"X8"

## (undated) DEVICE — XI OBTURATOR OPTICAL BLADELESS 8MM

## (undated) DEVICE — DRSG TAPE HYPAFIX 4"

## (undated) DEVICE — XI STAPLER SUREFORM 60

## (undated) DEVICE — SOL IRR POUR H2O 250ML

## (undated) DEVICE — GOWN XL

## (undated) DEVICE — Device

## (undated) DEVICE — PREP CHLORAPREP HI-LITE ORANGE 26ML

## (undated) DEVICE — DRAIN JACKSON PRATT 7MM FLAT FULL NO TROCAR

## (undated) DEVICE — MIDAS REX LEGEND BALL FLUTED MEDNEXT SM BORE 3.0MM X 10CM

## (undated) DEVICE — PACK BREAST MAJOR

## (undated) DEVICE — DRAPE INSTRUMENT POUCH 6.75" X 11"

## (undated) DEVICE — AESCULAP SCALPFIX 10 CLIPS

## (undated) DEVICE — RUBBER BANDS STERILE

## (undated) DEVICE — SUT VICRYL 2-0 27" SH UNDYED

## (undated) DEVICE — PREP CHLORAPREP HI-LITE ORANGE 10.5ML

## (undated) DEVICE — DRAPE 1/2 SHEET 40X57"

## (undated) DEVICE — MIDAS REX LEGEND BALL DIAMOND SM BORE 3.0MM X 10CM

## (undated) DEVICE — SNAP ON SPHERZ 5 PACK

## (undated) DEVICE — GLV 7 PROTEXIS (WHITE)

## (undated) DEVICE — GLV 8.5 PROTEXIS (WHITE)

## (undated) DEVICE — SYR LUER LOK 20CC

## (undated) DEVICE — DRAPE SPLIT SHEET 77" X 120"

## (undated) DEVICE — ELCTR MONOPOLAR STIMULATOR PROBE FLUSH-TIP

## (undated) DEVICE — XI DRAPE COLUMN

## (undated) DEVICE — CHEST DRAIN PLEUR-EVAC DRY/WET ADULT-PEDS SINGLE (QUICK)

## (undated) DEVICE — BLADE SCALPEL SAFETYLOCK #15

## (undated) DEVICE — SPECIMEN CONTAINER 100ML

## (undated) DEVICE — GLV 8 PROTEXIS (WHITE)

## (undated) DEVICE — DRSG STERISTRIPS 0.5 X 4"

## (undated) DEVICE — CANISTER KCI 500ML GEL SENSA TRAC

## (undated) DEVICE — GLV 6.5 PROTEXIS (WHITE)

## (undated) DEVICE — GLV 7 PROTEXIS (CREAM) MICRO

## (undated) DEVICE — TUBING SUCTION 20FT

## (undated) DEVICE — GLV 7.5 PROTEXIS (WHITE)

## (undated) DEVICE — DRAPE LIGHT HANDLE COVER (BLUE)

## (undated) DEVICE — DRAPE 3/4 SHEET W REINFORCEMENT 56X77"

## (undated) DEVICE — STAPLER SKIN VISI-STAT 35 WIDE

## (undated) DEVICE — SUT VICRYL 3-0 18" X-1 (POP-OFF)

## (undated) DEVICE — CUSA TIP ASPIRATOR PACK 1523211-1517079

## (undated) DEVICE — WARMING BLANKET LOWER ADULT

## (undated) DEVICE — XI DRAPE ARM

## (undated) DEVICE — ELCTR BOVIE PENCIL SMOKE EVACUATION

## (undated) DEVICE — DRSG DERMABOND PRINEO 22CM

## (undated) DEVICE — DRAPE MICROSCOPE OPMI VISIONGUARD 48X82"

## (undated) DEVICE — SUT MONOCRYL 4-0 27" PS-2 UNDYED

## (undated) DEVICE — SOL IRR POUR NS 0.9% 500ML

## (undated) DEVICE — BLADE SURGICAL #10 STAINLESS

## (undated) DEVICE — SUT VICRYL 0 27" UR-6

## (undated) DEVICE — BLADE SURGICAL #15 CARBON

## (undated) DEVICE — XI SEAL UNIV 5- 8 MM

## (undated) DEVICE — ENDOCATCH GENERAL 10MM (PURPLE)

## (undated) DEVICE — ELCTR GROUNDING PAD ADULT COVIDIEN

## (undated) DEVICE — ELCTR BOVIE TIP BLADE INSULATED 2.75" EDGE

## (undated) DEVICE — DRSG XEROFORM 1 X 8"

## (undated) DEVICE — TROCAR COVIDIEN VERSAPORT PLUS BLADELESS FIXATION 15MM

## (undated) DEVICE — POSITIONER FOAM HEAD CRADLE (PINK)

## (undated) DEVICE — LAP PAD 18 X 18"

## (undated) DEVICE — VENODYNE/SCD SLEEVE CALF MEDIUM

## (undated) DEVICE — DRSG KLING 4"

## (undated) DEVICE — ELCTR SUBDERMAL NDL 27G X 1/2" WITH TWISTED PAIR

## (undated) DEVICE — SUT VICRYL 2-0 18" CP-2 UNDYED (POP-OFF)

## (undated) DEVICE — GRASPER LAPA 5MMX35CM

## (undated) DEVICE — POSITIONER FOAM EGG CRATE ULNAR 2PCS (PINK)

## (undated) DEVICE — CHEST DRAIN PLEUR-EVAC DRY/DRY ADULT-PEDS SINGLE (QUICK)

## (undated) DEVICE — PACK ROBOTIC LIJ

## (undated) DEVICE — SUT NUROLON 4-0 8-18" TF (POP-OFF)

## (undated) DEVICE — DRAPE 3/4 SHEET 52X76"

## (undated) DEVICE — SUT SILK 2-0 30" SH

## (undated) DEVICE — FOLEY TRAY 16FR 5CC LTX UMETER CLOSED

## (undated) DEVICE — MEDICATION LABELS W MARKER

## (undated) DEVICE — TUBING STRYKEFLOW II SUCTION / IRRIGATOR

## (undated) DEVICE — SUT VICRYL 3-0 27" SH UNDYED

## (undated) DEVICE — DRAPE TOWEL BLUE 17" X 24"

## (undated) DEVICE — DRAPE MAYO STAND 30"

## (undated) DEVICE — XI ENDOWRIST STAPLER SHEATH

## (undated) DEVICE — ELCTR SUBDERMAL NDL CLASSIC 1.5M X 59" (6 COLOR)

## (undated) DEVICE — ELCTR BOVIE TIP BLADE INSULATED 6.5" EDGE

## (undated) DEVICE — DRSG GAUZE PACKTNER ROLL

## (undated) DEVICE — BLADE SCALPEL SAFETYLOCK #10

## (undated) DEVICE — CODMAN PERFORATOR 14MM (BLUE)

## (undated) DEVICE — LABELS BLANK W PEN

## (undated) DEVICE — MIDAS REX LEGEND LUBRICANT DIFFUSER CARTRIDGE

## (undated) DEVICE — NDL HYPO REGULAR BEVEL 22G X 1.5" (TURQUOISE)

## (undated) DEVICE — DRSG TELFA 3 X 8

## (undated) DEVICE — ELCTR BIPOLAR CORD J&J 12FT DISP

## (undated) DEVICE — WARMING BLANKET FULL ADULT

## (undated) DEVICE — ELCTR PEDICLE SCREW PROBE 3MM BALL 1.8MM X 100MM

## (undated) DEVICE — MIDAS REX LEGEND TAPERED SM BORE 2.3MM X 8CM

## (undated) DEVICE — DRAIN RESERVOIR FOR JACKSON PRATT 100CC CARDINAL

## (undated) DEVICE — DRSG VAC GRANUFOAM LARGE (BLACK)

## (undated) DEVICE — DRAPE MINOR PROCEDURE

## (undated) DEVICE — MARKING PEN W RULER

## (undated) DEVICE — PREP BETADINE KIT

## (undated) DEVICE — XI ARM GRASPER BIPOLAR LONG 8MM

## (undated) DEVICE — DRSG OPSITE 13.75 X 4"

## (undated) DEVICE — XI ARM FORCEP CADIERE 8MM

## (undated) DEVICE — BIPOLAR FORCEP SYMMETRY BAYONET 7" X 1.5MM SMOOTH (SILVER)

## (undated) DEVICE — BIPOLAR FORCEP CODMAN VERSATRU 8" X 0.5MM (BLUE)

## (undated) DEVICE — DRSG TELFA .5 X 3

## (undated) DEVICE — ELCTR BIPOLAR PROBE

## (undated) DEVICE — WARMING BLANKET UPPER ADULT

## (undated) DEVICE — SUT PROLENE 0 30" CT-1

## (undated) DEVICE — XI ENDOWRIST 12 - 8 MM CANNULA REDUCER

## (undated) DEVICE — BASIN SET DOUBLE

## (undated) DEVICE — POSITIONER STRAP ARMBOARD VELCRO TS-30

## (undated) DEVICE — GOWN TRIMAX LG

## (undated) DEVICE — APPLICATOR FOR PROGEL EXTENDED SPRAY TIP 29CM

## (undated) DEVICE — TUBING OLYMPUS INSUFFLATION

## (undated) DEVICE — XI ARM GRASPER TIP UP FENESTRATED

## (undated) DEVICE — DRAIN JACKSON PRATT 10MM FLAT FULL NO TROCAR

## (undated) DEVICE — DRSG TEGADERM 2.5X3"

## (undated) DEVICE — VISITEC 4X4

## (undated) DEVICE — XI 12MM AND STAPLER CANNULA SEAL

## (undated) DEVICE — STAPLER COVIDIEN ENDO GIA STANDARD HANDLE

## (undated) DEVICE — SUT MONOCRYL 4-0 18" P-3 UNDYED

## (undated) DEVICE — MIDAS REX LEGEND TAPERED SM BORE 1.1MM X 8CM

## (undated) DEVICE — GLV 8 PROTEXIS (CREAM) MICRO

## (undated) DEVICE — VENODYNE/SCD SLEEVE CALF LARGE

## (undated) DEVICE — D HELP - CLEARVIEW CLEARIFY SYSTEM